# Patient Record
Sex: MALE | Race: WHITE | NOT HISPANIC OR LATINO | Employment: OTHER | URBAN - METROPOLITAN AREA
[De-identification: names, ages, dates, MRNs, and addresses within clinical notes are randomized per-mention and may not be internally consistent; named-entity substitution may affect disease eponyms.]

---

## 2017-01-06 ENCOUNTER — OFFICE VISIT (OUTPATIENT)
Dept: CARDIOLOGY | Facility: CLINIC | Age: 72
End: 2017-01-06
Attending: INTERNAL MEDICINE
Payer: COMMERCIAL

## 2017-01-06 VITALS
BODY MASS INDEX: 25.9 KG/M2 | DIASTOLIC BLOOD PRESSURE: 58 MMHG | HEIGHT: 71 IN | HEART RATE: 68 BPM | WEIGHT: 185 LBS | SYSTOLIC BLOOD PRESSURE: 118 MMHG

## 2017-01-06 DIAGNOSIS — I10 ESSENTIAL HYPERTENSION WITH GOAL BLOOD PRESSURE LESS THAN 140/90: Primary | ICD-10-CM

## 2017-01-06 DIAGNOSIS — E78.5 HYPERLIPIDEMIA WITH TARGET LDL LESS THAN 100: ICD-10-CM

## 2017-01-06 DIAGNOSIS — R07.89 ATYPICAL CHEST PAIN: ICD-10-CM

## 2017-01-06 DIAGNOSIS — I77.810 AORTIC ROOT DILATATION (H): ICD-10-CM

## 2017-01-06 DIAGNOSIS — I25.10 CORONARY ARTERY DISEASE INVOLVING NATIVE CORONARY ARTERY OF NATIVE HEART WITHOUT ANGINA PECTORIS: ICD-10-CM

## 2017-01-06 PROCEDURE — 99214 OFFICE O/P EST MOD 30 MIN: CPT | Performed by: INTERNAL MEDICINE

## 2017-01-06 RX ORDER — NITROGLYCERIN 0.4 MG/1
TABLET SUBLINGUAL
Qty: 25 TABLET | Refills: 3 | Status: SHIPPED | OUTPATIENT
Start: 2017-01-06 | End: 2020-08-07

## 2017-01-06 RX ORDER — ROSUVASTATIN CALCIUM 40 MG/1
40 TABLET, COATED ORAL DAILY
Qty: 90 TABLET | Refills: 3 | Status: SHIPPED | OUTPATIENT
Start: 2017-01-06 | End: 2017-05-30

## 2017-01-06 NOTE — MR AVS SNAPSHOT
After Visit Summary   1/6/2017    Javier Tamez    MRN: [id number]           Patient Information     Date Of Birth          1945        Visit Information        Provider Department      1/6/2017 3:15 PM Miguelito Sim MD [facility] AT [place]        Today's Diagnoses     Essential hypertension with goal blood pressure less than 140/90    -  1     CAD (coronary artery disease)         Aortic stenosis         Coronary artery disease involving native coronary artery of native heart without angina pectoris            Follow-ups after your visit        Your next 10 appointments already scheduled     Jan 17, 2017  9:30 AM   NM SH CV MPI MULT RST ST 1 DAY with SCINM1   [facility] CV Nuclear Medicine (Cardiovascular Imaging at [facility])    [address]  [address]  [place] [address] [phone]           For a ONE day exam: Allow 3-4 hours for test. For a TWO day exam: Allow 2 hours PER day for test.  You may need to stop some medicines before the test. Follow your doctor s orders. - If you take a beta blocker: Follow your doctor s specific instructions on taking it prior to and on the day of your exam. - If you take Aggrenox or dipyridamole (Persantine, Permole), stop taking it 48 hours before your test. - If you take Viagra, Cialis or Levitra, stop taking it 48 hours before your test. - If you take theophylline or aminophylline, stop taking it 12 hours before your test.  For patients with diabetes: - If you take insulin, call your diabetes care team. Ask if you should take a 1/2 dose the morning of your test. - If you take diabetes medicine by mouth, don t take it on the morning of your test. Bring it with you to take after the test. (If you have questions, call your diabetes care team.)  Do not take nitrates on the day of your test. Do not wear your Nitro-Patch.  Stop all caffeine 12 hours before the test. This includes  "coffee, tea, soda pop, chocolate and certain medicines (such as Anacin, Excedrin and NoDoz). Also avoid decaf coffee and tea, as these contain small amounts of caffeine.  No alcohol, smoking or other tobacco for 12 hours before the test.  Stop eating 3 hours before the test. You may drink water.  Please wear a loose two-piece outfit. If you will have an exercise test, bring rubber-soled walking shoes.  When you arrive, please tell us if you: - Have diabetes - Are breastfeeding - May be pregnant - Have a pacemaker of ICD (implantable defibrillator).  Please call your Imaging Department at your exam site with any questions.              Future tests that were ordered for you today     Open Future Orders        Priority Expected Expires Ordered    NM Exercise stress test (nuc card) Routine 1/13/2017 1/6/2018 1/6/2017            Who to contact     If you have questions or need follow up information about today's clinic visit or your schedule please contact [facility] PHYS[facility] HE[facility] AT [place] directly at [phone].  Normal or non-critical lab and imaging results will be communicated to you by Resort Gemshart, letter or phone within 4 business days after the clinic has received the results. If you do not hear from us within 7 days, please contact the clinic through Gamifyt or phone. If you have a critical or abnormal lab result, we will notify you by phone as soon as possible.  Submit refill requests through Iterasi or call your pharmacy and they will forward the refill request to us. Please allow 3 business days for your refill to be completed.          Additional Information About Your Visit        Resort Gemsharspotflux Information     Iterasi lets you send messages to your doctor, view your test results, renew your prescriptions, schedule appointments and more. To sign up, go to [email or web address] . Click on \"Log in\" on the left side of the screen, which will take you to the Welcome page. Then click on \"Sign up " "Now\" on the right side of the page.     You will be asked to enter the access code listed below, as well as some personal information. Please follow the directions to create your username and password.     Your access code is: [email or web address]  Expires: 2017  9:30 AM     Your access code will  in 90 days. If you need help or a new code, please call your [facility] clinic or [phone].        Care EveryWhere ID     This is your Care EveryWhere ID. This could be used by other organizations to access your [facility] medical records  [id number]        Your Vitals Were     Pulse Height BMI (Body Mass Index)             68 1.803 m (5' 11\") 25.81 kg/m2          Blood Pressure from Last 3 Encounters:   17 118/58   16 102/56   16 96/56    Weight from Last 3 Encounters:   17 83.915 kg (185 lb)   16 82.101 kg (181 lb)   16 81.194 kg (179 lb)              We Performed the Following     Follow-Up with Cardiologist          Today's Medication Changes          These changes are accurate as of: 17  4:35 PM.  If you have any questions, ask your nurse or doctor.               Start taking these medicines.        Dose/Directions    nitroglycerin 0.4 MG sublingual tablet   Commonly known as:  NITROSTAT   Used for:  Coronary artery disease involving native coronary artery of native heart without angina pectoris   Started by:  Miguelito Sim MD        For chest pain place 1 tablet under the tongue every 5 minutes for 3 doses. If symptoms persist 5 minutes after 1st dose call 911.   Quantity:  25 tablet   Refills:  3       rosuvastatin 40 MG tablet   Commonly known as:  CRESTOR   Used for:  Coronary artery disease involving native coronary artery of native heart without angina pectoris   Started by:  Miguelito Sim MD        Dose:  40 mg   Take 1 tablet (40 mg) by mouth daily   Quantity:  90 tablet   Refills:  3         Stop taking these medicines if you haven't already. Please " contact your care team if you have questions.     simvastatin 40 MG tablet   Commonly known as:  ZOCOR   Stopped by:  Miguelito Sim MD                Where to get your medicines      These medications were sent to [id number] - [facility] [address]  [address] [facility] [address]     Phone:  [phone]    - nitroglycerin 0.4 MG sublingual tablet  - rosuvastatin 40 MG tablet             Primary Care Provider Office Phone # Fax #    Guillermo Kong [phone]       [facility] [address]  [facility] [address]        Thank you!     Thank you for choosing [facility] AT [place]  for your care. Our goal is always to provide you with excellent care. Hearing back from our patients is one way we can continue to improve our services. Please take a few minutes to complete the written survey that you may receive in the mail after your visit with us. Thank you!             Your Updated Medication List - Protect others around you: Learn how to safely use, store and throw away your medicines at www.disposemymeds.org.          This list is accurate as of: 1/6/17  4:35 PM.  Always use your most recent med list.                   Brand Name Dispense Instructions for use    aspirin 81 MG tablet      Take 81 mg by mouth daily       cyanocobalamin 100 MCG Tabs tablet    vitamin  B-12     Take 1,000 mcg by mouth daily.       lisinopril 2.5 MG tablet    PRINIVIL/Zestril    90 tablet    Take 1 tablet (2.5 mg) by mouth daily       nitroglycerin 0.4 MG sublingual tablet    NITROSTAT    25 tablet    For chest pain place 1 tablet under the tongue every 5 minutes for 3 doses. If symptoms persist 5 minutes after 1st dose call 911.       rosuvastatin 40 MG tablet    CRESTOR    90 tablet    Take 1 tablet (40 mg) by mouth daily       tadalafil 5 MG tablet    CIALIS    15 tablet    Take 1 tablet (5 mg) by mouth daily Never use  with nitroglycerin, terazosin or doxazosin.       VITAMIN C PO      Take 1,000 mg by mouth daily.       VITAMIN D (CHOLECALCIFEROL) PO      Take 1,000 Units by mouth daily

## 2017-01-06 NOTE — Clinical Note
2017    Guillermo Kong MD  [facility]   [address]  [facility] [address]    RE: Javier Jaegeras       Dear Colleague,    I had the pleasure of seeing Javier Tamez in the [facility].    Mr. Tamez is a very nice 72-year-old gentleman with past medical history significant for hypercholesterolemia, a very strong family history of coronary artery disease with his father dying of myocardial infarction at age 62, however, his first heart attack was at age 39.  His grandfather  at age 35 of myocardial infarction and he now informs me that his brother at 68 just had a myocardial infarction for which he emergently received a stent in [place].      Mr. Tamez had an atypical chest pain which led to a stress echocardiogram in  that appeared to be completely normal.  Because of his high risk, we decided to go beyond that and we did a calcium score which unfortunately came back with a score of 4316, putting him in the top percentile for cardiac risk.  His echocardiogram also raised the question of an ascending aortic aneurysm at 3.9.  However, the CT scan showed that it was actually 3.6 x 3.7.  He also had multiple calcified granulomas.      We embarked on a very aggressive risk factor modification and medical management.      Mr. Tamez returns to clinic stating he thinks he is doing quite well.  He works out 3-4 times a week.  He does a combination of aerobic activity and resistance activity and states he has no exertional chest, arm, neck, jaw or shoulder discomfort with this activity.  He does, however, have a left arm pain that he describes it as kind of pain in his arm as an aching sensation that occurs at rest with no relationship to physical activity.  He states he is not sure if he hurt his elbow with his weight lifting.  He has actually backed off on his weights to see if this might improve.  Again, it is not all related to his physical activity,  but he is worried about it, especially with his brother's recent myocardial infarction.  He notes no side effects or problems with his current medical regimen.  He states his lisinopril was decreased from 5 mg to 2.5 mg by his primary care doctor and asks if this is okay.  He also asks about his calcium score, coronary artery disease, his prognosis and stress testing.     Outpatient Encounter Prescriptions as of 1/6/2017   Medication Sig Dispense Refill     rosuvastatin (CRESTOR) 40 MG tablet Take 1 tablet (40 mg) by mouth daily 90 tablet 3     nitroglycerin (NITROSTAT) 0.4 MG sublingual tablet For chest pain place 1 tablet under the tongue every 5 minutes for 3 doses. If symptoms persist 5 minutes after 1st dose call 911. 25 tablet 3     lisinopril (PRINIVIL,ZESTRIL) 2.5 MG tablet Take 1 tablet (2.5 mg) by mouth daily 90 tablet 3     tadalafil (CIALIS) 5 MG tablet Take 1 tablet (5 mg) by mouth daily Never use with nitroglycerin, terazosin or doxazosin. 15 tablet 11     VITAMIN D, CHOLECALCIFEROL, PO Take 1,000 Units by mouth daily       Ascorbic Acid (VITAMIN C PO) Take 1,000 mg by mouth daily.       aspirin 81 MG tablet Take 81 mg by mouth daily        cyanocobalamin (VITAMIN  B-12) 100 MCG TABS Take 1,000 mcg by mouth daily.       [DISCONTINUED] simvastatin (ZOCOR) 40 MG tablet Take 1 tablet (40 mg) by mouth At Bedtime 90 tablet 3     No facility-administered encounter medications on file as of 1/6/2017.      ASSESSMENT AND PLAN:  At this time, Bal arm pain is quite atypical; however, given the fact that he is a very high risk individual, I have suggested we do a stress test, and I will do a stress nuclear scan at this time to evaluate this further.  If it is negative, then we will just continue with aggressive risk factor modification.  If it is positive, then obviously we will go the Cath Lab for further evaluation and treatment.  We talked about the possibilities of medical management versus stenting  versus coronary artery bypass grafting.  He has many questions, but I think I answered most of them to his satisfaction.      We also talked about the differences between high calcium score and a negative stress test, how these things can be possible.  We talked about flow through the arteries and the fact that the calcium may be in the wall or outside the wall, and again I think I have explained this to his satisfaction.        Blood pressure is very well controlled at 118/58  clearly within the SPRINT range.  I thought that lisinopril at 2.5 appears to be doing an adequate job and he does not need to be on more.      Weight is 185 pounds, which gives him a body mass index of 25.9 with clothes on and I have told him this is a good range.      We looked at his fasting lipid profile.  It is good at 128 with an HDL of 57, LDL 62, triglycerides of 43.  I suggested we try to get even more aggressive with this and I will switch him from simvastatin to rosuvastatin 40 mg and we try to drive his LDL down further.  We will recheck a fasting lipid profile in 1 month.        Electrolytes within normal limits with a creatinine of 0.81, BUN of 12 and a potassium of 4.6 on his lisinopril.      He states he is planning on having right hip surgery this summer.  The stress nuclear scan will also serve as a preoperative evaluation.      He asked about the aneurysm and again I have explained that the CT scan measuring at 3.6 x 3.7 is within normal limits, granted at the upper limits of normal and that the echocardiogram is probably more of a tangential cut at 3.9 and he probably does not have an ascending aortic aneurysm.  I have told him we can look at this again down the road, probably would use an echocardiogram as opposed to repeating a CT scan, but at this time the treatment is just good treatment of his blood pressure, which he is already  on.      I congratulated him on his exercise regimen and encouraged him to continue.       We talked about Mediterranean style diet high in fresh fruits and vegetables, high fiber, whole grain, low in red meat and low in dairy products.  Further evaluation and treatment will depend on his nuclear scan and a followup of his fasting lipid profile.     Again, thank you for allowing me to participate in the care of your patient.      Sincerely,    Miguelito Sim MD     [facility]

## 2017-01-06 NOTE — PROGRESS NOTES
HPI and Plan:   See dictation    Orders Placed This Encounter   Procedures     NM Exercise stress test (nuc card)     Basic metabolic panel     Lipid Profile     Basic metabolic panel     Lipid Profile     Follow-Up with Cardiac Advanced Practice Provider     Follow-Up with Cardiologist       Orders Placed This Encounter   Medications     rosuvastatin (CRESTOR) 40 MG tablet     Sig: Take 1 tablet (40 mg) by mouth daily     Dispense:  90 tablet     Refill:  3     nitroglycerin (NITROSTAT) 0.4 MG sublingual tablet     Sig: For chest pain place 1 tablet under the tongue every 5 minutes for 3 doses. If symptoms persist 5 minutes after 1st dose call 911.     Dispense:  25 tablet     Refill:  3       Medications Discontinued During This Encounter   Medication Reason     simvastatin (ZOCOR) 40 MG tablet          Encounter Diagnoses   Name Primary?     Essential hypertension with goal blood pressure less than 140/90 Yes     Coronary artery disease involving native coronary artery of native heart without angina pectoris      Aortic root dilatation (H)      Hyperlipidemia with target LDL less than 100      Atypical chest pain        CURRENT MEDICATIONS:  Current Outpatient Prescriptions   Medication Sig Dispense Refill     rosuvastatin (CRESTOR) 40 MG tablet Take 1 tablet (40 mg) by mouth daily 90 tablet 3     nitroglycerin (NITROSTAT) 0.4 MG sublingual tablet For chest pain place 1 tablet under the tongue every 5 minutes for 3 doses. If symptoms persist 5 minutes after 1st dose call 911. 25 tablet 3     lisinopril (PRINIVIL,ZESTRIL) 2.5 MG tablet Take 1 tablet (2.5 mg) by mouth daily 90 tablet 3     tadalafil (CIALIS) 5 MG tablet Take 1 tablet (5 mg) by mouth daily Never use with nitroglycerin, terazosin or doxazosin. 15 tablet 11     VITAMIN D, CHOLECALCIFEROL, PO Take 1,000 Units by mouth daily       Ascorbic Acid (VITAMIN C PO) Take 1,000 mg by mouth daily.       aspirin 81 MG tablet Take 81 mg by mouth daily         cyanocobalamin (VITAMIN  B-12) 100 MCG TABS Take 1,000 mcg by mouth daily.         ALLERGIES   No Known Allergies    PAST MEDICAL HISTORY:  Past Medical History   Diagnosis Date     Aortic root dilatation (H)      Hyperlipidemia LDL goal <70      Family history of heart disease      Coronary artery disease      Other chronic pain      Arthritis      Aortic stenosis      Essential hypertension with goal blood pressure less than 140/90 2016       PAST SURGICAL HISTORY:  Past Surgical History   Procedure Laterality Date     Vasectomy       Colonoscopy       Adenomatous polyp removed     C nonspecific procedure       kidney stone       FAMILY HISTORY:  Family History   Problem Relation Age of Onset     C.A.D. Father       of MI at age 63, 1ST MI age 39     DIABETES Father      CEREBROVASCULAR DISEASE Mother      Stroke age 69     Breast Cancer Mother       age 75       SOCIAL HISTORY:  Social History     Social History     Marital Status:      Spouse Name: N/A     Number of Children: 4     Years of Education: N/A     Occupational History     [employer] sales Self          Social History Main Topics     Smoking status: Never Smoker      Smokeless tobacco: Never Used     Alcohol Use: Yes      Comment: 3-4 glasses of wine weekly     Drug Use: No     Sexual Activity: Yes     Birth Control/ Protection: Surgical     Other Topics Concern     Parent/Sibling W/ Cabg, Mi Or Angioplasty Before 65f 55m? Yes     father      Caffeine Concern Yes     2 cups coffee daily, occ soda      Sleep Concern No     Special Diet No     Exercise Yes     3-4 times weekly      Seat Belt Yes     Social History Narrative    Rides bicycle or goes to [facility] regularly (3x/week).            Review of Systems:  Skin:  Negative       Eyes:  Positive for glasses    ENT:  Negative      Respiratory:  Negative       Cardiovascular:  Negative      Gastroenterology: Positive for   IBS  Genitourinary:  Positive for  "erectile dysfunction;nocturia 1-2 times  Musculoskeletal:  Positive for joint pain;joint stiffness;arthritis pain in left arm \"very infrequent\" - does not last long and is not sharp   Neurologic:  Negative      Psychiatric:  Negative      Heme/Lymph/Imm:  Negative      Endocrine:  Negative        Physical Exam:  Vitals: /58 mmHg  Pulse 68  Ht 1.803 m (5' 11\")  Wt 83.915 kg (185 lb)  BMI 25.81 kg/m2    Constitutional:  cooperative, alert and oriented, well developed, well nourished, in no acute distress        Skin:  warm and dry to the touch, no apparent skin lesions or masses noted        Head:  normocephalic, no masses or lesions        Eyes:  pupils equal and round, conjunctivae and lids unremarkable, sclera white, no xanthalasma, EOMS intact, no nystagmus        ENT:  no pallor or cyanosis, dentition good        Neck:  carotid pulses are full and equal bilaterally;no carotid bruit        Chest:  normal breath sounds, clear to auscultation, normal A-P diameter, normal symmetry, normal respiratory excursion, no use of accessory muscles          Cardiac: regular rhythm;normal S1 and S2;no S3 or S4;no murmurs, gallops or rubs detected occasional premature beats                Abdomen:           Vascular: pulses full and equal                                        Extremities and Back:  no edema;no spinal abnormalities noted;normal muscle strength and tone              Neurological:  affect appropriate, oriented to time, person and place;no gross motor deficits              CC  Miguelito Sim MD  [facility]  [address]  [place] [address]                "

## 2017-01-07 NOTE — PROGRESS NOTES
HISTORY OF PRESENT ILLNESS:  Mr. Tamez is a very nice 72-year-old gentleman with past medical history significant for hypercholesterolemia, a very strong family history of coronary artery disease with his father dying of myocardial infarction at age 62, however, his first heart attack was at age 39.  His grandfather  at age 35 of myocardial infarction and he now informs me that his brother at 68 just had a myocardial infarction for which he emergently received a stent in [place].      Mr. Tamez had an atypical chest pain which led to a stress echocardiogram in  that appeared to be completely normal.  Because of his high risk, we decided to go beyond that and we did a calcium score which unfortunately came back with a score of 4316, putting him in the top percentile for cardiac risk.  His echocardiogram also raised the question of an ascending aortic aneurysm at 3.9.  However, the CT scan showed that it was actually 3.6 x 3.7.  He also had multiple calcified granulomas.      We embarked on a very aggressive risk factor modification and medical management.      Mr. Tamez returns to clinic stating he thinks he is doing quite well.  He works out 3-4 times a week.  He does a combination of aerobic activity and resistance activity and states he has no exertional chest, arm, neck, jaw or shoulder discomfort with this activity.  He does, however, have a left arm pain that he describes it as kind of pain in his arm as an aching sensation that occurs at rest with no relationship to physical activity.  He states he is not sure if he hurt his elbow with his weight lifting.  He has actually backed off on his weights to see if this might improve.  Again, it is not all related to his physical activity, but he is worried about it, especially with his brother's recent myocardial infarction.  He notes no side effects or problems with his current medical regimen.  He states his lisinopril was decreased from 5 mg to 2.5 mg by  his primary care doctor and asks if this is okay.  He also asks about his calcium score, coronary artery disease, his prognosis and stress testing.      ASSESSMENT AND PLAN:  At this time, Javier' arm pain is quite atypical; however, given the fact that he is a very high risk individual, I have suggested we do a stress test, and I will do a stress nuclear scan at this time to evaluate this further.  If it is negative, then we will just continue with aggressive risk factor modification.  If it is positive, then obviously we will go the Cath Lab for further evaluation and treatment.  We talked about the possibilities of medical management versus stenting versus coronary artery bypass grafting.  He has many questions, but I think I answered most of them to his satisfaction.      We also talked about the differences between high calcium score and a negative stress test, how these things can be possible.  We talked about flow through the arteries and the fact that the calcium may be in the wall or outside the wall, and again I think I have explained this to his satisfaction.        Blood pressure is very well controlled at 118/58  clearly within the SPRINT range.  I thought that lisinopril at 2.5 appears to be doing an adequate job and he does not need to be on more.      Weight is 185 pounds, which gives him a body mass index of 25.9 with clothes on and I have told him this is a good range.      We looked at his fasting lipid profile.  It is good at 128 with an HDL of 57, LDL 62, triglycerides of 43.  I suggested we try to get even more aggressive with this and I will switch him from simvastatin to rosuvastatin 40 mg and we try to drive his LDL down further.  We will recheck a fasting lipid profile in 1 month.        Electrolytes within normal limits with a creatinine of 0.81, BUN of 12 and a potassium of 4.6 on his lisinopril.      He states he is planning on having right hip surgery this summer.  The stress nuclear  scan will also serve as a preoperative evaluation.      He asked about the aneurysm and again I have explained that the CT scan measuring at 3.6 x 3.7 is within normal limits, granted at the upper limits of normal and that the echocardiogram is probably more of a tangential cut at 3.9 and he probably does not have an ascending aortic aneurysm.  I have told him we can look at this again down the road, probably would use an echocardiogram as opposed to repeating a CT scan, but at this time the treatment is just good treatment of his blood pressure, which he is already  on.      I congratulated him on his exercise regimen and encouraged him to continue.      We talked about Mediterranean style diet high in fresh fruits and vegetables, high fiber, whole grain, low in red meat and low in dairy products.  Further evaluation and treatment will depend on his nuclear scan and a followup of his fasting lipid profile.         FLAQUITO ANNA MD, [facility]             D: 2017 17:48   T: 2017 12:55   MT: LUCY      Name:     ANILA MEDINA   MRN:      [id number]        Account:      [id number]   :      1945           Service Date: 2017      Document: [id number]

## 2017-01-24 ENCOUNTER — HOSPITAL ENCOUNTER (OUTPATIENT)
Dept: CARDIOLOGY | Facility: CLINIC | Age: 72
Discharge: HOME | End: 2017-01-24
Attending: INTERNAL MEDICINE | Admitting: INTERNAL MEDICINE
Payer: MEDICARE

## 2017-01-24 ENCOUNTER — TELEPHONE (OUTPATIENT)
Dept: CARDIOLOGY | Facility: CLINIC | Age: 72
End: 2017-01-24

## 2017-01-24 DIAGNOSIS — I25.10 CORONARY ARTERY DISEASE INVOLVING NATIVE CORONARY ARTERY OF NATIVE HEART WITHOUT ANGINA PECTORIS: ICD-10-CM

## 2017-01-24 PROCEDURE — 34300033 ZZH RX 343: Performed by: INTERNAL MEDICINE

## 2017-01-24 PROCEDURE — 78452 HT MUSCLE IMAGE SPECT MULT: CPT | Mod: 26 | Performed by: INTERNAL MEDICINE

## 2017-01-24 PROCEDURE — 93018 CV STRESS TEST I&R ONLY: CPT | Performed by: INTERNAL MEDICINE

## 2017-01-24 PROCEDURE — A9502 TC99M TETROFOSMIN: HCPCS | Performed by: INTERNAL MEDICINE

## 2017-01-24 PROCEDURE — 93016 CV STRESS TEST SUPVJ ONLY: CPT | Performed by: INTERNAL MEDICINE

## 2017-01-24 PROCEDURE — 78452 HT MUSCLE IMAGE SPECT MULT: CPT

## 2017-01-24 RX ADMIN — TETROFOSMIN 9.86 MCI.: 0.23 INJECTION, POWDER, LYOPHILIZED, FOR SOLUTION INTRAVENOUS at 10:29

## 2017-01-24 RX ADMIN — TETROFOSMIN 3.6 MCI.: 0.23 INJECTION, POWDER, LYOPHILIZED, FOR SOLUTION INTRAVENOUS at 09:22

## 2017-01-24 NOTE — TELEPHONE ENCOUNTER
Nuclear stress test 1-24-17 - ordered at  1-6-17   1.  Myocardial perfusion imaging using single isotope technique  demonstrated normal myocardial perfusion at rest and poststress with  no evidence of significant exercise-induced ischemia or prior  myocardial infarction.   2. Gated images demonstrated normal left ventricular wall motion.  The  left ventricular systolic function is 55% at rest and 60% on the post  stress images.  [place]: Dr. Luis A Sim to review.

## 2017-03-21 ENCOUNTER — TELEPHONE (OUTPATIENT)
Dept: INTERNAL MEDICINE | Facility: CLINIC | Age: 72
End: 2017-03-21

## 2017-03-21 NOTE — TELEPHONE ENCOUNTER
Pt calls, asking if we have colonoscopy report from 2011. He has a follow up colonoscopy coming up, but concerned it may not be covered. He thinks he had an adenomatous polyp removed then and insurance would cover this after 2 years.     No records in EPIC or Care Everywhere regarding this.   Pt will check with [facility] to see if they have this information.

## 2017-03-23 ENCOUNTER — TRANSFERRED RECORDS (OUTPATIENT)
Dept: HEALTH INFORMATION MANAGEMENT | Facility: CLINIC | Age: 72
End: 2017-03-23

## 2017-04-04 ENCOUNTER — TELEPHONE (OUTPATIENT)
Dept: CARDIOLOGY | Facility: CLINIC | Age: 72
End: 2017-04-04

## 2017-04-04 DIAGNOSIS — I25.10 CORONARY ARTERY DISEASE INVOLVING NATIVE CORONARY ARTERY OF NATIVE HEART WITHOUT ANGINA PECTORIS: ICD-10-CM

## 2017-04-04 LAB
ALT SERPL W P-5'-P-CCNC: 37 U/L (ref 0–70)
CHOLEST SERPL-MCNC: 131 MG/DL
HDLC SERPL-MCNC: 72 MG/DL
LDLC SERPL CALC-MCNC: 51 MG/DL
NONHDLC SERPL-MCNC: 59 MG/DL
TRIGL SERPL-MCNC: 38 MG/DL

## 2017-04-04 PROCEDURE — 84460 ALANINE AMINO (ALT) (SGPT): CPT | Performed by: INTERNAL MEDICINE

## 2017-04-04 PROCEDURE — 80061 LIPID PANEL: CPT | Performed by: INTERNAL MEDICINE

## 2017-04-04 PROCEDURE — 36415 COLL VENOUS BLD VENIPUNCTURE: CPT | Performed by: INTERNAL MEDICINE

## 2017-04-04 NOTE — LETTER
April 10, 2017       TO: Javier Tamez   [address] [place] [address]       Dear Mr. Tamez,    The results of your recent labs were reviewed by Dr. Sim. Labs look much better. Continue with medication, diet and exercise.    Results for orders placed or performed in visit on 04/04/17   Lipid Profile   Result Value Ref Range    Cholesterol 131 <200 mg/dL    Triglycerides 38 <150 mg/dL    HDL Cholesterol 72 >39 mg/dL    LDL Cholesterol Calculated 51 <100 mg/dL    Non HDL Cholesterol 59 <130 mg/dL   ALT   Result Value Ref Range    ALT 37 0 - 70 U/L         Dr. Sim's Nurse Team 2.     [facility]

## 2017-04-04 NOTE — TELEPHONE ENCOUNTER
2 month FLP recheck after changing to rosuvastatin 40 mg daily at OV 1-6-17  Lab Results   Component Value Date    CHOL 131 04/04/2017     Lab Results   Component Value Date    HDL 72 04/04/2017     Lab Results   Component Value Date    LDL 51 04/04/2017     Lab Results   Component Value Date    TRIG 38 04/04/2017   ALT      37   Will message Dr. Sim

## 2017-04-25 DIAGNOSIS — N52.9 ERECTILE DYSFUNCTION, UNSPECIFIED ERECTILE DYSFUNCTION TYPE: ICD-10-CM

## 2017-04-25 RX ORDER — TADALAFIL 5 MG/1
5 TABLET ORAL DAILY
Qty: 15 TABLET | Refills: 5 | Status: ON HOLD | OUTPATIENT
Start: 2017-04-25 | End: 2017-10-24

## 2017-04-25 NOTE — TELEPHONE ENCOUNTER
Pt calls for refill of Cialis.     Cialis 5mg      Last Written Prescription Date: 3/31/16  Last Fill Quantity: 15,  # refills: 11   Last Office Visit with [facility], P or [facility] prescribing provider: 11/30/16                                             Prescription approved per [facility] Refill Protocol.

## 2017-05-02 ENCOUNTER — TRANSFERRED RECORDS (OUTPATIENT)
Dept: HEALTH INFORMATION MANAGEMENT | Facility: CLINIC | Age: 72
End: 2017-05-02

## 2017-05-22 ENCOUNTER — TELEPHONE (OUTPATIENT)
Dept: INTERNAL MEDICINE | Facility: CLINIC | Age: 72
End: 2017-05-22

## 2017-05-22 NOTE — TELEPHONE ENCOUNTER
[facility] request for 90 day supply conversion Request for simvastatin., requesting to Please send to pharmacy. Simvastatin is not on patients active medication list. (crestor is on medication list).  Is patient to be taking Simvastatin?  Provider please review and advise.

## 2017-05-24 NOTE — TELEPHONE ENCOUNTER
Dr Sim recommended changing from simvastatin to Crestor at patient's cardiology office visit on 1/6/2017.   Please check with patient to see whether he ever made this change, and if not, what the reason was to remain on simvastatin.

## 2017-05-26 ENCOUNTER — TELEPHONE (OUTPATIENT)
Dept: CARDIOLOGY | Facility: CLINIC | Age: 72
End: 2017-05-26

## 2017-05-26 DIAGNOSIS — I25.10 CORONARY ARTERY DISEASE INVOLVING NATIVE CORONARY ARTERY OF NATIVE HEART WITHOUT ANGINA PECTORIS: ICD-10-CM

## 2017-05-26 DIAGNOSIS — E78.00 PURE HYPERCHOLESTEROLEMIA: Primary | ICD-10-CM

## 2017-05-26 NOTE — TELEPHONE ENCOUNTER
Pt calls back. States that he is filling prescription for Crestor - this is being ordered by Dr. Sim's office and their office will follow up with the pharmacy to make sure this is what is ordered for him.

## 2017-05-26 NOTE — TELEPHONE ENCOUNTER
Numbers are better but would be even better on rosuvastatin. Is he doing anything different...exercising...lost weight..different diet?

## 2017-05-26 NOTE — TELEPHONE ENCOUNTER
Spoke with patient who reports that he did not change to Rosuvastatin as recommended at last OV in Jan and has remained on Simvastatin 40 mg daily. FLP's on 4-4-17 were:   Chol HDL LDL sherwin Chol/HDL TG   04/04/17 0807 131 72 51 -- 38   11/30/16 0945 128 57 62 -- 43   10/15/15 1218 143 65 70 2.2 42   Patient wondering if he should remain on the Simvastatin as currently taking or change to Rosuvastatin?.   Will message Dr. Sim.

## 2017-05-26 NOTE — TELEPHONE ENCOUNTER
Patient called to get clarification of his statin medication.  Reports he has been taking simvastatin since appt with Dr Sim back on January 6th.  He recalls that the medication was supposed to be changed and was inquiring why.      Per note:  We looked at his fasting lipid profile.  It is good at 128 with an HDL of 57, LDL 62, triglycerides of 43.  I suggested we try to get even more aggressive with this and I will switch him from simvastatin to rosuvastatin 40 mg and we try to drive his LDL down further.  We will recheck a fasting lipid profile in 1 month.     Prescription was sent to his pharmacy on 1/6/17 for rosuvastatin 40mg.  As indicated above patient has approximately 10 days left of his simvastatin that he is going to complete and he will then start new medication.  [facility] pharmacy called to clarify order sent back on 1/6/17, they indicated that medication was put on hold and simvastatin continued to be filled.  Reviewed order per Dr Sim from 1/6/17 and indicated this was current and patient is requesting that this be filled.  Message will be sent to team 2 so Dr Sim is informed that patient will be starting recommended statin early June to see if he would like to have another lipid panel done after new therapy started as lipid panel done 4/4/17 did not reflect numbers with new medication recommended.

## 2017-05-30 RX ORDER — ROSUVASTATIN CALCIUM 40 MG/1
40 TABLET, COATED ORAL DAILY
Qty: 90 TABLET | Refills: 2 | Status: SHIPPED | OUTPATIENT
Start: 2017-05-30 | End: 2018-01-12

## 2017-05-30 NOTE — TELEPHONE ENCOUNTER
Spoke with patient and Dr. Sim's message reply reviewed. Patient agrees to finish his current tablets of Simvastatin and then change to Rosuvastatin 40 mg daily with repeat lipids. Patient states he is not doing anything different in terms of diet, exercise or weight loss. Prescription to be e scribed into [facility].   Patient also states he is thinking about having hip surgery and may need to see a cardiologist for clearance. Patient will call back when/if hip surgery is scheduled.

## 2017-07-21 ENCOUNTER — TELEPHONE (OUTPATIENT)
Dept: CARDIOLOGY | Facility: CLINIC | Age: 72
End: 2017-07-21

## 2017-07-21 NOTE — TELEPHONE ENCOUNTER
Patient called, left message that he was having hip surgery 10-24-17 and wondering if medications should be added or changed and is he should be seen by Dr. Sim before?  Attempted to return message Cell # was incorrect and home phone just rang.

## 2017-07-24 ENCOUNTER — TELEPHONE (OUTPATIENT)
Dept: CARDIOLOGY | Facility: CLINIC | Age: 72
End: 2017-07-24

## 2017-07-24 NOTE — TELEPHONE ENCOUNTER
Patient called - left message requesting return call regarding hip surgery scheduled for OCt.  Message left to please call back.

## 2017-07-24 NOTE — TELEPHONE ENCOUNTER
Spoke with patient and transferred to scheduling for cardiac clearance pre surgery appointment with cardiologist.

## 2017-07-25 ENCOUNTER — TELEPHONE (OUTPATIENT)
Dept: CARDIOLOGY | Facility: CLINIC | Age: 72
End: 2017-07-25

## 2017-07-25 DIAGNOSIS — E78.00 PURE HYPERCHOLESTEROLEMIA: ICD-10-CM

## 2017-07-25 LAB
ALT SERPL W P-5'-P-CCNC: 50 U/L (ref 0–70)
CHOLEST SERPL-MCNC: 117 MG/DL
HDLC SERPL-MCNC: 65 MG/DL
LDLC SERPL CALC-MCNC: 44 MG/DL
NONHDLC SERPL-MCNC: 52 MG/DL
TRIGL SERPL-MCNC: 41 MG/DL

## 2017-07-25 PROCEDURE — 84460 ALANINE AMINO (ALT) (SGPT): CPT | Performed by: INTERNAL MEDICINE

## 2017-07-25 PROCEDURE — 36415 COLL VENOUS BLD VENIPUNCTURE: CPT | Performed by: INTERNAL MEDICINE

## 2017-07-25 PROCEDURE — 80061 LIPID PANEL: CPT | Performed by: INTERNAL MEDICINE

## 2017-07-25 NOTE — TELEPHONE ENCOUNTER
Labs to be reviewed by Dr. Sim  Next scheduled OV 9-28-17 with Dr. Sim  Result letter mailed and reminded of Sept OV.

## 2017-07-25 NOTE — LETTER
July 25, 2017       TO: Javier Tamez   [address] [place] [address]       Dear Mr. Tamez,    The results of your recent cholesterol labs look good. Continue with medication, diet and exercise.   Office visit 9-28-17 with Dr. Sim.     Results for orders placed or performed in visit on 07/25/17   Lipid Profile   Result Value Ref Range    Cholesterol 117 <200 mg/dL    Triglycerides 41 <150 mg/dL    HDL Cholesterol 65 >39 mg/dL    LDL Cholesterol Calculated 44 <100 mg/dL    Non HDL Cholesterol 52 <130 mg/dL   ALT   Result Value Ref Range    ALT 50 0 - 70 U/L         Sincerely,    [facility]

## 2017-07-26 ENCOUNTER — TELEPHONE (OUTPATIENT)
Dept: CARDIOLOGY | Facility: CLINIC | Age: 72
End: 2017-07-26

## 2017-07-26 NOTE — TELEPHONE ENCOUNTER
The literature shows that starting a beta blocker just before surgery actually increase his risk. Do not start beta blocker.

## 2017-07-26 NOTE — TELEPHONE ENCOUNTER
Patient called asking is he will need a BB started prior to upcoming Hip surgery 10-24-17 @ [facility]?  Patient has a cardiac clearance OV with Dr. Sim 9-28-17 and a general pre op clearance appointment with his PMD 9-29-17.

## 2017-08-30 ENCOUNTER — TELEPHONE (OUTPATIENT)
Dept: CARDIOLOGY | Facility: CLINIC | Age: 72
End: 2017-08-30

## 2017-08-30 NOTE — TELEPHONE ENCOUNTER
Patient has an upcoming OV w Dr. Sim on 9- and a physical with his PMD on 9- is having hip surgery in October and was wondering if both visits were necessary.  Reviewed with patient the importance of Cardiac clearance and H&P with the PMD is required for surgery.

## 2017-09-12 ENCOUNTER — PRE VISIT (OUTPATIENT)
Dept: CARDIOLOGY | Facility: CLINIC | Age: 72
End: 2017-09-12

## 2017-09-28 ENCOUNTER — OFFICE VISIT (OUTPATIENT)
Dept: CARDIOLOGY | Facility: CLINIC | Age: 72
End: 2017-09-28
Payer: COMMERCIAL

## 2017-09-28 VITALS
BODY MASS INDEX: 25.34 KG/M2 | HEART RATE: 84 BPM | HEIGHT: 71 IN | WEIGHT: 181 LBS | SYSTOLIC BLOOD PRESSURE: 100 MMHG | DIASTOLIC BLOOD PRESSURE: 58 MMHG

## 2017-09-28 DIAGNOSIS — R07.89 ATYPICAL CHEST PAIN: ICD-10-CM

## 2017-09-28 DIAGNOSIS — E78.00 HYPERCHOLESTEROLEMIA: ICD-10-CM

## 2017-09-28 DIAGNOSIS — I77.810 AORTIC ROOT DILATATION (H): ICD-10-CM

## 2017-09-28 DIAGNOSIS — I25.10 CORONARY ARTERY DISEASE INVOLVING NATIVE CORONARY ARTERY OF NATIVE HEART WITHOUT ANGINA PECTORIS: Primary | ICD-10-CM

## 2017-09-28 PROCEDURE — 99214 OFFICE O/P EST MOD 30 MIN: CPT | Mod: 25 | Performed by: INTERNAL MEDICINE

## 2017-09-28 PROCEDURE — 93000 ELECTROCARDIOGRAM COMPLETE: CPT | Performed by: INTERNAL MEDICINE

## 2017-09-28 NOTE — PROGRESS NOTES
HPI and Plan:   See dictation    Orders Placed This Encounter   Procedures     EKG 12-lead complete w/read - Clinics (performed today)       No orders of the defined types were placed in this encounter.      There are no discontinued medications.      Encounter Diagnoses   Name Primary?     Coronary artery disease involving native coronary artery of native heart without angina pectoris Yes     Aortic root dilatation (H)      Hypercholesterolemia      Atypical chest pain        CURRENT MEDICATIONS:  Current Outpatient Prescriptions   Medication Sig Dispense Refill     rosuvastatin (CRESTOR) 40 MG tablet Take 1 tablet (40 mg) by mouth daily 90 tablet 2     tadalafil (CIALIS) 5 MG tablet Take 1 tablet (5 mg) by mouth daily Never use with nitroglycerin, terazosin or doxazosin. 15 tablet 5     nitroglycerin (NITROSTAT) 0.4 MG sublingual tablet For chest pain place 1 tablet under the tongue every 5 minutes for 3 doses. If symptoms persist 5 minutes after 1st dose call 911. 25 tablet 3     lisinopril (PRINIVIL,ZESTRIL) 2.5 MG tablet Take 1 tablet (2.5 mg) by mouth daily 90 tablet 3     VITAMIN D, CHOLECALCIFEROL, PO Take 1,000 Units by mouth daily       Ascorbic Acid (VITAMIN C PO) Take 1,000 mg by mouth daily.       aspirin 81 MG tablet Take 81 mg by mouth daily        cyanocobalamin (VITAMIN  B-12) 100 MCG TABS Take 1,000 mcg by mouth daily.         ALLERGIES   No Known Allergies    PAST MEDICAL HISTORY:  Past Medical History:   Diagnosis Date     Aortic root dilatation (H)      Aortic stenosis      Arthritis      Coronary artery disease      Essential hypertension with goal blood pressure less than 140/90 11/26/2016     Family history of heart disease      Hyperlipidemia LDL goal <70      Other chronic pain        PAST SURGICAL HISTORY:  Past Surgical History:   Procedure Laterality Date     C NONSPECIFIC PROCEDURE  1990's    kidney stone     COLONOSCOPY  2011    Adenomatous polyp removed     VASECTOMY         FAMILY  "HISTORY:  Family History   Problem Relation Age of Onset     C.A.D. Father       of MI at age 63, 1ST MI age 39     DIABETES Father      CEREBROVASCULAR DISEASE Mother      Stroke age 69     Breast Cancer Mother       age 75       SOCIAL HISTORY:  Social History     Social History     Marital status:      Spouse name: N/A     Number of children: 4     Years of education: N/A     Occupational History     [employer] sales Self          Social History Main Topics     Smoking status: Never Smoker     Smokeless tobacco: Never Used     Alcohol use Yes      Comment: 3-4 glasses of wine weekly     Drug use: No     Sexual activity: Yes     Birth control/ protection: Surgical     Other Topics Concern     Parent/Sibling W/ Cabg, Mi Or Angioplasty Before 65f 55m? Yes     father      Caffeine Concern Yes     2 cups coffee daily, occ soda      Sleep Concern No     Special Diet No     Exercise Yes     3-4 times weekly      Seat Belt Yes     Social History Narrative    Rides bicycle or goes to [place] regularly (3x/week).            Review of Systems:  Skin:  Negative       Eyes:  Positive for glasses    ENT:  Negative      Respiratory:  Negative       Cardiovascular:  Negative      Gastroenterology: Positive for   IBS  Genitourinary:  Positive for erectile dysfunction;nocturia 1-2 times  Musculoskeletal:  Positive for joint pain;joint stiffness;arthritis right hip pain, surgery scheduled in Oct, 2017  Neurologic:  Positive for (TIA)      Psychiatric:  Negative      Heme/Lymph/Imm:  Negative      Endocrine:  Negative        Physical Exam:  Vitals: /58  Pulse 84  Ht 1.803 m (5' 11\")  Wt 82.1 kg (181 lb)  BMI 25.24 kg/m2    Constitutional:  cooperative, alert and oriented, well developed, well nourished, in no acute distress        Skin:  warm and dry to the touch, no apparent skin lesions or masses noted        Head:  normocephalic, no masses or lesions        Eyes:  pupils equal and round, " conjunctivae and lids unremarkable, sclera white, no xanthalasma, EOMS intact, no nystagmus        ENT:  no pallor or cyanosis, dentition good        Neck:  carotid pulses are full and equal bilaterally;no carotid bruit        Chest:  normal breath sounds, clear to auscultation, normal A-P diameter, normal symmetry, normal respiratory excursion, no use of accessory muscles          Cardiac: regular rhythm;normal S1 and S2;no S3 or S4;no murmurs, gallops or rubs detected                  Abdomen:           Vascular: pulses full and equal                                        Extremities and Back:  no edema;no spinal abnormalities noted;normal muscle strength and tone              Neurological:  affect appropriate, oriented to time, person and place;no gross motor deficits              CC  No referring provider defined for this encounter.

## 2017-09-28 NOTE — MR AVS SNAPSHOT
After Visit Summary   9/28/2017    Javier Tamez    MRN: [id number]           Patient Information     Date Of Birth          1945        Visit Information        Provider Department      9/28/2017 8:15 AM Miguelito Sim MD [facility]        Today's Diagnoses     Coronary artery disease involving native coronary artery of native heart without angina pectoris    -  1    Aortic root dilatation (H)        Hypercholesterolemia        Atypical chest pain           Follow-ups after your visit        Your next 10 appointments already scheduled     Sep 29, 2017  2:00 PM CDT   SHORT with Guillermo Kong MD   [facility] ([facility])    [address]  [facility] [id number] [phone]            Oct 24, 2017   Procedure with Meño Avalos MD   [facility] (--)    [address]  [facility] [id number] [phone]              Who to contact     If you have questions or need follow up information about today's clinic visit or your schedule please contact [facility] directly at [phone].  Normal or non-critical lab and imaging results will be communicated to you by MyChart, letter or phone within 4 business days after the clinic has received the results. If you do not hear from us within 7 days, please contact the clinic through MyChart or phone. If you have a critical or abnormal lab result, we will notify you by phone as soon as possible.  Submit refill requests through [facility] or call your pharmacy and they will forward the refill request to us. Please allow 3 business days for your refill to be completed.          Additional Information About Your Visit        MyChart Information     [facility] lets you send messages to your doctor, view your test results, renew your prescriptions, schedule appointments and more. To sign up, go  "to [email or web address] . Click on \"Log in\" on the left side of the screen, which will take you to the Welcome page. Then click on \"Sign up Now\" on the right side of the page.     You will be asked to enter the access code listed below, as well as some personal information. Please follow the directions to create your username and password.     Your access code is: [email or web address]  Expires: 2017  9:01 AM     Your access code will  in 90 days. If you need help or a new code, please call your [place] clinic or [phone].        Care EveryWhere ID     This is your Care EveryWhere ID. This could be used by other organizations to access your [place] medical records  [id number]        Your Vitals Were     Pulse Height BMI (Body Mass Index)             84 1.803 m (5' 11\") 25.24 kg/m2          Blood Pressure from Last 3 Encounters:   17 100/58   17 118/58   16 102/56    Weight from Last 3 Encounters:   17 82.1 kg (181 lb)   17 83.9 kg (185 lb)   16 82.1 kg (181 lb)              We Performed the Following     EKG 12-lead complete w/read - Clinics (performed today)        Primary Care Provider Office Phone # Fax #    Guillermo Kong [phone]       [address] [place] [address]        Equal Access to Services     [facility]: Hadii aad ku hadasho Soomaali, waaxda luqadaha, qaybta kaalmada adeegyada, waxay seble starkey . So [facility] [phone].    ATENCIÓN: Si habla español, tiene a crews disposición servicios gratuitos de asistencia lingüística. Llame al [phone].    We comply with applicable federal civil rights laws and [place] laws. We do not discriminate on the basis of race, color, national origin, age, disability sex, sexual orientation or gender identity.            Thank you!     Thank you for choosing [facility] [place]  for your care. Our goal is always to provide you " with excellent care. Hearing back from our patients is one way we can continue to improve our services. Please take a few minutes to complete the written survey that you may receive in the mail after your visit with us. Thank you!             Your Updated Medication List - Protect others around you: Learn how to safely use, store and throw away your medicines at www.disposemymeds.org.          This list is accurate as of: 9/28/17  9:01 AM.  Always use your most recent med list.                   Brand Name Dispense Instructions for use Diagnosis    aspirin 81 MG tablet      Take 81 mg by mouth daily        cyanocobalamin 100 MCG Tabs tablet    vitamin  B-12     Take 1,000 mcg by mouth daily.        lisinopril 2.5 MG tablet    PRINIVIL/Zestril    90 tablet    Take 1 tablet (2.5 mg) by mouth daily    Essential hypertension with goal blood pressure less than 140/90, Aortic root dilatation (H)       nitroGLYcerin 0.4 MG sublingual tablet    NITROSTAT    25 tablet    For chest pain place 1 tablet under the tongue every 5 minutes for 3 doses. If symptoms persist 5 minutes after 1st dose call 911.    Coronary artery disease involving native coronary artery of native heart without angina pectoris       rosuvastatin 40 MG tablet    CRESTOR    90 tablet    Take 1 tablet (40 mg) by mouth daily    Coronary artery disease involving native coronary artery of native heart without angina pectoris       tadalafil 5 MG tablet    CIALIS    15 tablet    Take 1 tablet (5 mg) by mouth daily Never use with nitroglycerin, terazosin or doxazosin.    Erectile dysfunction, unspecified erectile dysfunction type       VITAMIN C PO      Take 1,000 mg by mouth daily.        VITAMIN D (CHOLECALCIFEROL) PO      Take 1,000 Units by mouth daily

## 2017-09-28 NOTE — LETTER
2017    Guillermo Kong MD  [address]  [facility] [address]    RE: Javier Tamez       Dear Colleague,    I had the pleasure of seeing Javier Tamez in the [facility].    Mr. Tamez is a very nice 72-year-old gentleman with past medical history significant for hypercholesterolemia, a very strong family history of coronary artery disease with his father dying of myocardial infarction at age 62, however, his first heart attack was age 40.  His grandfather  at age 35 and a brother at 68 had a myocardial infarction for which he received a stent in [place].      Prior workup includes a stress echocardiogram in  that appeared to be completely normal.  It did raise the question of an ascending aortic aneurysm.  To evaluate further, we did a calcium score which unfortunately came back at 4316, putting him in the top 1% for cardiac risk.  CT scan showed that his ascending aorta measured 3.6 x 3.7.  He had multiple calcified granulomas and we embarked on a very aggressive risk factor modification and medical management.      When I saw Mr. Tamez back this past January, he was having an atypical chest discomfort.  We performed a stress nuclear scan for which he was able to exercise 9 minutes of standard Silvano protocol.  He had no symptoms, no EKG changes.  Perfusion appeared to be normal.      Mr. Tamez is retiring in the next week and has decided to get his hip fixed.  He was just delaying this until he was retired.  He states the hip has also gotten much worse in the last 6 months or so.  It markedly interferes with his ability to exercise.  He is not running at all and in training his replacement this has taken up a great deal of time and he just has not even been walking or riding his bike very much.  He states he has had no chest, arm, neck, jaw or shoulder discomfort.  No dyspnea on exertion, orthopnea or PND.  No palpitations, lightheadedness, dizziness,  syncope or near-syncope.      He is concerned about his aorta.     Outpatient Encounter Prescriptions as of 9/28/2017   Medication Sig Dispense Refill     rosuvastatin (CRESTOR) 40 MG tablet Take 1 tablet (40 mg) by mouth daily 90 tablet 2     tadalafil (CIALIS) 5 MG tablet Take 1 tablet (5 mg) by mouth daily Never use with nitroglycerin, terazosin or doxazosin. 15 tablet 5     nitroglycerin (NITROSTAT) 0.4 MG sublingual tablet For chest pain place 1 tablet under the tongue every 5 minutes for 3 doses. If symptoms persist 5 minutes after 1st dose call 911. 25 tablet 3     lisinopril (PRINIVIL,ZESTRIL) 2.5 MG tablet Take 1 tablet (2.5 mg) by mouth daily 90 tablet 3     VITAMIN D, CHOLECALCIFEROL, PO Take 1,000 Units by mouth daily       Ascorbic Acid (VITAMIN C PO) Take 1,000 mg by mouth daily.       aspirin 81 MG tablet Take 81 mg by mouth daily        cyanocobalamin (VITAMIN  B-12) 100 MCG TABS Take 1,000 mcg by mouth daily.       No facility-administered encounter medications on file as of 9/28/2017.       ASSESSMENT AND PLAN:  Mr. Tamez appears to be doing quite well from a cardiac standpoint without clinical evidence of ischemia, heart failure or significant arrhythmia.  His stress nuclear scan in January of this year looks quite normal at a high workload and I do not think we need to do any further evaluation.  EKG today is normal.  He can proceed with his hip surgery.      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      Blood pressure is very well controlled at 100/58 with a pulse of 84.  He is not on a beta blocker, but with his low blood pressure which at times he recorded in the mid 90s I would not initiate beta blockade.      Weight is 181 pounds, giving him a body mass index of 25.      Fasting lipid profile is outstanding.  Total cholesterol is 117, HDL is 65, LDL is 44, triglycerides of 41.  We will continue his medical regimen as is.      I  have encouraged him to go ahead with his hip surgery in an effort to get him back to a regular exercise regimen, which he needs for his cardiovascular well-being.      We talked about his aneurysm.  I told him the echo overestimated the amount that was dilated.  His aorta is borderline dilated by CT scan from 02/2015.  I will repeat this in a year or two, but I have told him it was not dilated on the more accurate study and that his blood pressure is very well controlled.      Thank you for allowing me to participate in his care.     Sincerely,    Miguelito Sim MD     [facility]

## 2017-09-28 NOTE — PROGRESS NOTES
HISTORY OF PRESENT ILLNESS:  Mr. Tamez is a very nice 72-year-old gentleman with past medical history significant for hypercholesterolemia, a very strong family history of coronary artery disease with his father dying of myocardial infarction at age 62, however, his first heart attack was age 40.  His grandfather  at age 35 and a brother at 68 had a myocardial infarction for which he received a stent in [place].      Prior workup includes a stress echocardiogram in  that appeared to be completely normal.  It did raise the question of an ascending aortic aneurysm.  To evaluate further, we did a calcium score which unfortunately came back at 4316, putting him in the top 1% for cardiac risk.  CT scan showed that his ascending aorta measured 3.6 x 3.7.  He had multiple calcified granulomas and we embarked on a very aggressive risk factor modification and medical management.      When I saw Mr. Tamez back this past January, he was having an atypical chest discomfort.  We performed a stress nuclear scan for which he was able to exercise 9 minutes of standard Silvano protocol.  He had no symptoms, no EKG changes.  Perfusion appeared to be normal.      Mr. Tamez is retiring in the next week and has decided to get his hip fixed.  He was just delaying this until he was retired.  He states the hip has also gotten much worse in the last 6 months or so.  It markedly interferes with his ability to exercise.  He is not running at all and in training his replacement this has taken up a great deal of time and he just has not even been walking or riding his bike very much.  He states he has had no chest, arm, neck, jaw or shoulder discomfort.  No dyspnea on exertion, orthopnea or PND.  No palpitations, lightheadedness, dizziness, syncope or near-syncope.      He is concerned about his aorta.      ASSESSMENT AND PLAN:  Mr. Tamez appears to be doing quite well from a cardiac standpoint without clinical evidence of ischemia,  heart failure or significant arrhythmia.  His stress nuclear scan in January of this year looks quite normal at a high workload and I do not think we need to do any further evaluation.  EKG today is normal.  He can proceed with his hip surgery.      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      Blood pressure is very well controlled at 100/58 with a pulse of 84.  He is not on a beta blocker, but with his low blood pressure which at times he recorded in the mid 90s I would not initiate beta blockade.      Weight is 181 pounds, giving him a body mass index of 25.      Fasting lipid profile is outstanding.  Total cholesterol is 117, HDL is 65, LDL is 44, triglycerides of 41.  We will continue his medical regimen as is.      I have encouraged him to go ahead with his hip surgery in an effort to get him back to a regular exercise regimen, which he needs for his cardiovascular well-being.      We talked about his aneurysm.  I told him the echo overestimated the amount that was dilated.  His aorta is borderline dilated by CT scan from 2015.  I will repeat this in a year or two, but I have told him it was not dilated on the more accurate study and that his blood pressure is very well controlled.      Thank you for allowing me to participate in his care.         FLAQUITO ANNA MD, [facility]             D: 2017 09:01   T: 2017 11:14   MT: [id number]      Name:     ANILA MEDINA   MRN:      [id number]        Account:      [id number]   :      1945           Service Date: 2017      Document: [id number]

## 2017-09-29 ENCOUNTER — OFFICE VISIT (OUTPATIENT)
Dept: INTERNAL MEDICINE | Facility: CLINIC | Age: 72
End: 2017-09-29
Payer: COMMERCIAL

## 2017-09-29 VITALS
OXYGEN SATURATION: 93 % | HEART RATE: 124 BPM | HEIGHT: 71 IN | DIASTOLIC BLOOD PRESSURE: 50 MMHG | TEMPERATURE: 98.4 F | SYSTOLIC BLOOD PRESSURE: 92 MMHG | WEIGHT: 181.7 LBS | BODY MASS INDEX: 25.44 KG/M2

## 2017-09-29 DIAGNOSIS — M16.11 PRIMARY OSTEOARTHRITIS OF RIGHT HIP: ICD-10-CM

## 2017-09-29 DIAGNOSIS — I77.810 AORTIC ROOT DILATATION (H): ICD-10-CM

## 2017-09-29 DIAGNOSIS — I25.10 CORONARY ARTERY DISEASE INVOLVING NATIVE CORONARY ARTERY OF NATIVE HEART WITHOUT ANGINA PECTORIS: ICD-10-CM

## 2017-09-29 DIAGNOSIS — Z01.818 PREOP GENERAL PHYSICAL EXAM: Primary | ICD-10-CM

## 2017-09-29 LAB
ABO + RH BLD: NORMAL
ABO + RH BLD: NORMAL
ANION GAP SERPL CALCULATED.3IONS-SCNC: 7 MMOL/L (ref 3–14)
BLD GP AB SCN SERPL QL: NORMAL
BLOOD BANK CMNT PATIENT-IMP: NORMAL
BUN SERPL-MCNC: 13 MG/DL (ref 7–30)
CALCIUM SERPL-MCNC: 9.2 MG/DL (ref 8.5–10.1)
CHLORIDE SERPL-SCNC: 104 MMOL/L (ref 94–109)
CO2 SERPL-SCNC: 29 MMOL/L (ref 20–32)
CREAT SERPL-MCNC: 0.97 MG/DL (ref 0.66–1.25)
ERYTHROCYTE [DISTWIDTH] IN BLOOD BY AUTOMATED COUNT: 12.8 % (ref 10–15)
GFR SERPL CREATININE-BSD FRML MDRD: 76 ML/MIN/1.7M2
GLUCOSE SERPL-MCNC: 96 MG/DL (ref 70–99)
HCT VFR BLD AUTO: 47.4 % (ref 40–53)
HGB BLD-MCNC: 16.4 G/DL (ref 13.3–17.7)
MCH RBC QN AUTO: 32.4 PG (ref 26.5–33)
MCHC RBC AUTO-ENTMCNC: 34.6 G/DL (ref 31.5–36.5)
MCV RBC AUTO: 94 FL (ref 78–100)
PLATELET # BLD AUTO: 209 10E9/L (ref 150–450)
POTASSIUM SERPL-SCNC: 4.3 MMOL/L (ref 3.4–5.3)
RBC # BLD AUTO: 5.06 10E12/L (ref 4.4–5.9)
SODIUM SERPL-SCNC: 140 MMOL/L (ref 133–144)
SPECIMEN EXP DATE BLD: NORMAL
WBC # BLD AUTO: 7.8 10E9/L (ref 4–11)

## 2017-09-29 PROCEDURE — 99214 OFFICE O/P EST MOD 30 MIN: CPT | Performed by: INTERNAL MEDICINE

## 2017-09-29 PROCEDURE — 85027 COMPLETE CBC AUTOMATED: CPT | Performed by: INTERNAL MEDICINE

## 2017-09-29 PROCEDURE — 80048 BASIC METABOLIC PNL TOTAL CA: CPT | Performed by: INTERNAL MEDICINE

## 2017-09-29 PROCEDURE — 36415 COLL VENOUS BLD VENIPUNCTURE: CPT | Performed by: INTERNAL MEDICINE

## 2017-09-29 PROCEDURE — 86901 BLOOD TYPING SEROLOGIC RH(D): CPT | Performed by: INTERNAL MEDICINE

## 2017-09-29 PROCEDURE — 86850 RBC ANTIBODY SCREEN: CPT | Performed by: INTERNAL MEDICINE

## 2017-09-29 PROCEDURE — 86900 BLOOD TYPING SEROLOGIC ABO: CPT | Performed by: INTERNAL MEDICINE

## 2017-09-29 NOTE — PROGRESS NOTES
[place] [address]  [facility] [address]  [phone]  Dept: [phone]    PRE-OP EVALUATION:  Today's date: 2017    Javier Tamez (: 1945) presents for pre-operative evaluation assessment as requested by Dr. Galvez.  He requires evaluation and anesthesia risk assessment prior to undergoing surgery/procedure for treatment of Arthroplasty Hip, Anterior .  Proposed procedure: surgery    Date of Surgery/ Procedure: 10/24/2017  Time of Surgery/ Procedure: 7:30am  Hospital/Surgical Facility:[facility]  Fax number for surgical facility:   Primary Physician: Guillermo Kong  Type of Anesthesia Anticipated: General    Patient has a Health Care Directive or Living Will:  NO    Preop Questions 2017   1.  Do you have a history of heart attack, stroke, stent, bypass or surgery on an artery in the head, neck, heart or legs? YES - Remote history of TIA   2.  Do you ever have any pain or discomfort in your chest? YES - See below   3.  Do you have a history of  Heart Failure? No   4.   Are you troubled by shortness of breath when:  walking on a level surface, or up a slight hill, or at night? No   5.  Do you currently have a cold, bronchitis or other respiratory infection? No   6.  Do you have a cough, shortness of breath, or wheezing? No   7.  Do you sometimes get pains in the calves of your legs when you walk? No   8. Do you or anyone in your family have previous history of blood clots? No   9.  Do you or does anyone in your family have a serious bleeding problem such as prolonged bleeding following surgeries or cuts? No   10. Have you ever had problems with anemia or been told to take iron pills? No   11. Have you had any abnormal blood loss such as black, tarry or bloody stools? No   12. Have you ever had a blood transfusion? No   13. Have you or any of your relatives ever had problems with anesthesia? No   14. Do you have sleep apnea, excessive snoring or  "daytime drowsiness? No   15. Do you have any prosthetic heart valves? No   16. Do you have prosthetic joints? No     HPI:                                                      Brief HPI related to upcoming procedure: anterior right hip arthroplasty    Surgery is on 10/24/2017 by Dr. Hernandez at [facility]. Patient reports occasional left hip discomfort.     CAD  TIA symptoms occurred 25 years ago. Patient has not experienced any similar symptoms since. He saw Dr. Sim 9/28/2017. EKG from 9/28/2017 and nuclear stress test from 1/24/2017 were normal.     Patient never had any stents or bypasses placed. He experiences sporadic \"twinges\" of fleeting sensations in his chest, arm or jaw. Symptoms last less than a second. He states that symptoms are noticeable, but not disruptive or worrying. Episodes do not make him sweaty or short of breath. Symptoms do not come on with exertion.     See problem list for active medical problems.  Problems all longstanding and stable, except as noted/documented.  See ROS for pertinent symptoms related to these conditions.                                                                                                  .    MEDICAL HISTORY:                                                    Patient Active Problem List    Diagnosis Date Noted     Aortic stenosis      Priority: Medium     Coronary artery disease involving native coronary artery without angina pectoris 10/15/2015     Priority: Medium     Atypical chest pain 01/28/2015     Priority: Medium     Aortic root dilatation (H) 01/03/2014     Priority: Medium     Hypercholesterolemia 01/03/2014     Priority: Medium     Diagnosis updated by automated process. Provider to review and confirm.       Adenomatous polyp 01/03/2014     Priority: Medium     IBS (irritable bowel syndrome) 05/22/2013     Priority: Medium      Past Medical History:   Diagnosis Date     Aortic root dilatation (H)      Aortic stenosis      Arthritis      Coronary " artery disease      Essential hypertension with goal blood pressure less than 140/90 11/26/2016     Family history of heart disease      Hyperlipidemia LDL goal <70      Other chronic pain      Past Surgical History:   Procedure Laterality Date     C NONSPECIFIC PROCEDURE  1990's    kidney stone     COLONOSCOPY  2011    Adenomatous polyp removed     VASECTOMY       Current Outpatient Prescriptions   Medication Sig Dispense Refill     rosuvastatin (CRESTOR) 40 MG tablet Take 1 tablet (40 mg) by mouth daily 90 tablet 2     tadalafil (CIALIS) 5 MG tablet Take 1 tablet (5 mg) by mouth daily Never use with nitroglycerin, terazosin or doxazosin. 15 tablet 5     nitroglycerin (NITROSTAT) 0.4 MG sublingual tablet For chest pain place 1 tablet under the tongue every 5 minutes for 3 doses. If symptoms persist 5 minutes after 1st dose call 911. 25 tablet 3     lisinopril (PRINIVIL,ZESTRIL) 2.5 MG tablet Take 1 tablet (2.5 mg) by mouth daily 90 tablet 3     VITAMIN D, CHOLECALCIFEROL, PO Take 1,000 Units by mouth daily       Ascorbic Acid (VITAMIN C PO) Take 1,000 mg by mouth daily.       aspirin 81 MG tablet Take 81 mg by mouth daily        cyanocobalamin (VITAMIN  B-12) 100 MCG TABS Take 1,000 mcg by mouth daily.       OTC products: None, except as noted above    No Known Allergies   Latex Allergy: NO    Social History   Substance Use Topics     Smoking status: Never Smoker     Smokeless tobacco: Never Used     Alcohol use Yes      Comment: 3-4 glasses of wine weekly     History   Drug Use No     REVIEW OF SYSTEMS:                                                    C: NEGATIVE for fever, chills, change in weight  I: NEGATIVE for worrisome rashes, moles or lesions  E: NEGATIVE for vision changes or irritation  E/M: NEGATIVE for ear, mouth and throat problems  R: NEGATIVE for significant cough or SOB  B: NEGATIVE for masses, tenderness or discharge  CV: NEGATIVE for chest pain, palpitations or peripheral edema  GI: POSITIVE for  "occasional constipation NEGATIVE for nausea, abdominal pain, heartburn, or change in bowel habits  : NEGATIVE for frequency, dysuria, or hematuria  M: NEGATIVE for significant arthralgias or myalgia  N: NEGATIVE for weakness, dizziness or paresthesias  E: NEGATIVE for temperature intolerance, skin/hair changes  H: NEGATIVE for bleeding problems  P: NEGATIVE for changes in mood or affect    This document serves as a record of the services and decisions personally performed and made by Guillermo Kong MD. It was created on his behalf by Qi Palomino, a trained medical scribe. The creation of this document is based on the provider's statements to the medical scribe.  Qi Palomino September 29, 2017 2:57 PM     EXAM:                                                    BP 92/50 (BP Location: Right arm, Patient Position: Sitting, Cuff Size: Adult Regular)  Pulse 124  Temp 98.4  F (36.9  C) (Oral)  Ht 1.803 m (5' 11\")  Wt 82.4 kg (181 lb 11.2 oz)  SpO2 93%  BMI 25.34 kg/m2      GENERAL APPEARANCE: healthy, alert and no distress     EYES: EOMI,  PERRL     HENT: ear canals and TM's normal and nose and mouth without ulcers or lesions     NECK: no adenopathy, no asymmetry, masses, or scars and thyroid normal to palpation     RESP: lungs clear to auscultation - no rales, rhonchi or wheezes     CV: regular rates and rhythm, normal S1 S2, no S3 or S4 and no murmur, click or rub     ABDOMEN:  soft, nontender, no HSM or masses and bowel sounds normal     MS: extremities normal- no gross deformities noted, no evidence of inflammation in joints, FROM in all extremities.     SKIN: no suspicious lesions or rashes     NEURO: Normal strength and tone, sensory exam grossly normal, mentation intact and speech normal     PSYCH: mentation appears normal. and affect normal/bright     LYMPHATICS: No axillary, cervical, or supraclavicular nodes    DIAGNOSTICS:                                                      Labs Drawn and in " Process:   Unresulted Labs Ordered in the Past 30 Days of this Admission     Date and Time Order Name Status Description    9/29/2017 1453 CBC WITH PLATELETS In process     9/29/2017 1453 ABO/RH TYPE AND SCREEN In process     9/29/2017 1453 BASIC METABOLIC PANEL In process           Recent Labs   Lab Test  11/30/16   0945  03/31/16   1744   HGB  15.9  16.1   PLT  212  211   NA  144  142   POTASSIUM  4.6  4.2   CR  0.81  0.80      IMPRESSION:                                                    Reason for surgery/procedure: anterior right hip arthroplasty  Diagnosis/reason for consult: Clearance for surgery    The proposed surgical procedure is considered INTERMEDIATE risk.    REVISED CARDIAC RISK INDEX  The patient has the following serious cardiovascular risks for perioperative complications such as (MI, PE, VFib and 3  AV Block):  No serious cardiac risks  INTERPRETATION: 0 risks: Class I (very low risk - 0.4% complication rate)    The patient has the following additional risks for perioperative complications:  No identified additional risks      ICD-10-CM    1. Preop general physical exam Z01.818 Basic metabolic panel     ABO/Rh type and screen     CBC with platelets   2. Primary osteoarthritis of right hip M16.11    3. Coronary artery disease involving native coronary artery of native heart without angina pectoris I25.10    4. Aortic root dilatation (H) I77.810        RECOMMENDATIONS:                                                      --Consult hospital rounder / IM to assist post-op medical management    APPROVAL GIVEN to proceed with proposed procedure, without further diagnostic evaluation     Okay to go ahead with surgery as planned.   Stop by the lab ([address]) for preop labs.   Okay to take lisinopril the night before surgery (with your Crestor) instead of the morning of surgery.   The information in this document, created by the medical scribe for me, accurately reflects the services I personally  performed and the decisions made by me. I have reviewed and approved this document for accuracy prior to leaving the patient care area.  September 29, 2017 2:42 PM    Signed Electronically by: Guillermo Kong MD,     Copy of this evaluation report is provided to requesting physician.    [facility] Preop Guidelines

## 2017-09-29 NOTE — NURSING NOTE
"Chief Complaint   Patient presents with     Pre-Op Exam     Hip replacement       Initial BP 92/50 (BP Location: Right arm, Patient Position: Sitting, Cuff Size: Adult Regular)  Pulse 124  Temp 98.4  F (36.9  C) (Oral)  Ht 5' 11\" (1.803 m)  Wt 181 lb 11.2 oz (82.4 kg)  SpO2 93%  BMI 25.34 kg/m2 Estimated body mass index is 25.34 kg/(m^2) as calculated from the following:    Height as of this encounter: 5' 11\" (1.803 m).    Weight as of this encounter: 181 lb 11.2 oz (82.4 kg).  Medication Reconciliation: complete   Shabbir Meeks MA    "

## 2017-09-29 NOTE — PATIENT INSTRUCTIONS
Before Your Surgery      Call your surgeon if there is any change in your health. This includes signs of a cold or flu (such as a sore throat, runny nose, cough, rash or fever).    Do not smoke, drink alcohol or take over the counter medicine (unless your surgeon or primary care doctor tells you to) for the 24 hours before and after surgery.    If you take prescribed drugs: Follow your doctor s orders about which medicines to take and which to stop until after surgery.    Eating and drinking prior to surgery: follow the instructions from your surgeon    Take a shower or bath the night before surgery. Use the soap your surgeon gave you to gently clean your skin. If you do not have soap from your surgeon, use your regular soap. Do not shave or scrub the surgery site.  Wear clean pajamas and have clean sheets on your bed.         Okay to go ahead with surgery as planned.   Stop by the lab ([address]) for preop labs.   Okay to take lisinopril the night before surgery (with your Crestor) instead of the morning of surgery.

## 2017-09-29 NOTE — MR AVS SNAPSHOT
After Visit Summary   9/29/2017    Javier Tamez    MRN: [id number]           Patient Information     Date Of Birth          1945        Visit Information        Provider Department      9/29/2017 2:00 PM [facility]        Today's Diagnoses     Preop general physical exam    -  1    Primary osteoarthritis of right hip        Coronary artery disease involving native coronary artery of native heart without angina pectoris        Aortic root dilatation (H)          Care Instructions      Before Your Surgery      Call your surgeon if there is any change in your health. This includes signs of a cold or flu (such as a sore throat, runny nose, cough, rash or fever).    Do not smoke, drink alcohol or take over the counter medicine (unless your surgeon or primary care doctor tells you to) for the 24 hours before and after surgery.    If you take prescribed drugs: Follow your doctor s orders about which medicines to take and which to stop until after surgery.    Eating and drinking prior to surgery: follow the instructions from your surgeon    Take a shower or bath the night before surgery. Use the soap your surgeon gave you to gently clean your skin. If you do not have soap from your surgeon, use your regular soap. Do not shave or scrub the surgery site.  Wear clean pajamas and have clean sheets on your bed.         Okay to go ahead with surgery as planned.   Stop by the lab ([address]) for preop labs.   Okay to take lisinopril the night before surgery (with your Crestor) instead of the morning of surgery.           Follow-ups after your visit        Your next 10 appointments already scheduled     Oct 24, 2017   Procedure with Meño Avalos MD   [facility] PeriOP Services (--)    [address]  [place] [address] [phone]              Who to contact     If you have questions or need follow up information about today's clinic visit or  "your schedule please contact [facility] directly at [phone].  Normal or non-critical lab and imaging results will be communicated to you by MyChart, letter or phone within 4 business days after the clinic has received the results. If you do not hear from us within 7 days, please contact the clinic through [employer] or phone. If you have a critical or abnormal lab result, we will notify you by phone as soon as possible.  Submit refill requests through [employer] or call your pharmacy and they will forward the refill request to us. Please allow 3 business days for your refill to be completed.          Additional Information About Your Visit        [employer] Information     Threadflip lets you send messages to your doctor, view your test results, renew your prescriptions, schedule appointments and more. To sign up, go to [email or web address] . Click on \"Log in\" on the left side of the screen, which will take you to the Welcome page. Then click on \"Sign up Now\" on the right side of the page.     You will be asked to enter the access code listed below, as well as some personal information. Please follow the directions to create your username and password.     Your access code is: [email or web address]  Expires: 2017  9:01 AM     Your access code will  in 90 days. If you need help or a new code, please call your [place] clinic or [phone].        Care EveryWhere ID     This is your Care EveryWhere ID. This could be used by other organizations to access your [place] medical records  [id number]        Your Vitals Were     Pulse Temperature Height Pulse Oximetry BMI (Body Mass Index)       124 98.4  F (36.9  C) (Oral) 5' 11\" (1.803 m) 93% 25.34 kg/m2        Blood Pressure from Last 3 Encounters:   17 92/50   17 100/58   17 118/58    Weight from Last 3 Encounters:   17 181 lb 11.2 oz (82.4 kg)   17 181 lb (82.1 kg)   17 185 lb (83.9 kg)              We Performed " the Following     ABO/Rh type and screen     Basic metabolic panel     CBC with platelets        Primary Care Provider Office Phone # Fax #    Guillermo Kong [phone]       [address] [facility] [address]  [facility] [address]        Equal Access to Services     NICKNICOLETTE ISAEL : Hadii aad ku hadmoono Soomaali, waaxda luqadaha, qaybta kaalmada adeegyada, waxay idiin hayaan adebeltran quiñonez laAlexischet . So [facility] [phone].    ATENCIÓN: Si habla español, tiene a crews disposición servicios gratuitos de asistencia lingüística. Llame al [phone].    We comply with applicable federal civil rights laws and [place] laws. We do not discriminate on the basis of race, color, national origin, age, disability, sex, sexual orientation, or gender identity.            Thank you!     Thank you for choosing [facility]  for your care. Our goal is always to provide you with excellent care. Hearing back from our patients is one way we can continue to improve our services. Please take a few minutes to complete the written survey that you may receive in the mail after your visit with us. Thank you!             Your Updated Medication List - Protect others around you: Learn how to safely use, store and throw away your medicines at www.disposemymeds.org.          This list is accurate as of: 9/29/17  3:01 PM.  Always use your most recent med list.                   Brand Name Dispense Instructions for use Diagnosis    aspirin 81 MG tablet      Take 81 mg by mouth daily        cyanocobalamin 100 MCG Tabs tablet    vitamin  B-12     Take 1,000 mcg by mouth daily.        lisinopril 2.5 MG tablet    PRINIVIL/Zestril    90 tablet    Take 1 tablet (2.5 mg) by mouth daily    Essential hypertension with goal blood pressure less than 140/90, Aortic root dilatation (H)       nitroGLYcerin 0.4 MG sublingual tablet    NITROSTAT    25 tablet    For chest pain place 1 tablet under the tongue every 5 minutes for 3 doses. If  symptoms persist 5 minutes after 1st dose call 911.    Coronary artery disease involving native coronary artery of native heart without angina pectoris       rosuvastatin 40 MG tablet    CRESTOR    90 tablet    Take 1 tablet (40 mg) by mouth daily    Coronary artery disease involving native coronary artery of native heart without angina pectoris       tadalafil 5 MG tablet    CIALIS    15 tablet    Take 1 tablet (5 mg) by mouth daily Never use with nitroglycerin, terazosin or doxazosin.    Erectile dysfunction, unspecified erectile dysfunction type       VITAMIN C PO      Take 1,000 mg by mouth daily.        VITAMIN D (CHOLECALCIFEROL) PO      Take 1,000 Units by mouth daily

## 2017-10-02 ENCOUNTER — TRANSFERRED RECORDS (OUTPATIENT)
Dept: HEALTH INFORMATION MANAGEMENT | Facility: CLINIC | Age: 72
End: 2017-10-02

## 2017-10-11 ENCOUNTER — TRANSFERRED RECORDS (OUTPATIENT)
Dept: HEALTH INFORMATION MANAGEMENT | Facility: CLINIC | Age: 72
End: 2017-10-11

## 2017-10-20 NOTE — H&P (VIEW-ONLY)
[place] [address]  [facility] [address]  [phone]  Dept: [phone]    PRE-OP EVALUATION:  Today's date: 2017    Javier Tamez (: 1945) presents for pre-operative evaluation assessment as requested by Dr. Galvez.  He requires evaluation and anesthesia risk assessment prior to undergoing surgery/procedure for treatment of Arthroplasty Hip, Anterior .  Proposed procedure: surgery    Date of Surgery/ Procedure: 10/24/2017  Time of Surgery/ Procedure: 7:30am  Hospital/Surgical Facility:[facility]  Fax number for surgical facility:   Primary Physician: Guillermo Kong  Type of Anesthesia Anticipated: General    Patient has a Health Care Directive or Living Will:  NO    Preop Questions 2017   1.  Do you have a history of heart attack, stroke, stent, bypass or surgery on an artery in the head, neck, heart or legs? YES - Remote history of TIA   2.  Do you ever have any pain or discomfort in your chest? YES - See below   3.  Do you have a history of  Heart Failure? No   4.   Are you troubled by shortness of breath when:  walking on a level surface, or up a slight hill, or at night? No   5.  Do you currently have a cold, bronchitis or other respiratory infection? No   6.  Do you have a cough, shortness of breath, or wheezing? No   7.  Do you sometimes get pains in the calves of your legs when you walk? No   8. Do you or anyone in your family have previous history of blood clots? No   9.  Do you or does anyone in your family have a serious bleeding problem such as prolonged bleeding following surgeries or cuts? No   10. Have you ever had problems with anemia or been told to take iron pills? No   11. Have you had any abnormal blood loss such as black, tarry or bloody stools? No   12. Have you ever had a blood transfusion? No   13. Have you or any of your relatives ever had problems with anesthesia? No   14. Do you have sleep apnea, excessive snoring or  "daytime drowsiness? No   15. Do you have any prosthetic heart valves? No   16. Do you have prosthetic joints? No     HPI:                                                      Brief HPI related to upcoming procedure: anterior right hip arthroplasty    Surgery is on 10/24/2017 by Dr. Hernandez at [facility]. Patient reports occasional left hip discomfort.     CAD  TIA symptoms occurred 25 years ago. Patient has not experienced any similar symptoms since. He saw Dr. Sim 9/28/2017. EKG from 9/28/2017 and nuclear stress test from 1/24/2017 were normal.     Patient never had any stents or bypasses placed. He experiences sporadic \"twinges\" of fleeting sensations in his chest, arm or jaw. Symptoms last less than a second. He states that symptoms are noticeable, but not disruptive or worrying. Episodes do not make him sweaty or short of breath. Symptoms do not come on with exertion.     See problem list for active medical problems.  Problems all longstanding and stable, except as noted/documented.  See ROS for pertinent symptoms related to these conditions.                                                                                                  .    MEDICAL HISTORY:                                                    Patient Active Problem List    Diagnosis Date Noted     Aortic stenosis      Priority: Medium     Coronary artery disease involving native coronary artery without angina pectoris 10/15/2015     Priority: Medium     Atypical chest pain 01/28/2015     Priority: Medium     Aortic root dilatation (H) 01/03/2014     Priority: Medium     Hypercholesterolemia 01/03/2014     Priority: Medium     Diagnosis updated by automated process. Provider to review and confirm.       Adenomatous polyp 01/03/2014     Priority: Medium     IBS (irritable bowel syndrome) 05/22/2013     Priority: Medium      Past Medical History:   Diagnosis Date     Aortic root dilatation (H)      Aortic stenosis      Arthritis      Coronary " artery disease      Essential hypertension with goal blood pressure less than 140/90 11/26/2016     Family history of heart disease      Hyperlipidemia LDL goal <70      Other chronic pain      Past Surgical History:   Procedure Laterality Date     C NONSPECIFIC PROCEDURE  1990's    kidney stone     COLONOSCOPY  2011    Adenomatous polyp removed     VASECTOMY       Current Outpatient Prescriptions   Medication Sig Dispense Refill     rosuvastatin (CRESTOR) 40 MG tablet Take 1 tablet (40 mg) by mouth daily 90 tablet 2     tadalafil (CIALIS) 5 MG tablet Take 1 tablet (5 mg) by mouth daily Never use with nitroglycerin, terazosin or doxazosin. 15 tablet 5     nitroglycerin (NITROSTAT) 0.4 MG sublingual tablet For chest pain place 1 tablet under the tongue every 5 minutes for 3 doses. If symptoms persist 5 minutes after 1st dose call 911. 25 tablet 3     lisinopril (PRINIVIL,ZESTRIL) 2.5 MG tablet Take 1 tablet (2.5 mg) by mouth daily 90 tablet 3     VITAMIN D, CHOLECALCIFEROL, PO Take 1,000 Units by mouth daily       Ascorbic Acid (VITAMIN C PO) Take 1,000 mg by mouth daily.       aspirin 81 MG tablet Take 81 mg by mouth daily        cyanocobalamin (VITAMIN  B-12) 100 MCG TABS Take 1,000 mcg by mouth daily.       OTC products: None, except as noted above    No Known Allergies   Latex Allergy: NO    Social History   Substance Use Topics     Smoking status: Never Smoker     Smokeless tobacco: Never Used     Alcohol use Yes      Comment: 3-4 glasses of wine weekly     History   Drug Use No     REVIEW OF SYSTEMS:                                                    C: NEGATIVE for fever, chills, change in weight  I: NEGATIVE for worrisome rashes, moles or lesions  E: NEGATIVE for vision changes or irritation  E/M: NEGATIVE for ear, mouth and throat problems  R: NEGATIVE for significant cough or SOB  B: NEGATIVE for masses, tenderness or discharge  CV: NEGATIVE for chest pain, palpitations or peripheral edema  GI: POSITIVE for  "occasional constipation NEGATIVE for nausea, abdominal pain, heartburn, or change in bowel habits  : NEGATIVE for frequency, dysuria, or hematuria  M: NEGATIVE for significant arthralgias or myalgia  N: NEGATIVE for weakness, dizziness or paresthesias  E: NEGATIVE for temperature intolerance, skin/hair changes  H: NEGATIVE for bleeding problems  P: NEGATIVE for changes in mood or affect    This document serves as a record of the services and decisions personally performed and made by Guillermo Kong MD. It was created on his behalf by Qi Palomino, a trained medical scribe. The creation of this document is based on the provider's statements to the medical scribe.  Qi Palomino September 29, 2017 2:57 PM     EXAM:                                                    BP 92/50 (BP Location: Right arm, Patient Position: Sitting, Cuff Size: Adult Regular)  Pulse 124  Temp 98.4  F (36.9  C) (Oral)  Ht 1.803 m (5' 11\")  Wt 82.4 kg (181 lb 11.2 oz)  SpO2 93%  BMI 25.34 kg/m2      GENERAL APPEARANCE: healthy, alert and no distress     EYES: EOMI,  PERRL     HENT: ear canals and TM's normal and nose and mouth without ulcers or lesions     NECK: no adenopathy, no asymmetry, masses, or scars and thyroid normal to palpation     RESP: lungs clear to auscultation - no rales, rhonchi or wheezes     CV: regular rates and rhythm, normal S1 S2, no S3 or S4 and no murmur, click or rub     ABDOMEN:  soft, nontender, no HSM or masses and bowel sounds normal     MS: extremities normal- no gross deformities noted, no evidence of inflammation in joints, FROM in all extremities.     SKIN: no suspicious lesions or rashes     NEURO: Normal strength and tone, sensory exam grossly normal, mentation intact and speech normal     PSYCH: mentation appears normal. and affect normal/bright     LYMPHATICS: No axillary, cervical, or supraclavicular nodes    DIAGNOSTICS:                                                      Labs Drawn and in " Process:   Unresulted Labs Ordered in the Past 30 Days of this Admission     Date and Time Order Name Status Description    9/29/2017 1453 CBC WITH PLATELETS In process     9/29/2017 1453 ABO/RH TYPE AND SCREEN In process     9/29/2017 1453 BASIC METABOLIC PANEL In process           Recent Labs   Lab Test  11/30/16   0945  03/31/16   1744   HGB  15.9  16.1   PLT  212  211   NA  144  142   POTASSIUM  4.6  4.2   CR  0.81  0.80      IMPRESSION:                                                    Reason for surgery/procedure: anterior right hip arthroplasty  Diagnosis/reason for consult: Clearance for surgery    The proposed surgical procedure is considered INTERMEDIATE risk.    REVISED CARDIAC RISK INDEX  The patient has the following serious cardiovascular risks for perioperative complications such as (MI, PE, VFib and 3  AV Block):  No serious cardiac risks  INTERPRETATION: 0 risks: Class I (very low risk - 0.4% complication rate)    The patient has the following additional risks for perioperative complications:  No identified additional risks      ICD-10-CM    1. Preop general physical exam Z01.818 Basic metabolic panel     ABO/Rh type and screen     CBC with platelets   2. Primary osteoarthritis of right hip M16.11    3. Coronary artery disease involving native coronary artery of native heart without angina pectoris I25.10    4. Aortic root dilatation (H) I77.810        RECOMMENDATIONS:                                                      --Consult hospital rounder / IM to assist post-op medical management    APPROVAL GIVEN to proceed with proposed procedure, without further diagnostic evaluation     Okay to go ahead with surgery as planned.   Stop by the lab ([address]) for preop labs.   Okay to take lisinopril the night before surgery (with your Crestor) instead of the morning of surgery.   The information in this document, created by the medical scribe for me, accurately reflects the services I personally  performed and the decisions made by me. I have reviewed and approved this document for accuracy prior to leaving the patient care area.  September 29, 2017 2:42 PM    Signed Electronically by: Guillermo Kong MD,     Copy of this evaluation report is provided to requesting physician.    [facility] Preop Guidelines

## 2017-10-24 ENCOUNTER — HOSPITAL ENCOUNTER (INPATIENT)
Facility: CLINIC | Age: 72
LOS: 1 days | Discharge: HOME | DRG: 470 | End: 2017-10-25
Attending: ORTHOPAEDIC SURGERY | Admitting: ORTHOPAEDIC SURGERY
Payer: MEDICARE

## 2017-10-24 ENCOUNTER — APPOINTMENT (OUTPATIENT)
Dept: PHYSICAL THERAPY | Facility: CLINIC | Age: 72
DRG: 470 | End: 2017-10-24
Attending: ORTHOPAEDIC SURGERY
Payer: MEDICARE

## 2017-10-24 ENCOUNTER — ANESTHESIA (OUTPATIENT)
Dept: SURGERY | Facility: CLINIC | Age: 72
DRG: 470 | End: 2017-10-24
Payer: MEDICARE

## 2017-10-24 ENCOUNTER — APPOINTMENT (OUTPATIENT)
Dept: GENERAL RADIOLOGY | Facility: CLINIC | Age: 72
DRG: 470 | End: 2017-10-24
Attending: ORTHOPAEDIC SURGERY
Payer: MEDICARE

## 2017-10-24 ENCOUNTER — ANESTHESIA EVENT (OUTPATIENT)
Dept: SURGERY | Facility: CLINIC | Age: 72
DRG: 470 | End: 2017-10-24
Payer: MEDICARE

## 2017-10-24 DIAGNOSIS — Z96.641 STATUS POST RIGHT HIP REPLACEMENT: Primary | ICD-10-CM

## 2017-10-24 LAB
ABO + RH BLD: NORMAL
ABO + RH BLD: NORMAL
BLD GP AB SCN SERPL QL: NORMAL
BLOOD BANK CMNT PATIENT-IMP: NORMAL
CREAT SERPL-MCNC: 0.73 MG/DL (ref 0.66–1.25)
GFR SERPL CREATININE-BSD FRML MDRD: >90 ML/MIN/1.7M2
HGB BLD-MCNC: 15.6 G/DL (ref 13.3–17.7)
PLATELET # BLD AUTO: 200 10E9/L (ref 150–450)
POTASSIUM SERPL-SCNC: 3.9 MMOL/L (ref 3.4–5.3)
SPECIMEN EXP DATE BLD: NORMAL

## 2017-10-24 PROCEDURE — 25000125 ZZHC RX 250: Performed by: NURSE ANESTHETIST, CERTIFIED REGISTERED

## 2017-10-24 PROCEDURE — 97161 PT EVAL LOW COMPLEX 20 MIN: CPT | Mod: GP | Performed by: PHYSICAL THERAPIST

## 2017-10-24 PROCEDURE — C1776 JOINT DEVICE (IMPLANTABLE): HCPCS | Performed by: ORTHOPAEDIC SURGERY

## 2017-10-24 PROCEDURE — 97116 GAIT TRAINING THERAPY: CPT | Mod: GP | Performed by: PHYSICAL THERAPIST

## 2017-10-24 PROCEDURE — 97530 THERAPEUTIC ACTIVITIES: CPT | Mod: GP | Performed by: PHYSICAL THERAPIST

## 2017-10-24 PROCEDURE — 86850 RBC ANTIBODY SCREEN: CPT | Performed by: PHYSICIAN ASSISTANT

## 2017-10-24 PROCEDURE — A9270 NON-COVERED ITEM OR SERVICE: HCPCS | Mod: GY | Performed by: ORTHOPAEDIC SURGERY

## 2017-10-24 PROCEDURE — 12000007 ZZH R&B INTERMEDIATE

## 2017-10-24 PROCEDURE — 40000170 ZZH STATISTIC PRE-PROCEDURE ASSESSMENT II: Performed by: ORTHOPAEDIC SURGERY

## 2017-10-24 PROCEDURE — 25000566 ZZH SEVOFLURANE, EA 15 MIN: Performed by: ORTHOPAEDIC SURGERY

## 2017-10-24 PROCEDURE — 86900 BLOOD TYPING SEROLOGIC ABO: CPT | Performed by: PHYSICIAN ASSISTANT

## 2017-10-24 PROCEDURE — 25000128 H RX IP 250 OP 636: Performed by: ANESTHESIOLOGY

## 2017-10-24 PROCEDURE — 0SR903A REPLACEMENT OF RIGHT HIP JOINT WITH CERAMIC SYNTHETIC SUBSTITUTE, UNCEMENTED, OPEN APPROACH: ICD-10-PCS | Performed by: ORTHOPAEDIC SURGERY

## 2017-10-24 PROCEDURE — 25000128 H RX IP 250 OP 636: Performed by: ORTHOPAEDIC SURGERY

## 2017-10-24 PROCEDURE — 25000125 ZZHC RX 250: Performed by: ORTHOPAEDIC SURGERY

## 2017-10-24 PROCEDURE — 27210794 ZZH OR GENERAL SUPPLY STERILE: Performed by: ORTHOPAEDIC SURGERY

## 2017-10-24 PROCEDURE — 25800025 ZZH RX 258: Performed by: ORTHOPAEDIC SURGERY

## 2017-10-24 PROCEDURE — 97110 THERAPEUTIC EXERCISES: CPT | Mod: GP | Performed by: PHYSICAL THERAPIST

## 2017-10-24 PROCEDURE — 37000008 ZZH ANESTHESIA TECHNICAL FEE, 1ST 30 MIN: Performed by: ORTHOPAEDIC SURGERY

## 2017-10-24 PROCEDURE — 25000128 H RX IP 250 OP 636: Performed by: PHYSICIAN ASSISTANT

## 2017-10-24 PROCEDURE — A9270 NON-COVERED ITEM OR SERVICE: HCPCS | Mod: GY | Performed by: PHYSICIAN ASSISTANT

## 2017-10-24 PROCEDURE — 40000277 XR SURGERY CARM FLUORO LESS THAN 5 MIN W STILLS

## 2017-10-24 PROCEDURE — 25000125 ZZHC RX 250: Performed by: ANESTHESIOLOGY

## 2017-10-24 PROCEDURE — 99207 ZZC CONSULT E&M CHANGED TO SUBSEQUENT LEVEL: CPT | Performed by: NURSE PRACTITIONER

## 2017-10-24 PROCEDURE — 99232 SBSQ HOSP IP/OBS MODERATE 35: CPT | Performed by: NURSE PRACTITIONER

## 2017-10-24 PROCEDURE — 71000012 ZZH RECOVERY PHASE 1 LEVEL 1 FIRST HR: Performed by: ORTHOPAEDIC SURGERY

## 2017-10-24 PROCEDURE — 40000193 ZZH STATISTIC PT WARD VISIT: Performed by: PHYSICAL THERAPIST

## 2017-10-24 PROCEDURE — 27210995 ZZH RX 272: Performed by: ORTHOPAEDIC SURGERY

## 2017-10-24 PROCEDURE — 37000009 ZZH ANESTHESIA TECHNICAL FEE, EACH ADDTL 15 MIN: Performed by: ORTHOPAEDIC SURGERY

## 2017-10-24 PROCEDURE — 86901 BLOOD TYPING SEROLOGIC RH(D): CPT | Performed by: PHYSICIAN ASSISTANT

## 2017-10-24 PROCEDURE — 25000125 ZZHC RX 250: Performed by: PHYSICIAN ASSISTANT

## 2017-10-24 PROCEDURE — 36000065 ZZH SURGERY LEVEL 4 W FLUORO 1ST 30 MIN: Performed by: ORTHOPAEDIC SURGERY

## 2017-10-24 PROCEDURE — 85049 AUTOMATED PLATELET COUNT: CPT | Performed by: PHYSICIAN ASSISTANT

## 2017-10-24 PROCEDURE — 25000132 ZZH RX MED GY IP 250 OP 250 PS 637: Mod: GY | Performed by: ORTHOPAEDIC SURGERY

## 2017-10-24 PROCEDURE — 25000128 H RX IP 250 OP 636: Performed by: NURSE ANESTHETIST, CERTIFIED REGISTERED

## 2017-10-24 PROCEDURE — 36000063 ZZH SURGERY LEVEL 4 EA 15 ADDTL MIN: Performed by: ORTHOPAEDIC SURGERY

## 2017-10-24 PROCEDURE — 40000986 XR PELVIS AD HIP PORTABLE RIGHT 1 VIEW

## 2017-10-24 PROCEDURE — 36415 COLL VENOUS BLD VENIPUNCTURE: CPT | Performed by: PHYSICIAN ASSISTANT

## 2017-10-24 PROCEDURE — C1713 ANCHOR/SCREW BN/BN,TIS/BN: HCPCS | Performed by: ORTHOPAEDIC SURGERY

## 2017-10-24 PROCEDURE — 25000132 ZZH RX MED GY IP 250 OP 250 PS 637: Mod: GY | Performed by: PHYSICIAN ASSISTANT

## 2017-10-24 PROCEDURE — 84132 ASSAY OF SERUM POTASSIUM: CPT | Performed by: PHYSICIAN ASSISTANT

## 2017-10-24 PROCEDURE — 82565 ASSAY OF CREATININE: CPT | Performed by: PHYSICIAN ASSISTANT

## 2017-10-24 PROCEDURE — 85018 HEMOGLOBIN: CPT | Performed by: PHYSICIAN ASSISTANT

## 2017-10-24 DEVICE — IMP SCR BONE CAN ACE 6.5X20MM 1217-20-500: Type: IMPLANTABLE DEVICE | Site: HIP | Status: FUNCTIONAL

## 2017-10-24 DEVICE — IMPLANTABLE DEVICE: Type: IMPLANTABLE DEVICE | Site: HIP | Status: FUNCTIONAL

## 2017-10-24 DEVICE — IMP SCR BONE CAN ACE 6.5X35MM 1217-35-500: Type: IMPLANTABLE DEVICE | Site: HIP | Status: FUNCTIONAL

## 2017-10-24 DEVICE — IMP HEAD FEMORAL DEPUY CERAMIC 36MM +5MM 136536320: Type: IMPLANTABLE DEVICE | Site: HIP | Status: FUNCTIONAL

## 2017-10-24 DEVICE — IMP APEX HOLE ELIMINATOR HIP DEPUY DURALOC 1246-03-000: Type: IMPLANTABLE DEVICE | Site: HIP | Status: FUNCTIONAL

## 2017-10-24 DEVICE — IMP SCR DEPUY PINNACLE CANC 6.5X15MM 1217-15-500: Type: IMPLANTABLE DEVICE | Site: HIP | Status: FUNCTIONAL

## 2017-10-24 RX ORDER — LIDOCAINE HYDROCHLORIDE 20 MG/ML
INJECTION, SOLUTION INFILTRATION; PERINEURAL PRN
Status: DISCONTINUED | OUTPATIENT
Start: 2017-10-24 | End: 2017-10-24

## 2017-10-24 RX ORDER — ONDANSETRON 2 MG/ML
INJECTION INTRAMUSCULAR; INTRAVENOUS PRN
Status: DISCONTINUED | OUTPATIENT
Start: 2017-10-24 | End: 2017-10-24

## 2017-10-24 RX ORDER — FENTANYL CITRATE 50 UG/ML
25-50 INJECTION, SOLUTION INTRAMUSCULAR; INTRAVENOUS
Status: DISCONTINUED | OUTPATIENT
Start: 2017-10-24 | End: 2017-10-24 | Stop reason: HOSPADM

## 2017-10-24 RX ORDER — HYDROMORPHONE HYDROCHLORIDE 1 MG/ML
.3-.5 INJECTION, SOLUTION INTRAMUSCULAR; INTRAVENOUS; SUBCUTANEOUS
Status: DISCONTINUED | OUTPATIENT
Start: 2017-10-24 | End: 2017-10-25 | Stop reason: HOSPADM

## 2017-10-24 RX ORDER — MUPIROCIN 20 MG/G
OINTMENT TOPICAL 2 TIMES DAILY
Status: ON HOLD | COMMUNITY
Start: 2017-10-17 | End: 2017-10-25

## 2017-10-24 RX ORDER — ACETAMINOPHEN 325 MG/1
975 TABLET ORAL EVERY 8 HOURS
Status: DISCONTINUED | OUTPATIENT
Start: 2017-10-24 | End: 2017-10-25 | Stop reason: HOSPADM

## 2017-10-24 RX ORDER — ACETAMINOPHEN 500 MG
1000 TABLET ORAL ONCE
Status: COMPLETED | OUTPATIENT
Start: 2017-10-24 | End: 2017-10-24

## 2017-10-24 RX ORDER — SODIUM CHLORIDE, SODIUM LACTATE, POTASSIUM CHLORIDE, CALCIUM CHLORIDE 600; 310; 30; 20 MG/100ML; MG/100ML; MG/100ML; MG/100ML
INJECTION, SOLUTION INTRAVENOUS CONTINUOUS
Status: DISCONTINUED | OUTPATIENT
Start: 2017-10-24 | End: 2017-10-24 | Stop reason: HOSPADM

## 2017-10-24 RX ORDER — FENTANYL CITRATE 50 UG/ML
INJECTION, SOLUTION INTRAMUSCULAR; INTRAVENOUS PRN
Status: DISCONTINUED | OUTPATIENT
Start: 2017-10-24 | End: 2017-10-24

## 2017-10-24 RX ORDER — HYDRALAZINE HYDROCHLORIDE 20 MG/ML
10 INJECTION INTRAMUSCULAR; INTRAVENOUS EVERY 4 HOURS PRN
Status: DISCONTINUED | OUTPATIENT
Start: 2017-10-24 | End: 2017-10-25 | Stop reason: HOSPADM

## 2017-10-24 RX ORDER — OXYCODONE HYDROCHLORIDE 5 MG/1
5-10 TABLET ORAL
Status: DISCONTINUED | OUTPATIENT
Start: 2017-10-24 | End: 2017-10-25 | Stop reason: HOSPADM

## 2017-10-24 RX ORDER — LIDOCAINE 40 MG/G
CREAM TOPICAL
Status: DISCONTINUED | OUTPATIENT
Start: 2017-10-24 | End: 2017-10-25 | Stop reason: HOSPADM

## 2017-10-24 RX ORDER — NALOXONE HYDROCHLORIDE 0.4 MG/ML
.1-.4 INJECTION, SOLUTION INTRAMUSCULAR; INTRAVENOUS; SUBCUTANEOUS
Status: DISCONTINUED | OUTPATIENT
Start: 2017-10-24 | End: 2017-10-25 | Stop reason: HOSPADM

## 2017-10-24 RX ORDER — KETOROLAC TROMETHAMINE 15 MG/ML
15 INJECTION, SOLUTION INTRAMUSCULAR; INTRAVENOUS EVERY 6 HOURS
Status: COMPLETED | OUTPATIENT
Start: 2017-10-24 | End: 2017-10-25

## 2017-10-24 RX ORDER — HYDROMORPHONE HYDROCHLORIDE 1 MG/ML
.3-.5 INJECTION, SOLUTION INTRAMUSCULAR; INTRAVENOUS; SUBCUTANEOUS EVERY 5 MIN PRN
Status: DISCONTINUED | OUTPATIENT
Start: 2017-10-24 | End: 2017-10-24 | Stop reason: HOSPADM

## 2017-10-24 RX ORDER — TEMAZEPAM 7.5 MG/1
7.5 CAPSULE ORAL
Status: DISCONTINUED | OUTPATIENT
Start: 2017-10-25 | End: 2017-10-25 | Stop reason: HOSPADM

## 2017-10-24 RX ORDER — ONDANSETRON 4 MG/1
4 TABLET, ORALLY DISINTEGRATING ORAL EVERY 30 MIN PRN
Status: DISCONTINUED | OUTPATIENT
Start: 2017-10-24 | End: 2017-10-24 | Stop reason: HOSPADM

## 2017-10-24 RX ORDER — AMOXICILLIN 250 MG
1-2 CAPSULE ORAL 2 TIMES DAILY
Status: DISCONTINUED | OUTPATIENT
Start: 2017-10-24 | End: 2017-10-25 | Stop reason: HOSPADM

## 2017-10-24 RX ORDER — KETOROLAC TROMETHAMINE 30 MG/ML
INJECTION, SOLUTION INTRAMUSCULAR; INTRAVENOUS PRN
Status: DISCONTINUED | OUTPATIENT
Start: 2017-10-24 | End: 2017-10-24 | Stop reason: HOSPADM

## 2017-10-24 RX ORDER — DEXAMETHASONE SODIUM PHOSPHATE 4 MG/ML
INJECTION, SOLUTION INTRA-ARTICULAR; INTRALESIONAL; INTRAMUSCULAR; INTRAVENOUS; SOFT TISSUE PRN
Status: DISCONTINUED | OUTPATIENT
Start: 2017-10-24 | End: 2017-10-24

## 2017-10-24 RX ORDER — CEFAZOLIN SODIUM 2 G/100ML
2 INJECTION, SOLUTION INTRAVENOUS EVERY 8 HOURS
Status: COMPLETED | OUTPATIENT
Start: 2017-10-24 | End: 2017-10-25

## 2017-10-24 RX ORDER — EPHEDRINE SULFATE 50 MG/ML
INJECTION, SOLUTION INTRAMUSCULAR; INTRAVENOUS; SUBCUTANEOUS PRN
Status: DISCONTINUED | OUTPATIENT
Start: 2017-10-24 | End: 2017-10-24

## 2017-10-24 RX ORDER — ACETAMINOPHEN 325 MG/1
650 TABLET ORAL EVERY 4 HOURS PRN
Status: DISCONTINUED | OUTPATIENT
Start: 2017-10-27 | End: 2017-10-25 | Stop reason: HOSPADM

## 2017-10-24 RX ORDER — ONDANSETRON 2 MG/ML
4 INJECTION INTRAMUSCULAR; INTRAVENOUS EVERY 6 HOURS PRN
Status: DISCONTINUED | OUTPATIENT
Start: 2017-10-24 | End: 2017-10-25 | Stop reason: HOSPADM

## 2017-10-24 RX ORDER — DEXTROSE MONOHYDRATE, SODIUM CHLORIDE, AND POTASSIUM CHLORIDE 50; 1.49; 4.5 G/1000ML; G/1000ML; G/1000ML
INJECTION, SOLUTION INTRAVENOUS CONTINUOUS
Status: DISCONTINUED | OUTPATIENT
Start: 2017-10-24 | End: 2017-10-25 | Stop reason: HOSPADM

## 2017-10-24 RX ORDER — CEFAZOLIN SODIUM 2 G/100ML
2 INJECTION, SOLUTION INTRAVENOUS
Status: COMPLETED | OUTPATIENT
Start: 2017-10-24 | End: 2017-10-24

## 2017-10-24 RX ORDER — NEOSTIGMINE METHYLSULFATE 1 MG/ML
VIAL (ML) INJECTION PRN
Status: DISCONTINUED | OUTPATIENT
Start: 2017-10-24 | End: 2017-10-24

## 2017-10-24 RX ORDER — ONDANSETRON 4 MG/1
4 TABLET, ORALLY DISINTEGRATING ORAL EVERY 6 HOURS PRN
Status: DISCONTINUED | OUTPATIENT
Start: 2017-10-24 | End: 2017-10-25 | Stop reason: HOSPADM

## 2017-10-24 RX ORDER — CYCLOBENZAPRINE HCL 5 MG
5 TABLET ORAL 3 TIMES DAILY PRN
Status: DISCONTINUED | OUTPATIENT
Start: 2017-10-24 | End: 2017-10-25 | Stop reason: HOSPADM

## 2017-10-24 RX ORDER — BUPIVACAINE HYDROCHLORIDE AND EPINEPHRINE 5; 5 MG/ML; UG/ML
INJECTION, SOLUTION EPIDURAL; INTRACAUDAL; PERINEURAL PRN
Status: DISCONTINUED | OUTPATIENT
Start: 2017-10-24 | End: 2017-10-24 | Stop reason: HOSPADM

## 2017-10-24 RX ORDER — CEFAZOLIN SODIUM 1 G/3ML
1 INJECTION, POWDER, FOR SOLUTION INTRAMUSCULAR; INTRAVENOUS SEE ADMIN INSTRUCTIONS
Status: DISCONTINUED | OUTPATIENT
Start: 2017-10-24 | End: 2017-10-24 | Stop reason: HOSPADM

## 2017-10-24 RX ORDER — HYDROXYZINE HYDROCHLORIDE 10 MG/1
10 TABLET, FILM COATED ORAL EVERY 6 HOURS PRN
Status: DISCONTINUED | OUTPATIENT
Start: 2017-10-24 | End: 2017-10-25 | Stop reason: HOSPADM

## 2017-10-24 RX ORDER — PROPOFOL 10 MG/ML
INJECTION, EMULSION INTRAVENOUS PRN
Status: DISCONTINUED | OUTPATIENT
Start: 2017-10-24 | End: 2017-10-24

## 2017-10-24 RX ORDER — GLYCOPYRROLATE 0.2 MG/ML
INJECTION, SOLUTION INTRAMUSCULAR; INTRAVENOUS PRN
Status: DISCONTINUED | OUTPATIENT
Start: 2017-10-24 | End: 2017-10-24

## 2017-10-24 RX ORDER — PROCHLORPERAZINE MALEATE 5 MG
5 TABLET ORAL EVERY 6 HOURS PRN
Status: DISCONTINUED | OUTPATIENT
Start: 2017-10-24 | End: 2017-10-25 | Stop reason: HOSPADM

## 2017-10-24 RX ORDER — ONDANSETRON 2 MG/ML
4 INJECTION INTRAMUSCULAR; INTRAVENOUS EVERY 30 MIN PRN
Status: DISCONTINUED | OUTPATIENT
Start: 2017-10-24 | End: 2017-10-24 | Stop reason: HOSPADM

## 2017-10-24 RX ADMIN — ROCURONIUM BROMIDE 10 MG: 10 INJECTION INTRAVENOUS at 08:48

## 2017-10-24 RX ADMIN — PHENYLEPHRINE HYDROCHLORIDE 100 MCG: 10 INJECTION INTRAVENOUS at 09:30

## 2017-10-24 RX ADMIN — CEFAZOLIN SODIUM 1 G: 2 INJECTION, SOLUTION INTRAVENOUS at 09:40

## 2017-10-24 RX ADMIN — NEOSTIGMINE METHYLSULFATE 4 MG: 1 INJECTION INTRAMUSCULAR; INTRAVENOUS; SUBCUTANEOUS at 10:29

## 2017-10-24 RX ADMIN — PHENYLEPHRINE HYDROCHLORIDE 100 MCG: 10 INJECTION INTRAVENOUS at 09:07

## 2017-10-24 RX ADMIN — ROCURONIUM BROMIDE 20 MG: 10 INJECTION INTRAVENOUS at 09:25

## 2017-10-24 RX ADMIN — ONDANSETRON 4 MG: 2 INJECTION INTRAMUSCULAR; INTRAVENOUS at 10:04

## 2017-10-24 RX ADMIN — GLYCOPYRROLATE 0.6 MG: 0.2 INJECTION, SOLUTION INTRAMUSCULAR; INTRAVENOUS at 10:29

## 2017-10-24 RX ADMIN — ROCURONIUM BROMIDE 20 MG: 10 INJECTION INTRAVENOUS at 08:19

## 2017-10-24 RX ADMIN — CEFAZOLIN SODIUM 2 G: 2 INJECTION, SOLUTION INTRAVENOUS at 17:34

## 2017-10-24 RX ADMIN — SODIUM CHLORIDE, POTASSIUM CHLORIDE, SODIUM LACTATE AND CALCIUM CHLORIDE: 600; 310; 30; 20 INJECTION, SOLUTION INTRAVENOUS at 06:56

## 2017-10-24 RX ADMIN — ROCURONIUM BROMIDE 50 MG: 10 INJECTION INTRAVENOUS at 07:35

## 2017-10-24 RX ADMIN — MIDAZOLAM HYDROCHLORIDE 2 MG: 1 INJECTION, SOLUTION INTRAMUSCULAR; INTRAVENOUS at 07:30

## 2017-10-24 RX ADMIN — PHENYLEPHRINE HYDROCHLORIDE 100 MCG: 10 INJECTION INTRAVENOUS at 07:45

## 2017-10-24 RX ADMIN — LIDOCAINE HYDROCHLORIDE 1 ML: 10 INJECTION, SOLUTION EPIDURAL; INFILTRATION; INTRACAUDAL; PERINEURAL at 07:00

## 2017-10-24 RX ADMIN — SENNOSIDES AND DOCUSATE SODIUM 2 TABLET: 8.6; 5 TABLET ORAL at 21:47

## 2017-10-24 RX ADMIN — ACETAMINOPHEN 1000 MG: 500 TABLET, FILM COATED ORAL at 07:00

## 2017-10-24 RX ADMIN — PROPOFOL 150 MG: 10 INJECTION, EMULSION INTRAVENOUS at 07:35

## 2017-10-24 RX ADMIN — PHENYLEPHRINE HYDROCHLORIDE 100 MCG: 10 INJECTION INTRAVENOUS at 09:55

## 2017-10-24 RX ADMIN — PHENYLEPHRINE HYDROCHLORIDE 100 MCG: 10 INJECTION INTRAVENOUS at 08:18

## 2017-10-24 RX ADMIN — DEXAMETHASONE SODIUM PHOSPHATE 4 MG: 4 INJECTION, SOLUTION INTRA-ARTICULAR; INTRALESIONAL; INTRAMUSCULAR; INTRAVENOUS; SOFT TISSUE at 07:40

## 2017-10-24 RX ADMIN — HYDROMORPHONE HYDROCHLORIDE 0.5 MG: 1 INJECTION, SOLUTION INTRAMUSCULAR; INTRAVENOUS; SUBCUTANEOUS at 11:27

## 2017-10-24 RX ADMIN — CEFAZOLIN SODIUM 2 G: 2 INJECTION, SOLUTION INTRAVENOUS at 07:40

## 2017-10-24 RX ADMIN — FENTANYL CITRATE 50 MCG: 50 INJECTION, SOLUTION INTRAMUSCULAR; INTRAVENOUS at 08:30

## 2017-10-24 RX ADMIN — PHENYLEPHRINE HYDROCHLORIDE 100 MCG: 10 INJECTION INTRAVENOUS at 09:37

## 2017-10-24 RX ADMIN — KETOROLAC TROMETHAMINE 15 MG: 15 INJECTION, SOLUTION INTRAMUSCULAR; INTRAVENOUS at 15:04

## 2017-10-24 RX ADMIN — TRANEXAMIC ACID 1 G: 100 INJECTION, SOLUTION INTRAVENOUS at 10:04

## 2017-10-24 RX ADMIN — KETOROLAC TROMETHAMINE 15 MG: 15 INJECTION, SOLUTION INTRAMUSCULAR; INTRAVENOUS at 21:47

## 2017-10-24 RX ADMIN — HYDROMORPHONE HYDROCHLORIDE 0.5 MG: 1 INJECTION, SOLUTION INTRAMUSCULAR; INTRAVENOUS; SUBCUTANEOUS at 08:40

## 2017-10-24 RX ADMIN — FENTANYL CITRATE 50 MCG: 50 INJECTION, SOLUTION INTRAMUSCULAR; INTRAVENOUS at 08:48

## 2017-10-24 RX ADMIN — SODIUM CHLORIDE, POTASSIUM CHLORIDE, SODIUM LACTATE AND CALCIUM CHLORIDE: 600; 310; 30; 20 INJECTION, SOLUTION INTRAVENOUS at 10:02

## 2017-10-24 RX ADMIN — FENTANYL CITRATE 50 MCG: 50 INJECTION, SOLUTION INTRAMUSCULAR; INTRAVENOUS at 10:37

## 2017-10-24 RX ADMIN — SODIUM CHLORIDE, POTASSIUM CHLORIDE, SODIUM LACTATE AND CALCIUM CHLORIDE: 600; 310; 30; 20 INJECTION, SOLUTION INTRAVENOUS at 08:45

## 2017-10-24 RX ADMIN — Medication 5 MG: at 09:55

## 2017-10-24 RX ADMIN — FENTANYL CITRATE 100 MCG: 50 INJECTION, SOLUTION INTRAMUSCULAR; INTRAVENOUS at 07:30

## 2017-10-24 RX ADMIN — POTASSIUM CHLORIDE, DEXTROSE MONOHYDRATE AND SODIUM CHLORIDE: 150; 5; 450 INJECTION, SOLUTION INTRAVENOUS at 13:30

## 2017-10-24 RX ADMIN — FENTANYL CITRATE 50 MCG: 50 INJECTION, SOLUTION INTRAMUSCULAR; INTRAVENOUS at 08:26

## 2017-10-24 RX ADMIN — PHENYLEPHRINE HYDROCHLORIDE 100 MCG: 10 INJECTION INTRAVENOUS at 08:53

## 2017-10-24 RX ADMIN — TRANEXAMIC ACID 1 G: 100 INJECTION, SOLUTION INTRAVENOUS at 07:50

## 2017-10-24 RX ADMIN — ACETAMINOPHEN 975 MG: 325 TABLET, FILM COATED ORAL at 17:30

## 2017-10-24 RX ADMIN — Medication 5 MG: at 09:58

## 2017-10-24 RX ADMIN — LIDOCAINE HYDROCHLORIDE 40 MG: 20 INJECTION, SOLUTION INFILTRATION; PERINEURAL at 07:35

## 2017-10-24 RX ADMIN — HYDROMORPHONE HYDROCHLORIDE 0.5 MG: 1 INJECTION, SOLUTION INTRAMUSCULAR; INTRAVENOUS; SUBCUTANEOUS at 11:00

## 2017-10-24 RX ADMIN — FENTANYL CITRATE 50 MCG: 50 INJECTION, SOLUTION INTRAMUSCULAR; INTRAVENOUS at 10:44

## 2017-10-24 RX ADMIN — PHENYLEPHRINE HYDROCHLORIDE 100 MCG: 10 INJECTION INTRAVENOUS at 08:14

## 2017-10-24 ASSESSMENT — ENCOUNTER SYMPTOMS: DYSRHYTHMIAS: 0

## 2017-10-24 NOTE — IP AVS SNAPSHOT
[address] [facility]    [address] [place] [address]    Phone:  [phone]                                       After Visit Summary   10/24/2017    Javier Tamez    MRN: [id number]           After Visit Summary Signature Page     I have received my discharge instructions, and my questions have been answered. I have discussed any challenges I see with this plan with the nurse or doctor.    ..........................................................................................................................................  Patient/Patient Representative Signature      ..........................................................................................................................................  Patient Representative Print Name and Relationship to Patient    ..................................................               ................................................  Date                                            Time    ..........................................................................................................................................  Reviewed by Signature/Title    ...................................................              ..............................................  Date                                                            Time

## 2017-10-24 NOTE — ANESTHESIA CARE TRANSFER NOTE
Patient: Javier Tamez    Procedure(s):  RIGHT TOTAL HIP ARTHROPLASTY DIRECT ANTERIOR APPROACH - Wound Class: I-Clean    Diagnosis: RIGHT HIP DJD  Diagnosis Additional Information: No value filed.    Anesthesia Type:   General, ETT     Note:  Airway :Face Mask  Patient transferred to:PACU  Comments: Patient awake, vss, report givenHandoff Report: Identified the Reponsible Provider, Reviewed the pertinent medical history, Discussed the surgical course, Reviewed Intra-OP anesthesia mangement and issues during anesthesia, Set expectations for post-procedure period, Allowed opportunity for questions and acknowledgement of understanding and Identifed the Patient      Vitals: (Last set prior to Anesthesia Care Transfer)    CRNA VITALS  10/24/2017 1006 - 10/24/2017 1045      10/24/2017             NIBP: (!)  135/95    NIBP Mean: 122                Electronically Signed By: JAS Steel CRNA  October 24, 2017  10:45 AM

## 2017-10-24 NOTE — ANESTHESIA POSTPROCEDURE EVALUATION
Patient: Javier Tamez    Procedure(s):  RIGHT TOTAL HIP ARTHROPLASTY DIRECT ANTERIOR APPROACH - Wound Class: I-Clean    Diagnosis:RIGHT HIP DJD  Diagnosis Additional Information: No value filed.    Anesthesia Type:  General, ETT    Note:  Anesthesia Post Evaluation    Patient location during evaluation: PACU  Patient participation: Able to fully participate in evaluation  Level of consciousness: awake  Pain management: adequate  Airway patency: patent  Cardiovascular status: acceptable  Respiratory status: acceptable  Hydration status: acceptable  PONV: none     Anesthetic complications: None          Last vitals:  Vitals:    10/24/17 1115 10/24/17 1130 10/24/17 1145   BP: 114/72 104/64 90/69   Pulse:      Resp: 10 10 12   Temp:      SpO2: 99% 99% 100%         Electronically Signed By: José Staples MD  October 24, 2017  12:11 PM

## 2017-10-24 NOTE — ANESTHESIA PREPROCEDURE EVALUATION
Anesthesia Evaluation     . Pt has had prior anesthetic.            ROS/MED HX    ENT/Pulmonary:  - neg pulmonary ROS     Neurologic:     (+)TIA     Cardiovascular: Comment: 1/24/17 10:33 AM [id number] [facility] Nuclear Medicine   [employer] Interactive Report and InfoRx   View the interactive report  PACS Images   Show images for NM Exercise stress test (nuc card)  Study Result   GATED MYOCARDIAL PERFUSION SCINTIGRAPHY EXERCISE- ONE DAY STUDY      1/24/2017 10:33 AM  ANILA MEDINA  72 years  Male  1945.     Indication/Clinical History: Coronary artery calcification, arm  discomfort     Impression  1.  Myocardial perfusion imaging using single isotope technique  demonstrated normal myocardial perfusion at rest and poststress with  no evidence of significant exercise-induced ischemia or prior  myocardial infarction.   2. Gated images demonstrated normal left ventricular wall motion.  The  left ventricular systolic function is 55% at rest and 60% on the post  stress images.  3. There is no previous study for comparison.     Procedure  The patient performed treadmill exercise using a Silvano protocol,  completing 9 minutes and 0 seconds with an estimated workload of 10.1  METS.  The test was terminated due to patient fatigue. The heart rate  was 83 beats per minute at baseline and increased to 136 beats at peak  exercise, which was 91% of the maximum predicted heart rate. The rest  blood pressure was 110/68 mm/Hg and peak blood pressure is 158/72mm/Hg  with rate pressure product of 21,488. The patient experienced no chest  discomfort during the test. The patient was not on a beta blocker.     Myocardial perfusion imaging was performed at rest, approximately 45  minutes after the injection intravenously of 3.6of Tc-99m Myoview. At  peak exercise, the patient was injected intravenously with 9.86 mCi of   Tc-99m Myoview and exercise continued for approximately 1 minute.  Gated post-stress tomographic  imaging was performed approximately 30  minutes after stress     EKG Findings  The resting EKG demonstrated normal sinus rhythm with nonspecific ST-T  wave abnormalities . The stress EKG demonstrated no significant ST-T  wave changes from baseline.     Tomographic Findings  Overall, the study quality is good . On the stress images, perfusion  is normal. On the rest images, perfusion is normal . Gated images  demonstrated normal left ventricular wall motion. The left ventricular  ejection fraction was calculated to be 55% at rest and 60% on the post  stress images. TID was absent.        (+) Dyslipidemia, hypertension--CAD, --. : . . . :. .      (-) CHF, arrhythmias and stent   METS/Exercise Tolerance:  >4 METS   Hematologic:         Musculoskeletal:   (+) arthritis, , , -       GI/Hepatic:        (-) GERD and hiatal hernia   Renal/Genitourinary:      (-) renal disease   Endo:      (-) Type II DM   Psychiatric:         Infectious Disease:         Malignancy:         Other:                     Physical Exam  Normal systems: dental    Airway   Mallampati: II  TM distance: >3 FB  Neck ROM: full    Dental     Cardiovascular   Rhythm and rate: regular and normal      Pulmonary    breath sounds clear to auscultation                    Anesthesia Plan      History & Physical Review  History and physical reviewed and following examination; no interval change.    ASA Status:  3 .    NPO Status:  > 8 hours    Plan for General and ETT with Intravenous and Propofol induction. Maintenance will be Balanced.    PONV prophylaxis:  Ondansetron (or other 5HT-3) and Dexamethasone or Solumedrol       Postoperative Care  Postoperative pain management:  IV analgesics and Oral pain medications.      Consents  Anesthetic plan, risks, benefits and alternatives discussed with:  Patient.  Use of blood products discussed: Yes.   Use of blood products discussed with Patient.  Consented to blood products.  .                          .

## 2017-10-24 NOTE — PLAN OF CARE
Problem: Patient Care Overview  Goal: Plan of Care/Patient Progress Review  PT eval completed and treatment initiated,  Pt admitted for ZEYNEP, ant, no precautions, WBAT.  Pt's previous level of function was I at home with spouse with no AD, 1 step to enter and 12 ro lower level.  Bed and bath on main level with walk in shower and shower chair.  Pt has FWW to bring in to be adjusted.    Discharge Planner PT   Patient plan for discharge: home with A of spouse POD #1  Current status: min A for bed mob and transfers with FWW, SBA for amb with ', zachary LE ex well  Barriers to return to prior living situation: none  Recommendations for discharge: home with A to cont HEP  Rationale for recommendations: to maximize functional  Mob and surgical outcome.       Entered by: REANNA RIVERO 10/24/2017 4:03 PM

## 2017-10-24 NOTE — CONSULTS
[facility]    Hospitalist Consultation    Date of Admission:  10/24/2017    Assessment & Plan   Javier Tamez is a 72 year old male who was admitted on 10/24/2017 status post right total hip arthroplasty with direct anterior approach by Dr. Avalos. Surgery was well tolerated under general anesthesia with endotracheal intubation. - ml. I was asked to see the patient for post-operative medical management. He pain is adequately controlled at this time and he is recovering in his hospital room.     #1 Status post right total hip arthroplasty, direct anterior approach, 10/24/2017  Today Mr. Tamez underwent the above surgery, which was well tolerated. His pain is adequately controlled.   - full surgical plan of care per Dr. Avalos, including fluid and pain management, DVT prophylaxis   - renal function is normal, reasonable to continue Toradol and monitor BMP tomorrow     #2 Coronary artery disease, patient reports high coronary calcium score  #3 Aortic stenosis, unknown severity  Mr. Tamez states he has never had a myocardial infarction, only a high coronary calcium score. On optimization without concern.  - will be on ASA per Dr. Avalos  - will hold lisinopril for now, will assess renal function tomorrow and consider resuming  - will hold rosuvastatin in-hospital  - PRN hydralazine for SBP > 180    DVT Prophylaxis: Enoxaparin (Lovenox) SQ per Dr. Avalos  Code Status: Full Code    Disposition: Expected discharge in 1-2 days once meets discharge criteria per Dr. Avalos.    The above assessment and plan has been discussed with Dr. Jimenez, who is in agreement with the above assessment and plan.     JAS Valdes, CNP  Hospitalist Service, House Officer  [facility]     Text Page  Pager: [phone]    Reason for Consult   Reason for consult: I was asked by Dr. Avalos to evaluate this patient for post-operative medical management.    Primary Care Physician   Dr. Li  KEVEN Kong MD    History is obtained from the patient and his wife.    History of Present Illness   Javier Tamez is a 72 year old male who was admitted on 10/24/2017 status post right total hip arthroplasty with direct anterior approach by Dr. Avalos. Surgery was well tolerated under general anesthesia with endotracheal intubation. - ml. I was asked to see the patient for post-operative medical management. He pain is adequately controlled at this time and he is recovering in his hospital room.     Past Medical History    I personally reviewed history with patient:  - TIA in 1992  - CAD, based on elevated coronary calcium score  - aortic stenosis, unknown severity  - aortic root dilation  - renal stone    Past Surgical History   I personally reviewed history with patient:  - vasectomy    Prior to Admission Medications   Prior to Admission Medications   Prescriptions Last Dose Informant Patient Reported? Taking?   ASPIRIN EC PO Over 1 week ago at am Self Yes Yes   Sig: Take 81 mg by mouth every morning   Ascorbic Acid (VITAMIN C PO) Over 1 week ago at am Self Yes Yes   Sig: Take 1,000 mg by mouth every morning    Cyanocobalamin (VITAMIN B 12 PO) Over 1 week ago at am Self Yes Yes   Sig: Take 1,000 mcg by mouth every morning   IBUPROFEN PO Over 1 week ago at prn Self Yes Yes   Sig: Take 400 mg by mouth 2 times daily as needed for moderate pain (2 x 200mg)   Probiotic Product (PROBIOTIC PO) Over 1 week ago at am Self Yes Yes   Sig: Take 1 capsule by mouth every morning   Tadalafil (CIALIS PO) Over 1 week ago at prn Self Yes Yes   Sig: Take 5 mg by mouth daily as needed for erectile dysfunction   VITAMIN D, CHOLECALCIFEROL, PO Over 1 week ago at am Self Yes Yes   Sig: Take 1,000 Units by mouth every morning    lisinopril (PRINIVIL,ZESTRIL) 2.5 MG tablet 10/23/2017 at am Self No Yes   Sig: Take 1 tablet (2.5 mg) by mouth daily   mupirocin (BACTROBAN) 2 % ointment 10/24/2017 at am Self Yes No   Sig: Spray into  "both nostrils 2 times daily   nitroglycerin (NITROSTAT) 0.4 MG sublingual tablet Never used. at prn Self No Yes   Sig: For chest pain place 1 tablet under the tongue every 5 minutes for 3 doses. If symptoms persist 5 minutes after 1st dose call 911.   rosuvastatin (CRESTOR) 40 MG tablet 10/23/2017 at hs Self No Yes   Sig: Take 1 tablet (40 mg) by mouth daily      Facility-Administered Medications: None     Allergies   No Known Allergies    Social History   I personally reviewed history with patient:  - lives locally with his wife, who is present  - recently retired from sales  - lifelong non-smoker  - drinks 3-4 alcohol beverages/week  - denies other drugs    Family History   I personally reviewed history with patient:  - Mom: breast cancer, stroke  - Father: diabetes, coronary artery disease    Review of Systems   The 10 point Review of Systems is negative other than noted in the HPI or here.     Physical Exam   Nursing Notes Reviewed.  /64 (BP Location: Right arm)  Pulse 70  Temp 97.6  F (36.4  C) (Oral)  Resp 16  Ht 1.803 m (5' 11\")  Wt 82.1 kg (181 lb)  SpO2 94%  BMI 25.24 kg/m2     General: Appears stated age, no acute distress.  Skin:  Warm, dry. No rashes or lesions on exposed skin.  HEENT:  Normocephalic, atraumatic.  Chest:  Bilateral anterior and posterior lung fields clear to auscultation. No increased work of breathing. Does require supplemental oxygen.  Cardiovascular:  Regular rate and rhythm, without rub, or gallop. Murmur not easily appreciated. Bilateral upper and lower distal pulses palpable.   Abdomen:  Soft, non-tender, non-distended. Bowel sounds present.   Musculoskeletal:  Moves all four extremities.   Neurological:  Alert and oriented x 4. Cranial nerves II-XII grossly intact.   Psychiatric:  Affect and mood congruent.      Data   -Data reviewed today: All pertinent laboratory and imaging results from this encounter were reviewed. I personally reviewed no images or EKG's " today.    Recent Labs  Lab 10/24/17  0645   HGB 15.6      POTASSIUM 3.9   CR 0.73       Imaging:  No results found for this or any previous visit (from the past 24 hour(s)).

## 2017-10-24 NOTE — IP AVS SNAPSHOT
MRN:[id number]                      After Visit Summary   10/24/2017    Javier Tamez    MRN: [id number]           Thank you!     Thank you for choosing [facility] for your care. Our goal is always to provide you with excellent care. Hearing back from our patients is one way we can continue to improve our services. Please take a few minutes to complete the written survey that you may receive in the mail after you visit with us. Thank you!        Patient Information     Date Of Birth          1945        Designated Caregiver       Most Recent Value    Caregiver    Will someone help with your care after discharge? yes    Name of designated caregiver Jennifer    Phone number of caregiver     Caregiver address [address] [facility]      About your hospital stay     You were admitted on:  October 24, 2017 You last received care in the:  [place] [address] [facility]    You were discharged on:  October 25, 2017        Reason for your hospital stay       Minimally invasive total hip replacement                  Who to Call     For medical emergencies, please call 911.  For non-urgent questions about your medical care, please call your primary care provider or clinic, [phone]  For questions related to your surgery, please call your surgery clinic        Attending Provider     Provider Specialty    Meño Avalos MD Orthopedics       Primary Care Provider Office Phone # Fax #    Guillermo Kong [phone]       When to contact your care team       EMERGENCY CARE PLAN     1. I have questions or concerns during clinic hours:   - I will call the clinic directly at [phone] and and speak with Dr. Leonor Jackson's care coordinator.     2. I have questions or concerns outside clinic hours:   - I will call the clinic directly at [phone] and speak with the doctor on-call.     3. I need to schedule an appointment:   - I will call the clinic directly at  [phone] for [place] appointments or [phone] for [place] appointments.     4. I am concerned about symptoms that I am having and think I need same day treatment:   - During clinic hours, I will call the clinic first at [phone] and speak with Renetta.   - She will either make an appointment with Dr. Galvez or she will direct you to come in to our walk-in urgent care clinic.   - The urgent care is open 7 days a week from 8:00am - 8:00pm at all of our locations.     - After clinic hours, I will call the clinic first at [phone] and speak with the on-call doctor.   - You should NOT go to the emergency room or a urgent care with out being directed to do so by the clinic.                  After Care Instructions     Activity       Your activity upon discharge: activity as tolerated.  You should work on home exercises provided by the physical therapist at the hospital 2-3 times a day.     Physical Therapy: Once you leave the hospital you will not need outpatient physical therapy for your hip.  Walking is the best exercise after a hip replacement.  Do as much walking as you feel comfortable doing  Remember, you have no hip restrictions or precautions, however you should avoid any twisting or torquing of the hip (no hokie pokie).  You should also avoid any kind of strengthening exercises of the hip and leg for the first 8 weeks after surgery.     Assistive Ambulatory Devices:  Use your walker/crutches until you feel comfortable advancing to a cane.  You should use the cane in the hand opposite your surgery.  You can then advance from the cane as you feel comfortable.     Elevate: Swelling in the leg is normal after a hip replacement.  By keeping your leg elevated with a pillow under your calf, not under the knee, will help with the swelling.  The best position is to have the knee above the level of your heart and your ankle above the level of your knee.  Wearing the compression stockings (Barak hose) can help  reduce swelling.  You should wear these until you are seen at Dr. Galvez's office post operatively.  You can remove these twice a day for laundering and hygiene.  The swelling in the leg will be present for at least 4-6 weeks.       Ice: Ice the hip 4-5 times a day for 20-30 minutes at a time.  Icing will help reduce swelling and pain.     Pain control: Take the pain medication and/or anti-inflammatory medication as prescribed.  Don't let your pain become severe.            Diet       Follow this diet upon discharge: Regular            Discharge Instructions       Upon leaving the hospital you will be discharged with a few different medications:     1. Aspirin 325mg - you will be taking one tablet twice a day for 5 weeks following surgery for a blood thinner to help prevent blood clots.   2. Oxycodone 5mg - this is a pain medication.  You can take one or two tablets every four to six hours.  You will need this medication the longest to sleep at night and for physical therapy.  You can wean yourself from this medication as you are able by either increasing the time between tablets or going down to one tablet if you are taking two at a time.    **REMINDER: If you need a refill of your pain medication prior to your first post-operative appointment please contact our office and allow 24 to 48 hours for refills to be processed. Any refills needed before the weekend will need to be submitted on Thursday.  Also, all narcotic pain medication cannot be called in to the pharmacy and will need to be picked up at one of our clinics ([place] or [facility]), this is a Federal Law.  You or a family member will need to allow for time to come to our office to pick these up.  Please let us know who will be picking up the prescription as that person will need to show a photo ID to get the prescription.**    3. Senokot - this is a stool softener.  You can take one or two pills twice a day as needed for constipation.  You should take  this medication as long as you are taking narcotic pain medication and as long as you do not have diarrhea.  As you are more active and taking less pain medication you will be able to stop using this.     Medication you should already have at home and to start the day after you get home from the hospital:   1. Celebrex 200mg - this is an anti-inflammatory medication you will be taking one tablet daily with your morning meal. This medication will help with the pain and swelling in your hip and leg.  You should not take another anti-inflammatory medication (i.e. Ibuprofen, advil, aleve, etc) while taking celebrex.  You can take tylenol with Celebrex.  You should have already gotten a prescription for this medication and filled it prior to surgery.  You can start this medication the day after you get home from the hospital.     Other medications not prescribed:   1. Tylenol - you can take Tylenol in addition to the pain medication.  Do not exceed 3,000mg in a day.   2. Ibuprofen - we would like you to avoid taking ibuprofen while you are taking the aspirin.   3. Iron - we typically do not prescribe an iron supplement pill after surgery however, if you want to take one we recommend 324 or 325mg daily.  These pills will make you more constipated.     Please contact Dr. Galvez's office with any questions.  You can either call Dr. Leonor Jackson's , at [phone] or email Dr. Leonor Carreno's physician assistant, at [email or web address].            Wound care and dressings       You have a gauze and tape dressing over the incision that you should change daily for one week.  Once you are one week out from surgery you do not need to keep a dressing over the incision.  There is a thin mesh film adhered to your skin over the incision which should stay in place until your follow-up appointment with Dr. Galvez.  If this comes off you should call Dr. Galvez's office for further instruction.  You can get your  "incision wet in the shower but you should not submerge it.                  Follow-up Appointments     Follow-up and recommended labs and tests       Your follow-up appointment is schedule for 3:00pm on  at the [place] office with Dr. Galvez.  Please call [phone] if you need to reschedule this appointment.     If you have any questions prior to your follow-up appointment please call Dr. Leonor Jackson's , at [phone].  You may also email Ev with any questions at [email or web address]                  Pending Results     No orders found for last 3 day(s).            Statement of Approval     Ordered          10/25/17 1030  I have reviewed and agree with all the recommendations and orders detailed in this document.  EFFECTIVE NOW     Approved and electronically signed by:  Ev Chicas PA-C             Admission Information     Date & Time Provider Department Dept. Phone    10/24/2017 Meño Avalos MD [place] [address] [facility] [phone]      Your Vitals Were     Blood Pressure Pulse Temperature Respirations Height Weight    113/63 (BP Location: Left arm) 80 98.4  F (36.9  C) (Oral) 16 1.803 m (5' 11\") 82.1 kg (181 lb)    Pulse Oximetry BMI (Body Mass Index)                95% 25.24 kg/m2          MyChart Information     [email or web address] lets you send messages to your doctor, view your test results, renew your prescriptions, schedule appointments and more. To sign up, go to [email or web address] . Click on \"Log in\" on the left side of the screen, which will take you to the Welcome page. Then click on \"Sign up Now\" on the right side of the page.     You will be asked to enter the access code listed below, as well as some personal information. Please follow the directions to create your username and password.     Your access code is: [email or web address]  Expires: 2017  9:01 AM     Your access code will  in 90 days. If you need help or a new code, " please call your [facility] clinic or [phone].        Care EveryWhere ID     This is your Care EveryWhere ID. This could be used by other organizations to access your [facility] medical records  [id number]        Equal Access to Services     JASON KIRKLAND : Hadii aad ku hadpaula Gonzalez, waaxda luqadaha, qaybta kaalmada adeconyda, oliver quiñonez lalito garcia. So [facility] [phone].    ATENCIÓN: Si habla español, tiene a crews disposición servicios gratuitos de asistencia lingüística. Llame al [phone].    We comply with applicable federal civil rights laws and [place] laws. We do not discriminate on the basis of race, color, national origin, age, disability, sex, sexual orientation, or gender identity.               Review of your medicines      START taking        Dose / Directions    oxyCODONE 5 MG IR tablet   Commonly known as:  ROXICODONE        Dose:  5-10 mg   Take 1-2 tablets (5-10 mg) by mouth every 4 hours as needed for moderate to severe pain   Quantity:  50 tablet   Refills:  0       senna-docusate 8.6-50 MG per tablet   Commonly known as:  SENOKOT-S;PERICOLACE        Dose:  1-2 tablet   Take 1-2 tablets by mouth 2 times daily as needed for constipation   Quantity:  60 tablet   Refills:  0         CONTINUE these medicines which may have CHANGED, or have new prescriptions. If we are uncertain of the size of tablets/capsules you have at home, strength may be listed as something that might have changed.        Dose / Directions    aspirin  MG EC tablet   This may have changed:    - medication strength  - how much to take  - when to take this        Dose:  325 mg   Take 1 tablet (325 mg) by mouth 2 times daily   Quantity:  60 tablet   Refills:  0         CONTINUE these medicines which have NOT CHANGED        Dose / Directions    CIALIS PO        Dose:  5 mg   Take 5 mg by mouth daily as needed for erectile dysfunction   Refills:  0       lisinopril 2.5 MG tablet   Commonly known as:   PRINIVIL/Zestril   Used for:  Essential hypertension with goal blood pressure less than 140/90, Aortic root dilatation (H)   Notes to Patient:  Resume as prescribed        Dose:  2.5 mg   Take 1 tablet (2.5 mg) by mouth daily   Quantity:  90 tablet   Refills:  3       nitroGLYcerin 0.4 MG sublingual tablet   Commonly known as:  NITROSTAT   Used for:  Coronary artery disease involving native coronary artery of native heart without angina pectoris        For chest pain place 1 tablet under the tongue every 5 minutes for 3 doses. If symptoms persist 5 minutes after 1st dose call 911.   Quantity:  25 tablet   Refills:  3       PROBIOTIC PO   Notes to Patient:  Resume as prescribed        Dose:  1 capsule   Take 1 capsule by mouth every morning   Refills:  0       rosuvastatin 40 MG tablet   Commonly known as:  CRESTOR   Used for:  Coronary artery disease involving native coronary artery of native heart without angina pectoris   Notes to Patient:  Resume as prescribed        Dose:  40 mg   Take 1 tablet (40 mg) by mouth daily   Quantity:  90 tablet   Refills:  2       VITAMIN B 12 PO   Notes to Patient:  Resume as prescribed        Dose:  1000 mcg   Take 1,000 mcg by mouth every morning   Refills:  0       VITAMIN C PO   Notes to Patient:  Resume as prescribed        Dose:  1000 mg   Take 1,000 mg by mouth every morning   Refills:  0       VITAMIN D (CHOLECALCIFEROL) PO   Notes to Patient:  Resume as prescribed        Dose:  1000 Units   Take 1,000 Units by mouth every morning   Refills:  0         STOP taking     IBUPROFEN PO           mupirocin 2 % ointment   Commonly known as:  BACTROBAN                Where to get your medicines      These medications were sent to [facility] [place], [place] - [address]  [address] [facility] [address]     Phone:  [phone]     aspirin  MG EC tablet    senna-docusate 8.6-50 MG per tablet         Some of these will need a paper prescription and  others can be bought over the counter. Ask your nurse if you have questions.     Bring a paper prescription for each of these medications     oxyCODONE 5 MG IR tablet                Protect others around you: Learn how to safely use, store and throw away your medicines at www.disposemymeds.org.             Medication List: This is a list of all your medications and when to take them. Check marks below indicate your daily home schedule. Keep this list as a reference.      Medications           Morning Afternoon Evening Bedtime As Needed    aspirin  MG EC tablet   Take 1 tablet (325 mg) by mouth 2 times daily   Next Dose Due:  Start 10/26/17                                      CIALIS PO   Take 5 mg by mouth daily as needed for erectile dysfunction                                   lisinopril 2.5 MG tablet   Commonly known as:  PRINIVIL/Zestril   Take 1 tablet (2.5 mg) by mouth daily   Notes to Patient:  Resume as prescribed                                nitroGLYcerin 0.4 MG sublingual tablet   Commonly known as:  NITROSTAT   For chest pain place 1 tablet under the tongue every 5 minutes for 3 doses. If symptoms persist 5 minutes after 1st dose call 911.                                   oxyCODONE 5 MG IR tablet   Commonly known as:  ROXICODONE   Take 1-2 tablets (5-10 mg) by mouth every 4 hours as needed for moderate to severe pain                                   PROBIOTIC PO   Take 1 capsule by mouth every morning   Notes to Patient:  Resume as prescribed                                rosuvastatin 40 MG tablet   Commonly known as:  CRESTOR   Take 1 tablet (40 mg) by mouth daily   Notes to Patient:  Resume as prescribed                                senna-docusate 8.6-50 MG per tablet   Commonly known as:  SENOKOT-S;PERICOLACE   Take 1-2 tablets by mouth 2 times daily as needed for constipation   Last time this was given:  2 tablets on 10/25/2017  8:25 AM                                   VITAMIN B 12 PO    Take 1,000 mcg by mouth every morning   Notes to Patient:  Resume as prescribed                                VITAMIN C PO   Take 1,000 mg by mouth every morning   Notes to Patient:  Resume as prescribed                                VITAMIN D (CHOLECALCIFEROL) PO   Take 1,000 Units by mouth every morning   Notes to Patient:  Resume as prescribed

## 2017-10-24 NOTE — PLAN OF CARE
Problem: Hip Replacement, Total (Adult)  Goal: Signs and Symptoms of Listed Potential Problems Will be Absent or Manageable (Hip Replacement, Total)  Signs and symptoms of listed potential problems will be absent or manageable by discharge/transition of care (reference Hip Replacement, Total (Adult) CPG).   Outcome: Improving  Pt dangled at bedside and ambulated in halls with PT.  CMS good to ft.  Hip drsg dry and intact.  Chapa patent.  Rates pain #3 and has not taken any PRN meds yet. Ice to hip.

## 2017-10-24 NOTE — PROGRESS NOTES
10/24/17 1500   Quick Adds   Type of Visit Initial PT Evaluation   Living Environment   Lives With spouse   Living Arrangements house   Home Accessibility stairs to enter home;stairs within home   Number of Stairs to Enter Home 1   Number of Stairs Within Home 12  (optional)   Stair Railings at Home inside, present on right side   Transportation Available car;family or friend will provide   Self-Care   Equipment Currently Used at Home shower chair;walker, rolling  (has but not using)   Functional Level Prior   Ambulation 0-->independent   Transferring 0-->independent   Toileting 0-->independent   Bathing 0-->independent   Dressing 0-->independent   Eating 0-->independent   Communication 0-->understands/communicates without difficulty   Swallowing 0-->swallows foods/liquids without difficulty   Cognition 0 - no cognition issues reported   Fall history within last six months no   Which of the above functional risks had a recent onset or change? none   General Information   Onset of Illness/Injury or Date of Surgery - Date 10/24/17   Referring Physician Meño Smith   Patient/Family Goals Statement pt plans on dc to home with sopuse   Pertinent History of Current Problem (include personal factors and/or comorbidities that impact the POC) s/p ZEYNEP, mini   Precautions/Limitations no known precautions/limitations   Weight-Bearing Status - LUE full weight-bearing   Weight-Bearing Status - RUE full weight-bearing   Weight-Bearing Status - LLE full weight-bearing   Weight-Bearing Status - RLE weight-bearing as tolerated   Cognitive Status Examination   Orientation orientation to person, place and time   Level of Consciousness alert   Follows Commands and Answers Questions 100% of the time   Personal Safety and Judgment intact   Memory intact   Pain Assessment   Patient Currently in Pain Yes, see Vital Sign flowsheet  (3/10)   Integumentary/Edema   Integumentary/Edema Comments R hip per surgery   Range of Motion (ROM)   ROM  "Comment R LE limited s/p ZEYNEP   Strength   Strength Comments R LE limited s/p ZEYNEP due to pain, able to SAQ I   Bed Mobility   Bed Mobility Comments bed mob with min A and A for set up   Transfer Skills   Transfer Comments Sit to stand iwth FWW with cues for safety   Gait   Gait Comments amb with FWW with CGA 20'   Balance   Balance Comments impaired standing dynamic balance requires B UE support   Coordination   Coordination no deficits were identified   Muscle Tone   Muscle Tone no deficits were identified   General Therapy Interventions   Planned Therapy Interventions bed mobility training;gait training;ROM;strengthening;stretching;transfer training   Clinical Impression   Criteria for Skilled Therapeutic Intervention yes, treatment indicated   PT Diagnosis difficulty walking   Influenced by the following impairments post op pain decreased ROM, strength, balance and act zachary   Functional limitations due to impairments impaired functional mob I and safety   Clinical Presentation Stable/Uncomplicated   Clinical Presentation Rationale clinical judgement   Clinical Decision Making (Complexity) Low complexity   Therapy Frequency` 2 times/day   Predicted Duration of Therapy Intervention (days/wks) 2 days   Anticipated Discharge Disposition Home with Assist   Risk & Benefits of therapy have been explained Yes   Patient, Family & other staff in agreement with plan of care Yes   [facility] TM \"6 Clicks\"   2016, Trustees of [facility], under license to [employer].  All rights reserved.   6 Clicks Short Forms Basic Mobility Inpatient Short Form   [facility]  \"6 Clicks\" V.2 Basic Mobility Inpatient Short Form   1. Turning from your back to your side while in a flat bed without using bedrails? 3 - A Little   2. Moving from lying on your back to sitting on the side of a flat bed without using bedrails? 3 - A Little   3. Moving to and from a bed to a chair (including a wheelchair)? 3 - A Little "   4. Standing up from a chair using your arms (e.g., wheelchair, or bedside chair)? 3 - A Little   5. To walk in hospital room? 4 - None   6. Climbing 3-5 steps with a railing? 3 - A Little   Basic Mobility Raw Score (Score out of 24.Lower scores equate to lower levels of function) 19   Total Evaluation Time   Total Evaluation Time (Minutes) 10

## 2017-10-24 NOTE — BRIEF OP NOTE
[facility] Brief Operative Note    Pre-operative diagnosis: RIGHT HIP DJD   Post-operative diagnosis same   Procedure: Procedure(s):  RIGHT TOTAL HIP ARTHROPLASTY DIRECT ANTERIOR APPROACH - Wound Class: I-Clean   Surgeon(s): Surgeon(s) and Role:     * Meño Avalos MD - Primary     * Ev Chicas PA-C - Assisting   Estimated blood loss: 300 mL    Specimens: * No specimens in log *   Findings: Advanced OA     Plan: DC home POD1 w/wife assist.  DVT prophylaxis with lovenox in hospital, ASA 325mg BID x1 month.

## 2017-10-24 NOTE — PROGRESS NOTES
Admission medication history interview status for the 10/24/2017  admission is complete. See EPIC admission navigator for prior to admission medications     Medication history source reliability:Good    Medication history interview source(s):Patient    Medication history resources (including written lists, pill bottles, clinic record):Patient phoned in a med list prior to day of procedure.    [facility]    Additional medication history information not noted on PTA med list :None    Time spent in this activity: 30 minutes    Prior to Admission medications    Medication Sig Last Dose Taking? Auth Provider   ASPIRIN EC PO Take 81 mg by mouth every morning Over 1 week ago at am Yes Reported, Patient   Cyanocobalamin (VITAMIN B 12 PO) Take 1,000 mcg by mouth every morning Over 1 week ago at am Yes Reported, Patient   Tadalafil (CIALIS PO) Take 5 mg by mouth daily as needed for erectile dysfunction Over 1 week ago at prn Yes Reported, Patient   Probiotic Product (PROBIOTIC PO) Take 1 capsule by mouth every morning Over 1 week ago at am Yes Reported, Patient   IBUPROFEN PO Take 400 mg by mouth 2 times daily as needed for moderate pain (2 x 200mg) Over 1 week ago at prn Yes Reported, Patient   rosuvastatin (CRESTOR) 40 MG tablet Take 1 tablet (40 mg) by mouth daily 10/23/2017 at hs Yes Miguelito Sim MD   nitroglycerin (NITROSTAT) 0.4 MG sublingual tablet For chest pain place 1 tablet under the tongue every 5 minutes for 3 doses. If symptoms persist 5 minutes after 1st dose call 911. Never used. at prn Yes Miguelito Sim MD   lisinopril (PRINIVIL,ZESTRIL) 2.5 MG tablet Take 1 tablet (2.5 mg) by mouth daily 10/23/2017 at am Yes Guillermo Kong MD   VITAMIN D, CHOLECALCIFEROL, PO Take 1,000 Units by mouth every morning  Over 1 week ago at am Yes Reported, Patient   Ascorbic Acid (VITAMIN C PO) Take 1,000 mg by mouth every morning  Over 1 week ago at am Yes  Reported, Patient   mupirocin (BACTROBAN) 2 % ointment Spray into both nostrils 2 times daily 10/24/2017 at am  Reported, Patient

## 2017-10-25 ENCOUNTER — APPOINTMENT (OUTPATIENT)
Dept: PHYSICAL THERAPY | Facility: CLINIC | Age: 72
DRG: 470 | End: 2017-10-25
Attending: ORTHOPAEDIC SURGERY
Payer: MEDICARE

## 2017-10-25 ENCOUNTER — APPOINTMENT (OUTPATIENT)
Dept: OCCUPATIONAL THERAPY | Facility: CLINIC | Age: 72
DRG: 470 | End: 2017-10-25
Attending: ORTHOPAEDIC SURGERY
Payer: MEDICARE

## 2017-10-25 VITALS
OXYGEN SATURATION: 95 % | HEIGHT: 71 IN | SYSTOLIC BLOOD PRESSURE: 113 MMHG | DIASTOLIC BLOOD PRESSURE: 63 MMHG | RESPIRATION RATE: 16 BRPM | HEART RATE: 80 BPM | TEMPERATURE: 98.4 F | BODY MASS INDEX: 25.34 KG/M2 | WEIGHT: 181 LBS

## 2017-10-25 LAB
GLUCOSE SERPL-MCNC: 125 MG/DL (ref 70–99)
HGB BLD-MCNC: 12.1 G/DL (ref 13.3–17.7)

## 2017-10-25 PROCEDURE — 85018 HEMOGLOBIN: CPT | Performed by: ORTHOPAEDIC SURGERY

## 2017-10-25 PROCEDURE — 82947 ASSAY GLUCOSE BLOOD QUANT: CPT | Performed by: ORTHOPAEDIC SURGERY

## 2017-10-25 PROCEDURE — 97530 THERAPEUTIC ACTIVITIES: CPT | Mod: GP

## 2017-10-25 PROCEDURE — 97116 GAIT TRAINING THERAPY: CPT | Mod: GP

## 2017-10-25 PROCEDURE — 36415 COLL VENOUS BLD VENIPUNCTURE: CPT | Performed by: ORTHOPAEDIC SURGERY

## 2017-10-25 PROCEDURE — 97535 SELF CARE MNGMENT TRAINING: CPT | Mod: GO | Performed by: OCCUPATIONAL THERAPIST

## 2017-10-25 PROCEDURE — 25000128 H RX IP 250 OP 636: Performed by: ORTHOPAEDIC SURGERY

## 2017-10-25 PROCEDURE — 25000132 ZZH RX MED GY IP 250 OP 250 PS 637: Mod: GY | Performed by: ORTHOPAEDIC SURGERY

## 2017-10-25 PROCEDURE — 40000133 ZZH STATISTIC OT WARD VISIT: Performed by: OCCUPATIONAL THERAPIST

## 2017-10-25 PROCEDURE — A9270 NON-COVERED ITEM OR SERVICE: HCPCS | Mod: GY | Performed by: ORTHOPAEDIC SURGERY

## 2017-10-25 PROCEDURE — 97165 OT EVAL LOW COMPLEX 30 MIN: CPT | Mod: GO | Performed by: OCCUPATIONAL THERAPIST

## 2017-10-25 PROCEDURE — 40000193 ZZH STATISTIC PT WARD VISIT

## 2017-10-25 PROCEDURE — 97110 THERAPEUTIC EXERCISES: CPT | Mod: GP

## 2017-10-25 PROCEDURE — 25800025 ZZH RX 258: Performed by: ORTHOPAEDIC SURGERY

## 2017-10-25 RX ORDER — AMOXICILLIN 250 MG
1-2 CAPSULE ORAL 2 TIMES DAILY PRN
Qty: 60 TABLET | Refills: 0 | Status: SHIPPED | OUTPATIENT
Start: 2017-10-25 | End: 2018-01-12

## 2017-10-25 RX ORDER — OXYCODONE HYDROCHLORIDE 5 MG/1
5-10 TABLET ORAL EVERY 4 HOURS PRN
Qty: 50 TABLET | Refills: 0 | Status: SHIPPED | OUTPATIENT
Start: 2017-10-25 | End: 2018-01-12

## 2017-10-25 RX ADMIN — SENNOSIDES AND DOCUSATE SODIUM 2 TABLET: 8.6; 5 TABLET ORAL at 08:25

## 2017-10-25 RX ADMIN — ENOXAPARIN SODIUM 40 MG: 40 INJECTION SUBCUTANEOUS at 08:25

## 2017-10-25 RX ADMIN — ACETAMINOPHEN 975 MG: 325 TABLET, FILM COATED ORAL at 10:07

## 2017-10-25 RX ADMIN — ACETAMINOPHEN 975 MG: 325 TABLET, FILM COATED ORAL at 01:20

## 2017-10-25 RX ADMIN — POTASSIUM CHLORIDE, DEXTROSE MONOHYDRATE AND SODIUM CHLORIDE: 150; 5; 450 INJECTION, SOLUTION INTRAVENOUS at 00:28

## 2017-10-25 RX ADMIN — KETOROLAC TROMETHAMINE 15 MG: 15 INJECTION, SOLUTION INTRAMUSCULAR; INTRAVENOUS at 03:36

## 2017-10-25 RX ADMIN — KETOROLAC TROMETHAMINE 15 MG: 15 INJECTION, SOLUTION INTRAMUSCULAR; INTRAVENOUS at 10:07

## 2017-10-25 RX ADMIN — CEFAZOLIN SODIUM 2 G: 2 INJECTION, SOLUTION INTRAVENOUS at 01:20

## 2017-10-25 ASSESSMENT — ACTIVITIES OF DAILY LIVING (ADL): PREVIOUS_RESPONSIBILITIES: MEAL PREP;HOUSEKEEPING;LAUNDRY;SHOPPING;YARDWORK;MEDICATION MANAGEMENT;FINANCES;DRIVING

## 2017-10-25 NOTE — PLAN OF CARE
Problem: Patient Care Overview  Goal: Plan of Care/Patient Progress Review  Discharge Planner PT   Patient plan for discharge: Home with wife  Current status: Pt demonstrates ability to perform bed mobility independently, good sitting balance at EOB. Pt performs sit to/from stand and stand pivot transfer modified independent with FWW. Pt able to ambulate 200' with FWW modified independent. Pt able to ascend/descend 12 steps with handrail SBA and cues for sequencing. Pt independent with basic ZEYNEP supine exercise program.  Barriers to return to prior living situation: None anticipated  Recommendations for discharge: Home with wife, continue mobilization and HEP as instructed.  Rationale for recommendations: Pt independent with bed mobility, transfers, and ambulation. Requires only SBA for stair management, which wife is able to provide.       Entered by: Evy Flores 10/25/2017 9:56 AM     Physical Therapy Discharge Summary     Reason for therapy discharge:    Discharged to home.     Progress towards therapy goal(s). See goals on Care Plan in [facility] electronic health record for goal details.  Goals met     Therapy recommendation(s):    Continue home exercise program.

## 2017-10-25 NOTE — PLAN OF CARE
Problem: Patient Care Overview  Goal: Plan of Care/Patient Progress Review  OT: Orders received, eval completed, and treatment initiated. Pt admitted fora R ZEYNEP with anterior approach with no precautions.  Pt's previous level of function was I at home with spouse with no AD, 1 step to enter and 12 to lower level.  Bed and bath on main level with walk in shower and shower chair.  Pt has 2WW, reacher, and high toilets.    Discharge Planner OT      Patient plan for discharge: home with A of spouse   Current status: Pt completed bed mobility with IND, transferred with VC for hand placement, but with MOD IND physically, pt demonstrates functional standing tolerance and balance. Pt completed LE dressing with SBA, trialed AE, but demonstrates good mobility and will have assist from spouse for LE dressing as needed. Pt participated in ed for fall prevention, car transfers, and home safety.   Barriers to return to prior living situation: none  Recommendations for discharge: home with assist from spouse as needed  Rationale for recommendations: Pt demonstrates ability to follow discharge recommendations and has support at home.        Entered by: Geeta Gayle 10/25/2017 10:49 AM           Occupational Therapy Discharge Summary    Reason for therapy discharge:    Discharged to home.    Progress towards therapy goal(s). See goals on Care Plan in [facility] electronic health record for goal details.  Goals met    Therapy recommendation(s):    No further therapy is recommended.

## 2017-10-25 NOTE — PROGRESS NOTES
"Orthopedic Surgery  10/25/2017  POD #1    S: Patient voices no complaints today.     O: Blood pressure 105/59, pulse 87, temperature 98.8  F (37.1  C), temperature source Oral, resp. rate 16, height 1.803 m (5' 11\"), weight 82.1 kg (181 lb), SpO2 93 %.  Lab Results   Component Value Date    HGB 12.1 10/25/2017     Neurovascularly intact.  Calves are negative bilaterally, both soft and nontender.  The dressing is C/D/I.  The wound looks good with minimal erythema of the surrounding skin.    A: Mr. Tamez is doing well status post right total hip arthroplasty.    P:   1. Dressing change prior to d/c.  2. Mobilize and continue physical therapy.   3. Discharge to home today.    Ev Chicas PA-C  [phone]  "

## 2017-10-25 NOTE — PLAN OF CARE
Problem: Patient Care Overview  Goal: Plan of Care/Patient Progress Review  Outcome: Improving  A/O x4. Up Ax1 walker to amb in ingram. CMS intact. Drsg C/D/I. VSS on RA. Denies pain. Chapa in place and patent. Progressing per POC. Will cont to [facility]. Plan to DC 10/25.

## 2017-10-25 NOTE — PLAN OF CARE
Problem: Patient Care Overview  Goal: Plan of Care/Patient Progress Review  Outcome: Adequate for Discharge Date Met:  10/25/17  Minimal pain, managed with Tylenol and Toradol, voiding adequately, up with SBA and walker, VSS on RA, CMS intact, drsg changed, discharge teaching done with patient and wife, discharged home.

## 2017-10-25 NOTE — PLAN OF CARE
Problem: Hip Replacement, Total (Adult)  Goal: Signs and Symptoms of Listed Potential Problems Will be Absent or Manageable (Hip Replacement, Total)  Signs and symptoms of listed potential problems will be absent or manageable by discharge/transition of care (reference Hip Replacement, Total (Adult) CPG).   Outcome: Improving  A&O x4, VSS on RA, CMS intact, Drng C/D/I, Activity w/assist of 1, stephens patent and intact, Pain controlled w/ scheduled toradol and tylenol, Diet reg, IV-SL, denies nausea and appetite's been good per pt. Continue to monitor.

## 2017-10-25 NOTE — PROGRESS NOTES
10/25/17 1000   Quick Adds   Type of Visit Initial Occupational Therapy Evaluation   Living Environment   Lives With spouse   Living Arrangements house   Home Accessibility stairs to enter home;stairs within home   Number of Stairs to Enter Home 1   Number of Stairs Within Home 12  (optional)   Stair Railings at Home inside, present on right side   Living Environment Comment Pt lives with his wife who is able to assist as needed, they have a walk in shower, with shower chair, high toilets, and reacher.    Self-Care   Usual Activity Tolerance excellent   Current Activity Tolerance good   Functional Level Prior   Ambulation 0-->independent   Transferring 0-->independent   Toileting 0-->independent   Bathing 0-->independent   Dressing 0-->independent   Eating 0-->independent   Communication 0-->understands/communicates without difficulty   Swallowing 0-->swallows foods/liquids without difficulty   Cognition 0 - no cognition issues reported   Fall history within last six months no   Which of the above functional risks had a recent onset or change? none   Prior Functional Level Comment Per pt report he was IND in all ADLs and IADLs   General Information   Onset of Illness/Injury or Date of Surgery - Date 10/24/17   Referring Physician Katrin   Patient/Family Goals Statement None specifically stated   Additional Occupational Profile Info/Pertinent History of Current Problem Pt is a 71 yo male who underwent R ZEYNEP with direct anterior approach   Precautions/Limitations fall precautions;no known precautions/limitations   Weight-Bearing Status - RLE weight-bearing as tolerated   Cognitive Status Examination   Orientation orientation to person, place and time   Cognitive Comment No cognitive issues noted   Visual Perception   Visual Perception Wears glasses   Sensory Examination   Sensory Quick Adds No deficits were identified   Pain Assessment   Patient Currently in Pain Yes, see Vital Sign flowsheet   Integumentary/Edema    Integumentary/Edema no deficits were identifed   Range of Motion (ROM)   ROM Quick Adds No deficits were identified   ROM Comment BUE   Strength   Manual Muscle Testing Quick Adds No deficits were identified   Strength Comments BUE   Coordination   Upper Extremity Coordination No deficits were identified   Mobility   Bed Mobility Comments IND   Transfer Skills   Transfer Comments SBA   Transfer Skill: Bed to Chair/Chair to Bed   Level of Coffee: Bed to Chair stand-by assist   Physical Assist/Nonphysical Assist: Bed to Chair verbal cues   Weight-Bearing Restrictions weight-bearing as tolerated   Assistive Device - Transfer Skill Bed to Chair Chair to Bed Rehab Eval rolling walker   Transfer Skill: Sit to Stand   Level of Coffee: Sit/Stand stand-by assist   Physical Assist/Nonphysical Assist: Sit/Stand verbal cues   Transfer Skill: Sit to Stand weight-bearing as tolerated   Assistive Device for Transfer: Sit/Stand rolling walker   Toilet Transfer   Toilet Transfer Comments SBA   Tub/Shower Transfer   Tub/Shower Transfer Comments pt has walk in shower   Balance   Balance Comments reduced dynamic balance   Bathing   Level of Coffee stand-by assist   Upper Body Dressing   Level of Coffee: Dress Upper Body independent   Lower Body Dressing   Level of Coffee: Dress Lower Body minimum assist (75% patients effort)   Toileting   Level of Coffee: Toilet stand-by assist   Grooming   Level of Coffee: Grooming independent   Eating/Self Feeding   Level of Coffee: Eating independent   Instrumental Activities of Daily Living (IADL)   Previous Responsibilities meal prep;housekeeping;laundry;shopping;yardwork;medication management;finances;driving   Activities of Daily Living Analysis   Impairments Contributing to Impaired Activities of Daily Living balance impaired;strength decreased   General Therapy Interventions   Planned Therapy Interventions ADL retraining   Clinical Impression  "  Criteria for Skilled Therapeutic Interventions Met yes, treatment indicated   OT Diagnosis reduced safety and IND in ADLs   Influenced by the following impairments balance impaired;strength decreased   Assessment of Occupational Performance 1-3 Performance Deficits   Identified Performance Deficits dressing, bathing, toileting   Clinical Decision Making (Complexity) Low complexity   Therapy Frequency other (see comments)  (1 time)   Predicted Duration of Therapy Intervention (days/wks) 1 time   Anticipated Discharge Disposition Home with Assist   Risks and Benefits of Treatment have been explained. Yes   Patient, Family & other staff in agreement with plan of care Yes   [facility] TM \"6 Clicks\"   2016, Trustees of [facility], under license to [employer].  All rights reserved.   6 Clicks Short Forms Basic Mobility Inpatient Short Form   [facility]  \"6 Clicks\" Daily Activity Inpatient Short Form   1. Putting on and taking off regular lower body clothing? 3 - A Little   2. Bathing (including washing, rinsing, drying)? 3 - A Little   3. Toileting, which includes using toilet, bedpan or urinal? 3 - A Little   4. Putting on and taking off regular upper body clothing? 4 - None   5. Taking care of personal grooming such as brushing teeth? 4 - None   6. Eating meals? 4 - None   Daily Activity Raw Score (Score out of 24.Lower scores equate to lower levels of function) 21   Total Evaluation Time   Total Evaluation Time (Minutes) 5     "

## 2017-10-26 ENCOUNTER — TELEPHONE (OUTPATIENT)
Dept: INTERNAL MEDICINE | Facility: CLINIC | Age: 72
End: 2017-10-26

## 2017-10-26 NOTE — TELEPHONE ENCOUNTER
IP F/U    Date: 10/25/17  Diagnosis: Status post hip replacement  Is patient active in care coordination? No  Was patient in TCU? No

## 2017-10-30 NOTE — DISCHARGE SUMMARY
"Discharge Summary    Javier Tamez MRN# [id number]   YOB: 1945 Age: 72 year old     Date of Admission: 10/24/2017    Date of Discharge: 10/25/2017    Reason for Admission: Javier Tamez is an 72 year old male who was admitted to the hospital following surgery.    Preoperative Diagnosis: RIGHT HIP DJD    Postoperative Diagnosis: RIGHT HIP DJD    Procedure Completed:  Right total hip arthroplasty    Hospital Course:  Mr. Tamez was admitted and underwent the above procedure. The patient tolerated the procedure well. There were no complications. Following surgery he was admitted to the floor.  During his stay he did not require any blood transfusions. His pain was controlled with oral pain medication.  During his stay he progressed well in physical therapy and all the therapy goals were met.     Discharge Physical Exam:  /63 (BP Location: Left arm)  Pulse 80  Temp 98.4  F (36.9  C) (Oral)  Resp 16  Ht 1.803 m (5' 11\")  Wt 82.1 kg (181 lb)  SpO2 95%  BMI 25.24 kg/m2  Neurovascularly intact, distal pulses present bilaterally.  Calves are negative bilaterally, both soft and nontender.    Assessment: Mr. Tamez is stable and doing well status post right total hip arthroplasty.    Plan: We will discharge him home on analgesics and deep venous thrombosis prophylaxis.  He will follow-up with me approximately 2 weeks from surgery.  He may call [phone] to schedule an appointment.    Meds:   Javier Tamez   Home Medication Instructions KARLA:[id number]    Printed on:10/30/17 0553   Medication Information                      Ascorbic Acid (VITAMIN C PO)  Take 1,000 mg by mouth every morning              aspirin  MG EC tablet  Take 1 tablet (325 mg) by mouth 2 times daily             Cyanocobalamin (VITAMIN B 12 PO)  Take 1,000 mcg by mouth every morning             lisinopril (PRINIVIL,ZESTRIL) 2.5 MG tablet  Take 1 tablet (2.5 mg) by mouth daily             nitroglycerin " (NITROSTAT) 0.4 MG sublingual tablet  For chest pain place 1 tablet under the tongue every 5 minutes for 3 doses. If symptoms persist 5 minutes after 1st dose call 911.             oxyCODONE (ROXICODONE) 5 MG IR tablet  Take 1-2 tablets (5-10 mg) by mouth every 4 hours as needed for moderate to severe pain             Probiotic Product (PROBIOTIC PO)  Take 1 capsule by mouth every morning             rosuvastatin (CRESTOR) 40 MG tablet  Take 1 tablet (40 mg) by mouth daily             senna-docusate (SENOKOT-S;PERICOLACE) 8.6-50 MG per tablet  Take 1-2 tablets by mouth 2 times daily as needed for constipation             Tadalafil (CIALIS PO)  Take 5 mg by mouth daily as needed for erectile dysfunction             VITAMIN D, CHOLECALCIFEROL, PO  Take 1,000 Units by mouth every morning

## 2017-11-05 NOTE — OP NOTE
DATE OF SERVICE:  10/24/2017    SURGEON  RONNIE FREDERICK M.D.    ASSISTANT  Ev Chicas PA-C    PREOPERATIVE DIAGNOSIS   Right hip osteoarthritis, failed to respond to conservative management.    POSTOPERATIVE DIAGNOSIS   Right hip osteoarthritis, failed to respond to conservative management.    TITLE OF PROCEDURE   Right total hip arthroplasty, Depuy uncemented components, direct anterior approach.    PROCEDURE  The patient was brought to the operating room and after satisfactory anesthesia was placed on the [place] table. The right  lower extremity was then prepped and draped in the usual sterile fashion. Image intensification was utilized during the case for component positioning as well as leg length assessment. An incision was made just lateral to the ASIS and coursing toward the proximal femur. Dissection was carried down to the TFL. The fascia overlying the TFL was incised and dissection was carried down to the interval between TFL and rectus femoris. This was identified. A lateral cobra retractor was placed followed by a medial cobra around the femoral neck. The leash vessels, anterior circumflex, was identified and was coagulated. The underlying fascia was released. Dissection was carried down to the hip capsule. Capsule was identified and a capsulotomy was performed in a T-type fashion first along the intertrochanteric line and then secondarily up along the femoral neck and head, finally completed by releasing along the saddle of the trochanter. The capsular edges were tagged for later repair. The hip was then externally rotated and medial capsular release was performed such that the lesser trochanter could be palpated. Following this, the femoral neck was osteotomized as per the preoperative plan. The femoral head was removed with a corkscrew without difficulty. The acetabulum was exposed and was reamed sequentially up to 64 mm. This was reamed under both direct visualization as well as the aid of image  intensification. A 64 mm Ipava cup was impacted into place in approximately 40 to 45 degrees of abduction, and 15 degrees of anteversion. Three screws were placed and this gave excellent fixation. The 36 mm neutral liner was impacted into place.     Attention was then directed to the femur. The hook was placed underneath the proximal aspect of the femur between the trochanteric ridge and gluteus enmanuel. The hip was then brought into external rotation, adduction, and extension. The femur was then carefully elevated using the appropriate releases off the inside of the greater trochanter. Once the femur was elevated, a starter broach was placed followed by sequential broaches. The broaches were performed up to size 7, which gave excellent torsinal as well as axial stability. Trial reduction was performed with a +5mm high offset head. The hip was reduced and with hip reduction the combined anteversion looked excellent. The hip was brought into full extension and external rotation. There was no evidence of instability. As well, x-rays were printed and compared with the opposite side and found to have good length and restoration of offset.  It was felt that an additional stem size could not be placed.  The hip was then dislocated and then the proximal femur was then brought back up into the proximal aspect of the wound. The real size 7 trilock stem, high offset was impacted into place. Again this gave excellent torsion as well as axial stability. The real +5mm ceramic biolox head was impacted into place and the hip was reduced again. The image intensification confirmed excellent position of the components.   A 3 minute dilute betadine soak was performed.  The wound was thoroughly irrigated. The capsule was closed with interrupted 0 Vicryl suture and tissues infiltrated with toradol/marcaine mixture. The tensor fascia was closed with a running 0 V-lock suture. The subcutaneous layer was closed with interrupted 2-0  Vicryl, 2-0 V-lock, and 3-0 subcuticular monocryl was placed followed by Dermabond Prineo. Sterile dressing was applied. The patient left the operating room in satisfactory condition. Patient received 1 gm of tranexamic acid pre-op and at closure.

## 2017-11-06 ENCOUNTER — TRANSFERRED RECORDS (OUTPATIENT)
Dept: HEALTH INFORMATION MANAGEMENT | Facility: CLINIC | Age: 72
End: 2017-11-06

## 2017-12-08 ENCOUNTER — TRANSFERRED RECORDS (OUTPATIENT)
Dept: HEALTH INFORMATION MANAGEMENT | Facility: CLINIC | Age: 72
End: 2017-12-08

## 2017-12-22 DIAGNOSIS — I77.810 AORTIC ROOT DILATATION (H): ICD-10-CM

## 2017-12-22 DIAGNOSIS — I10 ESSENTIAL HYPERTENSION WITH GOAL BLOOD PRESSURE LESS THAN 140/90: ICD-10-CM

## 2017-12-26 RX ORDER — LISINOPRIL 2.5 MG/1
TABLET ORAL
Qty: 90 TABLET | Refills: 0 | Status: SHIPPED | OUTPATIENT
Start: 2017-12-26 | End: 2018-01-12

## 2017-12-28 DIAGNOSIS — I10 ESSENTIAL HYPERTENSION WITH GOAL BLOOD PRESSURE LESS THAN 140/90: ICD-10-CM

## 2017-12-28 DIAGNOSIS — I77.810 AORTIC ROOT DILATATION (H): ICD-10-CM

## 2018-01-04 RX ORDER — LISINOPRIL 2.5 MG/1
TABLET ORAL
Qty: 90 TABLET | Refills: 2 | Status: SHIPPED | OUTPATIENT
Start: 2018-01-04 | End: 2018-11-24

## 2018-01-04 NOTE — TELEPHONE ENCOUNTER
Requested Prescriptions   Pending Prescriptions Disp Refills     lisinopril (PRINIVIL/ZESTRIL) 2.5 MG tablet [Pharmacy Med Name: LISINOPRIL 2.5MG    TAB] 90 tablet 3     Sig: TAKE ONE TABLET BY MOUTH DAILY    ACE Inhibitors (Including Combos) Protocol Passed    12/28/2017  8:29 AM       Passed - Blood pressure under 140/90    BP Readings from Last 3 Encounters:   10/25/17 113/63   09/29/17 92/50   09/28/17 100/58                Passed - Recent or future visit with authorizing provider's specialty    Patient had office visit in the last year or has a visit in the next 30 days with authorizing provider.  See chart review.              Passed - Patient is age 18 or older       Passed - Normal serum creatinine on file in past 12 months    Recent Labs   Lab Test  10/24/17   0645   CR  0.73            Passed - Normal serum potassium on file in past 12 months    Recent Labs   Lab Test  10/24/17   0645   POTASSIUM  3.9             Prescription approved per [facility] Refill Protocol.  Aviva Enciso RN

## 2018-01-12 ENCOUNTER — OFFICE VISIT (OUTPATIENT)
Dept: CARDIOLOGY | Facility: CLINIC | Age: 73
End: 2018-01-12
Attending: INTERNAL MEDICINE
Payer: COMMERCIAL

## 2018-01-12 VITALS
HEART RATE: 80 BPM | HEIGHT: 71 IN | SYSTOLIC BLOOD PRESSURE: 108 MMHG | DIASTOLIC BLOOD PRESSURE: 62 MMHG | WEIGHT: 188.5 LBS | BODY MASS INDEX: 26.39 KG/M2

## 2018-01-12 DIAGNOSIS — I25.10 CORONARY ARTERY DISEASE INVOLVING NATIVE CORONARY ARTERY OF NATIVE HEART WITHOUT ANGINA PECTORIS: Primary | ICD-10-CM

## 2018-01-12 DIAGNOSIS — I25.10 CORONARY ARTERY DISEASE INVOLVING NATIVE CORONARY ARTERY OF NATIVE HEART WITHOUT ANGINA PECTORIS: ICD-10-CM

## 2018-01-12 DIAGNOSIS — I77.89 ASCENDING AORTA ENLARGEMENT (H): ICD-10-CM

## 2018-01-12 LAB
ANION GAP SERPL CALCULATED.3IONS-SCNC: 3 MMOL/L (ref 3–14)
BUN SERPL-MCNC: 10 MG/DL (ref 7–30)
CALCIUM SERPL-MCNC: 9 MG/DL (ref 8.5–10.1)
CHLORIDE SERPL-SCNC: 105 MMOL/L (ref 94–109)
CHOLEST SERPL-MCNC: 119 MG/DL
CO2 SERPL-SCNC: 31 MMOL/L (ref 20–32)
CREAT SERPL-MCNC: 0.77 MG/DL (ref 0.66–1.25)
GFR SERPL CREATININE-BSD FRML MDRD: >90 ML/MIN/1.7M2
GLUCOSE SERPL-MCNC: 93 MG/DL (ref 70–99)
HDLC SERPL-MCNC: 63 MG/DL
LDLC SERPL CALC-MCNC: 48 MG/DL
NONHDLC SERPL-MCNC: 56 MG/DL
POTASSIUM SERPL-SCNC: 3.9 MMOL/L (ref 3.4–5.3)
SODIUM SERPL-SCNC: 139 MMOL/L (ref 133–144)
TRIGL SERPL-MCNC: 41 MG/DL

## 2018-01-12 PROCEDURE — 80061 LIPID PANEL: CPT | Performed by: INTERNAL MEDICINE

## 2018-01-12 PROCEDURE — 93000 ELECTROCARDIOGRAM COMPLETE: CPT | Performed by: NURSE PRACTITIONER

## 2018-01-12 PROCEDURE — 99214 OFFICE O/P EST MOD 30 MIN: CPT | Performed by: NURSE PRACTITIONER

## 2018-01-12 PROCEDURE — 36415 COLL VENOUS BLD VENIPUNCTURE: CPT | Performed by: INTERNAL MEDICINE

## 2018-01-12 PROCEDURE — 80048 BASIC METABOLIC PNL TOTAL CA: CPT | Performed by: INTERNAL MEDICINE

## 2018-01-12 RX ORDER — SIMVASTATIN 40 MG
40 TABLET ORAL AT BEDTIME
COMMUNITY
End: 2018-04-13 | Stop reason: ALTCHOICE

## 2018-01-12 NOTE — LETTER
1/12/2018    Guillermo Kong MD, MD  [address] [facility] [address]  [facility] [address]    RE: Javier Tamez       Dear Colleague,    I had the pleasure of seeing Javier Tamez in the [facility].    History of Present Illness:    Javier Tamez is a 73 year old male followed here by Dr. Sim.  Mr. Jaeger returns today to review his lipid panel.  He has a history of dyslipidemia.  He has a strong family history of coronary artery disease with a father who passed away at the age of 62 but was diagnosed at the age of 39.  His grandfather passed away at age 35 and her brother had a heart attack at 68 years of age.    Previous stress echocardiogram in 2015 showed no ischemia.  He has had a coronary CT scan revealing a calcium score of 4316 putting him in 99th percentile for age and gender matched control groups.  This scan also suggested that his ascending aorta measures 3.6 x 3.7 cm.  Previous echocardiogram has measured this closer to 3.9-4.0.    He underwent a right anterior hip replacement recently by Dr. Galvez.  This went well.  He still has some residual discomfort but overall is doing well.  A nuclear stress test was performed prior to that showing no inducible ischemia.  He has preserved ejection fraction.    Echocardiography has also shown aortic sclerosis without stenosis.  I do not appreciate a systolic murmur.    His lipid panel reveals a total cholesterol 119 HDL 63 LDL 48 with normal triglycerides.  He remains on simvastatin 20 mg per day.    Initially his heart rate was 106 bpm.  He had a mild anemia documented after his hip surgery.  He has no palpitations chest pain or shortness of breath.    I rechecked the pulse rate and obtained 80 bpm.  An EKG confirms normal sinus rhythm with heart rate in the 80s.    He has concerns about peripheral arterial disease.  He is interested in [facility] screening.  Have highly encouraged him to pursue this to check carotids and even  abdominal aorta.  We will do a follow-up CT scan here to assess his ascending aorta and adjusted his EKG.    Exam is otherwise unremarkable      Impression/Plan:     1.  Coronary artery disease by CT coronary calcium score  -The 99th percentile for age and gender match control group  -No angina/negative nuclear stress test  -Continue aggressive risk factor modification including ace inhibitors statins and aspirin    2.  Dyslipidemia  -Treated to goal  -Continue simvastatin  -Recheck lipids in January at his annual visit    3.  Dilated proximal ascending aorta measuring 3.6 x 3.7 cm on CT scan  -Appears slightly larger by echo  -CT scan is ordered in January 2019    4.  Aortic sclerosis without stenosis    5.  Mildly elevated heart rate on presentation-not confirmed on EKG.    It has been a pleasure meeting Mr. Jaeger is today.  This patient had a number of questions.  Greater than 50% of this 30 minute visit today was spent in counseling    We will plan to see him back in 1 year or certainly sooner if issues arise        Sadie Leavitt, MSN, APRN-BC, CNP  Cardiology    Orders Placed This Encounter   Procedures     CT Chest w/o & w Contrast     EKG 12-lead complete w/read - Clinics     Orders Placed This Encounter   Medications     aspirin 81 MG tablet     Sig: Take 81 mg by mouth daily     simvastatin (ZOCOR) 40 MG tablet     Sig: Take 40 mg by mouth At Bedtime     Medications Discontinued During This Encounter   Medication Reason     lisinopril (PRINIVIL/ZESTRIL) 2.5 MG tablet Medication Reconciliation Clean Up     oxyCODONE (ROXICODONE) 5 MG IR tablet Not filled/taken by Patient     rosuvastatin (CRESTOR) 40 MG tablet Cost/Formulary change     senna-docusate (SENOKOT-S;PERICOLACE) 8.6-50 MG per tablet Medication Reconciliation Clean Up         Encounter Diagnoses   Name Primary?     Coronary artery disease involving native coronary artery of native heart without angina pectoris      Ascending aorta enlargement  (H) Yes       CURRENT MEDICATIONS:  Current Outpatient Prescriptions   Medication Sig Dispense Refill     aspirin 81 MG tablet Take 81 mg by mouth daily       simvastatin (ZOCOR) 40 MG tablet Take 40 mg by mouth At Bedtime       lisinopril (PRINIVIL/ZESTRIL) 2.5 MG tablet TAKE ONE TABLET BY MOUTH DAILY 90 tablet 2     Cyanocobalamin (VITAMIN B 12 PO) Take 1,000 mcg by mouth every morning       Tadalafil (CIALIS PO) Take 5 mg by mouth daily as needed for erectile dysfunction       Probiotic Product (PROBIOTIC PO) Take 1 capsule by mouth every morning       nitroglycerin (NITROSTAT) 0.4 MG sublingual tablet For chest pain place 1 tablet under the tongue every 5 minutes for 3 doses. If symptoms persist 5 minutes after 1st dose call 911. 25 tablet 3     VITAMIN D, CHOLECALCIFEROL, PO Take 1,000 Units by mouth every morning        Ascorbic Acid (VITAMIN C PO) Take 1,000 mg by mouth every morning        [DISCONTINUED] lisinopril (PRINIVIL/ZESTRIL) 2.5 MG tablet TAKE ONE TABLET BY MOUTH DAILY 90 tablet 0       ALLERGIES   No Known Allergies    PAST MEDICAL HISTORY:  Past Medical History:   Diagnosis Date     Aortic root dilatation (H)      Aortic stenosis      Arthritis      Atypical chest pain 1/28/2015     Cerebral artery occlusion with cerebral infarction (H)     TIA  1992     Essential hypertension with goal blood pressure less than 140/90 11/26/2016    takes lisinopril for preventitive     Family history of heart disease      Hypercholesterolemia 1/3/2014     Diagnosis updated by automated process. Provider to review and confirm.     Hyperlipidemia LDL goal <70      IBS (irritable bowel syndrome)      Other chronic pain        PAST SURGICAL HISTORY:  Past Surgical History:   Procedure Laterality Date     ARTHROPLASTY HIP ANTERIOR Right 10/24/2017    Procedure: ARTHROPLASTY HIP ANTERIOR;  RIGHT TOTAL HIP ARTHROPLASTY DIRECT ANTERIOR APPROACH;  Surgeon: Meño Avalos MD;  Location:  OR     C NONSPECIFIC PROCEDURE   "    kidney stone     COLONOSCOPY  2011    Adenomatous polyp removed     VASECTOMY         FAMILY HISTORY:  Family History   Problem Relation Age of Onset     C.A.D. Father       of MI at age 63, 1ST MI age 39     DIABETES Father      CEREBROVASCULAR DISEASE Mother      Stroke age 69     Breast Cancer Mother       age 75       SOCIAL HISTORY:  Social History     Social History     Marital status:      Spouse name: N/A     Number of children: 4     Years of education: N/A     Occupational History     [employer] sales Self          Social History Main Topics     Smoking status: Never Smoker     Smokeless tobacco: Never Used     Alcohol use Yes      Comment: 3-4 glasses of wine weekly     Drug use: No     Sexual activity: Yes     Birth control/ protection: Surgical     Other Topics Concern     Parent/Sibling W/ Cabg, Mi Or Angioplasty Before 65f 55m? Yes     father      Caffeine Concern Yes     2 cups coffee daily, occ soda      Sleep Concern No     Special Diet No     Exercise Yes     3-4 times weekly      Seat Belt Yes     Social History Narrative    Rides bicycle or [place] to [facility] regularly (3x/week).            Review of Systems:  Skin:  Negative     Eyes:  Positive for glasses  ENT:  Negative    Respiratory:  Negative    Cardiovascular:  Negative    Gastroenterology: Negative    Genitourinary:  Negative    Musculoskeletal:  Positive for joint pain  Neurologic:  Negative    Psychiatric:  Negative    Heme/Lymph/Imm:  Negative    Endocrine:  Negative      Physical Exam:  Vitals: /62  Pulse 106  Ht 1.803 m (5' 11\")  Wt 85.5 kg (188 lb 8 oz)  BMI 26.29 kg/m2    Constitutional:  cooperative, alert and oriented, well developed, well nourished, in no acute distress        Skin:  warm and dry to the touch, no apparent skin lesions or masses noted        Head:  normocephalic, no masses or lesions        Eyes:  pupils equal and round, conjunctivae and lids unremarkable, sclera " white, no xanthalasma, EOMS intact, no nystagmus        ENT:  no pallor or cyanosis, dentition good        Neck:  carotid pulses are full and equal bilaterally;no carotid bruit        Chest:  normal breath sounds, clear to auscultation, normal A-P diameter, normal symmetry, normal respiratory excursion, no use of accessory muscles        Cardiac: regular rhythm;normal S1 and S2;no S3 or S4;no murmurs, gallops or rubs detected occasional premature beats                Abdomen:  abdomen soft;BS normoactive;no masses;no HSM;non-tender;no bruits        Vascular: pulses full and equal                                      Extremities and Back:  no edema;no spinal abnormalities noted;normal muscle strength and tone        Neurological:             Recent Lab Results:  LIPID RESULTS:  Lab Results   Component Value Date    CHOL 119 01/12/2018    HDL 63 01/12/2018    LDL 48 01/12/2018    TRIG 41 01/12/2018    CHOLHDLRATIO 2.2 10/15/2015       LIVER ENZYME RESULTS:  Lab Results   Component Value Date    AST 23 11/30/2016    ALT 50 07/25/2017       CBC RESULTS:  Lab Results   Component Value Date    WBC 7.8 09/29/2017    RBC 5.06 09/29/2017    HGB 12.1 (L) 10/25/2017    HCT 47.4 09/29/2017    MCV 94 09/29/2017    MCH 32.4 09/29/2017    MCHC 34.6 09/29/2017    RDW 12.8 09/29/2017     10/24/2017       BMP RESULTS:  Lab Results   Component Value Date     01/12/2018    POTASSIUM 3.9 01/12/2018    CHLORIDE 105 01/12/2018    CO2 31 01/12/2018    ANIONGAP 3 01/12/2018    GLC 93 01/12/2018    BUN 10 01/12/2018    CR 0.77 01/12/2018    GFRESTIMATED >90 01/12/2018    GFRESTBLACK >90 01/12/2018    ALEXANDRA 9.0 01/12/2018        A1C RESULTS:  No results found for: A1C    INR RESULTS:  No results found for: INR    Thank you for allowing me to participate in the care of your patient.    Sincerely,     JAS [facility]

## 2018-01-12 NOTE — PROGRESS NOTES
History of Present Illness:    Javier Tamez is a 73 year old male followed here by Dr. Sim.  Mr. Jaeger returns today to review his lipid panel.  He has a history of dyslipidemia.  He has a strong family history of coronary artery disease with a father who passed away at the age of 62 but was diagnosed at the age of 39.  His grandfather passed away at age 35 and her brother had a heart attack at 68 years of age.    Previous stress echocardiogram in 2015 showed no ischemia.  He has had a coronary CT scan revealing a calcium score of 4316 putting him in 99th percentile for age and gender matched control groups.  This scan also suggested that his ascending aorta measures 3.6 x 3.7 cm.  Previous echocardiogram has measured this closer to 3.9-4.0.    He underwent a right anterior hip replacement recently by Dr. Galvez.  This went well.  He still has some residual discomfort but overall is doing well.  A nuclear stress test was performed prior to that showing no inducible ischemia.  He has preserved ejection fraction.    Echocardiography has also shown aortic sclerosis without stenosis.  I do not appreciate a systolic murmur.    His lipid panel reveals a total cholesterol 119 HDL 63 LDL 48 with normal triglycerides.  He remains on simvastatin 20 mg per day.    Initially his heart rate was 106 bpm.  He had a mild anemia documented after his hip surgery.  He has no palpitations chest pain or shortness of breath.    I rechecked the pulse rate and obtained 80 bpm.  An EKG confirms normal sinus rhythm with heart rate in the 80s.    He has concerns about peripheral arterial disease.  He is interested in [facility] screening.  Have highly encouraged him to pursue this to check carotids and even abdominal aorta.  We will do a follow-up CT scan here to assess his ascending aorta and adjusted his EKG.    Exam is otherwise unremarkable      Impression/Plan:     1.  Coronary artery disease by CT coronary calcium score  -The  99th percentile for age and gender match control group  -No angina/negative nuclear stress test  -Continue aggressive risk factor modification including ace inhibitors statins and aspirin    2.  Dyslipidemia  -Treated to goal  -Continue simvastatin  -Recheck lipids in January at his annual visit    3.  Dilated proximal ascending aorta measuring 3.6 x 3.7 cm on CT scan  -Appears slightly larger by echo  -CT scan is ordered in January 2019    4.  Aortic sclerosis without stenosis    5.  Mildly elevated heart rate on presentation-not confirmed on EKG.    It has been a pleasure meeting Mr. Jaeger is today.  This patient had a number of questions.  Greater than 50% of this 30 minute visit today was spent in counseling    We will plan to see him back in 1 year or certainly sooner if issues arise        Sadie Leavitt, MSN, APRN-BC, CNP  Cardiology    Orders Placed This Encounter   Procedures     CT Chest w/o & w Contrast     EKG 12-lead complete w/read - Clinics     Orders Placed This Encounter   Medications     aspirin 81 MG tablet     Sig: Take 81 mg by mouth daily     simvastatin (ZOCOR) 40 MG tablet     Sig: Take 40 mg by mouth At Bedtime     Medications Discontinued During This Encounter   Medication Reason     lisinopril (PRINIVIL/ZESTRIL) 2.5 MG tablet Medication Reconciliation Clean Up     oxyCODONE (ROXICODONE) 5 MG IR tablet Not filled/taken by Patient     rosuvastatin (CRESTOR) 40 MG tablet Cost/Formulary change     senna-docusate (SENOKOT-S;PERICOLACE) 8.6-50 MG per tablet Medication Reconciliation Clean Up         Encounter Diagnoses   Name Primary?     Coronary artery disease involving native coronary artery of native heart without angina pectoris      Ascending aorta enlargement (H) Yes       CURRENT MEDICATIONS:  Current Outpatient Prescriptions   Medication Sig Dispense Refill     aspirin 81 MG tablet Take 81 mg by mouth daily       simvastatin (ZOCOR) 40 MG tablet Take 40 mg by mouth At Bedtime        lisinopril (PRINIVIL/ZESTRIL) 2.5 MG tablet TAKE ONE TABLET BY MOUTH DAILY 90 tablet 2     Cyanocobalamin (VITAMIN B 12 PO) Take 1,000 mcg by mouth every morning       Tadalafil (CIALIS PO) Take 5 mg by mouth daily as needed for erectile dysfunction       Probiotic Product (PROBIOTIC PO) Take 1 capsule by mouth every morning       nitroglycerin (NITROSTAT) 0.4 MG sublingual tablet For chest pain place 1 tablet under the tongue every 5 minutes for 3 doses. If symptoms persist 5 minutes after 1st dose call 911. 25 tablet 3     VITAMIN D, CHOLECALCIFEROL, PO Take 1,000 Units by mouth every morning        Ascorbic Acid (VITAMIN C PO) Take 1,000 mg by mouth every morning        [DISCONTINUED] lisinopril (PRINIVIL/ZESTRIL) 2.5 MG tablet TAKE ONE TABLET BY MOUTH DAILY 90 tablet 0       ALLERGIES   No Known Allergies    PAST MEDICAL HISTORY:  Past Medical History:   Diagnosis Date     Aortic root dilatation (H)      Aortic stenosis      Arthritis      Atypical chest pain 2015     Cerebral artery occlusion with cerebral infarction (H)     TIA       Essential hypertension with goal blood pressure less than 140/90 2016    takes lisinopril for preventitive     Family history of heart disease      Hypercholesterolemia 1/3/2014     Diagnosis updated by automated process. Provider to review and confirm.     Hyperlipidemia LDL goal <70      IBS (irritable bowel syndrome)      Other chronic pain        PAST SURGICAL HISTORY:  Past Surgical History:   Procedure Laterality Date     ARTHROPLASTY HIP ANTERIOR Right 10/24/2017    Procedure: ARTHROPLASTY HIP ANTERIOR;  RIGHT TOTAL HIP ARTHROPLASTY DIRECT ANTERIOR APPROACH;  Surgeon: Meño Avalos MD;  Location: [facility] OR     C NONSPECIFIC PROCEDURE      kidney stone     COLONOSCOPY      Adenomatous polyp removed     VASECTOMY         FAMILY HISTORY:  Family History   Problem Relation Age of Onset     C.A.D. Father       of MI at age 63, 1ST MI age 39      "DIABETES Father      CEREBROVASCULAR DISEASE Mother      Stroke age 69     Breast Cancer Mother       age 75       SOCIAL HISTORY:  Social History     Social History     Marital status:      Spouse name: N/A     Number of children: 4     Years of education: N/A     Occupational History     [employer] Self          Social History Main Topics     Smoking status: Never Smoker     Smokeless tobacco: Never Used     Alcohol use Yes      Comment: 3-4 glasses of wine weekly     Drug use: No     Sexual activity: Yes     Birth control/ protection: Surgical     Other Topics Concern     Parent/Sibling W/ Cabg, Mi Or Angioplasty Before 65f 55m? Yes     father      Caffeine Concern Yes     2 cups coffee daily, occ soda      Sleep Concern No     Special Diet No     Exercise Yes     3-4 times weekly      Seat Belt Yes     Social History Narrative    Rides bicycle or [place] to [place] regularly (3x/week).            Review of Systems:  Skin:  Negative     Eyes:  Positive for glasses  ENT:  Negative    Respiratory:  Negative    Cardiovascular:  Negative    Gastroenterology: Negative    Genitourinary:  Negative    Musculoskeletal:  Positive for joint pain  Neurologic:  Negative    Psychiatric:  Negative    Heme/Lymph/Imm:  Negative    Endocrine:  Negative      Physical Exam:  Vitals: /62  Pulse 106  Ht 1.803 m (5' 11\")  Wt 85.5 kg (188 lb 8 oz)  BMI 26.29 kg/m2    Constitutional:  cooperative, alert and oriented, well developed, well nourished, in no acute distress        Skin:  warm and dry to the touch, no apparent skin lesions or masses noted        Head:  normocephalic, no masses or lesions        Eyes:  pupils equal and round, conjunctivae and lids unremarkable, sclera white, no xanthalasma, EOMS intact, no nystagmus        ENT:  no pallor or cyanosis, dentition good        Neck:  carotid pulses are full and equal bilaterally;no carotid bruit        Chest:  normal breath sounds, clear to " auscultation, normal A-P diameter, normal symmetry, normal respiratory excursion, no use of accessory muscles        Cardiac: regular rhythm;normal S1 and S2;no S3 or S4;no murmurs, gallops or rubs detected occasional premature beats                Abdomen:  abdomen soft;BS normoactive;no masses;no HSM;non-tender;no bruits        Vascular: pulses full and equal                                      Extremities and Back:  no edema;no spinal abnormalities noted;normal muscle strength and tone        Neurological:             Recent Lab Results:  LIPID RESULTS:  Lab Results   Component Value Date    CHOL 119 01/12/2018    HDL 63 01/12/2018    LDL 48 01/12/2018    TRIG 41 01/12/2018    CHOLHDLRATIO 2.2 10/15/2015       LIVER ENZYME RESULTS:  Lab Results   Component Value Date    AST 23 11/30/2016    ALT 50 07/25/2017       CBC RESULTS:  Lab Results   Component Value Date    WBC 7.8 09/29/2017    RBC 5.06 09/29/2017    HGB 12.1 (L) 10/25/2017    HCT 47.4 09/29/2017    MCV 94 09/29/2017    MCH 32.4 09/29/2017    MCHC 34.6 09/29/2017    RDW 12.8 09/29/2017     10/24/2017       BMP RESULTS:  Lab Results   Component Value Date     01/12/2018    POTASSIUM 3.9 01/12/2018    CHLORIDE 105 01/12/2018    CO2 31 01/12/2018    ANIONGAP 3 01/12/2018    GLC 93 01/12/2018    BUN 10 01/12/2018    CR 0.77 01/12/2018    GFRESTIMATED >90 01/12/2018    GFRESTBLACK >90 01/12/2018    ALEXANDRA 9.0 01/12/2018        A1C RESULTS:  No results found for: A1C    INR RESULTS:  No results found for: INR        CC  Miguelito Sim MD  [id number]  [place] [id number]

## 2018-01-12 NOTE — MR AVS SNAPSHOT
"              After Visit Summary   1/12/2018    Javier Tamez    MRN: [id number]           Patient Information     Date Of Birth          1945        Visit Information        Provider Department      1/12/2018 8:50 AM Sadie Leavitt APRN CNP [facility]        Today's Diagnoses     Ascending aorta enlargement (H)    -  1    Coronary artery disease involving native coronary artery of native heart without angina pectoris          Care Instructions    See Dr Sim back in one year with CT and labs or sooner if you need left hip done          Follow-ups after your visit        Future tests that were ordered for you today     Open Future Orders        Priority Expected Expires Ordered    CT Chest w/o & w Contrast Routine 1/10/2019 1/11/2019 1/12/2018            Who to contact     If you have questions or need follow up information about today's clinic visit or your schedule please contact [facility] directly at [phone].  Normal or non-critical lab and imaging results will be communicated to you by Atoomahart, letter or phone within 4 business days after the clinic has received the results. If you do not hear from us within 7 days, please contact the clinic through MongoSluicet or phone. If you have a critical or abnormal lab result, we will notify you by phone as soon as possible.  Submit refill requests through Netseer or call your pharmacy and they will forward the refill request to us. Please allow 3 business days for your refill to be completed.          Additional Information About Your Visit        Atoomahart Information     Netseer lets you send messages to your doctor, view your test results, renew your prescriptions, schedule appointments and more. To sign up, go to [email or web address] . Click on \"Log in\" on the left side of the screen, which will take you to the Welcome page. Then click on \"Sign up Now\" " "on the right side of the page.     You will be asked to enter the access code listed below, as well as some personal information. Please follow the directions to create your username and password.     Your access code is: [email or web address]  Expires: 2018  9:57 AM     Your access code will  in 90 days. If you need help or a new code, please call your [place] clinic or [phone].        Care EveryWhere ID     This is your Care EveryWhere ID. This could be used by other organizations to access your [place] medical records  [id number]        Your Vitals Were     Pulse Height BMI (Body Mass Index)             106 1.803 m (5' 11\") 26.29 kg/m2          Blood Pressure from Last 3 Encounters:   18 108/62   10/25/17 113/63   17 92/50    Weight from Last 3 Encounters:   18 85.5 kg (188 lb 8 oz)   10/24/17 82.1 kg (181 lb)   17 82.4 kg (181 lb 11.2 oz)              We Performed the Following     EKG 12-lead complete w/read - Clinics     Follow-Up with Cardiac Advanced Practice Provider          Today's Medication Changes          These changes are accurate as of: 18  9:57 AM.  If you have any questions, ask your nurse or doctor.               Stop taking these medicines if you haven't already. Please contact your care team if you have questions.     rosuvastatin 40 MG tablet   Commonly known as:  CRESTOR   Stopped by:  Sadie Leavitt, JAS CNP                    Primary Care Provider Office Phone # Fax #    Guillermo Kong [phone]       [address] [facility] [address]  [facility] [address]        Equal Access to Services     [facility] CenterBRISEYDA : Hadii arsen Gonzalez, waaxda luqadaha, qaybta kaalmada biayada, oliver garcia. So [facility] [phone].    ATENCIÓN: Si habla español, tiene a crews disposición servicios gratuitos de asistencia lingüística. Llame al [phone].    We comply with applicable federal civil rights laws and [place] " laws. We do not discriminate on the basis of race, color, national origin, age, disability, sex, sexual orientation, or gender identity.            Thank you!     Thank you for choosing [facility]  for your care. Our goal is always to provide you with excellent care. Hearing back from our patients is one way we can continue to improve our services. Please take a few minutes to complete the written survey that you may receive in the mail after your visit with us. Thank you!             Your Updated Medication List - Protect others around you: Learn how to safely use, store and throw away your medicines at www.disposemymeds.org.          This list is accurate as of: 1/12/18  9:57 AM.  Always use your most recent med list.                   Brand Name Dispense Instructions for use Diagnosis    aspirin 81 MG tablet      Take 81 mg by mouth daily        CIALIS PO      Take 5 mg by mouth daily as needed for erectile dysfunction        lisinopril 2.5 MG tablet    PRINIVIL/Zestril    90 tablet    TAKE ONE TABLET BY MOUTH DAILY    Essential hypertension with goal blood pressure less than 140/90, Aortic root dilatation (H)       nitroGLYcerin 0.4 MG sublingual tablet    NITROSTAT    25 tablet    For chest pain place 1 tablet under the tongue every 5 minutes for 3 doses. If symptoms persist 5 minutes after 1st dose call 911.    Coronary artery disease involving native coronary artery of native heart without angina pectoris       PROBIOTIC PO      Take 1 capsule by mouth every morning        simvastatin 40 MG tablet    ZOCOR     Take 40 mg by mouth At Bedtime        VITAMIN B 12 PO      Take 1,000 mcg by mouth every morning        VITAMIN C PO      Take 1,000 mg by mouth every morning        VITAMIN D (CHOLECALCIFEROL) PO      Take 1,000 Units by mouth every morning

## 2018-01-24 ENCOUNTER — TRANSFERRED RECORDS (OUTPATIENT)
Dept: HEALTH INFORMATION MANAGEMENT | Facility: CLINIC | Age: 73
End: 2018-01-24

## 2018-02-26 DIAGNOSIS — N52.8 OTHER MALE ERECTILE DYSFUNCTION: Primary | ICD-10-CM

## 2018-02-26 DIAGNOSIS — N52.9 ERECTILE DYSFUNCTION, UNSPECIFIED ERECTILE DYSFUNCTION TYPE: ICD-10-CM

## 2018-02-26 NOTE — TELEPHONE ENCOUNTER
"Requested Prescriptions   Pending Prescriptions Disp Refills     tadalafil (CIALIS) 5 MG tablet       Sig: Take 1 tablet (5 mg) by mouth daily as needed    Erectile Dysfuction Protocol Passed    2/26/2018  9:29 AM       Passed - Absence of nitrates on medication list       Passed - Absence of Alpha Blockers on Med list       Passed - Recent or future visit with authorizing provider    Patient had office visit in the last year or has a visit in the next 30 days with authorizing provider.  See \"Patient Info\" tab in inbasket, or \"Choose Columns\" in Meds & Orders section of the refill encounter.            Passed - Patient is age 18 or older        Routing refill request to provider for review/approval because:  Drug not active on patient's medication list        "

## 2018-02-27 RX ORDER — TADALAFIL 5 MG/1
5 TABLET ORAL DAILY PRN
Qty: 15 TABLET | Refills: 5 | Status: SHIPPED | OUTPATIENT
Start: 2018-02-27 | End: 2019-03-15

## 2018-02-27 NOTE — TELEPHONE ENCOUNTER
Patient called to check on status of refill.  Wants a call once PCP has addressed this message.  SAVANNA Tovar R.N.

## 2018-03-30 ENCOUNTER — TRANSFERRED RECORDS (OUTPATIENT)
Dept: HEALTH INFORMATION MANAGEMENT | Facility: CLINIC | Age: 73
End: 2018-03-30

## 2018-04-10 DIAGNOSIS — E78.2 MIXED HYPERLIPIDEMIA: Primary | ICD-10-CM

## 2018-04-13 RX ORDER — ROSUVASTATIN CALCIUM 40 MG/1
40 TABLET, COATED ORAL DAILY
Qty: 90 TABLET | Refills: 3 | Status: SHIPPED | OUTPATIENT
Start: 2018-04-13 | End: 2018-10-04

## 2018-04-13 NOTE — TELEPHONE ENCOUNTER
Call from patient, he changed to rosuvastatin last year but keeps forgetting the correct name so had not realized that simvastatin was still on his med list. Patient needs a new script for crestor/rosuvastatin 40mg sent today. Rx escripted, med list updated.

## 2018-07-16 ENCOUNTER — TELEPHONE (OUTPATIENT)
Dept: CARDIOLOGY | Facility: CLINIC | Age: 73
End: 2018-07-16

## 2018-07-16 NOTE — TELEPHONE ENCOUNTER
Call from Patient that he is scheduled for total hip replacement on 10-9-18 at [facility] as an in patient done by [facility].  *Patient asks if he needs a stress test before, last nuclear was done 1-24-17, and per OV note visit with Sadie  1-19-18 CT Chest to be done 1/2019 but to be moved up sooner if patient is having surgery fall 2018.   *Patient will schedule Chest CT but wonders about Stress test?  * Also patient will need cardiac clearance and would likw to be added to Dr. Sim's Sept schedule, no appointment available at this time. Patient wonders if he should f/u with another cardiologist for cardiac Clearance?   Will message Dr. Sim.

## 2018-07-19 NOTE — TELEPHONE ENCOUNTER
CT is not necessary but can be done at anytime to follow up on Ascending aortic dilitation. If he is asymptomatic he doesn't need a repeat stress test. He should be seen early in September by YUNG.

## 2018-07-19 NOTE — TELEPHONE ENCOUNTER
Attempted to contact patient to review Dr. Sim's recommendations. Patient did not answer. Left detailed message with recommendations to continue on Crestor. Instructed patient to call back with any further questions.

## 2018-07-19 NOTE — TELEPHONE ENCOUNTER
Spoke with patient about Dr. Sim's recommendations. Also reviewed with patient that Dr. Sim is not available for cardiac clearance in September, but the patient may see any of the cardiologist for cardiac clearance. Patient verbalized understanding and agreed with plan of care. Patient is also questioning which statin he should be on. Patient states that his AVS from his visit with Sadie in January says to continue on simvastatin and Crestor was discontinued from his medication list. Patient states that he has been taking Crestor 40 mg, not simvastatin. It appears, per chart review, patient was confused about which statin he was on during a medication rec at an OV and said he was on simvastatin instead of Crestor, which may be why Sadie suggested the patient to continue on simvastatin. Patient is confused about which statin they would like him to be on. Patient requesting that Dr. Sim weigh in on which statin he should continue to take. Will route to Dr. Sim for review. Patient connected with scheduling to set up cardiac clearance OV in September.

## 2018-09-05 ENCOUNTER — TRANSFERRED RECORDS (OUTPATIENT)
Dept: HEALTH INFORMATION MANAGEMENT | Facility: CLINIC | Age: 73
End: 2018-09-05

## 2018-09-18 ENCOUNTER — OFFICE VISIT (OUTPATIENT)
Dept: INTERNAL MEDICINE | Facility: CLINIC | Age: 73
End: 2018-09-18
Payer: COMMERCIAL

## 2018-09-18 VITALS
RESPIRATION RATE: 16 BRPM | BODY MASS INDEX: 25.76 KG/M2 | TEMPERATURE: 98.3 F | SYSTOLIC BLOOD PRESSURE: 110 MMHG | DIASTOLIC BLOOD PRESSURE: 60 MMHG | HEART RATE: 86 BPM | HEIGHT: 71 IN | WEIGHT: 184 LBS | OXYGEN SATURATION: 95 %

## 2018-09-18 DIAGNOSIS — E78.5 HYPERLIPIDEMIA LDL GOAL <100: ICD-10-CM

## 2018-09-18 DIAGNOSIS — I25.10 CORONARY ARTERY DISEASE INVOLVING NATIVE CORONARY ARTERY OF NATIVE HEART WITHOUT ANGINA PECTORIS: ICD-10-CM

## 2018-09-18 DIAGNOSIS — Z01.818 PREOP GENERAL PHYSICAL EXAM: Primary | ICD-10-CM

## 2018-09-18 DIAGNOSIS — Z96.641 STATUS POST RIGHT HIP REPLACEMENT: ICD-10-CM

## 2018-09-18 DIAGNOSIS — I77.810 AORTIC ROOT DILATATION (H): ICD-10-CM

## 2018-09-18 DIAGNOSIS — R35.0 URINARY FREQUENCY: ICD-10-CM

## 2018-09-18 LAB
ALBUMIN UR-MCNC: NEGATIVE MG/DL
APPEARANCE UR: CLEAR
BILIRUB UR QL STRIP: NEGATIVE
COLOR UR AUTO: YELLOW
ERYTHROCYTE [DISTWIDTH] IN BLOOD BY AUTOMATED COUNT: 13.5 % (ref 10–15)
GLUCOSE UR STRIP-MCNC: NEGATIVE MG/DL
HCT VFR BLD AUTO: 48.4 % (ref 40–53)
HGB BLD-MCNC: 16.3 G/DL (ref 13.3–17.7)
HGB UR QL STRIP: NEGATIVE
KETONES UR STRIP-MCNC: NEGATIVE MG/DL
LEUKOCYTE ESTERASE UR QL STRIP: NEGATIVE
MCH RBC QN AUTO: 32.3 PG (ref 26.5–33)
MCHC RBC AUTO-ENTMCNC: 33.7 G/DL (ref 31.5–36.5)
MCV RBC AUTO: 96 FL (ref 78–100)
MRSA DNA SPEC QL NAA+PROBE: POSITIVE
NITRATE UR QL: NEGATIVE
PH UR STRIP: 6.5 PH (ref 5–7)
PLATELET # BLD AUTO: 215 10E9/L (ref 150–450)
RBC # BLD AUTO: 5.05 10E12/L (ref 4.4–5.9)
SOURCE: NORMAL
SP GR UR STRIP: 1.02 (ref 1–1.03)
SPECIMEN SOURCE: ABNORMAL
UROBILINOGEN UR STRIP-ACNC: 0.2 EU/DL (ref 0.2–1)
WBC # BLD AUTO: 6 10E9/L (ref 4–11)

## 2018-09-18 PROCEDURE — 99215 OFFICE O/P EST HI 40 MIN: CPT | Performed by: INTERNAL MEDICINE

## 2018-09-18 PROCEDURE — 93000 ELECTROCARDIOGRAM COMPLETE: CPT | Performed by: INTERNAL MEDICINE

## 2018-09-18 PROCEDURE — 81003 URINALYSIS AUTO W/O SCOPE: CPT | Performed by: INTERNAL MEDICINE

## 2018-09-18 PROCEDURE — 80061 LIPID PANEL: CPT | Performed by: INTERNAL MEDICINE

## 2018-09-18 PROCEDURE — 36415 COLL VENOUS BLD VENIPUNCTURE: CPT | Performed by: INTERNAL MEDICINE

## 2018-09-18 PROCEDURE — 80053 COMPREHEN METABOLIC PANEL: CPT | Performed by: INTERNAL MEDICINE

## 2018-09-18 PROCEDURE — 85027 COMPLETE CBC AUTOMATED: CPT | Performed by: INTERNAL MEDICINE

## 2018-09-18 PROCEDURE — 87641 MR-STAPH DNA AMP PROBE: CPT | Performed by: INTERNAL MEDICINE

## 2018-09-18 PROCEDURE — 87640 STAPH A DNA AMP PROBE: CPT | Mod: 59 | Performed by: INTERNAL MEDICINE

## 2018-09-18 NOTE — PROGRESS NOTES
[place] [address]  [facility] [address]  [phone]  Dept: [phone]    PRE-OP EVALUATION:  Today's date: 2018    Javier Tamez (: 1945) presents for pre-operative evaluation assessment as requested by Dr. Galvez.  He requires evaluation and anesthesia risk assessment prior to undergoing surgery/procedure for treatment of left hip replacement .    Fax number for surgical facility: [phone]  Primary Physician: Guillermo Kong  Type of Anesthesia Anticipated: to be determined    Patient has a Health Care Directive or Living Will:  YES     Preop Questions 2018   Who is doing your surgery? Wojciech Galvez   What are you having done? Total  left hip replacement   Date of Surgery/Procedure: 1   Facility or Hospital where procedure/surgery will be performed: [facility]   1.  Do you have a history of Heart attack, stroke, stent, coronary bypass surgery, or other heart surgery? YES  - remote history of TIA (eg weakness)   2.  Do you ever have any pain or discomfort in your chest? No   3.  Do you have a history of  Heart Failure? No   4.   Are you troubled by shortness of breath when:  walking on a level surface, or up a slight hill, or at night? No   5.  Do you currently have a cold, bronchitis or other respiratory infection? No   6.  Do you have a cough, shortness of breath, or wheezing? No   7.  Do you sometimes get pains in the calves of your legs when you walk? No   8. Do you or anyone in your family have previous history of blood clots? No   9.  Do you or does anyone in your family have a serious bleeding problem such as prolonged bleeding following surgeries or cuts? No   10. Have you ever had problems with anemia or been told to take iron pills? No   11. Have you had any abnormal blood loss such as black, tarry or bloody stools? No   12. Have you ever had a blood transfusion? No   13. Have you or any of your relatives ever had problems with  anesthesia? No   14. Do you have sleep apnea, excessive snoring or daytime drowsiness? No   15. Do you have any prosthetic heart valves? No   16. Do you have prosthetic joints? YES - right hip, 10/2017         HPI:     HPI related to upcoming procedure: Patient is undergoing left hip arthroplasty on 10/9/18 with Dr Galvez.     No recent illness or chest discomfort, or TIA symptoms.       See problem list for active medical problems.  Problems all longstanding and stable, except as noted/documented.  See ROS for pertinent symptoms related to these conditions.                                                                                                                                                          .    MEDICAL HISTORY:     Patient Active Problem List    Diagnosis Date Noted     Status post right hip replacement 10/24/2017     Priority: Medium     Aortic stenosis      Priority: Medium     Coronary artery disease involving native coronary artery without angina pectoris 10/15/2015     Priority: Medium     Atypical chest pain 01/28/2015     Priority: Medium     Aortic root dilatation (H) 01/03/2014     Priority: Medium     Hypercholesterolemia 01/03/2014     Priority: Medium     Diagnosis updated by automated process. Provider to review and confirm.       Adenomatous polyp 01/03/2014     Priority: Medium     IBS (irritable bowel syndrome) 05/22/2013     Priority: Medium      Past Medical History:   Diagnosis Date     Aortic root dilatation (H)      Aortic stenosis      Arthritis      Atypical chest pain 1/28/2015     Cerebral artery occlusion with cerebral infarction (H)     TIA  1992     Essential hypertension with goal blood pressure less than 140/90 11/26/2016    takes lisinopril for preventitive     Family history of heart disease      Hypercholesterolemia 1/3/2014     Diagnosis updated by automated process. Provider to review and confirm.     Hyperlipidemia LDL goal <70      IBS (irritable bowel  syndrome)      Other chronic pain      Past Surgical History:   Procedure Laterality Date     ARTHROPLASTY HIP ANTERIOR Right 10/24/2017    Procedure: ARTHROPLASTY HIP ANTERIOR;  RIGHT TOTAL HIP ARTHROPLASTY DIRECT ANTERIOR APPROACH;  Surgeon: Meño Avalos MD;  Location: [facility] OR     C NONSPECIFIC PROCEDURE  1990's    kidney stone     COLONOSCOPY  2011    Adenomatous polyp removed     VASECTOMY       Current Outpatient Prescriptions   Medication Sig Dispense Refill     Ascorbic Acid (VITAMIN C PO) Take 1,000 mg by mouth every morning        aspirin 81 MG tablet Take 81 mg by mouth daily       Cyanocobalamin (VITAMIN B 12 PO) Take 1,000 mcg by mouth every morning       lisinopril (PRINIVIL/ZESTRIL) 2.5 MG tablet TAKE ONE TABLET BY MOUTH DAILY 90 tablet 2     nitroglycerin (NITROSTAT) 0.4 MG sublingual tablet For chest pain place 1 tablet under the tongue every 5 minutes for 3 doses. If symptoms persist 5 minutes after 1st dose call 911. 25 tablet 3     Probiotic Product (PROBIOTIC PO) Take 1 capsule by mouth every morning       rosuvastatin (CRESTOR) 40 MG tablet Take 1 tablet (40 mg) by mouth daily 90 tablet 3     tadalafil (CIALIS) 5 MG tablet Take 1 tablet (5 mg) by mouth daily as needed 15 tablet 5     VITAMIN D, CHOLECALCIFEROL, PO Take 1,000 Units by mouth every morning        OTC products: None, except as noted above and Aspirin    No Known Allergies   Latex Allergy: NO    Social History   Substance Use Topics     Smoking status: Never Smoker     Smokeless tobacco: Never Used     Alcohol use Yes      Comment: 3-4 glasses of wine weekly     History   Drug Use No       REVIEW OF SYSTEMS:   CONSTITUTIONAL: NEGATIVE for fever, chills, change in weight  INTEGUMENTARY/SKIN: NEGATIVE for worrisome rashes, moles or lesions  EYES: NEGATIVE for vision changes or irritation  ENT/MOUTH: NEGATIVE for ear, mouth and throat problems  RESP: NEGATIVE for significant cough or SOB  BREAST: NEGATIVE for masses, tenderness or  "discharge  CV: NEGATIVE for chest pain, palpitations or peripheral edema  GI: NEGATIVE for nausea, abdominal pain, heartburn, or change in bowel habits  : NEGATIVE for frequency, dysuria, or hematuria  MUSCULOSKELETAL: NEGATIVE for significant arthralgias or myalgia  NEURO: NEGATIVE for weakness, dizziness or paresthesias  ENDOCRINE: NEGATIVE for temperature intolerance, skin/hair changes  HEME: NEGATIVE for bleeding problems  PSYCHIATRIC: NEGATIVE for changes in mood or affect    This document serves as a record of the services and decisions personally performed and made by Guillermo Kong MD. It was created on his behalf by Yolie Collins, a trained medical scribe. The creation of this document is based on the provider's statements to the medical scribe.  Yolie Collins September 18, 2018 9:28 AM       EXAM:   /60 (BP Location: Left arm, Patient Position: Sitting, Cuff Size: Adult Large)  Pulse 86  Temp 98.3  F (36.8  C) (Oral)  Resp 16  Ht 5' 11\" (1.803 m)  Wt 184 lb (83.5 kg)  SpO2 95%  BMI 25.66 kg/m2    GENERAL APPEARANCE: healthy, alert and no distress     EYES: EOMI,  PERRL     HENT: ear canals and TM's normal and nose and mouth without ulcers or lesions     NECK: no adenopathy, no asymmetry, masses, or scars and thyroid normal to palpation     RESP: lungs clear to auscultation - no rales, rhonchi or wheezes     CV: regular rates and rhythm, normal S1 S2, no S3 or S4 and no murmur, click or rub     ABDOMEN:  soft, nontender, no HSM or masses and bowel sounds normal     MS: extremities normal- no gross deformities noted, no evidence of inflammation in joints, FROM in all extremities.     SKIN: no suspicious lesions or rashes     NEURO: Normal strength and tone, sensory exam grossly normal, mentation intact and speech normal     PSYCH: mentation appears normal. and affect normal/bright     LYMPHATICS: No cervical adenopathy    DIAGNOSTICS:     EKG: appears normal, NSR, normal axis, normal " intervals, no acute ST/T changes c/w ischemia, no LVH by voltage criteria, unchanged from previous tracings  Labs Resulted Today:   Results for orders placed or performed in visit on 01/12/18   Basic metabolic panel   Result Value Ref Range    Sodium 139 133 - 144 mmol/L    Potassium 3.9 3.4 - 5.3 mmol/L    Chloride 105 94 - 109 mmol/L    Carbon Dioxide 31 20 - 32 mmol/L    Anion Gap 3 3 - 14 mmol/L    Glucose 93 70 - 99 mg/dL    Urea Nitrogen 10 7 - 30 mg/dL    Creatinine 0.77 0.66 - 1.25 mg/dL    GFR Estimate >90 >60 mL/min/1.7m2    GFR Estimate If Black >90 >60 mL/min/1.7m2    Calcium 9.0 8.5 - 10.1 mg/dL   Lipid Profile   Result Value Ref Range    Cholesterol 119 <200 mg/dL    Triglycerides 41 <150 mg/dL    HDL Cholesterol 63 >39 mg/dL    LDL Cholesterol Calculated 48 <100 mg/dL    Non HDL Cholesterol 56 <130 mg/dL       Recent Labs   Lab Test  01/12/18   0738  10/25/17   0619  10/24/17   0645  09/29/17   1500   HGB   --   12.1*  15.6  16.4   PLT   --    --   200  209   NA  139   --    --   140   POTASSIUM  3.9   --   3.9  4.3   CR  0.77   --   0.73  0.97        IMPRESSION:   Reason for surgery/procedure: Left hip pain  Diagnosis/reason for consult: Perioperative risk assessment     The proposed surgical procedure is considered INTERMEDIATE risk.    REVISED CARDIAC RISK INDEX  The patient has the following serious cardiovascular risks for perioperative complications such as (MI, PE, VFib and 3  AV Block):  No serious cardiac risks  INTERPRETATION: 0 risks: Class I (very low risk - 0.4% complication rate)    The patient has the following additional risks for perioperative complications:  No identified additional risks      ICD-10-CM    1. Pre-op exam Z01.818 EKG 12-lead complete w/read - Clinics     Methicillin resistant staph aureus cult   2. Preop general physical exam Z01.818      (Z01.818) Preop general physical exam  (primary encounter diagnosis)  Comment: stable.   Plan: EKG 12-lead complete w/read -  Clinics, CBC with        platelets, Comprehensive metabolic panel (BMP +        Alb, Alk Phos, ALT, AST, Total. Bili, TP), UA         reflex to Microscopic, Methicillin Resistant         Staph Aureus PCR, CANCELED: Methicillin         resistant staph aureus cult, CANCELED: Basic         metabolic panel          (I25.10) Coronary artery disease involving native coronary artery of native heart without angina pectoris  Comment: No recent angina or CHF symptoms.   Plan: Lipid panel reflex to direct LDL Non-fasting,         CANCELED: Lipid panel reflex to direct LDL         Fasting          (I77.810) Aortic root dilatation (H)  Comment: stable.   Plan: Continue current meds and f/u with cardiology as they advise.     (E78.5) Hyperlipidemia LDL goal <100  Comment: Update lipids. Continue current meds.   Plan: Comprehensive metabolic panel (BMP + Alb, Alk         Phos, ALT, AST, Total. Bili, TP), Lipid panel         reflex to direct LDL Non-fasting, CANCELED:         Lipid panel reflex to direct LDL Fasting          (Z96.641) Status post right hip replacement    (R35.0) Urinary frequency  Comment: Check UA.   Plan: UA reflex to Microscopic            RECOMMENDATIONS:     --Consult hospital rounder / IM to assist post-op medical management    --Patient is to take all scheduled medications on the day of surgery EXCEPT for modifications listed below.    Hold off on taking any aspirin, ibuprofen or Aleve for about 10 days prior to surgery.   Take your lisinopril the morning of surgery with sips of water.     APPROVAL GIVEN to proceed with proposed procedure, without further diagnostic evaluation     The information in this document, created by the medical scribe for me, accurately reflects the services I personally performed and the decisions made by me. I have reviewed and approved this document for accuracy prior to leaving the patient care area.  September 18, 2018 9:36 AM    Signed Electronically by: Guillermo Kong MD,      Copy of this evaluation report is provided to requesting physician.    [facility] Preop Guidelines    Revised Cardiac Risk Index

## 2018-09-18 NOTE — MR AVS SNAPSHOT
After Visit Summary   9/18/2018    Javier Tamez    MRN: [id number]           Patient Information     Date Of Birth          1945        Visit Information        Provider Department      9/18/2018 9:00 AM Guillermo Kong MD [facility]        Today's Diagnoses     Preop general physical exam    -  1    Coronary artery disease involving native coronary artery of native heart without angina pectoris        Aortic root dilatation (H)        Hyperlipidemia LDL goal <100        Status post right hip replacement        Urinary frequency          Care Instructions      Before Your Surgery      Call your surgeon if there is any change in your health. This includes signs of a cold or flu (such as a sore throat, runny nose, cough, rash or fever).    Do not smoke, drink alcohol or take over the counter medicine (unless your surgeon or primary care doctor tells you to) for the 24 hours before and after surgery.    If you take prescribed drugs: Follow your doctor s orders about which medicines to take and which to stop until after surgery.    Eating and drinking prior to surgery: follow the instructions from your surgeon    Take a shower or bath the night before surgery. Use the soap your surgeon gave you to gently clean your skin. If you do not have soap from your surgeon, use your regular soap. Do not shave or scrub the surgery site.  Wear clean pajamas and have clean sheets on your bed.         Hold off on taking any aspirin, ibuprofen or Aleve for about 10 days prior to surgery.   Take your lisinopril the morning of surgery with sips of water.     Labs down in [address] today. I'll get back to you soon with results.     See me after surgery if Dr Galvez recommends, or otherwise see me in a year.           Follow-ups after your visit        Your next 10 appointments already scheduled     Sep 19, 2018  8:15 AM CDT   New Visit with Star White MD   [facility]  "[facility]    [address] [place] [address]  [facility] [address] [phone]            Oct 09, 2018   Procedure with Meño Avalos MD   [facility] (--)    [address]  [place] [address] [phone]              Who to contact     If you have questions or need follow up information about today's clinic visit or your schedule please contact [facility] directly at [phone].  Normal or non-critical lab and imaging results will be communicated to you by MyChart, letter or phone within 4 business days after the clinic has received the results. If you do not hear from us within 7 days, please contact the clinic through MyChart or phone. If you have a critical or abnormal lab result, we will notify you by phone as soon as possible.  Submit refill requests through [employer] or call your pharmacy and they will forward the refill request to us. Please allow 3 business days for your refill to be completed.          Additional Information About Your Visit        Care EveryWhere ID     This is your Care EveryWhere ID. This could be used by other organizations to access your [place] medical records  [id number]        Your Vitals Were     Pulse Temperature Respirations Height Pulse Oximetry BMI (Body Mass Index)    86 98.3  F (36.8  C) (Oral) 16 5' 11\" (1.803 m) 95% 25.66 kg/m2       Blood Pressure from Last 3 Encounters:   09/18/18 110/60   01/12/18 108/62   10/25/17 113/63    Weight from Last 3 Encounters:   09/18/18 184 lb (83.5 kg)   01/12/18 188 lb 8 oz (85.5 kg)   10/24/17 181 lb (82.1 kg)              We Performed the Following     CBC with platelets     Comprehensive metabolic panel (BMP + Alb, Alk Phos, ALT, AST, Total. Bili, TP)     EKG 12-lead complete w/read - Clinics     Lipid panel reflex to direct LDL Fasting     Methicillin resistant staph aureus cult     UA reflex to Microscopic        Primary Care " Provider Office Phone # Fax #    Guillermo Kong [phone]       [address] [facility] [address]  [facility] [address]        Equal Access to Services     NICKNICOLETTE ISAEL : Hadii arsen ku hadmoono Sokranthiali, waaxda luqadaha, qaybta kaalmada adeegyada, oliver gonzalezrandall jose. So [facility] [phone].    ATENCIÓN: Si habla español, tiene a crews disposición servicios gratuitos de asistencia lingüística. Llame al [phone].    We comply with applicable federal civil rights laws and [place] laws. We do not discriminate on the basis of race, color, national origin, age, disability, sex, sexual orientation, or gender identity.            Thank you!     Thank you for choosing [facility]  for your care. Our goal is always to provide you with excellent care. Hearing back from our patients is one way we can continue to improve our services. Please take a few minutes to complete the written survey that you may receive in the mail after your visit with us. Thank you!             Your Updated Medication List - Protect others around you: Learn how to safely use, store and throw away your medicines at www.disposemymeds.org.          This list is accurate as of 9/18/18  9:41 AM.  Always use your most recent med list.                   Brand Name Dispense Instructions for use Diagnosis    aspirin 81 MG tablet      Take 81 mg by mouth daily        lisinopril 2.5 MG tablet    PRINIVIL/Zestril    90 tablet    TAKE ONE TABLET BY MOUTH DAILY    Essential hypertension with goal blood pressure less than 140/90, Aortic root dilatation (H)       nitroGLYcerin 0.4 MG sublingual tablet    NITROSTAT    25 tablet    For chest pain place 1 tablet under the tongue every 5 minutes for 3 doses. If symptoms persist 5 minutes after 1st dose call 911.    Coronary artery disease involving native coronary artery of native heart without angina pectoris       PROBIOTIC PO      Take 1 capsule by mouth every morning         rosuvastatin 40 MG tablet    CRESTOR    90 tablet    Take 1 tablet (40 mg) by mouth daily    Mixed hyperlipidemia       tadalafil 5 MG tablet    CIALIS    15 tablet    Take 1 tablet (5 mg) by mouth daily as needed    Other male erectile dysfunction, Erectile dysfunction, unspecified erectile dysfunction type       VITAMIN B 12 PO      Take 1,000 mcg by mouth every morning        VITAMIN C PO      Take 1,000 mg by mouth every morning        VITAMIN D (CHOLECALCIFEROL) PO      Take 1,000 Units by mouth every morning

## 2018-09-18 NOTE — PATIENT INSTRUCTIONS
Before Your Surgery      Call your surgeon if there is any change in your health. This includes signs of a cold or flu (such as a sore throat, runny nose, cough, rash or fever).    Do not smoke, drink alcohol or take over the counter medicine (unless your surgeon or primary care doctor tells you to) for the 24 hours before and after surgery.    If you take prescribed drugs: Follow your doctor s orders about which medicines to take and which to stop until after surgery.    Eating and drinking prior to surgery: follow the instructions from your surgeon    Take a shower or bath the night before surgery. Use the soap your surgeon gave you to gently clean your skin. If you do not have soap from your surgeon, use your regular soap. Do not shave or scrub the surgery site.  Wear clean pajamas and have clean sheets on your bed.         Hold off on taking any aspirin, ibuprofen or Aleve for about 10 days prior to surgery.   Take your lisinopril the morning of surgery with sips of water.     Labs down in [address] today. I'll get back to you soon with results.     See me after surgery if Dr Galvez recommends, or otherwise see me in a year.

## 2018-09-19 ENCOUNTER — OFFICE VISIT (OUTPATIENT)
Dept: CARDIOLOGY | Facility: CLINIC | Age: 73
End: 2018-09-19
Payer: COMMERCIAL

## 2018-09-19 VITALS
HEART RATE: 80 BPM | DIASTOLIC BLOOD PRESSURE: 64 MMHG | HEIGHT: 71 IN | WEIGHT: 185 LBS | BODY MASS INDEX: 25.9 KG/M2 | SYSTOLIC BLOOD PRESSURE: 110 MMHG

## 2018-09-19 DIAGNOSIS — I10 ESSENTIAL HYPERTENSION WITH GOAL BLOOD PRESSURE LESS THAN 140/90: ICD-10-CM

## 2018-09-19 DIAGNOSIS — I25.10 CORONARY ARTERY DISEASE INVOLVING NATIVE CORONARY ARTERY OF NATIVE HEART WITHOUT ANGINA PECTORIS: ICD-10-CM

## 2018-09-19 DIAGNOSIS — E78.2 MIXED HYPERLIPIDEMIA: Primary | ICD-10-CM

## 2018-09-19 DIAGNOSIS — E78.00 HYPERCHOLESTEROLEMIA: ICD-10-CM

## 2018-09-19 DIAGNOSIS — I77.89 ASCENDING AORTA ENLARGEMENT (H): ICD-10-CM

## 2018-09-19 LAB
ALBUMIN SERPL-MCNC: 3.9 G/DL (ref 3.4–5)
ALP SERPL-CCNC: 66 U/L (ref 40–150)
ALT SERPL W P-5'-P-CCNC: 54 U/L (ref 0–70)
ANION GAP SERPL CALCULATED.3IONS-SCNC: 8 MMOL/L (ref 3–14)
AST SERPL W P-5'-P-CCNC: 33 U/L (ref 0–45)
BILIRUB SERPL-MCNC: 0.8 MG/DL (ref 0.2–1.3)
BUN SERPL-MCNC: 12 MG/DL (ref 7–30)
CALCIUM SERPL-MCNC: 9 MG/DL (ref 8.5–10.1)
CHLORIDE SERPL-SCNC: 104 MMOL/L (ref 94–109)
CHOLEST SERPL-MCNC: 91 MG/DL
CO2 SERPL-SCNC: 28 MMOL/L (ref 20–32)
CREAT SERPL-MCNC: 0.8 MG/DL (ref 0.66–1.25)
GFR SERPL CREATININE-BSD FRML MDRD: >90 ML/MIN/1.7M2
GLUCOSE SERPL-MCNC: 84 MG/DL (ref 70–99)
HDLC SERPL-MCNC: 59 MG/DL
LDLC SERPL CALC-MCNC: 24 MG/DL
NONHDLC SERPL-MCNC: 32 MG/DL
POTASSIUM SERPL-SCNC: 4.3 MMOL/L (ref 3.4–5.3)
PROT SERPL-MCNC: 7.6 G/DL (ref 6.8–8.8)
SODIUM SERPL-SCNC: 140 MMOL/L (ref 133–144)
TRIGL SERPL-MCNC: 42 MG/DL

## 2018-09-19 PROCEDURE — 99214 OFFICE O/P EST MOD 30 MIN: CPT | Performed by: INTERNAL MEDICINE

## 2018-09-19 NOTE — PROGRESS NOTES
Service Date: 09/19/2018      REFERRING PHYSICIAN:  Dr. Guillermo Kong.      HISTORY OF PRESENT ILLNESS:  It is my pleasure to see your patient, Javier Tamez, in followup visit for clearance for orthopedic surgery.  Mr. Tamez is a 73-year-old gentleman who is normally followed by my partner, Dr. Miguelito Sim.  This is a patient with known coronary artery disease.  On calcium scoring, he has a very high calcium score of over 4000.  He did have stress testing in 01/2017 which showed no evidence of ischemia.  This patient does have a history of a cerebral infarction in the past.  He has aortic root dilatation also.  Last assessment of his aortic root was in 2014.  At that particular time, the sinuses of Valsalva measured 3.9 cm in maximum diameter and the ascending aorta measured 3.6 cm.  Last lipid profile was in January of this year showing an LDL of 48, HDL of 63 and triglycerides of 41 with a total cholesterol of 119 which is excellent.      The patient has no chest pains or chest pressure.  He has no symptoms of congestive heart failure.  He does not have aortic stenosis based upon the last echocardiogram in 2014 and on physical examination and he has no history of malignant dysrhythmias.  Based upon the absence of these 4 clinical issues, his risk of an intraoperative cardiac event would be low.  His blood pressure is well controlled at 110/64.      IMPRESSION:   1.  Coronary artery disease.  The patient is asymptomatic with respect to coronary artery disease with no symptoms suggestive of angina pectoris.   2.  Mildly dilated aortic root and ascending aorta in 2014 on echocardiography.   3.  Normotensive.   4.  Excellent lipid profile.        Based upon the fact that the patient does not have symptoms of unstable angina pectoris, decompensated congestive heart failure, severely obstructive valvular lesions, nor malignant dysrhythmias, his risk of intraoperative cardiac event would be low.      PLAN:  We  will obtain a Lexiscan study to determine if there is any evidence of ischemia prior to surgery and given the fact that he has a very high calcium score.  We will have this performed as soon as possible so that there is enough time prior to his orthopedic surgery which I believe is on 10/09.  We will have the results of this test sent to the patient and to his physicians.  I have arranged for the patient to follow up in a year's time with Dr. Sim and at that stage we will recheck his lipids, but we will also recheck his echocardiogram.  At some stage, it might be useful to have an MRI or CT of his entire thoracic aorta which will give more definitive measurements.  It has been my pleasure to be involved in the care of this very nice patient.      cc:   Guillermo Kong MD   [facility] [address]    [address]   [place] [address]      Meño Avalos MD   [facility]    [address]    [address]   [place] [address]         HARSH HOWARD MD, [facility]             D: 2018   T: 2018   MT: [id number]      Name:     ANILA MEDINA   MRN:      [id number]        Account:      [id number]   :      1945           Service Date: 2018      Document: [id number]

## 2018-09-19 NOTE — LETTER
9/19/2018      Guillermo Kong MD, MD  [address] [facility] [address]  [facility] [address]      RE: Javier Tamez       Dear Colleague,    I had the pleasure of seeing Javier Tamez in the [facility].    Service Date: 09/19/2018      REFERRING PHYSICIAN:  Dr. Guillermo Kong.      HISTORY OF PRESENT ILLNESS:  It is my pleasure to see your patient, Javier Tamez, in followup visit for clearance for orthopedic surgery.  Mr. Tamez is a 73-year-old gentleman who is normally followed by my partner, Dr. Miguelito Sim.  This is a patient with known coronary artery disease.  On calcium scoring, he has a very high calcium score of over 4000.  He did have stress testing in 01/2017 which showed no evidence of ischemia.  This patient does have a history of a cerebral infarction in the past.  He has aortic root dilatation also.  Last assessment of his aortic root was in 2014.  At that particular time, the sinuses of Valsalva measured 3.9 cm in maximum diameter and the ascending aorta measured 3.6 cm.  Last lipid profile was in January of this year showing an LDL of 48, HDL of 63 and triglycerides of 41 with a total cholesterol of 119 which is excellent.      The patient has no chest pains or chest pressure.  He has no symptoms of congestive heart failure.  He does not have aortic stenosis based upon the last echocardiogram in 2014 and on physical examination and he has no history of malignant dysrhythmias.  Based upon the absence of these 4 clinical issues, his risk of an intraoperative cardiac event would be low.  His blood pressure is well controlled at 110/64.      IMPRESSION:   1.  Coronary artery disease.  The patient is asymptomatic with respect to coronary artery disease with no symptoms suggestive of angina pectoris.   2.  Mildly dilated aortic root and ascending aorta in 2014 on echocardiography.   3.  Normotensive.   4.  Excellent lipid profile.        Based upon the fact that the  patient does not have symptoms of unstable angina pectoris, decompensated congestive heart failure, severely obstructive valvular lesions, nor malignant dysrhythmias, his risk of intraoperative cardiac event would be low.      PLAN:  We will obtain a Lexiscan study to determine if there is any evidence of ischemia prior to surgery and given the fact that he has a very high calcium score.  We will have this performed as soon as possible so that there is enough time prior to his orthopedic surgery which I believe is on 10/09.  We will have the results of this test sent to the patient and to his physicians.  I have arranged for the patient to follow up in a year's time with Dr. Sim and at that stage we will recheck his lipids, but we will also recheck his echocardiogram.  At some stage, it might be useful to have an MRI or CT of his entire thoracic aorta which will give more definitive measurements.  It has been my pleasure to be involved in the care of this very nice patient.      cc:   Guillermo Kong MD   [facility] [address]    [address]   [place] [address]      Meño Avalos MD   [facility]    [address]    [address]   [place] [address]         HARSH HOWARD MD, [facility]             D: 2018   T: 2018   MT: JIMBO      Name:     ANILA MEDINA   MRN:      [id number]        Account:      [id number]   :      1945           Service Date: 2018      Document: [id number]         Outpatient Encounter Prescriptions as of 2018   Medication Sig Dispense Refill     aspirin 81 MG tablet Take 81 mg by mouth daily       Cyanocobalamin (VITAMIN B 12 PO) Take 1,000 mcg by mouth every morning       lisinopril (PRINIVIL/ZESTRIL) 2.5 MG tablet TAKE ONE TABLET BY MOUTH DAILY 90 tablet 2     nitroglycerin (NITROSTAT) 0.4 MG sublingual tablet For chest pain place 1 tablet under the tongue every 5 minutes for 3 doses. If symptoms  persist 5 minutes after 1st dose call 911. 25 tablet 3     Probiotic Product (PROBIOTIC PO) Take 1 capsule by mouth every morning       rosuvastatin (CRESTOR) 40 MG tablet Take 1 tablet (40 mg) by mouth daily 90 tablet 3     tadalafil (CIALIS) 5 MG tablet Take 1 tablet (5 mg) by mouth daily as needed 15 tablet 5     VITAMIN D, CHOLECALCIFEROL, PO Take 1,000 Units by mouth every morning        Ascorbic Acid (VITAMIN C PO) Take 1,000 mg by mouth every morning        No facility-administered encounter medications on file as of 9/19/2018.        Again, thank you for allowing me to participate in the care of your patient.      Sincerely,    Star White MD, MD     [facility]

## 2018-09-19 NOTE — MR AVS SNAPSHOT
After Visit Summary   9/19/2018    Javier Tamez    MRN: [id number]           Patient Information     Date Of Birth          1945        Visit Information        Provider Department      9/19/2018 8:15 AM Star White MD [facility]        Today's Diagnoses     Mixed hyperlipidemia    -  1    Coronary artery disease involving native coronary artery of native heart without angina pectoris        Ascending aorta enlargement (H)        Hypercholesterolemia        Essential hypertension with goal blood pressure less than 140/90           Follow-ups after your visit        Additional Services     Follow-Up with Cardiologist                 Your next 10 appointments already scheduled     Sep 21, 2018  8:00 AM CDT   NM MPI WITH LEXISCAN with RHNM1   [facility] ([facility])    [address] [facility] [id number] [phone]           How do I prepare for my exam? (Food and drink instructions) Day 1 & Day 2: Stop all caffeine 12 hours before the test. This includes coffee, tea, soda pop, chocolate and certain medicines (such as Anacin, Excedrin and NoDoz). Also avoid decaf coffee and tea, as these contain small amounts of caffeine. Stop eating 4 hours before the test. You may drink water.  How do I prepare for my exam? (Other instructions) You may need to stop some medicines before the test. Follow your doctor s orders. Day 1 & Day 2: *If you take a beta blocker: Do not take your beta-blocker on the day before your test, unless specifically told to by your doctor. And do not take it on the day of your test. Bring it with you to take after the test.  *If you take Aggrenox or dipyridamole (Persantine, Permole), stop taking it 48 hours before your test. *If you take Viagra, Cialis or Levitra, stop taking it 48 hours before your test. *If you take theophylline or aminophylline, stop taking it 12 hours  before your test.  For patients with diabetes: *If you take insulin, call your diabetes care team. Ask if you should take a 1/2 dose the morning of your test. *If you take diabetes medicine by mouth, don t take it on the morning of your test. Bring it with you to take after the test. (If you have questions, call your diabetes care team.)  *Do not take nitrates on the day of your test. Do not wear your Nitro-Patch. *No alcohol, smoking or other tobacco for 12 hours before the test.  What should I wear: Please wear a loose two-piece outfit. If you will have an exercise test, bring rubber-soled walking shoes.  How long does the exam take: *This test can take 1-2 days.* ONE day exam: Allow 3-4 hours for test. IF TWO day exam: Allow 30-60 minutes PER day for test.  What should I bring: Please bring a list of your medicines (including vitamins, minerals and over-the-counter drugs). Leave your valuables at home.  Do I need a :  No  is needed.  What do I need to tell my doctor? When you arrive, please tell us if you: * Have diabetes * Are breastfeeding * May be pregnant * Have a pacemaker of ICD (implantable defibrillator).  What should I do after the exam: No restrictions, You may resume normal activities.  What is this test: Your doctor has ordered a nuclear stress test to check how well blood is flowing through your heart. You will either exercise or take a medicine that mimics exercise; we will watch your heart.  Who should I call with questions: If you have any questions, please call the Imaging Department where you will have your exam. Directions, parking instructions, and other information is available on our website, [email or web address].            Sep 21, 2018  9:30 AM CDT   Ekg Stress Nm Lexiscan with RESRM3   [facility] ([facility])    [address] [facility] [address]   [phone]            Oct 09, 2018   Procedure with Meño Avalos MD  "  [facility] (--)    [address]  [place] [address] [phone]              Future tests that were ordered for you today     Open Future Orders        Priority Expected Expires Ordered    NM Lexiscan stress test (nuc card) Routine 9/21/2018 9/19/2019 9/19/2018    Lipid Profile Routine 9/19/2019 9/19/2019 9/19/2018    ALT Routine 9/19/2019 9/19/2019 9/19/2018    Basic metabolic panel Routine 9/19/2019 9/20/2019 9/19/2018    Follow-Up with Cardiologist Routine 9/19/2019 9/20/2019 9/19/2018    Echocardiogram Routine 9/19/2019 9/20/2019 9/19/2018            Who to contact     If you have questions or need follow up information about today's clinic visit or your schedule please contact [facility] directly at [phone].  Normal or non-critical lab and imaging results will be communicated to you by MyChart, letter or phone within 4 business days after the clinic has received the results. If you do not hear from us within 7 days, please contact the clinic through MyChart or phone. If you have a critical or abnormal lab result, we will notify you by phone as soon as possible.  Submit refill requests through [employer] or call your pharmacy and they will forward the refill request to us. Please allow 3 business days for your refill to be completed.          Additional Information About Your Visit        Care EveryWhere ID     This is your Care EveryWhere ID. This could be used by other organizations to access your [place] medical records  [id number]        Your Vitals Were     Pulse Height BMI (Body Mass Index)             80 1.803 m (5' 11\") 25.8 kg/m2          Blood Pressure from Last 3 Encounters:   09/19/18 110/64   09/18/18 110/60   01/12/18 108/62    Weight from Last 3 Encounters:   09/19/18 83.9 kg (185 lb)   09/18/18 83.5 kg (184 lb)   01/12/18 85.5 kg (188 lb 8 oz)               Primary Care Provider Office Phone # Fax #    " Guillermo Kong [phone]       [address] [facility] [address]  [facility] [address]        Equal Access to Services     JASON KIRKLAND : Hadсергей aresn ferreira charanjit Soneena, waprabhuda luqmichealha, qaamitata kaalmada neha, oliver gonzalezrandall jose. So [facility] [phone].    ATENCIÓN: Si habla español, tiene a crews disposición servicios gratuitos de asistencia lingüística. Llame al [phone].    We comply with applicable federal civil rights laws and [place] laws. We do not discriminate on the basis of race, color, national origin, age, disability, sex, sexual orientation, or gender identity.            Thank you!     Thank you for choosing [facility]  for your care. Our goal is always to provide you with excellent care. Hearing back from our patients is one way we can continue to improve our services. Please take a few minutes to complete the written survey that you may receive in the mail after your visit with us. Thank you!             Your Updated Medication List - Protect others around you: Learn how to safely use, store and throw away your medicines at www.disposemymeds.org.          This list is accurate as of 9/19/18  8:34 AM.  Always use your most recent med list.                   Brand Name Dispense Instructions for use Diagnosis    aspirin 81 MG tablet      Take 81 mg by mouth daily        lisinopril 2.5 MG tablet    PRINIVIL/Zestril    90 tablet    TAKE ONE TABLET BY MOUTH DAILY    Essential hypertension with goal blood pressure less than 140/90, Aortic root dilatation (H)       nitroGLYcerin 0.4 MG sublingual tablet    NITROSTAT    25 tablet    For chest pain place 1 tablet under the tongue every 5 minutes for 3 doses. If symptoms persist 5 minutes after 1st dose call 911.    Coronary artery disease involving native coronary artery of native heart without angina pectoris       PROBIOTIC PO      Take 1 capsule by mouth every morning         rosuvastatin 40 MG tablet    CRESTOR    90 tablet    Take 1 tablet (40 mg) by mouth daily    Mixed hyperlipidemia       tadalafil 5 MG tablet    CIALIS    15 tablet    Take 1 tablet (5 mg) by mouth daily as needed    Other male erectile dysfunction, Erectile dysfunction, unspecified erectile dysfunction type       VITAMIN B 12 PO      Take 1,000 mcg by mouth every morning        VITAMIN C PO      Take 1,000 mg by mouth every morning        VITAMIN D (CHOLECALCIFEROL) PO      Take 1,000 Units by mouth every morning

## 2018-09-19 NOTE — PROGRESS NOTES
HPI and Plan:   See dictation    Orders Placed This Encounter   Procedures     NM Lexiscan stress test (nuc card)     Lipid Profile     ALT     Basic metabolic panel     Follow-Up with Cardiologist     Echocardiogram       No orders of the defined types were placed in this encounter.      There are no discontinued medications.      Encounter Diagnoses   Name Primary?     Mixed hyperlipidemia Yes     Coronary artery disease involving native coronary artery of native heart without angina pectoris      Ascending aorta enlargement (H)      Hypercholesterolemia      Essential hypertension with goal blood pressure less than 140/90        CURRENT MEDICATIONS:  Current Outpatient Prescriptions   Medication Sig Dispense Refill     aspirin 81 MG tablet Take 81 mg by mouth daily       Cyanocobalamin (VITAMIN B 12 PO) Take 1,000 mcg by mouth every morning       lisinopril (PRINIVIL/ZESTRIL) 2.5 MG tablet TAKE ONE TABLET BY MOUTH DAILY 90 tablet 2     nitroglycerin (NITROSTAT) 0.4 MG sublingual tablet For chest pain place 1 tablet under the tongue every 5 minutes for 3 doses. If symptoms persist 5 minutes after 1st dose call 911. 25 tablet 3     Probiotic Product (PROBIOTIC PO) Take 1 capsule by mouth every morning       rosuvastatin (CRESTOR) 40 MG tablet Take 1 tablet (40 mg) by mouth daily 90 tablet 3     tadalafil (CIALIS) 5 MG tablet Take 1 tablet (5 mg) by mouth daily as needed 15 tablet 5     VITAMIN D, CHOLECALCIFEROL, PO Take 1,000 Units by mouth every morning        Ascorbic Acid (VITAMIN C PO) Take 1,000 mg by mouth every morning          ALLERGIES   No Known Allergies    PAST MEDICAL HISTORY:  Past Medical History:   Diagnosis Date     Aortic root dilatation (H)      Aortic stenosis      Arthritis      Atypical chest pain 1/28/2015     Cerebral artery occlusion with cerebral infarction (H)     TIA  1992     Essential hypertension with goal blood pressure less than 140/90 11/26/2016    takes lisinopril for  preventitive     Family history of heart disease      Hypercholesterolemia 1/3/2014     Diagnosis updated by automated process. Provider to review and confirm.     Hyperlipidemia LDL goal <70      IBS (irritable bowel syndrome)      Other chronic pain        PAST SURGICAL HISTORY:  Past Surgical History:   Procedure Laterality Date     ARTHROPLASTY HIP ANTERIOR Right 10/24/2017    Procedure: ARTHROPLASTY HIP ANTERIOR;  RIGHT TOTAL HIP ARTHROPLASTY DIRECT ANTERIOR APPROACH;  Surgeon: Meño Avalos MD;  Location: [facility] OR     C NONSPECIFIC PROCEDURE      kidney stone     COLONOSCOPY      Adenomatous polyp removed     VASECTOMY         FAMILY HISTORY:  Family History   Problem Relation Age of Onset     C.A.D. 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 Yes     father      Caffeine Concern Yes     2 cups coffee daily, occ soda      Sleep Concern No     Special Diet No     Exercise Yes     3-4 times weekly      Seat Belt Yes     Social History Narrative    Rides bicycle or goes to Y regularly (3x/week).            Review of Systems:  Skin:  Negative for       Eyes:  Negative for      ENT:  Negative for      Respiratory:  Negative for       Cardiovascular:  Negative for      Gastroenterology: Negative for      Genitourinary:  not  "assessed      Musculoskeletal:  Positive for joint pain    Neurologic:  Negative for      Psychiatric:  Negative for      Heme/Lymph/Imm:  Negative      Endocrine:  Negative        Physical Exam:  Vitals: /64  Pulse 80  Ht 1.803 m (5' 11\")  Wt 83.9 kg (185 lb)  BMI 25.8 kg/m2    Constitutional:  cooperative, alert and oriented, well developed, well nourished, in no acute distress        Skin:  warm and dry to the touch, no apparent skin lesions or masses noted          Head:  normocephalic, no masses or lesions        Eyes:  pupils equal and round, conjunctivae and lids unremarkable, sclera white, no xanthalasma, EOMS intact, no nystagmus        Lymph:      ENT:  no pallor or cyanosis, dentition good        Neck:  carotid pulses are full and equal bilaterally;no carotid bruit        Respiratory:  normal breath sounds, clear to auscultation, normal A-P diameter, normal symmetry, normal respiratory excursion, no use of accessory muscles         Cardiac: regular rhythm;normal S1 and S2;no S3 or S4;no murmurs, gallops or rubs detected occasional premature beats              pulses full and equal                                        GI:  abdomen soft;BS normoactive;no masses;no HSM;non-tender;no bruits        Extremities and Muscular Skeletal:  no edema;no spinal abnormalities noted;normal muscle strength and tone              Neurological:  no gross motor deficits;affect appropriate        Psych:  Alert and Oriented x 3        CC  No referring provider defined for this encounter.              "

## 2018-09-19 NOTE — LETTER
9/19/2018    Guillermo Kong MD, MD  [address]  [facility] [address]    RE: Javier Tamez       Dear Colleague,    I had the pleasure of seeing Javier Tamez in the [facility].    HPI and Plan:   See dictation    Orders Placed This Encounter   Procedures     NM Lexiscan stress test (nuc card)     Lipid Profile     ALT     Basic metabolic panel     Follow-Up with Cardiologist     Echocardiogram       No orders of the defined types were placed in this encounter.      There are no discontinued medications.      Encounter Diagnoses   Name Primary?     Mixed hyperlipidemia Yes     Coronary artery disease involving native coronary artery of native heart without angina pectoris      Ascending aorta enlargement (H)      Hypercholesterolemia      Essential hypertension with goal blood pressure less than 140/90        CURRENT MEDICATIONS:  Current Outpatient Prescriptions   Medication Sig Dispense Refill     aspirin 81 MG tablet Take 81 mg by mouth daily       Cyanocobalamin (VITAMIN B 12 PO) Take 1,000 mcg by mouth every morning       lisinopril (PRINIVIL/ZESTRIL) 2.5 MG tablet TAKE ONE TABLET BY MOUTH DAILY 90 tablet 2     nitroglycerin (NITROSTAT) 0.4 MG sublingual tablet For chest pain place 1 tablet under the tongue every 5 minutes for 3 doses. If symptoms persist 5 minutes after 1st dose call 911. 25 tablet 3     Probiotic Product (PROBIOTIC PO) Take 1 capsule by mouth every morning       rosuvastatin (CRESTOR) 40 MG tablet Take 1 tablet (40 mg) by mouth daily 90 tablet 3     tadalafil (CIALIS) 5 MG tablet Take 1 tablet (5 mg) by mouth daily as needed 15 tablet 5     VITAMIN D, CHOLECALCIFEROL, PO Take 1,000 Units by mouth every morning        Ascorbic Acid (VITAMIN C PO) Take 1,000 mg by mouth every morning          ALLERGIES   No Known Allergies    PAST MEDICAL HISTORY:  Past Medical History:   Diagnosis Date     Aortic root dilatation (H)      Aortic stenosis       Arthritis      Atypical chest pain 2015     Cerebral artery occlusion with cerebral infarction (H)     TIA       Essential hypertension with goal blood pressure less than 140/90 2016    takes lisinopril for preventitive     Family history of heart disease      Hypercholesterolemia 1/3/2014     Diagnosis updated by automated process. Provider to review and confirm.     Hyperlipidemia LDL goal <70      IBS (irritable bowel syndrome)      Other chronic pain        PAST SURGICAL HISTORY:  Past Surgical History:   Procedure Laterality Date     ARTHROPLASTY HIP ANTERIOR Right 10/24/2017    Procedure: ARTHROPLASTY HIP ANTERIOR;  RIGHT TOTAL HIP ARTHROPLASTY DIRECT ANTERIOR APPROACH;  Surgeon: Meño Avalos MD;  Location: [facility] OR     C NONSPECIFIC PROCEDURE      kidney stone     COLONOSCOPY      Adenomatous polyp removed     VASECTOMY         FAMILY HISTORY:  Family History   Problem Relation Age of Onset     C.A.D. 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 Yes     father      Caffeine Concern Yes     2 cups coffee daily, occ soda      Sleep Concern No     Special Diet No     Exercise Yes     3-4 times weekly      Seat Belt Yes     Social History Narrative    Rides bicycle or goes to [facility] regularly (3x/week).       "      Review of Systems:  Skin:  Negative for       Eyes:  Negative for      ENT:  Negative for      Respiratory:  Negative for       Cardiovascular:  Negative for      Gastroenterology: Negative for      Genitourinary:  not assessed      Musculoskeletal:  Positive for joint pain    Neurologic:  Negative for      Psychiatric:  Negative for      Heme/Lymph/Imm:  Negative      Endocrine:  Negative        Physical Exam:  Vitals: /64  Pulse 80  Ht 1.803 m (5' 11\")  Wt 83.9 kg (185 lb)  BMI 25.8 kg/m2    Constitutional:  cooperative, alert and oriented, well developed, well nourished, in no acute distress        Skin:  warm and dry to the touch, no apparent skin lesions or masses noted          Head:  normocephalic, no masses or lesions        Eyes:  pupils equal and round, conjunctivae and lids unremarkable, sclera white, no xanthalasma, EOMS intact, no nystagmus        Lymph:      ENT:  no pallor or cyanosis, dentition good        Neck:  carotid pulses are full and equal bilaterally;no carotid bruit        Respiratory:  normal breath sounds, clear to auscultation, normal A-P diameter, normal symmetry, normal respiratory excursion, no use of accessory muscles         Cardiac: regular rhythm;normal S1 and S2;no S3 or S4;no murmurs, gallops or rubs detected occasional premature beats              pulses full and equal                                        GI:  abdomen soft;BS normoactive;no masses;no HSM;non-tender;no bruits        Extremities and Muscular Skeletal:  no edema;no spinal abnormalities noted;normal muscle strength and tone              Neurological:  no gross motor deficits;affect appropriate        Psych:  Alert and Oriented x 3        CC  No referring provider defined for this encounter.                Thank you for allowing me to participate in the care of your patient.      Sincerely,     Star White MD, MD     [facility]    cc:   No referring " provider defined for this encounter.

## 2018-09-21 ENCOUNTER — HOSPITAL ENCOUNTER (OUTPATIENT)
Dept: NUCLEAR MEDICINE | Facility: CLINIC | Age: 73
Setting detail: NUCLEAR MEDICINE
End: 2018-09-21
Attending: INTERNAL MEDICINE
Payer: MEDICARE

## 2018-09-21 ENCOUNTER — HOSPITAL ENCOUNTER (OUTPATIENT)
Dept: CARDIOLOGY | Facility: CLINIC | Age: 73
Discharge: HOME | End: 2018-09-21
Attending: INTERNAL MEDICINE | Admitting: INTERNAL MEDICINE
Payer: MEDICARE

## 2018-09-21 DIAGNOSIS — I25.10 CORONARY ARTERY DISEASE INVOLVING NATIVE CORONARY ARTERY OF NATIVE HEART WITHOUT ANGINA PECTORIS: ICD-10-CM

## 2018-09-21 PROCEDURE — 78452 HT MUSCLE IMAGE SPECT MULT: CPT | Mod: 26 | Performed by: INTERNAL MEDICINE

## 2018-09-21 PROCEDURE — 34300033 ZZH RX 343: Performed by: INTERNAL MEDICINE

## 2018-09-21 PROCEDURE — 25000128 H RX IP 250 OP 636

## 2018-09-21 PROCEDURE — A9502 TC99M TETROFOSMIN: HCPCS | Performed by: INTERNAL MEDICINE

## 2018-09-21 PROCEDURE — 93016 CV STRESS TEST SUPVJ ONLY: CPT | Performed by: INTERNAL MEDICINE

## 2018-09-21 PROCEDURE — 93017 CV STRESS TEST TRACING ONLY: CPT

## 2018-09-21 PROCEDURE — 78452 HT MUSCLE IMAGE SPECT MULT: CPT

## 2018-09-21 PROCEDURE — 93018 CV STRESS TEST I&R ONLY: CPT | Performed by: INTERNAL MEDICINE

## 2018-09-21 RX ORDER — REGADENOSON 0.08 MG/ML
INJECTION, SOLUTION INTRAVENOUS
Status: DISCONTINUED
Start: 2018-09-21 | End: 2018-09-21 | Stop reason: WASHOUT

## 2018-09-21 RX ADMIN — TETROFOSMIN 32 MCI.: 1.38 INJECTION, POWDER, LYOPHILIZED, FOR SOLUTION INTRAVENOUS at 09:50

## 2018-09-21 RX ADMIN — TETROFOSMIN 10.3 MCI.: 1.38 INJECTION, POWDER, LYOPHILIZED, FOR SOLUTION INTRAVENOUS at 07:59

## 2018-09-24 ENCOUNTER — CARE COORDINATION (OUTPATIENT)
Dept: CARDIOLOGY | Facility: CLINIC | Age: 73
End: 2018-09-24

## 2018-09-24 NOTE — PROGRESS NOTES
Reviewed exercise stress test showing   Impression  1. Exercise: Average exercise capacity, target heart rate achieved  2. EKG: Normal, negative for ischemia  3. Symptoms: No chest pain or symptoms with exercise  4. Myocardial perfusion imaging using single isotope technique demonstrated normal perfusion. No ischemia or infarct..   5. Gated images demonstrated normal wall motion.  The left ventricular systolic function is 56% at rest, 65% with stress.  6. Compared to the prior study from January 2017, no changes .    Will message Dr. White to review. Pt scheduled for hip surgery 10/9/18. Will message Dr. White to review. LPenfield RN

## 2018-09-28 NOTE — PROGRESS NOTES
Attempted to call pt with results of stress test & recommendations from Dr. White, left message for pt to call back. LPenfield RN

## 2018-10-01 NOTE — PROGRESS NOTES
Pt called back, informed that the stress test showed normal blood flow through his heart & is cleared for surgery from a cardiovascular standpoint. Will let Dr. Kong know. LPenfield RN

## 2018-10-03 NOTE — PROGRESS NOTES
MRSA positive-MSSA positive. Antisepsis DOS. Vancomycin 1500 mg IV ordered pre-procedure per protocol.

## 2018-10-04 ENCOUNTER — TELEPHONE (OUTPATIENT)
Dept: INTERNAL MEDICINE | Facility: CLINIC | Age: 73
End: 2018-10-04

## 2018-10-04 NOTE — TELEPHONE ENCOUNTER
Patient calling--he did not hear anything back regarding his labs from his pre-op other than the staph was positive.  Advised that all other labs were within normal range.  He would like PCP to review and advise.  Surgery is next Tuesday.  Aviva Enciso RN

## 2018-10-04 NOTE — TELEPHONE ENCOUNTER
Labs all fine. Discussed with patient on phone.     Letter completed, please mail along with lab results.

## 2018-10-08 NOTE — H&P (VIEW-ONLY)
[place] [address]  [facility] [address]  [phone]  Dept: [phone]    PRE-OP EVALUATION:  Today's date: 2018    Javier Tamez (: 1945) presents for pre-operative evaluation assessment as requested by Dr. Galvez.  He requires evaluation and anesthesia risk assessment prior to undergoing surgery/procedure for treatment of left hip replacement .    Fax number for surgical facility: [phone]  Primary Physician: Guillermo Kong  Type of Anesthesia Anticipated: to be determined    Patient has a Health Care Directive or Living Will:  YES     Preop Questions 2018   Who is doing your surgery? Wojciech Galvez   What are you having done? Total  left hip replacement   Date of Surgery/Procedure: 1   Facility or Hospital where procedure/surgery will be performed: [facility]   1.  Do you have a history of Heart attack, stroke, stent, coronary bypass surgery, or other heart surgery? YES  - remote history of TIA (eg weakness)   2.  Do you ever have any pain or discomfort in your chest? No   3.  Do you have a history of  Heart Failure? No   4.   Are you troubled by shortness of breath when:  walking on a level surface, or up a slight hill, or at night? No   5.  Do you currently have a cold, bronchitis or other respiratory infection? No   6.  Do you have a cough, shortness of breath, or wheezing? No   7.  Do you sometimes get pains in the calves of your legs when you walk? No   8. Do you or anyone in your family have previous history of blood clots? No   9.  Do you or does anyone in your family have a serious bleeding problem such as prolonged bleeding following surgeries or cuts? No   10. Have you ever had problems with anemia or been told to take iron pills? No   11. Have you had any abnormal blood loss such as black, tarry or bloody stools? No   12. Have you ever had a blood transfusion? No   13. Have you or any of your relatives ever had problems with  anesthesia? No   14. Do you have sleep apnea, excessive snoring or daytime drowsiness? No   15. Do you have any prosthetic heart valves? No   16. Do you have prosthetic joints? YES - right hip, 10/2017         HPI:     HPI related to upcoming procedure: Patient is undergoing left hip arthroplasty on 10/9/18 with Dr Galvez.     No recent illness or chest discomfort, or TIA symptoms.       See problem list for active medical problems.  Problems all longstanding and stable, except as noted/documented.  See ROS for pertinent symptoms related to these conditions.                                                                                                                                                          .    MEDICAL HISTORY:     Patient Active Problem List    Diagnosis Date Noted     Status post right hip replacement 10/24/2017     Priority: Medium     Aortic stenosis      Priority: Medium     Coronary artery disease involving native coronary artery without angina pectoris 10/15/2015     Priority: Medium     Atypical chest pain 01/28/2015     Priority: Medium     Aortic root dilatation (H) 01/03/2014     Priority: Medium     Hypercholesterolemia 01/03/2014     Priority: Medium     Diagnosis updated by automated process. Provider to review and confirm.       Adenomatous polyp 01/03/2014     Priority: Medium     IBS (irritable bowel syndrome) 05/22/2013     Priority: Medium      Past Medical History:   Diagnosis Date     Aortic root dilatation (H)      Aortic stenosis      Arthritis      Atypical chest pain 1/28/2015     Cerebral artery occlusion with cerebral infarction (H)     TIA  1992     Essential hypertension with goal blood pressure less than 140/90 11/26/2016    takes lisinopril for preventitive     Family history of heart disease      Hypercholesterolemia 1/3/2014     Diagnosis updated by automated process. Provider to review and confirm.     Hyperlipidemia LDL goal <70      IBS (irritable bowel  syndrome)      Other chronic pain      Past Surgical History:   Procedure Laterality Date     ARTHROPLASTY HIP ANTERIOR Right 10/24/2017    Procedure: ARTHROPLASTY HIP ANTERIOR;  RIGHT TOTAL HIP ARTHROPLASTY DIRECT ANTERIOR APPROACH;  Surgeon: Meño Avalos MD;  Location: [facility] OR     C NONSPECIFIC PROCEDURE  1990's    kidney stone     COLONOSCOPY  2011    Adenomatous polyp removed     VASECTOMY       Current Outpatient Prescriptions   Medication Sig Dispense Refill     Ascorbic Acid (VITAMIN C PO) Take 1,000 mg by mouth every morning        aspirin 81 MG tablet Take 81 mg by mouth daily       Cyanocobalamin (VITAMIN B 12 PO) Take 1,000 mcg by mouth every morning       lisinopril (PRINIVIL/ZESTRIL) 2.5 MG tablet TAKE ONE TABLET BY MOUTH DAILY 90 tablet 2     nitroglycerin (NITROSTAT) 0.4 MG sublingual tablet For chest pain place 1 tablet under the tongue every 5 minutes for 3 doses. If symptoms persist 5 minutes after 1st dose call 911. 25 tablet 3     Probiotic Product (PROBIOTIC PO) Take 1 capsule by mouth every morning       rosuvastatin (CRESTOR) 40 MG tablet Take 1 tablet (40 mg) by mouth daily 90 tablet 3     tadalafil (CIALIS) 5 MG tablet Take 1 tablet (5 mg) by mouth daily as needed 15 tablet 5     VITAMIN D, CHOLECALCIFEROL, PO Take 1,000 Units by mouth every morning        OTC products: None, except as noted above and Aspirin    No Known Allergies   Latex Allergy: NO    Social History   Substance Use Topics     Smoking status: Never Smoker     Smokeless tobacco: Never Used     Alcohol use Yes      Comment: 3-4 glasses of wine weekly     History   Drug Use No       REVIEW OF SYSTEMS:   CONSTITUTIONAL: NEGATIVE for fever, chills, change in weight  INTEGUMENTARY/SKIN: NEGATIVE for worrisome rashes, moles or lesions  EYES: NEGATIVE for vision changes or irritation  ENT/MOUTH: NEGATIVE for ear, mouth and throat problems  RESP: NEGATIVE for significant cough or SOB  BREAST: NEGATIVE for masses, tenderness or  "discharge  CV: NEGATIVE for chest pain, palpitations or peripheral edema  GI: NEGATIVE for nausea, abdominal pain, heartburn, or change in bowel habits  : NEGATIVE for frequency, dysuria, or hematuria  MUSCULOSKELETAL: NEGATIVE for significant arthralgias or myalgia  NEURO: NEGATIVE for weakness, dizziness or paresthesias  ENDOCRINE: NEGATIVE for temperature intolerance, skin/hair changes  HEME: NEGATIVE for bleeding problems  PSYCHIATRIC: NEGATIVE for changes in mood or affect    This document serves as a record of the services and decisions personally performed and made by Guillermo Kong MD. It was created on his behalf by Yolie Collins, a trained medical scribe. The creation of this document is based on the provider's statements to the medical scribe.  Yolie Collins September 18, 2018 9:28 AM       EXAM:   /60 (BP Location: Left arm, Patient Position: Sitting, Cuff Size: Adult Large)  Pulse 86  Temp 98.3  F (36.8  C) (Oral)  Resp 16  Ht 5' 11\" (1.803 m)  Wt 184 lb (83.5 kg)  SpO2 95%  BMI 25.66 kg/m2    GENERAL APPEARANCE: healthy, alert and no distress     EYES: EOMI,  PERRL     HENT: ear canals and TM's normal and nose and mouth without ulcers or lesions     NECK: no adenopathy, no asymmetry, masses, or scars and thyroid normal to palpation     RESP: lungs clear to auscultation - no rales, rhonchi or wheezes     CV: regular rates and rhythm, normal S1 S2, no S3 or S4 and no murmur, click or rub     ABDOMEN:  soft, nontender, no HSM or masses and bowel sounds normal     MS: extremities normal- no gross deformities noted, no evidence of inflammation in joints, FROM in all extremities.     SKIN: no suspicious lesions or rashes     NEURO: Normal strength and tone, sensory exam grossly normal, mentation intact and speech normal     PSYCH: mentation appears normal. and affect normal/bright     LYMPHATICS: No cervical adenopathy    DIAGNOSTICS:     EKG: appears normal, NSR, normal axis, normal " intervals, no acute ST/T changes c/w ischemia, no LVH by voltage criteria, unchanged from previous tracings  Labs Resulted Today:   Results for orders placed or performed in visit on 01/12/18   Basic metabolic panel   Result Value Ref Range    Sodium 139 133 - 144 mmol/L    Potassium 3.9 3.4 - 5.3 mmol/L    Chloride 105 94 - 109 mmol/L    Carbon Dioxide 31 20 - 32 mmol/L    Anion Gap 3 3 - 14 mmol/L    Glucose 93 70 - 99 mg/dL    Urea Nitrogen 10 7 - 30 mg/dL    Creatinine 0.77 0.66 - 1.25 mg/dL    GFR Estimate >90 >60 mL/min/1.7m2    GFR Estimate If Black >90 >60 mL/min/1.7m2    Calcium 9.0 8.5 - 10.1 mg/dL   Lipid Profile   Result Value Ref Range    Cholesterol 119 <200 mg/dL    Triglycerides 41 <150 mg/dL    HDL Cholesterol 63 >39 mg/dL    LDL Cholesterol Calculated 48 <100 mg/dL    Non HDL Cholesterol 56 <130 mg/dL       Recent Labs   Lab Test  01/12/18   0738  10/25/17   0619  10/24/17   0645  09/29/17   1500   HGB   --   12.1*  15.6  16.4   PLT   --    --   200  209   NA  139   --    --   140   POTASSIUM  3.9   --   3.9  4.3   CR  0.77   --   0.73  0.97        IMPRESSION:   Reason for surgery/procedure: Left hip pain  Diagnosis/reason for consult: Perioperative risk assessment     The proposed surgical procedure is considered INTERMEDIATE risk.    REVISED CARDIAC RISK INDEX  The patient has the following serious cardiovascular risks for perioperative complications such as (MI, PE, VFib and 3  AV Block):  No serious cardiac risks  INTERPRETATION: 0 risks: Class I (very low risk - 0.4% complication rate)    The patient has the following additional risks for perioperative complications:  No identified additional risks      ICD-10-CM    1. Pre-op exam Z01.818 EKG 12-lead complete w/read - Clinics     Methicillin resistant staph aureus cult   2. Preop general physical exam Z01.818      (Z01.818) Preop general physical exam  (primary encounter diagnosis)  Comment: stable.   Plan: EKG 12-lead complete w/read -  Clinics, CBC with        platelets, Comprehensive metabolic panel (BMP +        Alb, Alk Phos, ALT, AST, Total. Bili, TP), UA         reflex to Microscopic, Methicillin Resistant         Staph Aureus PCR, CANCELED: Methicillin         resistant staph aureus cult, CANCELED: Basic         metabolic panel          (I25.10) Coronary artery disease involving native coronary artery of native heart without angina pectoris  Comment: No recent angina or CHF symptoms.   Plan: Lipid panel reflex to direct LDL Non-fasting,         CANCELED: Lipid panel reflex to direct LDL         Fasting          (I77.810) Aortic root dilatation (H)  Comment: stable.   Plan: Continue current meds and f/u with cardiology as they advise.     (E78.5) Hyperlipidemia LDL goal <100  Comment: Update lipids. Continue current meds.   Plan: Comprehensive metabolic panel (BMP + Alb, Alk         Phos, ALT, AST, Total. Bili, TP), Lipid panel         reflex to direct LDL Non-fasting, CANCELED:         Lipid panel reflex to direct LDL Fasting          (Z96.641) Status post right hip replacement    (R35.0) Urinary frequency  Comment: Check UA.   Plan: UA reflex to Microscopic            RECOMMENDATIONS:     --Consult hospital rounder / IM to assist post-op medical management    --Patient is to take all scheduled medications on the day of surgery EXCEPT for modifications listed below.    Hold off on taking any aspirin, ibuprofen or Aleve for about 10 days prior to surgery.   Take your lisinopril the morning of surgery with sips of water.     APPROVAL GIVEN to proceed with proposed procedure, without further diagnostic evaluation     The information in this document, created by the medical scribe for me, accurately reflects the services I personally performed and the decisions made by me. I have reviewed and approved this document for accuracy prior to leaving the patient care area.  September 18, 2018 9:36 AM    Signed Electronically by: Guillermo Kong MD,      Copy of this evaluation report is provided to requesting physician.    [place] Preop Guidelines    Revised Cardiac Risk Index

## 2018-10-09 ENCOUNTER — ANESTHESIA (OUTPATIENT)
Dept: SURGERY | Facility: CLINIC | Age: 73
DRG: 470 | End: 2018-10-09
Payer: MEDICARE

## 2018-10-09 ENCOUNTER — APPOINTMENT (OUTPATIENT)
Dept: PHYSICAL THERAPY | Facility: CLINIC | Age: 73
DRG: 470 | End: 2018-10-09
Attending: ORTHOPAEDIC SURGERY
Payer: MEDICARE

## 2018-10-09 ENCOUNTER — APPOINTMENT (OUTPATIENT)
Dept: GENERAL RADIOLOGY | Facility: CLINIC | Age: 73
DRG: 470 | End: 2018-10-09
Attending: ORTHOPAEDIC SURGERY
Payer: MEDICARE

## 2018-10-09 ENCOUNTER — ANESTHESIA EVENT (OUTPATIENT)
Dept: SURGERY | Facility: CLINIC | Age: 73
DRG: 470 | End: 2018-10-09
Payer: MEDICARE

## 2018-10-09 ENCOUNTER — HOSPITAL ENCOUNTER (INPATIENT)
Facility: CLINIC | Age: 73
LOS: 1 days | Discharge: HOME | DRG: 470 | End: 2018-10-10
Attending: ORTHOPAEDIC SURGERY | Admitting: ORTHOPAEDIC SURGERY
Payer: MEDICARE

## 2018-10-09 DIAGNOSIS — Z96.642 STATUS POST LEFT HIP REPLACEMENT: Primary | ICD-10-CM

## 2018-10-09 LAB
ABO + RH BLD: NORMAL
ABO + RH BLD: NORMAL
BLD GP AB SCN SERPL QL: NORMAL
BLOOD BANK CMNT PATIENT-IMP: NORMAL
CREAT SERPL-MCNC: 0.76 MG/DL (ref 0.66–1.25)
GFR SERPL CREATININE-BSD FRML MDRD: >90 ML/MIN/1.7M2
HGB BLD-MCNC: 15.5 G/DL (ref 13.3–17.7)
POTASSIUM SERPL-SCNC: 3.9 MMOL/L (ref 3.4–5.3)
SPECIMEN EXP DATE BLD: NORMAL

## 2018-10-09 PROCEDURE — 25000128 H RX IP 250 OP 636

## 2018-10-09 PROCEDURE — 12000007 ZZH R&B INTERMEDIATE

## 2018-10-09 PROCEDURE — 40000193 ZZH STATISTIC PT WARD VISIT

## 2018-10-09 PROCEDURE — 71000013 ZZH RECOVERY PHASE 1 LEVEL 1 EA ADDTL HR: Performed by: ORTHOPAEDIC SURGERY

## 2018-10-09 PROCEDURE — 25000132 ZZH RX MED GY IP 250 OP 250 PS 637: Mod: GY | Performed by: PHYSICIAN ASSISTANT

## 2018-10-09 PROCEDURE — 25000132 ZZH RX MED GY IP 250 OP 250 PS 637: Mod: GY | Performed by: ORTHOPAEDIC SURGERY

## 2018-10-09 PROCEDURE — A9270 NON-COVERED ITEM OR SERVICE: HCPCS | Mod: GY

## 2018-10-09 PROCEDURE — 86900 BLOOD TYPING SEROLOGIC ABO: CPT | Performed by: ANESTHESIOLOGY

## 2018-10-09 PROCEDURE — 85018 HEMOGLOBIN: CPT | Performed by: PHYSICIAN ASSISTANT

## 2018-10-09 PROCEDURE — 25000128 H RX IP 250 OP 636: Performed by: NURSE ANESTHETIST, CERTIFIED REGISTERED

## 2018-10-09 PROCEDURE — 0SRB04A REPLACEMENT OF LEFT HIP JOINT WITH CERAMIC ON POLYETHYLENE SYNTHETIC SUBSTITUTE, UNCEMENTED, OPEN APPROACH: ICD-10-PCS | Performed by: ORTHOPAEDIC SURGERY

## 2018-10-09 PROCEDURE — A9270 NON-COVERED ITEM OR SERVICE: HCPCS | Mod: GY | Performed by: ORTHOPAEDIC SURGERY

## 2018-10-09 PROCEDURE — 84132 ASSAY OF SERUM POTASSIUM: CPT | Performed by: PHYSICIAN ASSISTANT

## 2018-10-09 PROCEDURE — 97530 THERAPEUTIC ACTIVITIES: CPT | Mod: GP

## 2018-10-09 PROCEDURE — C1776 JOINT DEVICE (IMPLANTABLE): HCPCS | Performed by: ORTHOPAEDIC SURGERY

## 2018-10-09 PROCEDURE — 36000063 ZZH SURGERY LEVEL 4 EA 15 ADDTL MIN: Performed by: ORTHOPAEDIC SURGERY

## 2018-10-09 PROCEDURE — 25000566 ZZH SEVOFLURANE, EA 15 MIN: Performed by: ORTHOPAEDIC SURGERY

## 2018-10-09 PROCEDURE — 37000008 ZZH ANESTHESIA TECHNICAL FEE, 1ST 30 MIN: Performed by: ORTHOPAEDIC SURGERY

## 2018-10-09 PROCEDURE — 25000132 ZZH RX MED GY IP 250 OP 250 PS 637: Mod: GY

## 2018-10-09 PROCEDURE — 37000009 ZZH ANESTHESIA TECHNICAL FEE, EACH ADDTL 15 MIN: Performed by: ORTHOPAEDIC SURGERY

## 2018-10-09 PROCEDURE — 25000125 ZZHC RX 250: Performed by: PHYSICIAN ASSISTANT

## 2018-10-09 PROCEDURE — 25000128 H RX IP 250 OP 636: Performed by: ANESTHESIOLOGY

## 2018-10-09 PROCEDURE — 27210794 ZZH OR GENERAL SUPPLY STERILE: Performed by: ORTHOPAEDIC SURGERY

## 2018-10-09 PROCEDURE — 40000169 ZZH STATISTIC PRE-PROCEDURE ASSESSMENT I: Performed by: ORTHOPAEDIC SURGERY

## 2018-10-09 PROCEDURE — 25000125 ZZHC RX 250: Performed by: NURSE ANESTHETIST, CERTIFIED REGISTERED

## 2018-10-09 PROCEDURE — 40000277 XR SURGERY CARM FLUORO LESS THAN 5 MIN W STILLS

## 2018-10-09 PROCEDURE — A9270 NON-COVERED ITEM OR SERVICE: HCPCS | Mod: GY | Performed by: PHYSICIAN ASSISTANT

## 2018-10-09 PROCEDURE — 71000012 ZZH RECOVERY PHASE 1 LEVEL 1 FIRST HR: Performed by: ORTHOPAEDIC SURGERY

## 2018-10-09 PROCEDURE — 97110 THERAPEUTIC EXERCISES: CPT | Mod: GP

## 2018-10-09 PROCEDURE — 97161 PT EVAL LOW COMPLEX 20 MIN: CPT | Mod: GP

## 2018-10-09 PROCEDURE — 25800025 ZZH RX 258: Performed by: ORTHOPAEDIC SURGERY

## 2018-10-09 PROCEDURE — 86901 BLOOD TYPING SEROLOGIC RH(D): CPT | Performed by: ANESTHESIOLOGY

## 2018-10-09 PROCEDURE — 82565 ASSAY OF CREATININE: CPT | Performed by: PHYSICIAN ASSISTANT

## 2018-10-09 PROCEDURE — 36415 COLL VENOUS BLD VENIPUNCTURE: CPT | Performed by: PHYSICIAN ASSISTANT

## 2018-10-09 PROCEDURE — 97116 GAIT TRAINING THERAPY: CPT | Mod: GP

## 2018-10-09 PROCEDURE — 25000125 ZZHC RX 250: Performed by: ORTHOPAEDIC SURGERY

## 2018-10-09 PROCEDURE — C1713 ANCHOR/SCREW BN/BN,TIS/BN: HCPCS | Performed by: ORTHOPAEDIC SURGERY

## 2018-10-09 PROCEDURE — 40000986 XR PELVIS AND HIP PORTABLE LEFT 1 VIEW

## 2018-10-09 PROCEDURE — 25000128 H RX IP 250 OP 636: Performed by: PHYSICIAN ASSISTANT

## 2018-10-09 PROCEDURE — 25000128 H RX IP 250 OP 636: Performed by: ORTHOPAEDIC SURGERY

## 2018-10-09 PROCEDURE — 36000065 ZZH SURGERY LEVEL 4 W FLUORO 1ST 30 MIN: Performed by: ORTHOPAEDIC SURGERY

## 2018-10-09 PROCEDURE — 27211024 ZZHC OR SUPPLY OTHER OPNP: Performed by: ORTHOPAEDIC SURGERY

## 2018-10-09 PROCEDURE — 86850 RBC ANTIBODY SCREEN: CPT | Performed by: ANESTHESIOLOGY

## 2018-10-09 DEVICE — IMP SCR BONE CAN ACE 6.5X35MM 1217-35-500: Type: IMPLANTABLE DEVICE | Site: HIP | Status: FUNCTIONAL

## 2018-10-09 DEVICE — IMP APEX HOLE ELIMINATOR HIP DEPUY DURALOC 1246-03-000: Type: IMPLANTABLE DEVICE | Site: HIP | Status: FUNCTIONAL

## 2018-10-09 DEVICE — IMPLANTABLE DEVICE: Type: IMPLANTABLE DEVICE | Site: HIP | Status: FUNCTIONAL

## 2018-10-09 RX ORDER — PROPOFOL 10 MG/ML
INJECTION, EMULSION INTRAVENOUS CONTINUOUS PRN
Status: DISCONTINUED | OUTPATIENT
Start: 2018-10-09 | End: 2018-10-09

## 2018-10-09 RX ORDER — OXYCODONE HYDROCHLORIDE 5 MG/1
5-10 TABLET ORAL
Status: DISCONTINUED | OUTPATIENT
Start: 2018-10-09 | End: 2018-10-10 | Stop reason: HOSPADM

## 2018-10-09 RX ORDER — CELECOXIB 200 MG/1
400 CAPSULE ORAL ONCE
Status: COMPLETED | OUTPATIENT
Start: 2018-10-09 | End: 2018-10-09

## 2018-10-09 RX ORDER — PROPOFOL 10 MG/ML
INJECTION, EMULSION INTRAVENOUS PRN
Status: DISCONTINUED | OUTPATIENT
Start: 2018-10-09 | End: 2018-10-09

## 2018-10-09 RX ORDER — GLYCOPYRROLATE 0.2 MG/ML
INJECTION, SOLUTION INTRAMUSCULAR; INTRAVENOUS PRN
Status: DISCONTINUED | OUTPATIENT
Start: 2018-10-09 | End: 2018-10-09

## 2018-10-09 RX ORDER — ACETAMINOPHEN 500 MG
1000 TABLET ORAL ONCE
Status: COMPLETED | OUTPATIENT
Start: 2018-10-09 | End: 2018-10-09

## 2018-10-09 RX ORDER — EPHEDRINE SULFATE 50 MG/ML
INJECTION, SOLUTION INTRAMUSCULAR; INTRAVENOUS; SUBCUTANEOUS PRN
Status: DISCONTINUED | OUTPATIENT
Start: 2018-10-09 | End: 2018-10-09

## 2018-10-09 RX ORDER — DEXTROSE MONOHYDRATE, SODIUM CHLORIDE, AND POTASSIUM CHLORIDE 50; 1.49; 4.5 G/1000ML; G/1000ML; G/1000ML
INJECTION, SOLUTION INTRAVENOUS CONTINUOUS
Status: DISCONTINUED | OUTPATIENT
Start: 2018-10-09 | End: 2018-10-10 | Stop reason: HOSPADM

## 2018-10-09 RX ORDER — VANCOMYCIN HCL 900 MCG/MG
POWDER (GRAM) MISCELLANEOUS PRN
Status: DISCONTINUED | OUTPATIENT
Start: 2018-10-09 | End: 2018-10-09 | Stop reason: HOSPADM

## 2018-10-09 RX ORDER — ACETAMINOPHEN 325 MG/1
TABLET ORAL
Status: COMPLETED
Start: 2018-10-09 | End: 2018-10-09

## 2018-10-09 RX ORDER — ONDANSETRON 4 MG/1
4 TABLET, ORALLY DISINTEGRATING ORAL EVERY 6 HOURS PRN
Status: DISCONTINUED | OUTPATIENT
Start: 2018-10-09 | End: 2018-10-10 | Stop reason: HOSPADM

## 2018-10-09 RX ORDER — LIDOCAINE HYDROCHLORIDE 20 MG/ML
INJECTION, SOLUTION INFILTRATION; PERINEURAL PRN
Status: DISCONTINUED | OUTPATIENT
Start: 2018-10-09 | End: 2018-10-09

## 2018-10-09 RX ORDER — NEOSTIGMINE METHYLSULFATE 1 MG/ML
VIAL (ML) INJECTION PRN
Status: DISCONTINUED | OUTPATIENT
Start: 2018-10-09 | End: 2018-10-09

## 2018-10-09 RX ORDER — FENTANYL CITRATE 50 UG/ML
25-50 INJECTION, SOLUTION INTRAMUSCULAR; INTRAVENOUS
Status: DISCONTINUED | OUTPATIENT
Start: 2018-10-09 | End: 2018-10-09 | Stop reason: HOSPADM

## 2018-10-09 RX ORDER — MAGNESIUM HYDROXIDE 1200 MG/15ML
LIQUID ORAL PRN
Status: DISCONTINUED | OUTPATIENT
Start: 2018-10-09 | End: 2018-10-09 | Stop reason: HOSPADM

## 2018-10-09 RX ORDER — SODIUM CHLORIDE, SODIUM LACTATE, POTASSIUM CHLORIDE, CALCIUM CHLORIDE 600; 310; 30; 20 MG/100ML; MG/100ML; MG/100ML; MG/100ML
INJECTION, SOLUTION INTRAVENOUS CONTINUOUS
Status: DISCONTINUED | OUTPATIENT
Start: 2018-10-09 | End: 2018-10-09 | Stop reason: HOSPADM

## 2018-10-09 RX ORDER — ONDANSETRON 2 MG/ML
INJECTION INTRAMUSCULAR; INTRAVENOUS PRN
Status: DISCONTINUED | OUTPATIENT
Start: 2018-10-09 | End: 2018-10-09

## 2018-10-09 RX ORDER — KETOROLAC TROMETHAMINE 15 MG/ML
15 INJECTION, SOLUTION INTRAMUSCULAR; INTRAVENOUS EVERY 6 HOURS
Status: COMPLETED | OUTPATIENT
Start: 2018-10-09 | End: 2018-10-10

## 2018-10-09 RX ORDER — ONDANSETRON 4 MG/1
4 TABLET, ORALLY DISINTEGRATING ORAL EVERY 30 MIN PRN
Status: DISCONTINUED | OUTPATIENT
Start: 2018-10-09 | End: 2018-10-09 | Stop reason: HOSPADM

## 2018-10-09 RX ORDER — ACETAMINOPHEN 325 MG/1
975 TABLET ORAL EVERY 8 HOURS
Status: DISCONTINUED | OUTPATIENT
Start: 2018-10-09 | End: 2018-10-10 | Stop reason: HOSPADM

## 2018-10-09 RX ORDER — NALOXONE HYDROCHLORIDE 0.4 MG/ML
.1-.4 INJECTION, SOLUTION INTRAMUSCULAR; INTRAVENOUS; SUBCUTANEOUS
Status: ACTIVE | OUTPATIENT
Start: 2018-10-09 | End: 2018-10-10

## 2018-10-09 RX ORDER — TEMAZEPAM 7.5 MG/1
7.5 CAPSULE ORAL
Status: DISCONTINUED | OUTPATIENT
Start: 2018-10-10 | End: 2018-10-10 | Stop reason: HOSPADM

## 2018-10-09 RX ORDER — PREGABALIN 150 MG/1
150 CAPSULE ORAL DAILY
Status: COMPLETED | OUTPATIENT
Start: 2018-10-09 | End: 2018-10-09

## 2018-10-09 RX ORDER — BUPIVACAINE HYDROCHLORIDE AND EPINEPHRINE 5; 5 MG/ML; UG/ML
INJECTION, SOLUTION EPIDURAL; INTRACAUDAL; PERINEURAL PRN
Status: DISCONTINUED | OUTPATIENT
Start: 2018-10-09 | End: 2018-10-09 | Stop reason: HOSPADM

## 2018-10-09 RX ORDER — ALBUTEROL SULFATE 0.83 MG/ML
2.5 SOLUTION RESPIRATORY (INHALATION) EVERY 4 HOURS PRN
Status: DISCONTINUED | OUTPATIENT
Start: 2018-10-09 | End: 2018-10-09 | Stop reason: HOSPADM

## 2018-10-09 RX ORDER — LIDOCAINE 40 MG/G
CREAM TOPICAL
Status: DISCONTINUED | OUTPATIENT
Start: 2018-10-09 | End: 2018-10-10 | Stop reason: HOSPADM

## 2018-10-09 RX ORDER — HYDROMORPHONE HYDROCHLORIDE 1 MG/ML
.3-.5 INJECTION, SOLUTION INTRAMUSCULAR; INTRAVENOUS; SUBCUTANEOUS
Status: DISCONTINUED | OUTPATIENT
Start: 2018-10-09 | End: 2018-10-10 | Stop reason: HOSPADM

## 2018-10-09 RX ORDER — LISINOPRIL 2.5 MG/1
2.5 TABLET ORAL DAILY
Status: DISCONTINUED | OUTPATIENT
Start: 2018-10-10 | End: 2018-10-10 | Stop reason: HOSPADM

## 2018-10-09 RX ORDER — CEFAZOLIN SODIUM 1 G/3ML
1 INJECTION, POWDER, FOR SOLUTION INTRAMUSCULAR; INTRAVENOUS SEE ADMIN INSTRUCTIONS
Status: DISCONTINUED | OUTPATIENT
Start: 2018-10-09 | End: 2018-10-09 | Stop reason: HOSPADM

## 2018-10-09 RX ORDER — CEFAZOLIN SODIUM 2 G/100ML
2 INJECTION, SOLUTION INTRAVENOUS
Status: DISCONTINUED | OUTPATIENT
Start: 2018-10-09 | End: 2018-10-09 | Stop reason: HOSPADM

## 2018-10-09 RX ORDER — HYDROXYZINE HYDROCHLORIDE 10 MG/1
10 TABLET, FILM COATED ORAL EVERY 6 HOURS PRN
Status: DISCONTINUED | OUTPATIENT
Start: 2018-10-09 | End: 2018-10-10 | Stop reason: HOSPADM

## 2018-10-09 RX ORDER — HYDROMORPHONE HYDROCHLORIDE 1 MG/ML
.3-.5 INJECTION, SOLUTION INTRAMUSCULAR; INTRAVENOUS; SUBCUTANEOUS EVERY 5 MIN PRN
Status: DISCONTINUED | OUTPATIENT
Start: 2018-10-09 | End: 2018-10-09 | Stop reason: HOSPADM

## 2018-10-09 RX ORDER — NALOXONE HYDROCHLORIDE 0.4 MG/ML
.1-.4 INJECTION, SOLUTION INTRAMUSCULAR; INTRAVENOUS; SUBCUTANEOUS
Status: DISCONTINUED | OUTPATIENT
Start: 2018-10-09 | End: 2018-10-10 | Stop reason: HOSPADM

## 2018-10-09 RX ORDER — AMOXICILLIN 250 MG
2 CAPSULE ORAL 2 TIMES DAILY
Status: DISCONTINUED | OUTPATIENT
Start: 2018-10-09 | End: 2018-10-10 | Stop reason: HOSPADM

## 2018-10-09 RX ORDER — MEPERIDINE HYDROCHLORIDE 25 MG/ML
12.5 INJECTION INTRAMUSCULAR; INTRAVENOUS; SUBCUTANEOUS EVERY 5 MIN PRN
Status: DISCONTINUED | OUTPATIENT
Start: 2018-10-09 | End: 2018-10-09 | Stop reason: HOSPADM

## 2018-10-09 RX ORDER — PROCHLORPERAZINE MALEATE 5 MG
5 TABLET ORAL EVERY 6 HOURS PRN
Status: DISCONTINUED | OUTPATIENT
Start: 2018-10-09 | End: 2018-10-10 | Stop reason: HOSPADM

## 2018-10-09 RX ORDER — ONDANSETRON 2 MG/ML
4 INJECTION INTRAMUSCULAR; INTRAVENOUS EVERY 6 HOURS PRN
Status: DISCONTINUED | OUTPATIENT
Start: 2018-10-09 | End: 2018-10-10 | Stop reason: HOSPADM

## 2018-10-09 RX ORDER — ACETAMINOPHEN 325 MG/1
650 TABLET ORAL EVERY 4 HOURS PRN
Status: DISCONTINUED | OUTPATIENT
Start: 2018-10-12 | End: 2018-10-10 | Stop reason: HOSPADM

## 2018-10-09 RX ORDER — DEXAMETHASONE SODIUM PHOSPHATE 4 MG/ML
INJECTION, SOLUTION INTRA-ARTICULAR; INTRALESIONAL; INTRAMUSCULAR; INTRAVENOUS; SOFT TISSUE PRN
Status: DISCONTINUED | OUTPATIENT
Start: 2018-10-09 | End: 2018-10-09

## 2018-10-09 RX ORDER — FENTANYL CITRATE 50 UG/ML
INJECTION, SOLUTION INTRAMUSCULAR; INTRAVENOUS PRN
Status: DISCONTINUED | OUTPATIENT
Start: 2018-10-09 | End: 2018-10-09

## 2018-10-09 RX ORDER — AMOXICILLIN 250 MG
1 CAPSULE ORAL 2 TIMES DAILY
Status: DISCONTINUED | OUTPATIENT
Start: 2018-10-09 | End: 2018-10-10 | Stop reason: HOSPADM

## 2018-10-09 RX ORDER — ONDANSETRON 2 MG/ML
4 INJECTION INTRAMUSCULAR; INTRAVENOUS EVERY 30 MIN PRN
Status: DISCONTINUED | OUTPATIENT
Start: 2018-10-09 | End: 2018-10-09 | Stop reason: HOSPADM

## 2018-10-09 RX ORDER — CEFAZOLIN SODIUM 1 G/3ML
INJECTION, POWDER, FOR SOLUTION INTRAMUSCULAR; INTRAVENOUS PRN
Status: DISCONTINUED | OUTPATIENT
Start: 2018-10-09 | End: 2018-10-09

## 2018-10-09 RX ORDER — CEFAZOLIN SODIUM 2 G/100ML
2 INJECTION, SOLUTION INTRAVENOUS EVERY 8 HOURS
Status: COMPLETED | OUTPATIENT
Start: 2018-10-09 | End: 2018-10-10

## 2018-10-09 RX ORDER — HYDRALAZINE HYDROCHLORIDE 20 MG/ML
10 INJECTION INTRAMUSCULAR; INTRAVENOUS EVERY 4 HOURS PRN
Status: DISCONTINUED | OUTPATIENT
Start: 2018-10-09 | End: 2018-10-10 | Stop reason: HOSPADM

## 2018-10-09 RX ADMIN — CEFAZOLIN 1 G: 1 INJECTION, POWDER, FOR SOLUTION INTRAMUSCULAR; INTRAVENOUS at 10:11

## 2018-10-09 RX ADMIN — GLYCOPYRROLATE 0.4 MG: 0.2 INJECTION, SOLUTION INTRAMUSCULAR; INTRAVENOUS at 11:01

## 2018-10-09 RX ADMIN — PROPOFOL 25 MCG/KG/MIN: 10 INJECTION, EMULSION INTRAVENOUS at 08:28

## 2018-10-09 RX ADMIN — Medication 0.5 MG: at 12:07

## 2018-10-09 RX ADMIN — Medication 0.5 MG: at 11:40

## 2018-10-09 RX ADMIN — Medication 5 MG: at 09:55

## 2018-10-09 RX ADMIN — SENNOSIDES AND DOCUSATE SODIUM 1 TABLET: 8.6; 5 TABLET ORAL at 21:34

## 2018-10-09 RX ADMIN — SODIUM CHLORIDE, POTASSIUM CHLORIDE, SODIUM LACTATE AND CALCIUM CHLORIDE: 600; 310; 30; 20 INJECTION, SOLUTION INTRAVENOUS at 06:56

## 2018-10-09 RX ADMIN — Medication 0.5 MG: at 11:29

## 2018-10-09 RX ADMIN — FENTANYL CITRATE 100 MCG: 50 INJECTION, SOLUTION INTRAMUSCULAR; INTRAVENOUS at 08:55

## 2018-10-09 RX ADMIN — ACETAMINOPHEN 1000 MG: 500 TABLET, FILM COATED ORAL at 06:54

## 2018-10-09 RX ADMIN — FENTANYL CITRATE 50 MCG: 50 INJECTION, SOLUTION INTRAMUSCULAR; INTRAVENOUS at 08:06

## 2018-10-09 RX ADMIN — ROCURONIUM BROMIDE 20 MG: 10 INJECTION INTRAVENOUS at 09:07

## 2018-10-09 RX ADMIN — DEXAMETHASONE SODIUM PHOSPHATE 4 MG: 4 INJECTION, SOLUTION INTRA-ARTICULAR; INTRALESIONAL; INTRAMUSCULAR; INTRAVENOUS; SOFT TISSUE at 08:32

## 2018-10-09 RX ADMIN — HYDROXYZINE HYDROCHLORIDE 10 MG: 10 TABLET ORAL at 15:31

## 2018-10-09 RX ADMIN — PREGABALIN 150 MG: 150 CAPSULE ORAL at 06:55

## 2018-10-09 RX ADMIN — SODIUM CHLORIDE 1 G: 9 INJECTION, SOLUTION INTRAVENOUS at 08:15

## 2018-10-09 RX ADMIN — CEFAZOLIN SODIUM 2 G: 2 INJECTION, SOLUTION INTRAVENOUS at 17:18

## 2018-10-09 RX ADMIN — PHENYLEPHRINE HYDROCHLORIDE 200 MCG: 10 INJECTION, SOLUTION INTRAMUSCULAR; INTRAVENOUS; SUBCUTANEOUS at 10:27

## 2018-10-09 RX ADMIN — CEFAZOLIN 2 G: 1 INJECTION, POWDER, FOR SOLUTION INTRAMUSCULAR; INTRAVENOUS at 08:10

## 2018-10-09 RX ADMIN — Medication 10 MG: at 08:54

## 2018-10-09 RX ADMIN — FENTANYL CITRATE 50 MCG: 50 INJECTION, SOLUTION INTRAMUSCULAR; INTRAVENOUS at 10:18

## 2018-10-09 RX ADMIN — ONDANSETRON 4 MG: 2 INJECTION INTRAMUSCULAR; INTRAVENOUS at 10:44

## 2018-10-09 RX ADMIN — ROCURONIUM BROMIDE 50 MG: 10 INJECTION INTRAVENOUS at 08:06

## 2018-10-09 RX ADMIN — ASPIRIN 325 MG: 325 TABLET, DELAYED RELEASE ORAL at 17:19

## 2018-10-09 RX ADMIN — PHENYLEPHRINE HYDROCHLORIDE 200 MCG: 10 INJECTION, SOLUTION INTRAMUSCULAR; INTRAVENOUS; SUBCUTANEOUS at 10:42

## 2018-10-09 RX ADMIN — ACETAMINOPHEN 975 MG: 325 TABLET, FILM COATED ORAL at 15:31

## 2018-10-09 RX ADMIN — NEOSTIGMINE METHYLSULFATE 3 MG: 1 INJECTION, SOLUTION INTRAVENOUS at 11:01

## 2018-10-09 RX ADMIN — FENTANYL CITRATE 50 MCG: 50 INJECTION, SOLUTION INTRAMUSCULAR; INTRAVENOUS at 09:22

## 2018-10-09 RX ADMIN — CELECOXIB 400 MG: 200 CAPSULE ORAL at 06:55

## 2018-10-09 RX ADMIN — ROCURONIUM BROMIDE 10 MG: 10 INJECTION INTRAVENOUS at 10:05

## 2018-10-09 RX ADMIN — POTASSIUM CHLORIDE, DEXTROSE MONOHYDRATE AND SODIUM CHLORIDE: 150; 5; 450 INJECTION, SOLUTION INTRAVENOUS at 15:33

## 2018-10-09 RX ADMIN — ROCURONIUM BROMIDE 10 MG: 10 INJECTION INTRAVENOUS at 09:46

## 2018-10-09 RX ADMIN — PHENYLEPHRINE HYDROCHLORIDE 200 MCG: 10 INJECTION, SOLUTION INTRAMUSCULAR; INTRAVENOUS; SUBCUTANEOUS at 09:55

## 2018-10-09 RX ADMIN — SODIUM CHLORIDE 1 G: 9 INJECTION, SOLUTION INTRAVENOUS at 10:47

## 2018-10-09 RX ADMIN — VANCOMYCIN HYDROCHLORIDE 1500 MG: 100 INJECTION, POWDER, LYOPHILIZED, FOR SOLUTION INTRAVENOUS at 07:25

## 2018-10-09 RX ADMIN — PHENYLEPHRINE HYDROCHLORIDE 200 MCG: 10 INJECTION, SOLUTION INTRAMUSCULAR; INTRAVENOUS; SUBCUTANEOUS at 08:14

## 2018-10-09 RX ADMIN — HYDROMORPHONE HYDROCHLORIDE 0.5 MG: 1 INJECTION, SOLUTION INTRAMUSCULAR; INTRAVENOUS; SUBCUTANEOUS at 09:54

## 2018-10-09 RX ADMIN — LIDOCAINE HYDROCHLORIDE 40 MG: 20 INJECTION, SOLUTION INFILTRATION; PERINEURAL at 08:06

## 2018-10-09 RX ADMIN — Medication 10 MG: at 08:39

## 2018-10-09 RX ADMIN — PROPOFOL 200 MG: 10 INJECTION, EMULSION INTRAVENOUS at 08:06

## 2018-10-09 RX ADMIN — KETOROLAC TROMETHAMINE 15 MG: 15 INJECTION, SOLUTION INTRAMUSCULAR; INTRAVENOUS at 17:14

## 2018-10-09 ASSESSMENT — ACTIVITIES OF DAILY LIVING (ADL)
ADLS_ACUITY_SCORE: 10
ADLS_ACUITY_SCORE: 10

## 2018-10-09 ASSESSMENT — ENCOUNTER SYMPTOMS: ORTHOPNEA: 0

## 2018-10-09 NOTE — IP AVS SNAPSHOT
MRN:[id number]                      After Visit Summary   10/9/2018    Javier Tamez    MRN: [id number]           Thank you!     Thank you for choosing [facility] for your care. Our goal is always to provide you with excellent care. Hearing back from our patients is one way we can continue to improve our services. Please take a few minutes to complete the written survey that you may receive in the mail after you visit with us. Thank you!        Patient Information     Date Of Birth          1945        Designated Caregiver       Most Recent Value    Caregiver    Will someone help with your care after discharge? yes    Name of designated caregiver Luiza spouse    Phone number of caregiver [phone]    Caregiver address [address], [place]      About your hospital stay     You were admitted on:  October 9, 2018 You last received care in the:  [place] [facility] [address] [facility]    You were discharged on:  October 10, 2018        Reason for your hospital stay       Minimally invasive total hip replacement                  Who to Call     For medical emergencies, please call 911.  For non-urgent questions about your medical care, please call your primary care provider or clinic, [phone]  For questions related to your surgery, please call your surgery clinic        Attending Provider     Provider Specialty    Meño Avalos MD Orthopedics       Primary Care Provider Office Phone # Fax #    Guillermo Kong [phone]       When to contact your care team       EMERGENCY CARE PLAN     1. I have questions or concerns during clinic hours:   - I will call the clinic directly at [phone] and and speak with Dr. Leonor Jackson's care coordinator.     2. I have questions or concerns outside clinic hours:   - I will call the clinic directly at [phone] and speak with the doctor on-call.     3. I need to schedule an appointment:   - I will call  the clinic directly at [phone] for [facility] appointments or [phone] for [employer] appointments.     4. I am concerned about symptoms that I am having and think I need same day treatment:   - During clinic hours, I will call the clinic first at [phone] and speak with Renetta.   - She will either make an appointment with Dr. Galvez or she will direct you to come in to our walk-in urgent care clinic.   - The urgent care is open 7 days a week from 8:00am - 8:00pm at all of our locations.     - After clinic hours, I will call the clinic first at [phone] and speak with the on-call doctor.   - You should NOT go to the emergency room or a urgent care with out being directed to do so by the clinic.                  After Care Instructions     Activity       Your activity upon discharge: activity as tolerated.  You should work on home exercises provided by the physical therapist at the hospital 2-3 times a day.     Physical Therapy: Once you leave the hospital you will not need outpatient physical therapy for your hip.  Walking is the best exercise after a hip replacement.  Do as much walking as you feel comfortable doing  Remember, you have no hip restrictions or precautions, however you should avoid any twisting or torquing of the hip (no hokie pokie).  You should also avoid any kind of strengthening exercises of the hip and leg for the first 8 weeks after surgery.     Assistive Ambulatory Devices:  Use your walker/crutches until you feel comfortable advancing to a cane.  You should use the cane in the hand opposite your surgery.  You can then advance from the cane as you feel comfortable.     Elevate: Swelling in the leg is normal after a hip replacement.  By keeping your leg elevated with a pillow under your calf, not under the knee, will help with the swelling.  The best position is to have the knee above the level of your heart and your ankle above the level of your knee.  Wearing the compression  stockings (Barak hose) can help reduce swelling.  You should wear these until you are seen at Dr. Galvez's office post operatively.  You can remove these twice a day for laundering and hygiene.  The swelling in the leg will be present for at least 4-6 weeks.       Ice: Ice the hip 4-5 times a day for 20-30 minutes at a time.  Icing will help reduce swelling and pain.     Pain control: Take the pain medication and/or anti-inflammatory medication as prescribed.  Don't let your pain become severe.            Diet       Follow this diet upon discharge: Regular            Discharge Instructions       Upon leaving the hospital you will be discharged with a few different medications:     1. Aspirin 325mg - you will be taking one tablet a day for one month following surgery for a blood thinner to help prevent blood clots.   2. Tylenol 500mg - you will be taking two tablets every six hours for pain.  You can take Tylenol in addition to the pain medication.  You should use Tylenol as a scheduled pain medication as long or longer than you are taking the narcotic pain medication (oxycodone/dilaudid/hydrocodone).  Do not exceed 4,000mg in a day.  3. Oxycodone 5mg - this is a pain medication.  You can take one or two tablets every four to six hours.  You will need this medication the longest to sleep at night and for physical therapy.  You can wean yourself from this medication as you are able by either increasing the time between tablets or going down to one tablet if you are taking two at a time.    **REMINDER: If you need a refill of your pain medication prior to your first post-operative appointment please contact our office and allow 24 to 48 hours for refills to be processed. Any refills needed before the weekend will need to be submitted on Thursday.  Also, all narcotic pain medication cannot be called in to the pharmacy and will need to be picked up at one of our clinics ([place] or [place]), this is a Federal Law.  You  or a family member will need to allow for time to come to our office to pick these up.  Please let us know who will be picking up the prescription as that person will need to show a photo ID to get the prescription.**    4. Senokot - this is a stool softener.  You can take one or two pills twice a day as needed for constipation.  You should take this medication as long as you are taking narcotic pain medication and as long as you do not have diarrhea.  As you are more active and taking less pain medication you will be able to stop using this.     5. Celebrex 200mg - this is an anti-inflammatory medication you will be taking one tablet daily with your morning meal. This medication will help with the pain and swelling in your hip and leg.  You should not take another anti-inflammatory medication (i.e. Ibuprofen, advil, aleve, etc) while taking celebrex.  You can take tylenol with Celebrex.  You should have already gotten a prescription for this medication and filled it prior to surgery.  You can start this medication the day after you get home from the hospital.     Other medications not prescribed:   1. Ibuprofen - we would like you to avoid taking ibuprofen while you are taking the aspirin and Celebrex.   2. Iron - we typically do not prescribe an iron supplement pill after surgery however, if you want to take one we recommend 324 or 325mg daily.  These pills will make you more constipated.     Please contact Dr. Galvez's office with any questions.  You can either call Dr. Leonor Jackson's , at [phone] or email Dr. Leonor Carreno's physician assistant, at [email or web address].            Wound care and dressings       You have a gauze and tape dressing over the incision that you should leave in place for 2 days after leaving the hospital.  You will remove this dressing after 2 days and do not need to replace the dressing.  There is a thin mesh film adhered to your skin over the incision which should  "stay in place until you are instructed to remove this.  If this comes off you should call Dr. Galvez's office for further instruction.  You can get your incision wet in the shower but you should not submerge it.                  Follow-up Appointments     Follow-up and recommended labs and tests       Your follow-up appointment is schedule for 2:20pm on Wednesday 10/31 at the [place] office with Dr. Galvez.  Please call [phone] if you need to reschedule this appointment.     If you have any questions prior to your follow-up appointment please call Dr. Leonor Jackson's , at [phone].  You may also email Ev with any questions at [email or web address]                  Pending Results     No orders found for last 3 day(s).            Statement of Approval     Ordered          10/10/18 0738  I have reviewed and agree with all the recommendations and orders detailed in this document.  EFFECTIVE NOW     Approved and electronically signed by:  Ev Chicas PA-C             Admission Information     Date & Time Provider Department Dept. Phone    10/9/2018 Meño Avalos MD [place] [address] [facility] [phone]      Your Vitals Were     Blood Pressure Pulse Temperature Respirations Height Weight    97/55 (BP Location: Left arm) 60 97.9  F (36.6  C) (Oral) 16 1.803 m (5' 11\") 82.6 kg (182 lb)    Pulse Oximetry BMI (Body Mass Index)                91% 25.38 kg/m2          Care EveryWhere ID     This is your Care EveryWhere ID. This could be used by other organizations to access your [place] medical records  [id number]        Equal Access to Services     [facility] ISAEL AH: Hadii arsen Gonzalez, waaxda lucandyadaha, qaybta kaalmaoliver ramirez. So [facility] [phone].    ATENCIÓN: Si habla español, tiene a crews disposición servicios gratuitos de asistencia lingüística. Llame al [phone].    We comply with applicable federal " civil rights laws and [place] laws. We do not discriminate on the basis of race, color, national origin, age, disability, sex, sexual orientation, or gender identity.               Review of your medicines      START taking        Dose / Directions    aspirin 325 MG EC tablet   Replaces:  aspirin 81 MG tablet        Dose:  325 mg   Take 1 tablet (325 mg) by mouth daily   Quantity:  30 tablet   Refills:  0       celecoxib 200 MG capsule   Commonly known as:  celeBREX        Dose:  200 mg   Take 1 capsule (200 mg) by mouth daily   Quantity:  30 capsule   Refills:  0       oxyCODONE IR 5 MG tablet   Commonly known as:  ROXICODONE        Take 1 tablet every 4-6 hours for pain rating 3-6, Take 2 tablets every 4-6 hours for pain 7-10  **Max 8 tablets per day**   Quantity:  30 tablet   Refills:  0       senna-docusate 8.6-50 MG per tablet   Commonly known as:  SENOKOT-S;PERICOLACE        Dose:  2 tablet   Take 2 tablets by mouth 2 times daily as needed for constipation   Quantity:  60 tablet   Refills:  0         CONTINUE these medicines which may have CHANGED, or have new prescriptions. If we are uncertain of the size of tablets/capsules you have at home, strength may be listed as something that might have changed.        Dose / Directions    nitroGLYcerin 0.4 MG sublingual tablet   Commonly known as:  NITROSTAT   This may have changed:    - how much to take  - how to take this  - when to take this  - reasons to take this  - additional instructions   Used for:  Coronary artery disease involving native coronary artery of native heart without angina pectoris        For chest pain place 1 tablet under the tongue every 5 minutes for 3 doses. If symptoms persist 5 minutes after 1st dose call 911.   Quantity:  25 tablet   Refills:  3       * TYLENOL PO   This may have changed:  Another medication with the same name was added. Make sure you understand how and when to take each.        Dose:  500-1000 mg   Take 500-1,000 mg by  mouth 2 times daily as needed for mild pain or fever   Refills:  0       * acetaminophen 500 MG tablet   Commonly known as:  TYLENOL   This may have changed:  You were already taking a medication with the same name, and this prescription was added. Make sure you understand how and when to take each.        Dose:  1000 mg   Take 2 tablets (1,000 mg) by mouth every 6 hours as needed for pain   Quantity:  100 tablet   Refills:  0       * Notice:  This list has 2 medication(s) that are the same as other medications prescribed for you. Read the directions carefully, and ask your doctor or other care provider to review them with you.      CONTINUE these medicines which have NOT CHANGED        Dose / Directions    CRESTOR PO        Dose:  40 mg   Take 40 mg by mouth every evening   Refills:  0       lisinopril 2.5 MG tablet   Commonly known as:  PRINIVIL/Zestril   Used for:  Essential hypertension with goal blood pressure less than 140/90, Aortic root dilatation (H)        TAKE ONE TABLET BY MOUTH DAILY   Quantity:  90 tablet   Refills:  2       PROBIOTIC PO        Dose:  1 capsule   Take 1 capsule by mouth every morning   Refills:  0       tadalafil 5 MG tablet   Commonly known as:  CIALIS   Used for:  Other male erectile dysfunction, Erectile dysfunction, unspecified erectile dysfunction type        Dose:  5 mg   Take 1 tablet (5 mg) by mouth daily as needed   Quantity:  15 tablet   Refills:  5       VITAMIN B 12 PO        Dose:  1000 mcg   Take 1,000 mcg by mouth every morning   Refills:  0       VITAMIN C PO        Dose:  1000 mg   Take 1,000 mg by mouth every morning   Refills:  0       VITAMIN D (CHOLECALCIFEROL) PO        Dose:  1000 Units   Take 1,000 Units by mouth every morning   Refills:  0         STOP taking     aspirin 81 MG tablet   Replaced by:  aspirin 325 MG EC tablet                Where to get your medicines      These medications were sent to [facility] [place], [place] - [address]   [address], [place] [address]     Phone:  [phone]     acetaminophen 500 MG tablet    aspirin 325 MG EC tablet    celecoxib 200 MG capsule    senna-docusate 8.6-50 MG per tablet         Some of these will need a paper prescription and others can be bought over the counter. Ask your nurse if you have questions.     Bring a paper prescription for each of these medications     oxyCODONE IR 5 MG tablet                Protect others around you: Learn how to safely use, store and throw away your medicines at www.disposemymeds.org.        Information about OPIOIDS     PRESCRIPTION OPIOIDS: WHAT YOU NEED TO KNOW   We gave you an opioid (narcotic) pain medicine. It is important to manage your pain, but opioids are not always the best choice. You should first try all the other options your care team gave you. Take this medicine for as short a time (and as few doses) as possible.    Some activities can increase your pain, such as bandage changes or therapy sessions. It may help to take your pain medicine 30 to 60 minutes before these activities. Reduce your stress by getting enough sleep, working on hobbies you enjoy and practicing relaxation or meditation. Talk to your care team about ways to manage your pain beyond prescription opioids.    These medicines have risks:    DO NOT drive when on new or higher doses of pain medicine. These medicines can affect your alertness and reaction times, and you could be arrested for driving under the influence (DUI). If you need to use opioids long-term, talk to your care team about driving.    DO NOT operate heavy machinery    DO NOT do any other dangerous activities while taking these medicines.    DO NOT drink any alcohol while taking these medicines.     If the opioid prescribed includes acetaminophen, DO NOT take with any other medicines that contain acetaminophen. Read all labels carefully. Look for the word  acetaminophen  or  Tylenol.  Ask your pharmacist if  you have questions or are unsure.    You can get addicted to pain medicines, especially if you have a history of addiction (chemical, alcohol or substance dependence). Talk to your care team about ways to reduce this risk.    All opioids tend to cause constipation. Drink plenty of water and eat foods that have a lot of fiber, such as fruits, vegetables, prune juice, apple juice and high-fiber cereal. Take a laxative (Miralax, milk of magnesia, Colace, Senna) if you don t move your bowels at least every other day. Other side effects include upset stomach, sleepiness, dizziness, throwing up, tolerance (needing more of the medicine to have the same effect), physical dependence and slowed breathing.    Store your pills in a secure place, locked if possible. We will not replace any lost or stolen medicine. If you don t finish your medicine, please throw away (dispose) as directed by your pharmacist. The [place] Pollution Control Agency has more information about safe disposal: https://www.pca.[facility].[place].us/living-green/managing-unwanted-medications             Medication List: This is a list of all your medications and when to take them. Check marks below indicate your daily home schedule. Keep this list as a reference.      Medications           Morning Afternoon Evening Bedtime As Needed    aspirin 325 MG EC tablet   Take 1 tablet (325 mg) by mouth daily   Last time this was given:  325 mg on 10/9/2018  5:19 PM   Next Dose Due:  10/10 evening                                celecoxib 200 MG capsule   Commonly known as:  celeBREX   Take 1 capsule (200 mg) by mouth daily   Last time this was given:  400 mg on 10/9/2018  6:55 AM   Next Dose Due:  10/10 evening                                   CRESTOR PO   Take 40 mg by mouth every evening   Next Dose Due:  10/10 evening                                   lisinopril 2.5 MG tablet   Commonly known as:  PRINIVIL/Zestril   TAKE ONE TABLET BY MOUTH DAILY   Next Dose Due:  10/11  morning                                   nitroGLYcerin 0.4 MG sublingual tablet   Commonly known as:  NITROSTAT   For chest pain place 1 tablet under the tongue every 5 minutes for 3 doses. If symptoms persist 5 minutes after 1st dose call 911.   Next Dose Due:  As needed for chest pain                                   oxyCODONE IR 5 MG tablet   Commonly known as:  ROXICODONE   Take 1 tablet every 4-6 hours for pain rating 3-6, Take 2 tablets every 4-6 hours for pain 7-10  **Max 8 tablets per day**   Last time this was given:  5 mg on 10/10/2018  1:36 PM   Next Dose Due:  As needed for pain, 5:40pm                                   PROBIOTIC PO   Take 1 capsule by mouth every morning   Next Dose Due:  10/11 morning                                   senna-docusate 8.6-50 MG per tablet   Commonly known as:  SENOKOT-S;PERICOLACE   Take 2 tablets by mouth 2 times daily as needed for constipation   Last time this was given:  1 tablet on 10/10/2018  8:42 AM   Next Dose Due:  10/11 evening, as needed for constipation                                   tadalafil 5 MG tablet   Commonly known as:  CIALIS   Take 1 tablet (5 mg) by mouth daily as needed   Next Dose Due:  As needed                                   * TYLENOL PO   Take 500-1,000 mg by mouth 2 times daily as needed for mild pain or fever   Last time this was given:  975 mg on 10/10/2018  8:42 AM   Next Dose Due:  As needed for pain                                   * acetaminophen 500 MG tablet   Commonly known as:  TYLENOL   Take 2 tablets (1,000 mg) by mouth every 6 hours as needed for pain   Last time this was given:  975 mg on 10/10/2018  8:42 AM   Next Dose Due:  Duplicate (tylenol)                                   VITAMIN B 12 PO   Take 1,000 mcg by mouth every morning   Next Dose Due:  10/11 morning                                   VITAMIN C PO   Take 1,000 mg by mouth every morning   Next Dose Due:  10/11 morning                                    VITAMIN D (CHOLECALCIFEROL) PO   Take 1,000 Units by mouth every morning   Next Dose Due:  10/11 morning                                   * Notice:  This list has 2 medication(s) that are the same as other medications prescribed for you. Read the directions carefully, and ask your doctor or other care provider to review them with you.

## 2018-10-09 NOTE — PLAN OF CARE
Problem: Hip Arthroplasty (Total, Partial) (Adult)  Goal: Signs and Symptoms of Listed Potential Problems Will be Absent, Minimized or Managed (Hip Arthroplasty)  Signs and symptoms of listed potential problems will be absent, minimized or managed by discharge/transition of care (reference Hip Arthroplasty (Total, Partial) (Adult) CPG).   Outcome: No Change  Pt dangled and walked in halls with PT.  Hip drsg dry and intact.  Ice to hip.  Rates pain #3 and took Tylenol & Atarax.  Denies nausea.  CMS good bilaterally to ft.

## 2018-10-09 NOTE — BRIEF OP NOTE
[facility] Brief Operative Note    Pre-operative diagnosis: DJD LEFT HIP    Post-operative diagnosis same   Procedure: Procedure(s):  LEFT TOTAL HIP ARTHROPLASTY DIRECT ANTERIOR APPROACH (DEPUY)^ - Wound Class: I-Clean   Surgeon(s): Surgeon(s) and Role:     * Meño Avalos MD - Primary     * Ev Chicas PA-C - Assisting   Estimated blood loss: 500 mL    Specimens: * No specimens in log *   Findings: Advanced OA     Plan: DC home POD1 w/family assist.  DVT prophylaxis w/ASA 325mg QD x1mo.

## 2018-10-09 NOTE — PROGRESS NOTES
" 10/09/18 1600   Quick Adds   Type of Visit Initial PT Evaluation   Living Environment   Lives With spouse   Living Arrangements house   Home Accessibility stairs to enter home   Number of Stairs to Enter Home 2   Number of Stairs Within Home 0   Stair Railings at Home outside, present on right side   Transportation Available car;family or friend will provide   Living Environment Comment main floor living   Self-Care   Dominant Hand right   Usual Activity Tolerance good   Current Activity Tolerance moderate   Functional Level Prior   Ambulation 0-->independent   Transferring 0-->independent   Toileting 0-->independent   Bathing 0-->independent   Dressing 0-->independent   Eating 0-->independent   Communication 0-->understands/communicates without difficulty   Swallowing 0-->swallows foods/liquids without difficulty   Cognition 0 - no cognition issues reported   Fall history within last six months no   Prior Functional Level Comment Pt  has a SEC and FWW available   General Information   Onset of Illness/Injury or Date of Surgery - Date 10/09/18   Referring Physician Meño Avalos MD   Patient/Family Goals Statement \"Go home\"   Pertinent History of Current Problem (include personal factors and/or comorbidities that impact the POC) 73 YOM with L hip DJD presents POD 0 L ZEYNEP direct anterior approach. PMH: R TKA, HLD   Precautions/Limitations fall precautions;oxygen therapy device and L/min   Weight-Bearing Status - LUE full weight-bearing   Weight-Bearing Status - RUE full weight-bearing   Weight-Bearing Status - LLE weight-bearing as tolerated   Weight-Bearing Status - RLE full weight-bearing   General Observations Pleasant and cooperative   General Info Comments Activity: ambulate with assist   Cognitive Status Examination   Orientation orientation to person, place and time   Level of Consciousness alert   Follows Commands and Answers Questions 100% of the time;able to follow multistep instructions   Personal " "Safety and Judgment intact   Memory intact   Cognitive Comment no concerns   Pain Assessment   Patient Currently in Pain Yes, see Vital Sign flowsheet  (3/10 L hip)   Posture    Posture Forward head position   Range of Motion (ROM)   ROM Comment BLEs WFL   Strength   Strength Comments RLE 5/5 throughout. L hip 4-/5, knee 4/5, ankle 5/5   Bed Mobility   Bed Mobility Comments Supine>sit with Josep   Transfer Skills   Transfer Comments Sit>stand from EOB to FWW with Josep   Gait   Gait Comments Gait with FWW x 5' with Josep, 3 point pattern, good tolerance for LLE WB, decreased step length B   Balance   Balance Comments Good sitting, requires UE support for standing   Sensory Examination   Sensory Perception no deficits were identified   Coordination   Coordination no deficits were identified   General Therapy Interventions   Planned Therapy Interventions balance training;bed mobility training;gait training;strengthening;transfer training   Clinical Impression   Criteria for Skilled Therapeutic Intervention yes, treatment indicated   PT Diagnosis Impaired gait   Influenced by the following impairments weakness, pain, fatigue   Functional limitations due to impairments bed mobility, transfers, gait   Clinical Presentation Stable/Uncomplicated   Clinical Presentation Rationale elective ZEYNEP progressing per pathway   Clinical Decision Making (Complexity) Low complexity   Therapy Frequency` 2 times/day   Predicted Duration of Therapy Intervention (days/wks) 3 days   Anticipated Discharge Disposition Home with Assist   Risk & Benefits of therapy have been explained Yes   Patient, Family & other staff in agreement with plan of care Yes   [facility] [employer] TM \"6 Clicks\"   2016, Trustees of [facility], under license to [employer].  All rights reserved.   6 Clicks Short Forms Basic Mobility Inpatient Short Form   [facility] AM-PAC  \"6 Clicks\" V.2 Basic Mobility Inpatient Short Form   1. Turning from your back " to your side while in a flat bed without using bedrails? 3 - A Little   2. Moving from lying on your back to sitting on the side of a flat bed without using bedrails? 3 - A Little   3. Moving to and from a bed to a chair (including a wheelchair)? 3 - A Little   4. Standing up from a chair using your arms (e.g., wheelchair, or bedside chair)? 3 - A Little   5. To walk in hospital room? 3 - A Little   6. Climbing 3-5 steps with a railing? 3 - A Little   Basic Mobility Raw Score (Score out of 24.Lower scores equate to lower levels of function) 18   Total Evaluation Time   Total Evaluation Time (Minutes) 10

## 2018-10-09 NOTE — PROVIDER NOTIFICATION
Hospitalist consult received for post-op medical management. Patient is POD #0 s/p LTHA with anterior approach for DJD. Plan for discharge home POD #1 per Orthopedic note. Per chart review patient has a history of coronary artery disease per CT coronary calcium score in 2015. He was evaluated by Dr. White of Cardiology prior to surgery and underwent stress test 9/19/18 which was negative for ischemia. He is also followed for AAA and aortic root dilatation.     He is on lisinopril 2.5mg daily PTA which will be ordered with hold parameters for the am. Cr ordered for am as he is receiving scheduled Toradol.  Nursing to call if Cr abnormal prior to administration.  Hospitalist will chart check tomorrow.     Can resume statin at discharge as plan is for him to go home tomorrow.     Aspirin per Orthopedics.     Will defer formal consultation. Please call with any questions or concerns and we will be happy to see the patient. Discussed with nursing who has no concerns at this time.     Jayde Flores PA-C

## 2018-10-09 NOTE — PROGRESS NOTES
Admission medication history interview status for the 10/9/2018  admission is complete. See EPIC admission navigator for prior to admission medications     Medication history source reliability:Good    Medication history interview source(s):Patient    Medication history resources (including written lists, pill bottles, clinic record):None    Primary pharmacy.[facility]    Additional medication history information not noted on PTA med list :None    Time spent in this activity: 45 minutes    Prior to Admission medications    Medication Sig Last Dose Taking? Auth Provider   Acetaminophen (TYLENOL PO) Take 500-1,000 mg by mouth 2 times daily as needed for mild pain or fever 10/7/2018 at prn Yes Reported, Patient   Ascorbic Acid (VITAMIN C PO) Take 1,000 mg by mouth every morning  10/8/2018 at am Yes Reported, Patient   aspirin 81 MG tablet Take 81 mg by mouth daily 9/30/2018 Yes Reported, Patient   Cyanocobalamin (VITAMIN B 12 PO) Take 1,000 mcg by mouth every morning 10/8/2018 at am Yes Reported, Patient   lisinopril (PRINIVIL/ZESTRIL) 2.5 MG tablet TAKE ONE TABLET BY MOUTH DAILY 10/9/2018 at 0500 Yes Guillermo Kong MD   nitroglycerin (NITROSTAT) 0.4 MG sublingual tablet For chest pain place 1 tablet under the tongue every 5 minutes for 3 doses. If symptoms persist 5 minutes after 1st dose call 911.  Patient taking differently: Place 0.4 mg under the tongue every 5 minutes as needed For chest pain place 1 tablet under the tongue every 5 minutes for 3 doses. If symptoms persist 5 minutes after 1st dose call 911. more than a month at prn Yes Miguelito Sim MD   Probiotic Product (PROBIOTIC PO) Take 1 capsule by mouth every morning 10/8/2018 at am Yes Reported, Patient   Rosuvastatin Calcium (CRESTOR PO) Take 40 mg by mouth every evening 10/9/2018 at 0500 Yes Reported, Patient   tadalafil (CIALIS) 5 MG tablet Take 1 tablet (5 mg) by mouth daily as needed more than a week at prn Yes Guillermo Kong MD   VITAMIN D,  CHOLECALCIFEROL, PO Take 1,000 Units by mouth every morning  10/8/2018 at am Yes Reported, Patient

## 2018-10-09 NOTE — IP AVS SNAPSHOT
[address] [facility]    [address] [place] [address]    Phone:  [phone]                                       After Visit Summary   10/9/2018    Javier Tamez    MRN: [id number]           After Visit Summary Signature Page     I have received my discharge instructions, and my questions have been answered. I have discussed any challenges I see with this plan with the nurse or doctor.    ..........................................................................................................................................  Patient/Patient Representative Signature      ..........................................................................................................................................  Patient Representative Print Name and Relationship to Patient    ..................................................               ................................................  Date                                   Time    ..........................................................................................................................................  Reviewed by Signature/Title    ...................................................              ..............................................  Date                                               Time          22EPIC Rev 08/18

## 2018-10-09 NOTE — ANESTHESIA PREPROCEDURE EVALUATION
Anesthesia Evaluation     . Pt has had prior anesthetic.            ROS/MED HX    ENT/Pulmonary: Comment: TMJ synd      (-) asthma and sleep apnea   Neurologic:     (+)TIA date: 40 years ago features: left leg weakness,     Cardiovascular:     (+) Dyslipidemia, hypertension--CAD, --. : . . . :. .      (-) CHF, GARCIAS, orthopnea/PND and syncope   METS/Exercise Tolerance:  >4 METS   Hematologic:         Musculoskeletal:   (+) arthritis, , , -       GI/Hepatic: Comment: IBS       (-) GERD   Renal/Genitourinary:         Endo:         Psychiatric:         Infectious Disease:         Malignancy:         Other:                     Physical Exam      Airway   Mallampati: II  TM distance: >3 FB  Neck ROM: full    Dental   (+) caps    Cardiovascular   Rhythm and rate: regular      Pulmonary    breath sounds clear to auscultation                    Anesthesia Plan      History & Physical Review  History and physical reviewed and following examination; no interval change.    ASA Status:  2 .        Plan for General and ETT   PONV prophylaxis:  Ondansetron (or other 5HT-3) and Droperidol or Haldol  Additional equipment: Videolaryngoscope Propofol Infusion       Postoperative Care      Consents  Anesthetic plan, risks, benefits and alternatives discussed with:  Patient..                          .  Procedure: Procedure(s):  ARTHROPLASTY HIP ANTERIOR  Preop diagnosis: DJD LEFT HIP     No Known Allergies  Past Medical History:   Diagnosis Date     Aortic root dilatation (H)      Aortic stenosis      Arthritis      Atypical chest pain 1/28/2015     Cerebral artery occlusion with cerebral infarction (H)     TIA  1992     Essential hypertension with goal blood pressure less than 140/90 11/26/2016    takes lisinopril for preventitive     Family history of heart disease      Hypercholesterolemia 1/3/2014     Diagnosis updated by automated process. Provider to review and confirm.     Hyperlipidemia LDL goal <70      IBS (irritable bowel  syndrome)      Other chronic pain      Past Surgical History:   Procedure Laterality Date     ARTHROPLASTY HIP ANTERIOR Right 10/24/2017    Procedure: ARTHROPLASTY HIP ANTERIOR;  RIGHT TOTAL HIP ARTHROPLASTY DIRECT ANTERIOR APPROACH;  Surgeon: Meño Avalos MD;  Location: [facility] OR     C NONSPECIFIC PROCEDURE  1990's    kidney stone     COLONOSCOPY  2011    Adenomatous polyp removed     VASECTOMY       Social History   Substance Use Topics     Smoking status: Never Smoker     Smokeless tobacco: Never Used     Alcohol use Yes      Comment: 3-4 glasses of wine weekly     Prior to Admission medications    Medication Sig Start Date End Date Taking? Authorizing Provider   Acetaminophen (TYLENOL PO) Take 500-1,000 mg by mouth 2 times daily as needed for mild pain or fever   Yes Reported, Patient   Ascorbic Acid (VITAMIN C PO) Take 1,000 mg by mouth every morning    Yes Reported, Patient   aspirin 81 MG tablet Take 81 mg by mouth daily   Yes Reported, Patient   Cyanocobalamin (VITAMIN B 12 PO) Take 1,000 mcg by mouth every morning   Yes Reported, Patient   lisinopril (PRINIVIL/ZESTRIL) 2.5 MG tablet TAKE ONE TABLET BY MOUTH DAILY 1/4/18  Yes Guillermo Kong MD   nitroglycerin (NITROSTAT) 0.4 MG sublingual tablet For chest pain place 1 tablet under the tongue every 5 minutes for 3 doses. If symptoms persist 5 minutes after 1st dose call 911.  Patient taking differently: Place 0.4 mg under the tongue every 5 minutes as needed For chest pain place 1 tablet under the tongue every 5 minutes for 3 doses. If symptoms persist 5 minutes after 1st dose call 911. 1/6/17  Yes Miguelito Sim MD   Probiotic Product (PROBIOTIC PO) Take 1 capsule by mouth every morning   Yes Reported, Patient   Rosuvastatin Calcium (CRESTOR PO) Take 40 mg by mouth every evening   Yes Reported, Patient   tadalafil (CIALIS) 5 MG tablet Take 1 tablet (5 mg) by mouth daily as needed 2/27/18  Yes Guillermo Kong MD   VITAMIN D, CHOLECALCIFEROL, PO Take  1,000 Units by mouth every morning    Yes Reported, Patient     Current Facility-Administered Medications Ordered in Epic   Medication Dose Route Frequency Last Rate Last Dose     ceFAZolin (ANCEF) 1 g vial to attach to  ml bag for ADULT or 50 ml bag for PEDS  1 g Intravenous See Admin Instructions         ceFAZolin (ANCEF) intermittent infusion 2 g in 100 mL dextrose PRE-MIX  2 g Intravenous Pre-Op/Pre-procedure x 1 dose         lactated ringers infusion   Intravenous Continuous 25 mL/hr at 10/09/18 0656       lidocaine 1 % 1 mL  1 mL Other Q1H PRN         tranexamic acid (CYKLOKAPRON) 1 g in sodium chloride 0.9 % 60 mL bolus  1 g Intravenous Once         tranexamic acid (CYKLOKAPRON) 1 g in sodium chloride 0.9 % 60 mL bolus  1 g Intravenous Once         vancomycin (VANCOCIN) 1,500 mg in sodium chloride 0.9 % 250 mL intermittent infusion  1,500 mg Intravenous Pre-Op/Pre-procedure x 1 dose   1,500 mg at 10/09/18 0708     No current [facility]-ordered outpatient prescriptions on file.       lactated ringers 25 mL/hr at 10/09/18 0656     Wt Readings from Last 1 Encounters:   10/09/18 82.6 kg (182 lb)     Temp Readings from Last 1 Encounters:   10/09/18 36.2  C (97.2  F) (Axillary)     BP Readings from Last 6 Encounters:   10/09/18 108/72   09/19/18 110/64   09/18/18 110/60   01/12/18 108/62   10/25/17 113/63   09/29/17 92/50     Pulse Readings from Last 4 Encounters:   10/09/18 77   09/19/18 80   09/18/18 86   01/12/18 80     Resp Readings from Last 1 Encounters:   10/09/18 16     SpO2 Readings from Last 1 Encounters:   10/09/18 97%     Recent Labs   Lab Test  09/18/18   0943  01/12/18   0738   NA  140  139   POTASSIUM  4.3  3.9   CHLORIDE  104  105   CO2  28  31   ANIONGAP  8  3   GLC  84  93   BUN  12  10   CR  0.80  0.77   ALEXANDRA  9.0  9.0     Recent Labs   Lab Test  09/18/18   0943  07/25/17   0813   11/30/16   0945   AST  33   --    --   23   ALT  54  50   < >  40   ALKPHOS  66   --    --   63   BILITOTAL  0.8   --     --   0.8    < > = values in this interval not displayed.     Recent Labs   Lab Test  10/09/18   0705  09/18/18   0943   10/24/17   0645  09/29/17   1500   WBC   --   6.0   --    --   7.8   HGB  15.5  16.3   < >  15.6  16.4   PLT   --   215   --   200  209    < > = values in this interval not displayed.     Recent Labs   Lab Test  10/09/18   0705  10/24/17   0645   ABO  PENDING  A   RH   --   Pos     No results for input(s): INR, PTT in the last 96515 hours.   No results for input(s): TROPI in the last 77597 hours.  No results for input(s): PH, PCO2, PO2, HCO3 in the last 54086 hours.  No results for input(s): HCG in the last 56079 hours.  Recent Results (from the past 744 hour(s))   NM Exercise stress test    Narrative    GATED MYOCARDIAL PERFUSION SCINTIGRAPHY EXERCISE- ONE DAY STUDY     9/21/2018 10:10 AM  ANILA MEDINA  73 years  Male  1945. BMI  26    Indication/Clinical History: 73-year-old male with no cardiac history  undergoing stress test for preop    Impression       1. Exercise: Average exercise capacity, target heart rate  achieved       2. EKG: Normal, negative for ischemia       3. Symptoms: No chest pain or symptoms with exercise  4. Myocardial perfusion imaging using single isotope technique  demonstrated normal perfusion. No ischemia or infarct..   5. Gated images demonstrated normal wall motion.  The left ventricular  systolic function is 56% at rest, 65% with stress.  6. Compared to the prior study from January 2017, no changes .    Procedure  The patient performed treadmill exercise using a Silvano protocol,  completing 10 minutes and 11 seconds with an estimated workload of 12  METS.  The test was terminated due to fatigue. The heart rate was 78  beats per minute at baseline and increased to 139 beats at peak  exercise, which was 94% of the maximum predicted heart rate. The rest  blood pressure was 122/64 mm/Hg and peak blood pressure is 168/70mm/Hg  with rate pressure product of 23,300.  The patient experienced no  symptoms during the test. The patient was not on a beta blocker.    Myocardial perfusion imaging was performed at rest, approximately 45  minutes after the injection intravenously of 10.3of Tc-99m Myoview. At  peak exercise, the patient was injected intravenously with 32 mCi of   Tc-99m Myoview and exercise continued for approximately 1 minute.  Gated post-stress tomographic imaging was performed approximately 30  minutes after stress    EKG Findings  The resting EKG demonstrated sinus rhythm, no baseline ST segment  abnormalities . The stress EKG demonstrated no changes from baseline,  negative for ischemia.    Tomographic Findings  Overall, the study quality is very good . On the stress images, normal  perfusion. On the rest images, normal perfusion . Gated images  demonstrated normal wall motion. The left ventricular ejection  fraction was calculated to be 56% at rest, 65% with stress. TID was  was not appreciated.    LORELEI MENENDEZ MD       RECENT LABS:   ECG:   ECHO:

## 2018-10-09 NOTE — PLAN OF CARE
Problem: Patient Care Overview  Goal: Plan of Care/Patient Progress Review  PT: PT orders received, initial eval completed and treatment initiated. Patient lives with his wife in a house with 2 steps to enter and main floor living. Previously independent with all ADLs and mobility without a device. He rush have access to a SEC and FWW. Pt presents POD 0 L ZEYNEP with direct anterior approach.    Discharge Planner PT   Patient plan for discharge: home tomorrow  Current status: Instructed pt in WBAT and no precautions. Pt requires Josep for bed mobility, transfers and gait x 60' with a FWW.  Barriers to return to prior living situation: level of assist required, pain  Recommendations for discharge: home with assist for household tasks, ambulation and LE HEP programs  Rationale for recommendations: Pt is mobilizing well POD 0. Anticipate home at hospital disch.       Entered by: Caren Owens 10/09/2018 5:10 PM

## 2018-10-09 NOTE — ANESTHESIA CARE TRANSFER NOTE
Patient: Javier Tamez    Procedure(s):  LEFT TOTAL HIP ARTHROPLASTY DIRECT ANTERIOR APPROACH (DEPUY)^ - Wound Class: I-Clean    Diagnosis: DJD LEFT HIP   Diagnosis Additional Information: No value filed.    Anesthesia Type:   General, ETT     Note:  Airway :Face Mask  Patient transferred to:PACU  Handoff Report: Identifed the Patient, Identified the Reponsible Provider, Reviewed the pertinent medical history, Discussed the surgical course, Reviewed Intra-OP anesthesia mangement and issues during anesthesia, Set expectations for post-procedure period and Allowed opportunity for questions and acknowledgement of understanding      Vitals: (Last set prior to Anesthesia Care Transfer)    CRNA VITALS  10/9/2018 1046 - 10/9/2018 1125      10/9/2018             NIBP: 115/68    Pulse: 90    NIBP Mean: 84    Ht Rate: 90    SpO2: 97 %    Resp Rate (observed): 17                Electronically Signed By: Renetta Ibrahim  October 9, 2018  11:25 AM

## 2018-10-09 NOTE — ANESTHESIA POSTPROCEDURE EVALUATION
Patient: Javier Tamez    Procedure(s):  LEFT TOTAL HIP ARTHROPLASTY DIRECT ANTERIOR APPROACH (DEPUY)^ - Wound Class: I-Clean    Diagnosis:DJD LEFT HIP   Diagnosis Additional Information: No value filed.    Anesthesia Type:  General, ETT    Note:  Anesthesia Post Evaluation    Patient location during evaluation: PACU  Patient participation: Able to fully participate in evaluation  Level of consciousness: sleepy but conscious and responsive to verbal stimuli  Pain management: adequate  Airway patency: patent  Cardiovascular status: acceptable and hemodynamically stable  Respiratory status: acceptable and unassisted  Hydration status: acceptable  PONV: none     Anesthetic complications: None          Last vitals:  Vitals:    10/09/18 1345 10/09/18 1415 10/09/18 1515   BP: 113/72 124/74 106/66   Pulse:      Resp: 16 13 14   Temp:      SpO2: 97% 97% 95%         Electronically Signed By: Arian Tijerina MD  October 9, 2018  3:56 PM

## 2018-10-10 ENCOUNTER — APPOINTMENT (OUTPATIENT)
Dept: OCCUPATIONAL THERAPY | Facility: CLINIC | Age: 73
DRG: 470 | End: 2018-10-10
Attending: ORTHOPAEDIC SURGERY
Payer: MEDICARE

## 2018-10-10 ENCOUNTER — APPOINTMENT (OUTPATIENT)
Dept: PHYSICAL THERAPY | Facility: CLINIC | Age: 73
DRG: 470 | End: 2018-10-10
Attending: ORTHOPAEDIC SURGERY
Payer: MEDICARE

## 2018-10-10 VITALS
TEMPERATURE: 98.1 F | DIASTOLIC BLOOD PRESSURE: 56 MMHG | WEIGHT: 182 LBS | SYSTOLIC BLOOD PRESSURE: 93 MMHG | HEART RATE: 60 BPM | BODY MASS INDEX: 25.48 KG/M2 | RESPIRATION RATE: 16 BRPM | OXYGEN SATURATION: 96 % | HEIGHT: 71 IN

## 2018-10-10 LAB
CREAT SERPL-MCNC: 0.71 MG/DL (ref 0.66–1.25)
GFR SERPL CREATININE-BSD FRML MDRD: >90 ML/MIN/1.7M2
GLUCOSE SERPL-MCNC: 116 MG/DL (ref 70–99)
HGB BLD-MCNC: 12.3 G/DL (ref 13.3–17.7)

## 2018-10-10 PROCEDURE — 97110 THERAPEUTIC EXERCISES: CPT | Mod: GP

## 2018-10-10 PROCEDURE — 25800025 ZZH RX 258: Performed by: ORTHOPAEDIC SURGERY

## 2018-10-10 PROCEDURE — A9270 NON-COVERED ITEM OR SERVICE: HCPCS | Mod: GY | Performed by: ORTHOPAEDIC SURGERY

## 2018-10-10 PROCEDURE — 25000128 H RX IP 250 OP 636: Performed by: ORTHOPAEDIC SURGERY

## 2018-10-10 PROCEDURE — 97165 OT EVAL LOW COMPLEX 30 MIN: CPT | Mod: GO

## 2018-10-10 PROCEDURE — 85018 HEMOGLOBIN: CPT | Performed by: ORTHOPAEDIC SURGERY

## 2018-10-10 PROCEDURE — 25000132 ZZH RX MED GY IP 250 OP 250 PS 637: Mod: GY | Performed by: ORTHOPAEDIC SURGERY

## 2018-10-10 PROCEDURE — 40000133 ZZH STATISTIC OT WARD VISIT

## 2018-10-10 PROCEDURE — 82565 ASSAY OF CREATININE: CPT | Performed by: ORTHOPAEDIC SURGERY

## 2018-10-10 PROCEDURE — 40000193 ZZH STATISTIC PT WARD VISIT

## 2018-10-10 PROCEDURE — 82947 ASSAY GLUCOSE BLOOD QUANT: CPT | Performed by: ORTHOPAEDIC SURGERY

## 2018-10-10 PROCEDURE — 97535 SELF CARE MNGMENT TRAINING: CPT | Mod: GO

## 2018-10-10 PROCEDURE — 97530 THERAPEUTIC ACTIVITIES: CPT | Mod: GP

## 2018-10-10 PROCEDURE — 97116 GAIT TRAINING THERAPY: CPT | Mod: GP

## 2018-10-10 PROCEDURE — 36415 COLL VENOUS BLD VENIPUNCTURE: CPT | Performed by: ORTHOPAEDIC SURGERY

## 2018-10-10 RX ORDER — CELECOXIB 200 MG/1
200 CAPSULE ORAL DAILY
Qty: 30 CAPSULE | Refills: 0 | Status: SHIPPED | OUTPATIENT
Start: 2018-10-10 | End: 2020-12-29

## 2018-10-10 RX ORDER — ACETAMINOPHEN 500 MG
1000 TABLET ORAL EVERY 6 HOURS PRN
Qty: 100 TABLET | Refills: 0 | Status: SHIPPED | OUTPATIENT
Start: 2018-10-10 | End: 2019-10-28

## 2018-10-10 RX ORDER — OXYCODONE HYDROCHLORIDE 5 MG/1
TABLET ORAL
Qty: 30 TABLET | Refills: 0 | Status: SHIPPED | OUTPATIENT
Start: 2018-10-10 | End: 2019-10-28

## 2018-10-10 RX ORDER — AMOXICILLIN 250 MG
2 CAPSULE ORAL 2 TIMES DAILY PRN
Qty: 60 TABLET | Refills: 0 | Status: SHIPPED | OUTPATIENT
Start: 2018-10-10 | End: 2019-10-28

## 2018-10-10 RX ADMIN — OXYCODONE HYDROCHLORIDE 5 MG: 5 TABLET ORAL at 09:47

## 2018-10-10 RX ADMIN — POTASSIUM CHLORIDE, DEXTROSE MONOHYDRATE AND SODIUM CHLORIDE: 150; 5; 450 INJECTION, SOLUTION INTRAVENOUS at 03:26

## 2018-10-10 RX ADMIN — KETOROLAC TROMETHAMINE 15 MG: 15 INJECTION, SOLUTION INTRAMUSCULAR; INTRAVENOUS at 01:07

## 2018-10-10 RX ADMIN — CEFAZOLIN SODIUM 2 G: 2 INJECTION, SOLUTION INTRAVENOUS at 01:07

## 2018-10-10 RX ADMIN — ACETAMINOPHEN 975 MG: 325 TABLET, FILM COATED ORAL at 08:42

## 2018-10-10 RX ADMIN — KETOROLAC TROMETHAMINE 15 MG: 15 INJECTION, SOLUTION INTRAMUSCULAR; INTRAVENOUS at 06:40

## 2018-10-10 RX ADMIN — ACETAMINOPHEN 975 MG: 325 TABLET, FILM COATED ORAL at 01:07

## 2018-10-10 RX ADMIN — KETOROLAC TROMETHAMINE 15 MG: 15 INJECTION, SOLUTION INTRAMUSCULAR; INTRAVENOUS at 13:36

## 2018-10-10 RX ADMIN — OXYCODONE HYDROCHLORIDE 5 MG: 5 TABLET ORAL at 13:36

## 2018-10-10 RX ADMIN — SENNOSIDES AND DOCUSATE SODIUM 1 TABLET: 8.6; 5 TABLET ORAL at 08:42

## 2018-10-10 RX ADMIN — OXYCODONE HYDROCHLORIDE 5 MG: 5 TABLET ORAL at 06:40

## 2018-10-10 ASSESSMENT — ACTIVITIES OF DAILY LIVING (ADL)
ADLS_ACUITY_SCORE: 10
PREVIOUS_RESPONSIBILITIES: HOUSEKEEPING;LAUNDRY;SHOPPING;MEDICATION MANAGEMENT;FINANCES;DRIVING
ADLS_ACUITY_SCORE: 10
ADLS_ACUITY_SCORE: 10

## 2018-10-10 NOTE — PROVIDER NOTIFICATION
Pt stating surgical (L) ft feels stiff and swollen.  Pt has normal plantar movement but dorsiflexion has gotten weak. Unable to pull great toe up.  Pedal pulses +2 and strong.  Pt denies numbness and tingling.  Hip drsg dry and intact with ice on it.  Rates pain #3.  Call placed to Dr. Isaias Ibanez, on call ortho MD.  Dr Ibanez stated he is not on call for [facility].   He suggested having pt sit on edge of bed with knee flexed which we did and no change in status.  Dr. Ronny Anderson called

## 2018-10-10 NOTE — PROVIDER NOTIFICATION
Hospitalist consult received for post-op medical management on 10/9/2018, full consultation was deferred at that time.    Patient is POD #1 s/p LTHA with anterior approach for DJD. Plan for discharge home POD #1 per Orthopedic note. Per chart review patient has a history of coronary artery disease per CT coronary calcium score in 2015. He was evaluated by Dr. White of Cardiology prior to surgery and underwent stress test 9/19/18 which was negative for ischemia. He is also followed for AAA and aortic root dilatation.     He is on lisinopril 2.5mg daily PTA which is ordered with hold parameters. Creatinine this morning 0.71, which is unchanged. Okay to resume statin at discharge. May resume Aspirin when okay with Orthopedics.     Recommend checking creatinine at 5-7 day post-discharge follow-up with PCP.     Please re-consult if there is a need for formal consultation. The Hospitalist Service will sign-off. Please call us with questions or concerns.    JAS Valdes, CNP  Hospitalist Service, House Officer  [facility]     Text Page  Pager: [phone]

## 2018-10-10 NOTE — PLAN OF CARE
Problem: Patient Care Overview  Goal: Plan of Care/Patient Progress Review  OT: Evaluation and treatment initiated. Pt lives in a two level town home, all needs met on one level. Prior pt independent in all I/ADLs. History of ZEYNEP in October 2017. Currently, pt is s/p L ZEYNEP with direct anterior approach.  Discharge Planner OT   Patient plan for discharge: Home   Current status:  Pt went from sit<>stand with SBA. Pt ambulated to the bathroom with SBA/CGA and walker, and transferred to/from the toilet with Mod I.  Pt completed lower body dressing with AE and SBA. Educated on compensatory techniques for I/ADL techniques. Pt verbalized confidence in shower and car transfer and declined education. All IP OT goals met, no further needs identified.     Barriers to return to prior living situation: none anticipated   Recommendations for discharge: Home with spouse and assist for I/ADLs as needed  Rationale for recommendations: Pt will have assist from spouse upon discharge. Pt has met all IP OT established goal areas.     Occupational Therapy Discharge Summary    Reason for therapy discharge:    All goals and outcomes met, no further needs identified.    Progress towards therapy goal(s). See goals on Care Plan in [facility] electronic health record for goal details.  Goals met    Therapy recommendation(s):    Assist as needed for I/ADLs            Entered by: Shannan Avila 10/10/2018 11:02 AM

## 2018-10-10 NOTE — PROGRESS NOTES
Received a call back from Dr. Ronny Valenzuela in regards to patient's change of condition. Pt reports some improvement with the movement of his feet from side to side. Patient maintains normal planter flexion and a weak dorsiflexion. Per Dr. Ronny Valenzuela, will continue to monitor overnight and Dr. Avalos to evaluate in the AM.

## 2018-10-10 NOTE — PLAN OF CARE
Problem: Patient Care Overview  Goal: Individualization & Mutuality  Outcome: Completed Date Met: 10/10/18  Pt to D/C to home with wife.  Pt provided with d/c instructions, including new medications, when medications were last given, and when to take them again.  Pt also informed to f/u with ortho as scheduled.  Pt verbalized understanding of all d/c and f/u instructions.  All questions were answered at this time.  Copy of paperwork sent with pt.  Medication/Scripts sent with pt. Was too early to fill celebrex, but pt reports he has it at home.  Wife to provide transport.  All personal belongings sent with pt.

## 2018-10-10 NOTE — PLAN OF CARE
Problem: Patient Care Overview  Goal: Plan of Care/Patient Progress Review  Discharge Planner PT   Patient plan for discharge: home   Current status: supine to sit SBA with elevated HOB and railings, scooting at EOB SBA, sit to stand with min A and FWW cues for technique. Gait training with FWW min A and w/c follow for safety due to low BP, no LOB, flexed forward gait, Denies reports of dizziness at end of session at rest or when OOB. BP monitored  with transfers and following ambulation. BP supine post ex:94/52, HR=74, BP sitting at WKO=997/50, HR=92, post amb in sitting 82/48 HR=85, pt returned to bed at end of session due to low BP  Left hip pain 2/10 throughout session, 5/5 bilateral ankle DF/PF-pt reports weakness has resolved  Barriers to return to prior living situation: level of assist required, pain, low BP  Recommendations for discharge: home with assist for household tasks, ambulation and LE HEP programs  Rationale for recommendations: Anticipate home at hospital disch.       Entered by: Rena Fishman 10/10/2018 12:25 PM         Physical Therapy Discharge Summary    Reason for therapy discharge:    Discharged to home.    Progress towards therapy goal(s). See goals on Care Plan in [facility] electronic health record for goal details.  Goals partially met.  Barriers to achieving goals:   discharge from facility.    Therapy recommendation(s):    No further therapy is recommended.  Continue home exercise program.

## 2018-10-10 NOTE — PLAN OF CARE
Problem: Patient Care Overview  Goal: Plan of Care/Patient Progress Review  Outcome: Improving  Pt is A&Ox4. VSS on 2 L of oxygen NC, afebrile. Up with assist of 1 using a walker and GB. Left foot dorsiflexion has improved to moderate. Pt able to move L foot from side to side while lying in bed. Dressing C/D/I. Pt rates his pain at 3. Tolerates regular diet. Chapa catheter discontinued and is due to void. Progressing well per POC.

## 2018-10-10 NOTE — PROGRESS NOTES
10/10/18 1000   Quick Adds   Type of Visit Initial Occupational Therapy Evaluation   Living Environment   Lives With spouse   Living Arrangements house  (Two level town home )   Home Accessibility stairs to enter home;bed and bath on same level  (Walk in with a built in seat )   Transportation Available car;family or friend will provide   Living Environment Comment Spouse will be available to assist as needed.    Self-Care   Usual Activity Tolerance good   Current Activity Tolerance moderate   Regular Exercise yes   Activity/Exercise Type walking   Exercise Amount/Frequency daily   Equipment Currently Used at Home raised toilet  (Has a reacher)   Functional Level Prior   Ambulation 0-->independent   Transferring 0-->independent   Toileting 0-->independent   Bathing 0-->independent   Dressing 0-->independent   Fall history within last six months no   General Information   Onset of Illness/Injury or Date of Surgery - Date 10/09/18   Referring Physician Meño Avalos MD   Patient/Family Goals Statement Home   Additional Occupational Profile Info/Pertinent History of Current Problem L ZEYNEP with direct anterior approach. No precautions secondary to surgical approach per MD orders   Precautions/Limitations fall precautions   General Info Comments Previous R ZEYNEP October 2017   Cognitive Status Examination   Orientation orientation to person, place and time   Level of Consciousness alert   Visual Perception   Visual Perception Wears glasses   Sensory Examination   Sensory Quick Adds No deficits were identified   Pain Assessment   Patient Currently in Pain Yes, see Vital Sign flowsheet   Mobility   Bed Mobility Bed mobility skill: Sit to supine;Bed mobility skill: Supine to sit   Bed Mobility Skill: Sit to Supine   Level of [place]: Sit/Supine stand-by assist   Bed Mobility Skill: Supine to Sit   Level of [place]: Supine/Sit stand-by assist   Transfer Skills   Transfer Transfer Safety Analysis  "Bed/Chair;Transfer Skill: Stand to Sit;Transfer Safety Analysis Sit/Stand   Transfer Skill: Bed to Chair/Chair to Bed   Level of [place]: Bed to Chair stand-by assist   Transfer Skill: Sit to Stand   Level of [place]: Sit/Stand stand-by assist   Toilet Transfer   Toilet Transfer Toilet Transfer Skill;Toilet Transfer Safety Analysis   Transfer Skill: Toilet Transfer   Level of [place]: Toilet (Mod I )   Lower Body Dressing   Level of [place]: Dress Lower Body stand-by assist   Physical Assist/Nonphysical Assist: Dress Lower Body verbal cues;supervision   Assistive Device reacher;sock-aid;long-handled shoe horn   Instrumental Activities of Daily Living (IADL)   Previous Responsibilities housekeeping;laundry;shopping;medication management;finances;driving   General Therapy Interventions   Planned Therapy Interventions ADL retraining;IADL retraining;transfer training   Clinical Impression   Criteria for Skilled Therapeutic Interventions Met yes, treatment indicated   OT Diagnosis Decreased endurance for I/ADLs    Influenced by the following impairments Decreased endurance for I/ADLs    Assessment of Occupational Performance 1-3 Performance Deficits   Identified Performance Deficits Decreased endurance for I/ADLs  (dressing, bathing, toileting) N   Clinical Decision Making (Complexity) Low complexity   Predicted Duration of Therapy Intervention (days/wks) evaluation and one treatment    Anticipated Discharge Disposition Home with Assist   Risks and Benefits of Treatment have been explained. Yes   Patient, Family & other staff in agreement with plan of care Yes   [facility] TM \"6 Clicks\"   2016, Trustees of [facility], under license to [employer].  All rights reserved.   6 Clicks Short Forms Daily Activity Inpatient Short Form   [facility] AM-PAC  \"6 Clicks\" Daily Activity Inpatient Short Form   1. Putting on and taking off regular lower body clothing? 3 - A Little   2. " Bathing (including washing, rinsing, drying)? 3 - A Little   3. Toileting, which includes using toilet, bedpan or urinal? 3 - A Little   4. Putting on and taking off regular upper body clothing? 4 - None   5. Taking care of personal grooming such as brushing teeth? 4 - None   6. Eating meals? 4 - None   Daily Activity Raw Score (Score out of 24.Lower scores equate to lower levels of function) 21   Total Evaluation Time   Total Evaluation Time (Minutes) 8

## 2018-10-11 ENCOUNTER — TELEPHONE (OUTPATIENT)
Dept: INTERNAL MEDICINE | Facility: CLINIC | Age: 73
End: 2018-10-11

## 2018-10-11 NOTE — PROGRESS NOTES
"Orthopedic Surgery  10/10/2018  POD #1    S: Patient voices no complaints today.. Events of last night noted.   By report, weakness of left ankle last pm, now resolved.    O: Blood pressure 93/56, pulse 60, temperature 98.1  F (36.7  C), temperature source Oral, resp. rate 16, height 1.803 m (5' 11\"), weight 82.6 kg (182 lb), SpO2 96 %.  Lab Results   Component Value Date    HGB 12.3 10/10/2018     Neurovascularly intact, including sciatic, peroneal, tibial, femoral nerves.  Calves are negative bilaterally, both soft and nontender.  The dressing is C/D/I.      A: Mr. Tamez is doing well status post Procedure(s):  ARTHROPLASTY HIP ANTERIOR.    P: Dressing change prior to d/c, use ABD and tape dressing.  No aquacel or island dressing.  Continue physical therapy. Anticipate discharge to home today.    Meño Galvez  [phone]  "

## 2018-10-11 NOTE — TELEPHONE ENCOUNTER
IP F/U    Date: 10/10  Diagnosis: Djd Left Hip , Status Post Left Hip Replacement  Is patient active in care coordination? No  Was patient in TCU? No

## 2018-10-11 NOTE — DISCHARGE SUMMARY
"Discharge Summary    Javier Tamez MRN# [id number]   YOB: 1945 Age: 73 year old     Date of Admission: 10/9/2018    Date of Discharge: 10/10/2018    Reason for Admission: Javier Tamez is an 73 year old male who was admitted to the hospital following surgery.    Preoperative Diagnosis: DJD LEFT HIP     Postoperative Diagnosis: DJD LEFT HIP     Procedure Completed:  Left total hip arthroplasty    Hospital Course:  Mr. Tamez was admitted and underwent the above procedure. The patient tolerated the procedure well. There were no complications. Following surgery he was admitted to the floor.  During his stay he did not require any blood transfusions. His pain was controlled with oral pain medication.  During his stay he progressed well in physical therapy and all the therapy goals were met.     Discharge Physical Exam:  BP 93/56 (BP Location: Left arm)  Pulse 60  Temp 98.1  F (36.7  C) (Oral)  Resp 16  Ht 1.803 m (5' 11\")  Wt 82.6 kg (182 lb)  SpO2 96%  BMI 25.38 kg/m2  Neurovascularly intact, distal pulses present bilaterally.  Calves are negative bilaterally, both soft and nontender.    Assessment: Mr. Tamez is stable and doing well status post left total hip arthroplasty.    Plan: We will discharge him home on analgesics and deep venous thrombosis prophylaxis.  He will follow-up with me approximately 2 weeks from surgery.  He may call [phone] to schedule an appointment.    Meds:   Javier Tamez   Home Medication Instructions KARLA:[id number]    Printed on:10/11/18 9526   Medication Information                      Acetaminophen (TYLENOL PO)  Take 500-1,000 mg by mouth 2 times daily as needed for mild pain or fever             acetaminophen (TYLENOL) 500 MG tablet  Take 2 tablets (1,000 mg) by mouth every 6 hours as needed for pain             Ascorbic Acid (VITAMIN C PO)  Take 1,000 mg by mouth every morning              aspirin 325 MG EC tablet  Take 1 tablet (325 mg) by mouth " daily             celecoxib (CELEBREX) 200 MG capsule  Take 1 capsule (200 mg) by mouth daily             Cyanocobalamin (VITAMIN B 12 PO)  Take 1,000 mcg by mouth every morning             lisinopril (PRINIVIL/ZESTRIL) 2.5 MG tablet  TAKE ONE TABLET BY MOUTH DAILY             nitroglycerin (NITROSTAT) 0.4 MG sublingual tablet  For chest pain place 1 tablet under the tongue every 5 minutes for 3 doses. If symptoms persist 5 minutes after 1st dose call 911.             oxyCODONE IR (ROXICODONE) 5 MG tablet  Take 1 tablet every 4-6 hours for pain rating 3-6,  Take 2 tablets every 4-6 hours for pain 7-10    **Max 8 tablets per day**             Probiotic Product (PROBIOTIC PO)  Take 1 capsule by mouth every morning             Rosuvastatin Calcium (CRESTOR PO)  Take 40 mg by mouth every evening             senna-docusate (SENOKOT-S;PERICOLACE) 8.6-50 MG per tablet  Take 2 tablets by mouth 2 times daily as needed for constipation             tadalafil (CIALIS) 5 MG tablet  Take 1 tablet (5 mg) by mouth daily as needed             VITAMIN D, CHOLECALCIFEROL, PO  Take 1,000 Units by mouth every morning

## 2018-10-16 NOTE — OP NOTE
DATE OF SERVICE:  10/9/2018    SURGEON  RONNIE FREDERICK M.D.    ASSISTANT  Ev Chicas PA-C    PREOPERATIVE DIAGNOSIS   Left hip osteoarthritis, failed to respond to conservative management.    POSTOPERATIVE DIAGNOSIS   Left hip osteoarthritis, failed to respond to conservative management.    TITLE OF PROCEDURE   Left total hip arthroplasty, Depuy uncemented components, direct anterior approach.    PROCEDURE  The patient was brought to the operating room and after satisfactory anesthesia was placed on the [place] table. The left  lower extremity was then prepped and draped in the usual sterile fashion. Image intensification was utilized during the case for component positioning as well as leg length assessment. An incision was made just lateral to the ASIS and coursing toward the proximal femur. Dissection was carried down to the TFL. The fascia overlying the TFL was incised and dissection was carried down to the interval between TFL and rectus femoris. This was identified. A lateral cobra retractor was placed followed by a medial cobra around the femoral neck. The leash vessels, anterior circumflex, was identified and was coagulated. The underlying fascia was released. Dissection was carried down to the hip capsule. Capsule was identified and a capsulotomy was performed in a T-type fashion first along the intertrochanteric line and then secondarily up along the femoral neck and head, finally completed by releasing along the saddle of the trochanter. The capsular edges were tagged for later repair. The hip was then externally rotated and medial capsular release was performed such that the lesser trochanter could be palpated. Following this, the femoral neck was osteotomized as per the preoperative plan. The femoral head was removed with a corkscrew without difficulty. The acetabulum was exposed and was reamed sequentially up to 64 mm. This was reamed under both direct visualization as well as the aid of image  intensification. A 64 mm [place] cup was impacted into place in approximately 40 to 45 degrees of abduction, and 15 degrees of anteversion. Two screws were placed and this gave excellent fixation. The 36 mm  neutral liner was impacted into place.     Attention was then directed to the femur. The hook was placed underneath the proximal aspect of the femur between the trochanteric ridge and gluteus enmanuel. The hip was then brought into external rotation, adduction, and extension. The femur was then carefully elevated using the appropriate releases off the inside of the greater trochanter. Once the femur was elevated, a starter broach was placed followed by sequential broaches. The broaches were performed up to size 8 Actis, which gave excellent torsinal as well as axial stability. Trial reduction was performed with a high offset +8.5mm head. The hip was reduced and with hip reduction the combined anteversion looked excellent. The hip was brought into full extension and external rotation. There was no evidence of instability. As well, x-rays were printed and compared with the opposite side and found to have good length and restoration of offset.  It was felt that an additional stem size could not be placed.  The hip was then dislocated and then the proximal femur was then brought back up into the proximal aspect of the wound. The real size 8 Actis stem, high offset, was impacted into place. Again this gave excellent torsion as well as axial stability. The real +8.5mm ceramic biolox head was impacted into place and the hip was reduced again. The image intensification confirmed excellent position of the components.   A 3 minute dilute betadine soak was performed.  The wound was thoroughly irrigated. The capsule was closed with interrupted 0 Vicryl suture and tissues infiltrated with toradol/marcaine mixture. The tensor fascia was closed with a running 0 V-lock suture. The subcutaneous layer was closed with interrupted  2-0 Vicryl, 2-0 V-lock, and 3-0 subcuticular monocryl was placed followed by Dermabond Prineo. Sterile dressing was applied. The patient left the operating room in satisfactory condition. Patient received 1 gm of tranexamic acid pre-op and at closure.

## 2018-10-18 NOTE — LETTER
"  [facility]  [address] [place] [address]  [phone]    10/4/2018    Javier Tamez  [address] [place] [address]         Dear Mr. Tamez,    The results of your lab tests are enclosed. Everything looks fine. Unless noted otherwise below, any results that are outside the \"normal\" range are within acceptable limits and are of no concern.    The nasal swab was positive for \"MRSA\" (methicillin-resistant Staph Aureus). I will defer to Dr Galvez on treatment for this.     Your hemoglobin, white blood cell count, and platelet count looked fine.  Hemoglobin measures the amount of red blood cells carrying oxygen to the body's tissues. A low hemoglobin can cause symptoms of fatigue.  WBC Count measures White Blood Cells, the cells that fight infection. The percentages of various types of white blood cells are listed and look fine.  Platelets assist in normal blood clotting. Your platelet count looks normal.    LDL= Bad Cholesterol-- the target is below 130.     HDL= Good Cholesterol-- although this is determined mostly by heredity, exercise and/or medications may sometimes raise this number.    Triglycerides are another type of fat in the blood, and can sometimes be lowered by reducing intake of sweets or excess carbohydrates, alcohol, and by weight reduction if needed.  Sometimes medications are also used.    AST and ALT are liver tests, as are the bilirubin (total and direct), albumin, total protein, and alkaline phosphatase. Yours are all normal.     Urea Nitrogen and Creatinine are kidney tests--yours are normal. GFR stands for Glomerular Filtration Rate, a more precise estimate of kidney function.    Sodium, Potassium, Chloride, Carbon Dioxide, and Calcium are all normal salts in the bloodstream. Yours all look normal. Your glucose (blood sugar) also looks fine. (You can ignore the anion gap result).    Your urinalysis (urine study) results were normal, showing for " noted   example no sugar, blood or infection in the urine.         If you have any further questions or problems, please contact our office.    Sincerely,        Guillermo Kong MD  Attachment: Lab results

## 2018-10-31 ENCOUNTER — TRANSFERRED RECORDS (OUTPATIENT)
Dept: HEALTH INFORMATION MANAGEMENT | Facility: CLINIC | Age: 73
End: 2018-10-31

## 2018-11-24 DIAGNOSIS — I77.810 AORTIC ROOT DILATATION (H): ICD-10-CM

## 2018-11-24 DIAGNOSIS — I10 ESSENTIAL HYPERTENSION WITH GOAL BLOOD PRESSURE LESS THAN 140/90: ICD-10-CM

## 2018-11-26 ENCOUNTER — TELEPHONE (OUTPATIENT)
Dept: CARDIOLOGY | Facility: CLINIC | Age: 73
End: 2018-11-26

## 2018-11-26 NOTE — TELEPHONE ENCOUNTER
Call from patient, he wants to clarify if he should have CT chest or echo in the spring. Patient was seen for pre-op for his hip in September with a nuclear study.  Reviewed with patient that this test does not measure his aorta so image testing will be needed this year.  Will message Dr. Sim to clarify which testing is due.

## 2018-11-26 NOTE — TELEPHONE ENCOUNTER
"Requested Prescriptions   Pending Prescriptions Disp Refills     lisinopril (PRINIVIL/ZESTRIL) 2.5 MG tablet [Pharmacy Med Name: LISINOPRIL 2.5MG    TAB] 90 tablet 2    Last Written Prescription Date:  01/04/2018  Last Fill Quantity: 90,  # refills: 2   Last office visit: 9/18/2018 with prescribing provider:     Future Office Visit:   Sig: TAKE ONE TABLET BY MOUTH ONCE DAILY    ACE Inhibitors (Including Combos) Protocol Passed    11/24/2018 10:56 AM       Passed - Blood pressure under 140/90 in past 12 months    BP Readings from Last 3 Encounters:   10/10/18 93/56   09/19/18 110/64   09/18/18 110/60                Passed - Recent (12 mo) or future (30 days) visit within the authorizing provider's specialty    Patient had office visit in the last 12 months or has a visit in the next 30 days with authorizing provider or within the authorizing provider's specialty.  See \"Patient Info\" tab in inbasket, or \"Choose Columns\" in Meds & Orders section of the refill encounter.             Passed - Patient is age 18 or older       Passed - Normal serum creatinine on file in past 12 months    Recent Labs   Lab Test  10/10/18   0719   CR  0.71            Passed - Normal serum potassium on file in past 12 months    Recent Labs   Lab Test  10/09/18   0705   POTASSIUM  3.9             "

## 2018-11-28 RX ORDER — LISINOPRIL 2.5 MG/1
TABLET ORAL
Qty: 90 TABLET | Refills: 0 | Status: SHIPPED | OUTPATIENT
Start: 2018-11-28 | End: 2018-12-19

## 2018-11-28 NOTE — TELEPHONE ENCOUNTER
Prescription approved per [facility] Refill Protocol.  Noemi Lopez RN

## 2018-11-30 NOTE — TELEPHONE ENCOUNTER
Spoke with Patient, Dr. Sim's reply reviewed. Patient agrees with CT chest to evaluate aorta.   Test already scheduled for 1-10-19.

## 2018-11-30 NOTE — TELEPHONE ENCOUNTER
Lets get the CT scan to evaluate his aorta. If he has a significant murmer we may also get an Echo but I suspect it can wait.

## 2018-12-05 ENCOUNTER — TRANSFERRED RECORDS (OUTPATIENT)
Dept: HEALTH INFORMATION MANAGEMENT | Facility: CLINIC | Age: 73
End: 2018-12-05

## 2018-12-06 DIAGNOSIS — I10 ESSENTIAL HYPERTENSION WITH GOAL BLOOD PRESSURE LESS THAN 140/90: ICD-10-CM

## 2018-12-06 DIAGNOSIS — I77.810 AORTIC ROOT DILATATION (H): ICD-10-CM

## 2018-12-06 NOTE — TELEPHONE ENCOUNTER
"Requested Prescriptions   Pending Prescriptions Disp Refills     lisinopril (PRINIVIL/ZESTRIL) 2.5 MG tablet [Pharmacy Med Name: LISINOPRIL 2.5MG    TAB] 90 tablet 2    Last Written Prescription Date:  11/28/2018  Last Fill Quantity: 90,  # refills: 0   Last office visit: 9/18/2018 with prescribing provider:     Future Office Visit:   Sig: TAKE ONE TABLET BY MOUTH ONCE DAILY    ACE Inhibitors (Including Combos) Protocol Passed    12/6/2018  8:16 AM       Passed - Blood pressure under 140/90 in past 12 months    BP Readings from Last 3 Encounters:   10/10/18 93/56   09/19/18 110/64   09/18/18 110/60                Passed - Recent (12 mo) or future (30 days) visit within the authorizing provider's specialty    Patient had office visit in the last 12 months or has a visit in the next 30 days with authorizing provider or within the authorizing provider's specialty.  See \"Patient Info\" tab in inbasket, or \"Choose Columns\" in Meds & Orders section of the refill encounter.             Passed - Patient is age 18 or older       Passed - Normal serum creatinine on file in past 12 months    Recent Labs   Lab Test  10/10/18   0719   CR  0.71            Passed - Normal serum potassium on file in past 12 months    Recent Labs   Lab Test  10/09/18   0705   POTASSIUM  3.9             "

## 2018-12-11 RX ORDER — LISINOPRIL 2.5 MG/1
TABLET ORAL
Qty: 90 TABLET | Refills: 2 | Status: SHIPPED | OUTPATIENT
Start: 2018-12-11 | End: 2019-12-06

## 2018-12-11 NOTE — TELEPHONE ENCOUNTER
Prescription approved per [facility] Refill Protocol. SAVANNA Tovar R.N.

## 2018-12-14 DIAGNOSIS — I10 ESSENTIAL HYPERTENSION WITH GOAL BLOOD PRESSURE LESS THAN 140/90: ICD-10-CM

## 2018-12-14 DIAGNOSIS — I77.810 AORTIC ROOT DILATATION (H): ICD-10-CM

## 2018-12-14 NOTE — TELEPHONE ENCOUNTER
"Requested Prescriptions   Pending Prescriptions Disp Refills     lisinopril (PRINIVIL/ZESTRIL) 2.5 MG tablet [Pharmacy Med Name: LISINOPRIL 2.5MG    TAB] 90 tablet 2    Last Written Prescription Date:  12/11/2018  Last Fill Quantity: 90,  # refills: 2   Last office visit: 9/18/2018 with prescribing provider:     Future Office Visit:   Sig: TAKE ONE TABLET BY MOUTH ONCE DAILY    ACE Inhibitors (Including Combos) Protocol Passed - 12/14/2018 11:06 AM       Passed - Blood pressure under 140/90 in past 12 months    BP Readings from Last 3 Encounters:   10/10/18 93/56   09/19/18 110/64   09/18/18 110/60                Passed - Recent (12 mo) or future (30 days) visit within the authorizing provider's specialty    Patient had office visit in the last 12 months or has a visit in the next 30 days with authorizing provider or within the authorizing provider's specialty.  See \"Patient Info\" tab in inbasket, or \"Choose Columns\" in Meds & Orders section of the refill encounter.             Passed - Patient is age 18 or older       Passed - Normal serum creatinine on file in past 12 months    Recent Labs   Lab Test 10/10/18  0719   CR 0.71            Passed - Normal serum potassium on file in past 12 months    Recent Labs   Lab Test 10/09/18  0705   POTASSIUM 3.9             "

## 2018-12-19 RX ORDER — LISINOPRIL 2.5 MG/1
TABLET ORAL
Qty: 90 TABLET | Refills: 2 | OUTPATIENT
Start: 2018-12-19

## 2019-01-08 ENCOUNTER — TELEPHONE (OUTPATIENT)
Dept: CARDIOLOGY | Facility: CLINIC | Age: 74
End: 2019-01-08

## 2019-01-10 ENCOUNTER — HOSPITAL ENCOUNTER (OUTPATIENT)
Dept: CARDIOLOGY | Facility: CLINIC | Age: 74
Discharge: HOME | End: 2019-01-10
Attending: NURSE PRACTITIONER | Admitting: NURSE PRACTITIONER
Payer: COMMERCIAL

## 2019-01-10 VITALS — SYSTOLIC BLOOD PRESSURE: 108 MMHG | HEART RATE: 82 BPM | DIASTOLIC BLOOD PRESSURE: 68 MMHG

## 2019-01-10 DIAGNOSIS — I77.89 ASCENDING AORTA ENLARGEMENT (H): ICD-10-CM

## 2019-01-10 LAB
CREAT BLD-MCNC: 0.8 MG/DL (ref 0.66–1.25)
GFR SERPL CREATININE-BSD FRML MDRD: >90 ML/MIN/{1.73_M2}

## 2019-01-10 PROCEDURE — 71275 CT ANGIOGRAPHY CHEST: CPT

## 2019-01-10 PROCEDURE — 82565 ASSAY OF CREATININE: CPT

## 2019-01-10 PROCEDURE — 25000128 H RX IP 250 OP 636: Performed by: NURSE PRACTITIONER

## 2019-01-10 RX ORDER — IOPAMIDOL 755 MG/ML
500 INJECTION, SOLUTION INTRAVASCULAR ONCE
Status: COMPLETED | OUTPATIENT
Start: 2019-01-10 | End: 2019-01-10

## 2019-01-10 RX ADMIN — IOPAMIDOL 100 ML: 755 INJECTION, SOLUTION INTRAVENOUS at 11:26

## 2019-01-10 RX ADMIN — SODIUM CHLORIDE 100 ML: 9 INJECTION, SOLUTION INTRAVENOUS at 11:26

## 2019-01-14 ENCOUNTER — TELEPHONE (OUTPATIENT)
Dept: INTERNAL MEDICINE | Facility: CLINIC | Age: 74
End: 2019-01-14

## 2019-01-14 ENCOUNTER — TELEPHONE (OUTPATIENT)
Dept: CARDIOLOGY | Facility: CLINIC | Age: 74
End: 2019-01-14

## 2019-01-14 DIAGNOSIS — E04.2 MULTIPLE THYROID NODULES: Primary | ICD-10-CM

## 2019-01-14 NOTE — TELEPHONE ENCOUNTER
Call from patient, he would like a copy of his CTa chest. He notes that he was called with the results. He was not aware of any thyroid nodules in a previous exams. He would like his PCP updated with this information.  Mailed copy of results to patient.  Called Dr. Kong's office with request to have CTa chest and incidental finding of thyroid nodules brought to his attention.

## 2019-01-14 NOTE — TELEPHONE ENCOUNTER
"Luiza from [facility] calling about a CTA chest they ordered on patient.  Aorta and lung nodules \"were fine\" but there was an incidental finding of thyroid nodules that they would like primary care provider to follow up on.  SAVANNA Tovar R.N.    "

## 2019-01-15 NOTE — TELEPHONE ENCOUNTER
Left patient a MyChart message. Advised that the nodules are likely benign, but we usually get help from endocrinology specialist in an office consult. Will also order a thyroid ultrasound, as they usually are interested in checking this as well.     Please advise pt.

## 2019-01-15 NOTE — TELEPHONE ENCOUNTER
Patient also calling, would like to discuss nodule on thyroid and future treatment plan. Best call back is [phone].

## 2019-01-16 NOTE — TELEPHONE ENCOUNTER
Contacted patient, he has not read the [place] message from Dr. Kong yet. Informed patient that the nodules are usually benign, but usually have patients see our Endocrinologist and will also order thyroid ultrasound. Referral placed for Endocrinology, scheduled appointment for 3/13/19. Informed patient Radiology will call him this week to schedule appointment for ultrasound.

## 2019-01-18 ENCOUNTER — HOSPITAL ENCOUNTER (OUTPATIENT)
Dept: ULTRASOUND IMAGING | Facility: CLINIC | Age: 74
Discharge: HOME | End: 2019-01-18
Attending: INTERNAL MEDICINE | Admitting: INTERNAL MEDICINE
Payer: COMMERCIAL

## 2019-01-18 DIAGNOSIS — E04.2 MULTIPLE THYROID NODULES: ICD-10-CM

## 2019-01-18 PROCEDURE — 76536 US EXAM OF HEAD AND NECK: CPT

## 2019-05-08 ENCOUNTER — OFFICE VISIT (OUTPATIENT)
Dept: ENDOCRINOLOGY | Facility: CLINIC | Age: 74
End: 2019-05-08
Payer: COMMERCIAL

## 2019-05-08 VITALS
TEMPERATURE: 98.2 F | SYSTOLIC BLOOD PRESSURE: 104 MMHG | HEART RATE: 107 BPM | BODY MASS INDEX: 26.02 KG/M2 | WEIGHT: 185.9 LBS | DIASTOLIC BLOOD PRESSURE: 60 MMHG | HEIGHT: 71 IN | OXYGEN SATURATION: 96 %

## 2019-05-08 DIAGNOSIS — E04.1 THYROID NODULE: Primary | ICD-10-CM

## 2019-05-08 PROCEDURE — 84443 ASSAY THYROID STIM HORMONE: CPT | Performed by: INTERNAL MEDICINE

## 2019-05-08 PROCEDURE — 36415 COLL VENOUS BLD VENIPUNCTURE: CPT | Performed by: INTERNAL MEDICINE

## 2019-05-08 PROCEDURE — 99204 OFFICE O/P NEW MOD 45 MIN: CPT | Performed by: INTERNAL MEDICINE

## 2019-05-08 ASSESSMENT — MIFFLIN-ST. JEOR: SCORE: 1610.37

## 2019-05-08 NOTE — PROGRESS NOTES
Name: Javier Tamez  Seen in consultation with Guillermo Kong for thyroid nodule.   HPI:  Javier Tamez is a 74 year old male who presents for the evaluation of thyroid nodule.    Complex past medical history including history of irritable bowel syndrome, coronary artery disease, aortic root dilatation, dyslipidemia, status post right and left hip replacement.    Patient had CTA chest done in January 2019 for ascending aorta enlargement which showed incidental finding of thyroid nodule.  This was followed by thyroid ultrasound as noted below   Thyroid ultrasound January 2019 showing 2 thyroid nodules.      History of radiation exposure: NO  FH of thyroid problem: son has hypothyroidism. No FH of thyroid cancer.  History of thyroid dysfunction: NO  Palpitations:  No  Changes to hair or skin: No  Diarrhea/Constipation:h/o IBS  Dysphagia or Shortness of breath:No  Tremors:No  Difficulty sleeping:No  Changes in weight: No    PMH/PSH:  Past Medical History:   Diagnosis Date     Aortic root dilatation (H)      Aortic stenosis      Arthritis      Atypical chest pain 1/28/2015     Cerebral artery occlusion with cerebral infarction (H)     TIA  1992     Essential hypertension with goal blood pressure less than 140/90 11/26/2016    takes lisinopril for preventitive     Family history of heart disease      Hypercholesterolemia 1/3/2014     Diagnosis updated by automated process. Provider to review and confirm.     Hyperlipidemia LDL goal <70      IBS (irritable bowel syndrome)      Other chronic pain      Past Surgical History:   Procedure Laterality Date     ARTHROPLASTY HIP ANTERIOR Right 10/24/2017    Procedure: ARTHROPLASTY HIP ANTERIOR;  RIGHT TOTAL HIP ARTHROPLASTY DIRECT ANTERIOR APPROACH;  Surgeon: Meño Avalos MD;  Location:  OR     ARTHROPLASTY HIP ANTERIOR Left 10/9/2018    Procedure: ARTHROPLASTY HIP ANTERIOR;  LEFT TOTAL HIP ARTHROPLASTY DIRECT ANTERIOR APPROACH (DEPUY)^;  Surgeon: Meño Avalos  MD Christos;  Location:  OR     C NONSPECIFIC PROCEDURE      kidney stone     COLONOSCOPY      Adenomatous polyp removed     VASECTOMY       Family Hx:  Family History   Problem Relation Age of Onset     C.A.D. 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     Comment:    Social Needs     Financial resource strain: Not on file     Food insecurity:     Worry: Not on file     Inability: Not on file     Transportation needs:     Medical: Not on file     Non-medical: Not on file   Tobacco Use     Smoking status: Never Smoker     Smokeless tobacco: Never Used   Substance and Sexual Activity     Alcohol use: Yes     Comment: 3-4 glasses of wine weekly     Drug use: No     Sexual activity: Yes     Birth control/protection: Surgical   Lifestyle     Physical activity:     Days per week: Not on file     Minutes per session: Not on file     Stress: Not on file   Relationships     Social connections:     Talks on phone: Not on file     Gets together: Not on file     Attends Bahai service: Not on file     Active member of club or organization: Not on file     Attends meetings of clubs or organizations: Not on file     Relationship status: Not on file     Intimate partner violence:     Fear of current or ex partner: Not on file     Emotionally abused: Not on file     Physically abused: Not on file     Forced sexual activity: Not on file   Other Topics Concern     Parent/sibling w/ CABG, MI or angioplasty before 65F 55M? Yes     Comment: father       Service Not Asked  "    Blood Transfusions Not Asked     Caffeine Concern Yes     Comment: 2 cups coffee daily, occ soda      Occupational Exposure Not Asked     Hobby Hazards Not Asked     Sleep Concern No     Stress Concern Not Asked     Weight Concern Not Asked     Special Diet No     Back Care Not Asked     Exercise Yes     Comment: 3-4 times weekly      Bike Helmet Not Asked     Seat Belt Yes     Self-Exams Not Asked   Social History Narrative    Rides bicycle or [place] [facility] regularly (3x/week).           MEDICATIONS:  has a current medication list which includes the following prescription(s): acetaminophen, acetaminophen, ascorbic acid, aspirin, celecoxib, cyanocobalamin, lisinopril, nitroglycerin, oxycodone, probiotic product, rosuvastatin calcium, senna-docusate, tadalafil, and cholecalciferol.    Review of Systems  10 point ROS neg other than the symptoms noted above in the HPI.    Physical Exam   VS: /60 (BP Location: Left arm, Patient Position: Chair, Cuff Size: Adult Regular)   Pulse 107   Temp 98.2  F (36.8  C) (Oral)   Ht 1.803 m (5' 11\")   Wt 84.3 kg (185 lb 14.4 oz)   SpO2 96%   BMI 25.93 kg/m    GENERAL: AXOX3, NAD, well dressed, answering questions appropriately, appears stated age.  HEENT: OP clear, no LAD, no TM, non-tender, no exopthalmous, no proptosis, EOMI, no lig lag, no retraction  NECK: Thyroid normal in size, non tender, no nodules were palpated.  CV: RRR, no rubs, gallops, no murmurs  LUNGS: CTAB, no wheezes, rales, or ronchi  ABDOMEN: +BS  EXTREMITIES: no edema, +pulses, no rashes, no lesions  NEUROLOGY: CN grossly intact, + DTR upper and lower extremity, no tremors  MSK: grossly intact  SKIN: no rashes, no lesions    LABS:  TFTs:  ENDO THYROID LABS-[facility] Latest Ref Rng & Units 11/30/2016 10/15/2015   TSH 0.40 - 4.00 mU/L 1.08 0.77     ENDO THYROID LABS-[facility] Latest Ref Rng & Units 1/3/2014   TSH 0.40 - 4.00 mU/L 1.16     Thyroid Ultrasound:  ULTRASOUND THYROID   1/18/2019 2:17 PM      HISTORY: " Multiple thyroid nodules.     COMPARISON: None.     FINDINGS: The right and left lobes of the thyroid gland measure 4.9 x  2.0 x 1.2 cm and 5.1 x 2.1 x 2.0 cm respectively. The thyroid isthmus  measures 0.1 cm in thickness. Thyroid echotexture is homogeneous. Two  nodules are present in the thyroid gland as follows:     Nodule 1 - 0.9 x 0.7 x 0.4 cm cystic nodule containing a few internal  echogenicities in the mid right lobe.     Nodule 2 - 2.8 x 2.0 x 1.5 cm cystic nodule containing a few mildly  thickened septations and internal echogenicities in the mid left lobe.     No microcalcifications or increased blood flow associated with the  nodules.                                                                      IMPRESSION: Two cystic thyroid nodules, the largest a 2.8 x 2.0 x 1.5  cm mildly complex cystic nodule in the mid left lobe of the thyroid  gland. No significant solid tissue is associated with the nodules.  All pertinent notes, labs, and images personally reviewed by me.     A/P  Mr.Nicholas MARIO Tamez is a 74 year old here for the evaluation of thyroid nodule:  #1 Thyroid Nodule:  Thyroid nodules are common and are frequently benign. Data suggest that the prevalence of palpable thyroid nodules is 3% to 7% in [place]; the prevalence is as high as 50% based on ultrasonography (US) or autopsy data. All patients with a palpable thyroid nodule, however, should undergo US examination. US-guided FNA (US-FNA) is recommended for nodules ?10 mm; US-FNA is suggested for nodules <10 mm only if clinical information or US features are suspicious.  The frequency of thyroid nodules in general population was discussed. Also discussed possibility of malignancy, potential for thyroid autonomy. Discussed possible compressive symptoms and signs to watch for.  No history of radiation  No family history of thyroid cancer  No compressive symptoms  He is not on thyroid hormone replacement.  Clinically looks  euthyroid  Plan  Get thyroid labs today  Biopsy of dominant nodule on left side measuring 2.8 x 2.0 x 1.5 cm.  It appears complex- mostly cystic nodule with septations.  Discussed possible outcomes of biopsy including possible benign, possible malignancy and possible AUS. If AUS indication for molecular marker testing.  Discussed compressive symptoms to watch for  Follow-up after above      More than 50% of the time spent with Mr. Tamez on counseling / coordinating his care.      Follow-up:  After FNA.  Isabel Smith MD  Endocrinology   [facility]/[place]    Cc: Guillermo Kong    Addendum to above note and clinic visit:    Labs reviewed.    See result note/telephone encounter.

## 2019-05-08 NOTE — PATIENT INSTRUCTIONS
[facility]   Dr Smith, Endocrinology Department      [facility]   [address]  [place] [address]  Appointment [phone]  Fax: [phone]  [place]: Monday and Tuesday         [facility]   [address]  [place] [address]  Appointment [phone]  Fax: [phone]  [place]: Wednesday and Thursday            Labs today  FNAB with radiology  Follow up after that.     [facility] radiology scheduleing   [place]  [phone]   [facility] Radiology scheduling  [place]  [phone]     Please call and schedule the recommended test as discussed in clinic visit. These are the numbers to call.

## 2019-05-08 NOTE — NURSING NOTE
ENDOCRINOLOGY INTAKE FORM    Patient Name:  Javier Tamez  :  1945    Is patient Diabetic?   No  Does patient have non-diabetic or other endocrine issues?  Yes: thyroid nodules, hypercholesterolemia    Vitals: There were no vitals taken for this visit.  BMI= There is no height or weight on file to calculate BMI.      Smoking and Alcohol use:  Social History     Tobacco Use     Smoking status: Never Smoker     Smokeless tobacco: Never Used   Substance Use Topics     Alcohol use: Yes     Comment: 3-4 glasses of wine weekly     Drug use: No       Cielo Valenzuela CMA  [facility]  [place]

## 2019-05-08 NOTE — LETTER
5/8/2019         RE: Javier Tamez  [address] [place] [address]        Dear Colleague,    Thank you for referring your patient, Javier Tamez, to the [facility]. Please see a copy of my visit note below.    Name: Javier Tamez  Seen in consultation with Guillermo Kong for thyroid nodule.   HPI:  Javier Tamez is a 74 year old male who presents for the evaluation of thyroid nodule.    Complex past medical history including history of irritable bowel syndrome, coronary artery disease, aortic root dilatation, dyslipidemia, status post right and left hip replacement.    Patient had CTA chest done in January 2019 for ascending aorta enlargement which showed incidental finding of thyroid nodule.  This was followed by thyroid ultrasound as noted below   Thyroid ultrasound January 2019 showing 2 thyroid nodules.      History of radiation exposure: NO  FH of thyroid problem: son has hypothyroidism. No FH of thyroid cancer.  History of thyroid dysfunction: NO  Palpitations:  No  Changes to hair or skin: No  Diarrhea/Constipation:h/o IBS  Dysphagia or Shortness of breath:No  Tremors:No  Difficulty sleeping:No  Changes in weight: No    PMH/PSH:  Past Medical History:   Diagnosis Date     Aortic root dilatation (H)      Aortic stenosis      Arthritis      Atypical chest pain 1/28/2015     Cerebral artery occlusion with cerebral infarction (H)     TIA  1992     Essential hypertension with goal blood pressure less than 140/90 11/26/2016    takes lisinopril for preventitive     Family history of heart disease      Hypercholesterolemia 1/3/2014     Diagnosis updated by automated process. Provider to review and confirm.     Hyperlipidemia LDL goal <70      IBS (irritable bowel syndrome)      Other chronic pain      Past Surgical History:   Procedure Laterality Date     ARTHROPLASTY HIP ANTERIOR Right 10/24/2017    Procedure: ARTHROPLASTY HIP ANTERIOR;  RIGHT TOTAL HIP  ARTHROPLASTY DIRECT ANTERIOR APPROACH;  Surgeon: Meño Avalos MD;  Location: [facility] OR     ARTHROPLASTY HIP ANTERIOR Left 10/9/2018    Procedure: ARTHROPLASTY HIP ANTERIOR;  LEFT TOTAL HIP ARTHROPLASTY DIRECT ANTERIOR APPROACH (DEPUY)^;  Surgeon: Meño Avalos MD;  Location: [facility] OR     C NONSPECIFIC PROCEDURE      kidney stone     COLONOSCOPY      Adenomatous polyp removed     VASECTOMY       Family Hx:  Family History   Problem Relation Age of Onset     C.A.D. 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     Comment:    Social Needs     Financial resource strain: Not on file     Food insecurity:     Worry: Not on file     Inability: Not on file     Transportation needs:     Medical: Not on file     Non-medical: Not on file   Tobacco Use     Smoking status: Never Smoker     Smokeless tobacco: Never Used   Substance and Sexual Activity     Alcohol use: Yes     Comment: 3-4 glasses of wine weekly     Drug use: No     Sexual activity: Yes     Birth control/protection: Surgical   Lifestyle     Physical activity:     Days per week: Not on file     Minutes per session: Not on file     Stress: Not on file   Relationships     Social connections:     Talks on phone: Not on file     Gets together: Not on file     Attends Muslim service: Not on file     Active member of club or organization: Not on file     Attends meetings of clubs or organizations: Not on file     Relationship status: Not on file     Intimate partner violence:     Fear of current or ex partner: Not  "on file     Emotionally abused: Not on file     Physically abused: Not on file     Forced sexual activity: Not on file   Other Topics Concern     Parent/sibling w/ CABG, MI or angioplasty before 65F 55M? Yes     Comment: father       Service Not Asked     Blood Transfusions Not Asked     Caffeine Concern Yes     Comment: 2 cups coffee daily, occ soda      Occupational Exposure Not Asked     Hobby Hazards Not Asked     Sleep Concern No     Stress Concern Not Asked     Weight Concern Not Asked     Special Diet No     Back Care Not Asked     Exercise Yes     Comment: 3-4 times weekly      Bike Helmet Not Asked     Seat Belt Yes     Self-Exams Not Asked   Social History Narrative    Rides bicycle or goes to [employer] regularly (3x/week).           MEDICATIONS:  has a current medication list which includes the following prescription(s): acetaminophen, acetaminophen, ascorbic acid, aspirin, celecoxib, cyanocobalamin, lisinopril, nitroglycerin, oxycodone, probiotic product, rosuvastatin calcium, senna-docusate, tadalafil, and cholecalciferol.    Review of Systems  10 point ROS neg other than the symptoms noted above in the HPI.    Physical Exam   VS: /60 (BP Location: Left arm, Patient Position: Chair, Cuff Size: Adult Regular)   Pulse 107   Temp 98.2  F (36.8  C) (Oral)   Ht 1.803 m (5' 11\")   Wt 84.3 kg (185 lb 14.4 oz)   SpO2 96%   BMI 25.93 kg/m     GENERAL: AXOX3, NAD, well dressed, answering questions appropriately, appears stated age.  HEENT: OP clear, no LAD, no TM, non-tender, no exopthalmous, no proptosis, EOMI, no lig lag, no retraction  NECK: Thyroid normal in size, non tender, no nodules were palpated.  CV: RRR, no rubs, gallops, no murmurs  LUNGS: CTAB, no wheezes, rales, or ronchi  ABDOMEN: +BS  EXTREMITIES: no edema, +pulses, no rashes, no lesions  NEUROLOGY: CN grossly intact, + DTR upper and lower extremity, no tremors  MSK: grossly intact  SKIN: no rashes, no lesions    LABS:  TFTs:  ENDO " THYROID LABS-[facility] Latest Ref Rng & Units 11/30/2016 10/15/2015   TSH 0.40 - 4.00 mU/L 1.08 0.77     ENDO THYROID LABS-[facility] Latest Ref Rng & Units 1/3/2014   TSH 0.40 - 4.00 mU/L 1.16     Thyroid Ultrasound:  ULTRASOUND THYROID   1/18/2019 2:17 PM      HISTORY: Multiple thyroid nodules.     COMPARISON: None.     FINDINGS: The right and left lobes of the thyroid gland measure 4.9 x  2.0 x 1.2 cm and 5.1 x 2.1 x 2.0 cm respectively. The thyroid isthmus  measures 0.1 cm in thickness. Thyroid echotexture is homogeneous. Two  nodules are present in the thyroid gland as follows:     Nodule 1 - 0.9 x 0.7 x 0.4 cm cystic nodule containing a few internal  echogenicities in the mid right lobe.     Nodule 2 - 2.8 x 2.0 x 1.5 cm cystic nodule containing a few mildly  thickened septations and internal echogenicities in the mid left lobe.     No microcalcifications or increased blood flow associated with the  nodules.                                                                      IMPRESSION: Two cystic thyroid nodules, the largest a 2.8 x 2.0 x 1.5  cm mildly complex cystic nodule in the mid left lobe of the thyroid  gland. No significant solid tissue is associated with the nodules.  All pertinent notes, labs, and images personally reviewed by me.     A/P  Mr.Nicholas MARIO Tamez is a 74 year old here for the evaluation of thyroid nodule:  #1 Thyroid Nodule:  Thyroid nodules are common and are frequently benign. Data suggest that the prevalence of palpable thyroid nodules is 3% to 7% in [place]; the prevalence is as high as 50% based on ultrasonography (US) or autopsy data. All patients with a palpable thyroid nodule, however, should undergo US examination. US-guided FNA (US-FNA) is recommended for nodules ?10 mm; US-FNA is suggested for nodules <10 mm only if clinical information or US features are suspicious.  The frequency of thyroid nodules in general population was discussed. Also discussed possibility of malignancy,  potential for thyroid autonomy. Discussed possible compressive symptoms and signs to watch for.  No history of radiation  No family history of thyroid cancer  No compressive symptoms  He is not on thyroid hormone replacement.  Clinically looks euthyroid  Plan  Get thyroid labs today  Biopsy of dominant nodule on left side measuring 2.8 x 2.0 x 1.5 cm.  It appears complex- mostly cystic nodule with septations.  Discussed possible outcomes of biopsy including possible benign, possible malignancy and possible AUS. If AUS indication for molecular marker testing.  Discussed compressive symptoms to watch for  Follow-up after above      More than 50% of the time spent with Mr. Tamez on counseling / coordinating his care.      Follow-up:  After FNA.  Isabel Smith MD  Endocrinology   [facility]/[place]    Cc: Guillermo Kong    Addendum to above note and clinic visit:    Labs reviewed.    See result note/telephone encounter.            Again, thank you for allowing me to participate in the care of your patient.        Sincerely,        Isabel Smith MD

## 2019-05-09 LAB — TSH SERPL DL<=0.005 MIU/L-ACNC: 0.86 MU/L (ref 0.4–4)

## 2019-05-22 ENCOUNTER — HOSPITAL ENCOUNTER (OUTPATIENT)
Dept: ULTRASOUND IMAGING | Facility: CLINIC | Age: 74
Discharge: HOME | End: 2019-05-22
Attending: INTERNAL MEDICINE | Admitting: INTERNAL MEDICINE
Payer: COMMERCIAL

## 2019-05-22 DIAGNOSIS — E04.1 THYROID NODULE: ICD-10-CM

## 2019-05-22 PROCEDURE — 88173 CYTOPATH EVAL FNA REPORT: CPT | Mod: 26 | Performed by: INTERNAL MEDICINE

## 2019-05-22 PROCEDURE — 88305 TISSUE EXAM BY PATHOLOGIST: CPT | Mod: 26 | Performed by: INTERNAL MEDICINE

## 2019-05-22 PROCEDURE — 88112 CYTOPATH CELL ENHANCE TECH: CPT | Performed by: INTERNAL MEDICINE

## 2019-05-22 PROCEDURE — 88305 TISSUE EXAM BY PATHOLOGIST: CPT | Performed by: INTERNAL MEDICINE

## 2019-05-22 PROCEDURE — 00000155 ZZHCL STATISTIC H-CELL BLOCK W/STAIN: Performed by: INTERNAL MEDICINE

## 2019-05-22 PROCEDURE — 00000102 ZZHCL STATISTIC CYTO WRIGHT STAIN TC: Performed by: INTERNAL MEDICINE

## 2019-05-22 PROCEDURE — 88112 CYTOPATH CELL ENHANCE TECH: CPT | Mod: 26 | Performed by: INTERNAL MEDICINE

## 2019-05-22 PROCEDURE — 88173 CYTOPATH EVAL FNA REPORT: CPT | Performed by: INTERNAL MEDICINE

## 2019-05-22 PROCEDURE — 10005 FNA BX W/US GDN 1ST LES: CPT

## 2019-05-22 NOTE — PROGRESS NOTES
RADIOLOGY PROCEDURE NOTE  Patient name: Javier Tamez  MRN: [id number]  : 1945    Pre-procedure diagnosis: Cystic left thyroid nodule  Post-procedure diagnosis: Same    Procedure Date/Time: May 22, 2019  1:16 PM  Procedure: FNA biopsy of the left nodule  Estimated blood loss: None  Specimen(s) collected with description: 3 passes.  Cystic aspiration placed on slides... One pass in molecular.. One pass of fluid left in the syringe  The patient tolerated the procedure well with no immediate complications.  Significant findings:none    See imaging dictation for procedural details.    Provider name: Star Cuellar  Assistant(s):None

## 2019-05-22 NOTE — PROGRESS NOTES
Pt arrived to Radiology today for an US guided left thyroid biopsy. Procedure was performed by SETH WHALEN. Procedure went without complications and pt tolerated procedure well. Pt was given written and verbal instructions and left in satisfactory condition.  Specimen to lab for testing

## 2019-05-23 DIAGNOSIS — E78.00 HYPERCHOLESTEROLEMIA: Primary | ICD-10-CM

## 2019-05-23 RX ORDER — ROSUVASTATIN CALCIUM 40 MG/1
40 TABLET, COATED ORAL EVERY EVENING
Qty: 90 TABLET | Refills: 1 | Status: SHIPPED | OUTPATIENT
Start: 2019-05-23 | End: 2019-05-28

## 2019-05-24 LAB
COPATH REPORT: NORMAL
COPATH REPORT: NORMAL

## 2019-05-28 ENCOUNTER — TELEPHONE (OUTPATIENT)
Dept: ENDOCRINOLOGY | Facility: CLINIC | Age: 74
End: 2019-05-28

## 2019-05-28 DIAGNOSIS — E04.1 THYROID NODULE: Primary | ICD-10-CM

## 2019-05-28 DIAGNOSIS — E78.00 HYPERCHOLESTEROLEMIA: ICD-10-CM

## 2019-05-28 RX ORDER — ROSUVASTATIN CALCIUM 40 MG/1
40 TABLET, COATED ORAL EVERY EVENING
Qty: 90 TABLET | Refills: 1 | Status: SHIPPED | OUTPATIENT
Start: 2019-05-28 | End: 2019-12-06

## 2019-05-28 NOTE — TELEPHONE ENCOUNTER
Biopsy of dominant nodule on left side measuring 2.8 x 2.0 x 1.5 cm.It appears complex- mostly cystic nodule with septations.      SPECIMEN/STAIN PROCESS:   Thyroid, left thyroid nodule , ultrasound guided fine needle aspiration        Pap-Cyto x 3, Roberts's stain-cyto x 1     ----------------------------------------------------------------     CYTOLOGIC INTERPRETATION:      Thyroid, left thyroid nodule , ultrasound guided fine needle aspiration:     Nondiagnostic specimen.   Virtually acellular specimen     The [place] implied risk of malignancy and recommended clinical   management:   Nondiagnostic or Unsatisfactory has a 5-10% risk of malignancy,   recommended management is repeat FNA with   ultrasound guidance in 4-6 weeks.     ---------------    Ana Luisa Herrera.  Discussed results.  Will recommend repeat thyroid US in 6 months to assess for interval change in size and characteristics of thyroid nodules.  Follow up after that in clinic.    The patient indicates understanding of these issues and agrees with the plan.

## 2019-09-20 ENCOUNTER — TELEPHONE (OUTPATIENT)
Dept: INTERNAL MEDICINE | Facility: CLINIC | Age: 74
End: 2019-09-20

## 2019-09-20 NOTE — TELEPHONE ENCOUNTER
Reason for call:  Patient reporting a symptom    Symptom or request: cough, congestion    Duration (how long have symptoms been present): 1 week    Have you been treated for this before? unknown    Additional comments: patient would like advise about the above symptoms and if he needs to be seen    Phone Number patient can be reached at: [phone]    Best Time:  any    Can we leave a detailed message on this number:  YES    Call taken on 9/20/2019 at 3:19 PM by Lin Dennis

## 2019-10-01 ENCOUNTER — HEALTH MAINTENANCE LETTER (OUTPATIENT)
Age: 74
End: 2019-10-01

## 2019-10-11 ENCOUNTER — TELEPHONE (OUTPATIENT)
Dept: CARDIOLOGY | Facility: CLINIC | Age: 74
End: 2019-10-11

## 2019-10-11 ENCOUNTER — HOSPITAL ENCOUNTER (OUTPATIENT)
Dept: CARDIOLOGY | Facility: CLINIC | Age: 74
Discharge: HOME | End: 2019-10-11
Attending: INTERNAL MEDICINE | Admitting: INTERNAL MEDICINE
Payer: COMMERCIAL

## 2019-10-11 DIAGNOSIS — I25.10 CORONARY ARTERY DISEASE INVOLVING NATIVE CORONARY ARTERY WITHOUT ANGINA PECTORIS: Primary | ICD-10-CM

## 2019-10-11 DIAGNOSIS — I25.10 CORONARY ARTERY DISEASE INVOLVING NATIVE CORONARY ARTERY OF NATIVE HEART WITHOUT ANGINA PECTORIS: ICD-10-CM

## 2019-10-11 DIAGNOSIS — I77.89 ASCENDING AORTA ENLARGEMENT (H): ICD-10-CM

## 2019-10-11 DIAGNOSIS — I10 ESSENTIAL HYPERTENSION WITH GOAL BLOOD PRESSURE LESS THAN 140/90: ICD-10-CM

## 2019-10-11 LAB
ALT SERPL W P-5'-P-CCNC: 64 U/L (ref 0–70)
ANION GAP SERPL CALCULATED.3IONS-SCNC: 3 MMOL/L (ref 3–14)
BUN SERPL-MCNC: 14 MG/DL (ref 7–30)
CALCIUM SERPL-MCNC: 8.8 MG/DL (ref 8.5–10.1)
CHLORIDE SERPL-SCNC: 106 MMOL/L (ref 94–109)
CHOLEST SERPL-MCNC: 118 MG/DL
CO2 SERPL-SCNC: 30 MMOL/L (ref 20–32)
CREAT SERPL-MCNC: 0.73 MG/DL (ref 0.66–1.25)
GFR SERPL CREATININE-BSD FRML MDRD: >90 ML/MIN/{1.73_M2}
GLUCOSE SERPL-MCNC: 88 MG/DL (ref 70–99)
HDLC SERPL-MCNC: 52 MG/DL
LDLC SERPL CALC-MCNC: 58 MG/DL
NONHDLC SERPL-MCNC: 66 MG/DL
POTASSIUM SERPL-SCNC: 4 MMOL/L (ref 3.4–5.3)
SODIUM SERPL-SCNC: 139 MMOL/L (ref 133–144)
TRIGL SERPL-MCNC: 39 MG/DL

## 2019-10-11 PROCEDURE — 84460 ALANINE AMINO (ALT) (SGPT): CPT | Performed by: INTERNAL MEDICINE

## 2019-10-11 PROCEDURE — 93306 TTE W/DOPPLER COMPLETE: CPT

## 2019-10-11 PROCEDURE — 93306 TTE W/DOPPLER COMPLETE: CPT | Mod: 26 | Performed by: INTERNAL MEDICINE

## 2019-10-11 PROCEDURE — 80061 LIPID PANEL: CPT | Performed by: INTERNAL MEDICINE

## 2019-10-11 PROCEDURE — 36415 COLL VENOUS BLD VENIPUNCTURE: CPT | Performed by: INTERNAL MEDICINE

## 2019-10-11 PROCEDURE — 80048 BASIC METABOLIC PNL TOTAL CA: CPT | Performed by: INTERNAL MEDICINE

## 2019-10-11 NOTE — TELEPHONE ENCOUNTER
Echo 10/11/19 noted, ordered for annual screening. Patient to see Dr. Sim on 12/31/19.    Echo: Left ventricular systolic function is low normal. The visual ejection fraction is estimated at 50-55%. There is moderate trileaflet aortic sclerosis. No aortic stenosis is present. There is trace aortic regurgitation. Mild aortic root dilatation. The ascending aorta is Borderline dilated.(4.2 cm vs 3.9cm in 2014). No significnat change compared to prior study from 2014. The study was  technically difficult.    Ascending aorta on CT 1/2019 = 3.6cm  Ascending aorta on echo 2014 = 3.6cm    Will message Dr. Sim to review

## 2019-10-14 NOTE — TELEPHONE ENCOUNTER
Called patient to discuss echo report as stable. Patient states he scheduled his visit with Dr. Sim on 12/31/19 because last month he had 2 episodes of chest discomfort. He states both times he was at rest and felt a dull discomfort in his chest that last a couple of minutes. He has had no further episodes since he called for the visit.  Patient has a history of CAD due to elevated calcium score. He had a nuclear study 9/2018 for cardiac clearance prior to hip surgery, nuclear study showed no ischemia.  Patient states his routine exercise is bike riding and hasn't been out much due to the weather. He plans to start using his treadmill again. He wonders if he needs any more testing for this chest discomfort before he sees Dr. Sim.    Will message Dr. Sim to review

## 2019-10-14 NOTE — TELEPHONE ENCOUNTER
Order placed for stress echo on meds per Dr. Sim. Attempted to contact patient to set up stress test, left message for patient to call back.    1520 spoke with patient, he would like to do the stress echo and is able to walk on a treadmill. Connected patient with scheduling.

## 2019-10-22 ENCOUNTER — HOSPITAL ENCOUNTER (OUTPATIENT)
Dept: CARDIOLOGY | Facility: CLINIC | Age: 74
Discharge: HOME | End: 2019-10-22
Attending: INTERNAL MEDICINE | Admitting: INTERNAL MEDICINE
Payer: COMMERCIAL

## 2019-10-22 DIAGNOSIS — I25.10 CORONARY ARTERY DISEASE INVOLVING NATIVE CORONARY ARTERY WITHOUT ANGINA PECTORIS: ICD-10-CM

## 2019-10-22 PROCEDURE — 93350 STRESS TTE ONLY: CPT | Mod: 26 | Performed by: INTERNAL MEDICINE

## 2019-10-22 PROCEDURE — 93325 DOPPLER ECHO COLOR FLOW MAPG: CPT | Mod: 26 | Performed by: INTERNAL MEDICINE

## 2019-10-22 PROCEDURE — 93016 CV STRESS TEST SUPVJ ONLY: CPT | Performed by: INTERNAL MEDICINE

## 2019-10-22 PROCEDURE — 93018 CV STRESS TEST I&R ONLY: CPT | Performed by: INTERNAL MEDICINE

## 2019-10-22 PROCEDURE — 25500064 ZZH RX 255 OP 636: Performed by: INTERNAL MEDICINE

## 2019-10-22 PROCEDURE — 93321 DOPPLER ECHO F-UP/LMTD STD: CPT | Mod: 26 | Performed by: INTERNAL MEDICINE

## 2019-10-22 PROCEDURE — 40000264 ECHO STRESS ECHOCARDIOGRAM

## 2019-10-22 RX ADMIN — HUMAN ALBUMIN MICROSPHERES AND PERFLUTREN 3 ML: 10; .22 INJECTION, SOLUTION INTRAVENOUS at 09:45

## 2019-10-23 ENCOUNTER — TELEPHONE (OUTPATIENT)
Dept: CARDIOLOGY | Facility: CLINIC | Age: 74
End: 2019-10-23

## 2019-10-23 NOTE — TELEPHONE ENCOUNTER
Stress echo ordered by Dr Sim 10/11/19 for patient reports of chest pain, results below. Pt has f/up OV w/ Dr Sim scheduled 12/31/19. Routed to Dr Sim as an FYI.     Interpretation Summary  The patient exercised 10:23.  The patient exhibited no chest pain during exercise.  The EKG portion of this stress test was negative for inducible ischemia (see  echo results below).  Normal resting wall motion and no stress-induced wall motion abnormality.  This was a normal stress echocardiogram with no evidence of stress-induced  ischemia.  The [facility] treadmill score was low risk ( >5 Pittman score).  [place]: Dr Medina

## 2019-10-24 NOTE — TELEPHONE ENCOUNTER
Called patient to review negative stress test and confirm f/up on 12/31/19 w/ Dr Sim. Pt verbalized understanding, agreeable to plan.

## 2019-10-26 ASSESSMENT — ENCOUNTER SYMPTOMS
FEVER: 0
CONSTIPATION: 1
EYE PAIN: 0
COUGH: 0
NERVOUS/ANXIOUS: 0
DIZZINESS: 0
ABDOMINAL PAIN: 0
PARESTHESIAS: 0
MYALGIAS: 0
HEARTBURN: 0
PALPITATIONS: 0
WEAKNESS: 0
NAUSEA: 0
ARTHRALGIAS: 0
SHORTNESS OF BREATH: 0
SORE THROAT: 0
HEMATURIA: 0
HEMATOCHEZIA: 0
FREQUENCY: 0
DYSURIA: 0
HEADACHES: 0
DIARRHEA: 0
JOINT SWELLING: 0
CHILLS: 0

## 2019-10-26 ASSESSMENT — ACTIVITIES OF DAILY LIVING (ADL): CURRENT_FUNCTION: NO ASSISTANCE NEEDED

## 2019-10-28 ENCOUNTER — TELEPHONE (OUTPATIENT)
Dept: INTERNAL MEDICINE | Facility: CLINIC | Age: 74
End: 2019-10-28

## 2019-10-28 ENCOUNTER — OFFICE VISIT (OUTPATIENT)
Dept: INTERNAL MEDICINE | Facility: CLINIC | Age: 74
End: 2019-10-28
Payer: COMMERCIAL

## 2019-10-28 VITALS
WEIGHT: 188 LBS | HEART RATE: 120 BPM | OXYGEN SATURATION: 95 % | HEIGHT: 71 IN | RESPIRATION RATE: 16 BRPM | DIASTOLIC BLOOD PRESSURE: 62 MMHG | SYSTOLIC BLOOD PRESSURE: 90 MMHG | TEMPERATURE: 98.3 F | BODY MASS INDEX: 26.32 KG/M2

## 2019-10-28 DIAGNOSIS — Z00.00 HEALTHCARE MAINTENANCE: Primary | ICD-10-CM

## 2019-10-28 DIAGNOSIS — Z96.642 STATUS POST LEFT HIP REPLACEMENT: ICD-10-CM

## 2019-10-28 PROCEDURE — 99397 PER PM REEVAL EST PAT 65+ YR: CPT | Performed by: NURSE PRACTITIONER

## 2019-10-28 PROCEDURE — 99207 C PAF COMPLETED  NO CHARGE: CPT | Mod: 25 | Performed by: NURSE PRACTITIONER

## 2019-10-28 PROCEDURE — 36415 COLL VENOUS BLD VENIPUNCTURE: CPT | Performed by: NURSE PRACTITIONER

## 2019-10-28 PROCEDURE — 86803 HEPATITIS C AB TEST: CPT | Performed by: NURSE PRACTITIONER

## 2019-10-28 ASSESSMENT — ENCOUNTER SYMPTOMS
EYE PAIN: 0
PARESTHESIAS: 0
NERVOUS/ANXIOUS: 0
DIZZINESS: 0
HEADACHES: 0
ARTHRALGIAS: 0
FREQUENCY: 0
JOINT SWELLING: 0
PALPITATIONS: 0
DYSURIA: 0
NAUSEA: 0
HEARTBURN: 0
DIARRHEA: 0
WEAKNESS: 0
SHORTNESS OF BREATH: 0
ABDOMINAL PAIN: 0
CHILLS: 0
COUGH: 0
CONSTIPATION: 1
MYALGIAS: 0
HEMATOCHEZIA: 0
FEVER: 0
HEMATURIA: 0
SORE THROAT: 0

## 2019-10-28 ASSESSMENT — MIFFLIN-ST. JEOR: SCORE: 1619.89

## 2019-10-28 ASSESSMENT — ACTIVITIES OF DAILY LIVING (ADL): CURRENT_FUNCTION: NO ASSISTANCE NEEDED

## 2019-10-28 NOTE — LETTER
October 30, 2019      Javier Tamez  [address] [place] [address]        Dear Joi,    We are writing to inform you of your test results.    Your recent lab results were normal.     Resulted Orders   Hepatitis C antibody   Result Value Ref Range    Hepatitis C Antibody Nonreactive NR^Nonreactive      Comment:      Assay performance characteristics have not been established for newborns,   infants, and children         If you have any questions or concerns, please call the clinic at the number listed above.       Sincerely,        Candice Payan NP

## 2019-10-28 NOTE — PROGRESS NOTES
"SUBJECTIVE:   Javier Tamez is a 74 year old male who presents for Preventive Visit.      Are you in the first 12 months of your Medicare coverage?  No    Healthy Habits:     In general, how would you rate your overall health?  Good    Frequency of exercise:  4-5 days/week    Duration of exercise:  15-30 minutes    Do you usually eat at least 4 servings of fruit and vegetables a day, include whole grains    & fiber and avoid regularly eating high fat or \"junk\" foods?  Yes    Taking medications regularly:  Yes    Medication side effects:  None    Ability to successfully perform activities of daily living:  No assistance needed    Home Safety:  Throw rugs in the hallway and lack of grab bars in the bathroom    Hearing Impairment:  No hearing concerns    In the past 6 months, have you been bothered by leaking of urine?  No    In general, how would you rate your overall mental or emotional health?  Good      PHQ-2 Total Score: 0    Additional concerns today:  No    Do you feel safe in your environment? Yes    Do you have a Health Care Directive? Yes: Advance Directive has been received and scanned.      Fall risk       Cognitive Screening   1) Repeat 3 items (Leader, Season, Table)    2) Clock draw: NORMAL  3) 3 item recall: Recalls 3 objects  Results: 3 items recalled: COGNITIVE IMPAIRMENT LESS LIKELY    Mini-CogTM Copyright [employer]. Licensed by the author for use in [facility]; reprinted with permission (leigha@.[email or web address]). All rights reserved.      Do you have sleep apnea, excessive snoring or daytime drowsiness?: no    Reviewed and updated as needed this visit by clinical staff  Tobacco  Allergies  Meds  Med Hx  Surg Hx  Fam Hx  Soc Hx        Reviewed and updated as needed this visit by Provider        Social History     Tobacco Use     Smoking status: Never Smoker     Smokeless tobacco: Never Used   Substance Use Topics     Alcohol use: Yes     Comment: 3-4 glasses of wine weekly     If you drink " alcohol do you typically have >3 drinks per day or >7 drinks per week? No    Alcohol Use 10/26/2019   Prescreen: >3 drinks/day or >7 drinks/week? No   Prescreen: >3 drinks/day or >7 drinks/week? -               Current providers sharing in care for this patient include:   Patient Care Team:  Guillermo Kong MD as PCP - General (Internal Medicine)  Guillermo Kong MD as Assigned PCP    The following health maintenance items are reviewed in Epic and correct as of today:  Health Maintenance   Topic Date Due     HEPATITIS C SCREENING  1945     MEDICARE ANNUAL WELLNESS VISIT  11/30/2017     INFLUENZA VACCINE (1) 09/01/2019     FALL RISK ASSESSMENT  09/18/2019     DTAP/TDAP/TD IMMUNIZATION (2 - Td) 08/16/2023     ADVANCE CARE PLANNING  10/10/2023     LIPID  10/11/2024     COLONOSCOPY  03/23/2027     PHQ-2  Completed     PNEUMOCOCCAL IMMUNIZATION 65+ LOW/MEDIUM RISK  Completed     ZOSTER IMMUNIZATION  Completed     AORTIC ANEURYSM SCREENING (SYSTEM ASSIGNED)  Completed     IPV IMMUNIZATION  Aged Out     MENINGITIS IMMUNIZATION  Aged Out     BP Readings from Last 3 Encounters:   10/28/19 90/62   05/08/19 104/60   01/10/19 108/68    Wt Readings from Last 3 Encounters:   10/28/19 85.3 kg (188 lb)   05/08/19 84.3 kg (185 lb 14.4 oz)   10/09/18 82.6 kg (182 lb)                  Patient Active Problem List   Diagnosis     IBS (irritable bowel syndrome)     Aortic root dilatation (H)     Hypercholesterolemia     Adenomatous polyp     Atypical chest pain     Coronary artery disease involving native coronary artery without angina pectoris     Status post right hip replacement     Status post left hip replacement     Past Surgical History:   Procedure Laterality Date     ARTHROPLASTY HIP ANTERIOR Right 10/24/2017    Procedure: ARTHROPLASTY HIP ANTERIOR;  RIGHT TOTAL HIP ARTHROPLASTY DIRECT ANTERIOR APPROACH;  Surgeon: Meño Avalos MD;  Location: [facility] OR     ARTHROPLASTY HIP ANTERIOR Left 10/9/2018    Procedure:  ARTHROPLASTY HIP ANTERIOR;  LEFT TOTAL HIP ARTHROPLASTY DIRECT ANTERIOR APPROACH (DEPUY)^;  Surgeon: Meño Avalos MD;  Location: [facility] OR     C NONSPECIFIC PROCEDURE      kidney stone     COLONOSCOPY      Adenomatous polyp removed     VASECTOMY         Social History     Tobacco Use     Smoking status: Never Smoker     Smokeless tobacco: Never Used   Substance Use Topics     Alcohol use: Yes     Comment: 3-4 glasses of wine weekly     Family History   Problem Relation Age of Onset     C.A.D. Father          of MI at age 63, 1ST MI age 39     Diabetes Father      Cerebrovascular Disease Mother         Stroke age 69     Breast Cancer Mother          age 75         Current Outpatient Medications   Medication Sig Dispense Refill     Ascorbic Acid (VITAMIN C PO) Take 1,000 mg by mouth every morning        aspirin (ASA) 81 MG tablet Take 1 tablet (81 mg) by mouth daily       Cyanocobalamin (VITAMIN B 12 PO) Take 1,000 mcg by mouth every morning       lisinopril (PRINIVIL/ZESTRIL) 2.5 MG tablet TAKE ONE TABLET BY MOUTH ONCE DAILY 90 tablet 2     nitroglycerin (NITROSTAT) 0.4 MG sublingual tablet For chest pain place 1 tablet under the tongue every 5 minutes for 3 doses. If symptoms persist 5 minutes after 1st dose call 911. (Patient taking differently: Place 0.4 mg under the tongue every 5 minutes as needed For chest pain place 1 tablet under the tongue every 5 minutes for 3 doses. If symptoms persist 5 minutes after 1st dose call 911.) 25 tablet 3     Probiotic Product (PROBIOTIC PO) Take 1 capsule by mouth every morning       rosuvastatin (CRESTOR) 40 MG tablet Take 1 tablet (40 mg) by mouth every evening 90 tablet 1     tadalafil (CIALIS) 5 MG tablet TAKE 1 TABLET BY MOUTH DAILY AS NEEDED 4 tablet 22     VITAMIN D, CHOLECALCIFEROL, PO Take 1,000 Units by mouth every morning        celecoxib (CELEBREX) 200 MG capsule Take 1 capsule (200 mg) by mouth daily 30 capsule 0         Review of Systems  "  Constitutional: Negative for chills and fever.   HENT: Negative for congestion, ear pain, hearing loss and sore throat.    Eyes: Negative for pain and visual disturbance.   Respiratory: Negative for cough and shortness of breath.    Cardiovascular: Positive for chest pain. Negative for palpitations and peripheral edema.   Gastrointestinal: Positive for constipation. Negative for abdominal pain, diarrhea, heartburn, hematochezia and nausea.   Genitourinary: Negative for discharge, dysuria, frequency, genital sores, hematuria, impotence and urgency.   Musculoskeletal: Negative for arthralgias, joint swelling and myalgias.   Skin: Negative for rash.   Neurological: Negative for dizziness, weakness, headaches and paresthesias.   Psychiatric/Behavioral: Negative for mood changes. The patient is not nervous/anxious.          OBJECTIVE:   BP 90/62 (BP Location: Right arm, Patient Position: Sitting, Cuff Size: Adult Large)   Pulse 120   Temp 98.3  F (36.8  C) (Oral)   Resp 16   Ht 1.803 m (5' 11\")   Wt 85.3 kg (188 lb)   SpO2 95%   BMI 26.22 kg/m   Estimated body mass index is 26.22 kg/m  as calculated from the following:    Height as of this encounter: 1.803 m (5' 11\").    Weight as of this encounter: 85.3 kg (188 lb).  Physical Exam  GENERAL: healthy, alert and no distress  NECK: no adenopathy, no asymmetry, masses, or scars and thyroid normal to palpation  RESP: lungs clear to auscultation - no rales, rhonchi or wheezes  CV: regular rate and rhythm, normal S1 S2, no S3 or S4, no murmur, click or rub, no peripheral edema and peripheral pulses strong  ABDOMEN: soft, nontender, no hepatosplenomegaly, no masses and bowel sounds normal  PSYCH: mentation appears normal, affect normal/bright        ASSESSMENT / PLAN:       ICD-10-CM    1. Healthcare maintenance Z00.00 Hepatitis C antibody   2. Status post left hip replacement Z96.642        End of Life Planning:  Patient currently has an advanced directive: Yes.  " "Practitioner is supportive of decision.    COUNSELING:  Reviewed preventive health counseling, as reflected in patient instructions    Estimated body mass index is 26.22 kg/m  as calculated from the following:    Height as of this encounter: 1.803 m (5' 11\").    Weight as of this encounter: 85.3 kg (188 lb).         reports that he has never smoked. He has never used smokeless tobacco.      Appropriate preventive services were discussed with this patient, including applicable screening as appropriate for cardiovascular disease, diabetes, osteopenia/osteoporosis, and glaucoma.  As appropriate for age/gender, discussed screening for colorectal cancer, prostate cancer, breast cancer, and cervical cancer. Checklist reviewing preventive services available has been given to the patient.    Reviewed patients plan of care and provided an AVS. The Basic Care Plan (routine screening as documented in Health Maintenance) for Javier meets the Care Plan requirement. This Care Plan has been established and reviewed with the Patient.    Counseling Resources:  ATP IV Guidelines  Pooled Cohorts Equation Calculator  Breast Cancer Risk Calculator  FRAX Risk Assessment  ICSI Preventive Guidelines  Dietary Guidelines for Americans, 2010  USDA's MyPlate  ASA Prophylaxis  Lung CA Screening    Candice Payan NP  [facility]    Identified Health Risks:  "

## 2019-10-28 NOTE — TELEPHONE ENCOUNTER
Patient was seen this morning and was told to come back in next month. Patient is not sure why he needs to come back. Please advise. Ok to call and lul [phone]

## 2019-10-28 NOTE — NURSING NOTE
"Patient here for a wellness visit and is not fasting.  BP 90/62 (BP Location: Right arm, Patient Position: Sitting, Cuff Size: Adult Large)   Pulse 120   Temp 98.3  F (36.8  C) (Oral)   Resp 16   Ht 1.803 m (5' 11\")   Wt 85.3 kg (188 lb)   SpO2 95%   BMI 26.22 kg/m      "

## 2019-10-28 NOTE — TELEPHONE ENCOUNTER
Please see message below and advise.     Per 10/28/19 office visit note, patient was advised to return in one year. Will route to ensure patient was not given any other instructions on when to return.

## 2019-10-29 ENCOUNTER — TELEPHONE (OUTPATIENT)
Dept: ENDOCRINOLOGY | Facility: CLINIC | Age: 74
End: 2019-10-29

## 2019-10-29 DIAGNOSIS — E04.1 THYROID NODULE: Primary | ICD-10-CM

## 2019-10-29 LAB — HCV AB SERPL QL IA: NONREACTIVE

## 2019-10-29 NOTE — TELEPHONE ENCOUNTER
Patient wants to verify when he needs to follow up with Dr. Smith.  He understood that it would be one year from 05/19 visit, but his PCP yesterday advised him that typically it would be 6 month follow up.  Please contact patient to clarify.

## 2019-10-29 NOTE — TELEPHONE ENCOUNTER
Patient will sched us.    Next 5 appointments (look out 90 days)    Nov 26, 2019 10:30 AM CST  [facility] with Isabel [facility] MD  [facility] [address]  [address]  [place] [address]  [phone]   Dec 31, 2019  9:00 AM CST  Return Visit with Miguelito Sim MD  [facility] ([facility]) [address]  [facility] [facility] [id number] [phone]        Cielo Valenzuela CMA  [facility]  [place]

## 2019-10-29 NOTE — TELEPHONE ENCOUNTER
Thyroid ultrasound signed  He can get ultrasound done through radiology  Follow-up in clinic 1 week after that  Okay to add on  Please check with any availability.     [facility] radiology scheduleing   [place]  [phone]   [facility] Radiology scheduling  [place]  [phone]     Please call and schedule the recommended test as discussed in clinic visit. These are the numbers to call.

## 2019-10-29 NOTE — TELEPHONE ENCOUNTER
Looks like he need 6 mo f/u US of thyroid, pended.  When do you want to see him?    Cielo Valenzuela, [facility]  [place]

## 2019-11-12 ENCOUNTER — TELEPHONE (OUTPATIENT)
Dept: CARDIOLOGY | Facility: CLINIC | Age: 74
End: 2019-11-12

## 2019-11-12 NOTE — TELEPHONE ENCOUNTER
Patient called, has an Annual Dr. Sim OV Dec. 31,2019.   Patient will be having a thyroid US on  Nov 22,  to check pulmonary nodules ordered by PCP.  Patient wondering if he should have a Chest CT with contrast prior to Dr. Sim's OV.     Dx: Aortic root dilatation.   Last CT Chest done 1-10-19- 1. Mild ectasia of the ascending thoracic aorta 3.6 x 3.5 cm, not  significantly changed.    Will message Dr. Sim.

## 2019-11-22 ENCOUNTER — HOSPITAL ENCOUNTER (OUTPATIENT)
Dept: ULTRASOUND IMAGING | Facility: CLINIC | Age: 74
Discharge: HOME | End: 2019-11-22
Attending: INTERNAL MEDICINE | Admitting: INTERNAL MEDICINE
Payer: COMMERCIAL

## 2019-11-22 DIAGNOSIS — E04.1 THYROID NODULE: ICD-10-CM

## 2019-11-22 PROCEDURE — 76536 US EXAM OF HEAD AND NECK: CPT

## 2019-11-26 ENCOUNTER — OFFICE VISIT (OUTPATIENT)
Dept: ENDOCRINOLOGY | Facility: CLINIC | Age: 74
End: 2019-11-26
Payer: COMMERCIAL

## 2019-11-26 VITALS
WEIGHT: 188 LBS | HEART RATE: 70 BPM | DIASTOLIC BLOOD PRESSURE: 66 MMHG | TEMPERATURE: 97.6 F | SYSTOLIC BLOOD PRESSURE: 112 MMHG | BODY MASS INDEX: 26.32 KG/M2 | HEIGHT: 71 IN | OXYGEN SATURATION: 98 % | RESPIRATION RATE: 18 BRPM

## 2019-11-26 DIAGNOSIS — E04.1 THYROID NODULE: Primary | ICD-10-CM

## 2019-11-26 PROCEDURE — 99214 OFFICE O/P EST MOD 30 MIN: CPT | Performed by: INTERNAL MEDICINE

## 2019-11-26 ASSESSMENT — MIFFLIN-ST. JEOR: SCORE: 1619.89

## 2019-11-26 NOTE — NURSING NOTE
ENDOCRINOLOGY INTAKE FORM    Patient Name:  Javier Tamez  :  1945    Is patient Diabetic?   No  Does patient have non-diabetic or other endocrine issues?  Yes: hypercholesterolemia, thyroid nodule    Vitals: There were no vitals taken for this visit.  BMI= There is no height or weight on file to calculate BMI.    Smoking and Alcohol use:  Social History     Tobacco Use     Smoking status: Never Smoker     Smokeless tobacco: Never Used   Substance Use Topics     Alcohol use: Yes     Comment: 3-4 glasses of wine weekly     Drug use: No     Cielo Valenzuela CMA  [facility]  [place]

## 2019-11-26 NOTE — PATIENT INSTRUCTIONS
[facility] & [place] locations   Dr Smith, Endocrinology Department      [facility]   [address]  [place] [address]  Appointment [phone]  Fax: [phone]  [place]: Monday and Tuesday         [facility]   [address]  [place] [address]  Appointment [phone]  Fax: [phone]  [place]: Wednesday and Thursday            Please check the cost coverage and copay with insurance before recommended tests, services and medications (especially if new medications are prescribed).     If ordered, please get blood work done 1 week prior to your next appointment so they will be available to Dr. Smith at your visit.    Thyroid ultrasound in 1 year (2020)  Follow up after that.     [facility] radiology scheduleing   [place]  [phone]   [facility] Radiology scheduling  [place]  [phone]     Please call and schedule the recommended test as discussed in clinic visit. These are the numbers to call.

## 2019-11-26 NOTE — LETTER
11/26/2019         RE: Javier Tamez  [address] [place] [address]        Dear Colleague,    Thank you for referring your patient, Javier Tamez, to the [facility]. Please see a copy of my visit note below.    Name: Javier Tamez  Seen in consultation with Guillermo Kong for thyroid nodule.   HPI:  Javier Tamez is a 74 year old male who presents for the evaluation of thyroid nodule.    Complex past medical history including history of irritable bowel syndrome, coronary artery disease, aortic root dilatation, dyslipidemia, status post right and left hip replacement.    Patient had CTA chest done in January 2019 for ascending aorta enlargement which showed incidental finding of thyroid nodule.  This was followed by thyroid ultrasound as noted below   Thyroid ultrasound January 2019 showing 2 thyroid nodules.  S/p FNA of left nodule 5/2019 c/w Nondiagnostic specimen.   Repeat US 11/2019: Small subcentimeter cystic nodule noted on left side. Dominant nodule on left side has decreased in size.  Right nodule appears stable.    History of radiation exposure: NO  FH of thyroid problem: son has hypothyroidism. No FH of thyroid cancer.  History of thyroid dysfunction: NO  Palpitations:  No  Changes to hair or skin: No  Diarrhea/Constipation:h/o IBS  Dysphagia or Shortness of breath:No  Tremors:No  Difficulty sleeping:No  Changes in weight: No    PMH/PSH:  Past Medical History:   Diagnosis Date     Aortic root dilatation (H)      Aortic stenosis      Arthritis      Atypical chest pain 1/28/2015     Cerebral artery occlusion with cerebral infarction (H)     TIA  1992     Essential hypertension with goal blood pressure less than 140/90 11/26/2016    takes lisinopril for preventitive     Family history of heart disease      Hypercholesterolemia 1/3/2014     Diagnosis updated by automated process. Provider to review and confirm.     Hyperlipidemia LDL goal <70      IBS (irritable  bowel syndrome)      Other chronic pain      Past Surgical History:   Procedure Laterality Date     ARTHROPLASTY HIP ANTERIOR Right 10/24/2017    Procedure: ARTHROPLASTY HIP ANTERIOR;  RIGHT TOTAL HIP ARTHROPLASTY DIRECT ANTERIOR APPROACH;  Surgeon: Meño Avalos MD;  Location: [facility]     ARTHROPLASTY HIP ANTERIOR Left 10/9/2018    Procedure: ARTHROPLASTY HIP ANTERIOR;  LEFT TOTAL HIP ARTHROPLASTY DIRECT ANTERIOR APPROACH (DEPUY)^;  Surgeon: Meño Avalos MD;  Location: [facility] OR     C NONSPECIFIC PROCEDURE      kidney stone     COLONOSCOPY      Adenomatous polyp removed     VASECTOMY       Family Hx:  Family History   Problem Relation Age of Onset     C.A.D. 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     Comment:    Social Needs     Financial resource strain: Not on file     Food insecurity:     Worry: Not on file     Inability: Not on file     Transportation needs:     Medical: Not on file     Non-medical: Not on file   Tobacco Use     Smoking status: Never Smoker     Smokeless tobacco: Never Used   Substance and Sexual Activity     Alcohol use: Yes     Comment: 3-4 glasses of wine weekly     Drug use: No     Sexual activity: Yes     Birth control/protection: Surgical   Lifestyle     Physical activity:     Days per week: Not on file     Minutes per session: Not on file     Stress: Not on file   Relationships     Social connections:     Talks on phone: Not on file     Gets together: Not on file     Attends Voodoo service: Not on file     Active member of  "club or organization: Not on file     Attends meetings of clubs or organizations: Not on file     Relationship status: Not on file     Intimate partner violence:     Fear of current or ex partner: Not on file     Emotionally abused: Not on file     Physically abused: Not on file     Forced sexual activity: Not on file   Other Topics Concern     Parent/sibling w/ CABG, MI or angioplasty before 65F 55M? Yes     Comment: father       Service Not Asked     Blood Transfusions Not Asked     Caffeine Concern Yes     Comment: 2 cups coffee daily, occ soda      Occupational Exposure Not Asked     Hobby Hazards Not Asked     Sleep Concern No     Stress Concern Not Asked     Weight Concern Not Asked     Special Diet No     Back Care Not Asked     Exercise Yes     Comment: 3-4 times weekly      Bike Helmet Not Asked     Seat Belt Yes     Self-Exams Not Asked   Social History Narrative    R[place] [facility] or [facility] (3x/week).           MEDICATIONS:  has a current medication list which includes the following prescription(s): ascorbic acid, aspirin, celecoxib, cyanocobalamin, lisinopril, nitroglycerin, probiotic product, rosuvastatin, tadalafil, and cholecalciferol.    Review of Systems  10 point ROS neg other than the symptoms noted above in the HPI.    Physical Exam   VS: /66 (BP Location: Right arm, Patient Position: Chair, Cuff Size: Adult Large)   Pulse 70   Temp 97.6  F (36.4  C) (Oral)   Resp 18   Ht 1.803 m (5' 11\")   Wt 85.3 kg (188 lb)   SpO2 98%   BMI 26.22 kg/m     GENERAL: AXOX3, NAD, well dressed, answering questions appropriately, appears stated age.  HEENT: No exopthalmous, no proptosis, EOMI, no lig lag, no retraction  Neck/NECK: Thyroid normal in size, non tender, no nodules were palpated.  Lymph: No lymphadenopathy in neck.  NEUROLOGY: CN grossly intact, no tremors  PSYCH: normal affect and mood    LABS:  TFTs:  ENDO THYROID LABS-[facility] Latest Ref Rng & Units 5/8/2019   TSH 0.40 - " 4.00 mU/L 0.86     ENDO THYROID LABS-[facility] Latest Ref Rng & Units 11/30/2016 10/15/2015   TSH 0.40 - 4.00 mU/L 1.08 0.77     ENDO THYROID LABS-[facility] Latest Ref Rng & Units 1/3/2014   TSH 0.40 - 4.00 mU/L 1.16     Thyroid Ultrasound:  ULTRASOUND THYROID   1/18/2019 2:17 PM      HISTORY: Multiple thyroid nodules.     COMPARISON: None.     FINDINGS: The right and left lobes of the thyroid gland measure 4.9 x  2.0 x 1.2 cm and 5.1 x 2.1 x 2.0 cm respectively. The thyroid isthmus  measures 0.1 cm in thickness. Thyroid echotexture is homogeneous. Two  nodules are present in the thyroid gland as follows:     Nodule 1 - 0.9 x 0.7 x 0.4 cm cystic nodule containing a few internal  echogenicities in the mid right lobe.     Nodule 2 - 2.8 x 2.0 x 1.5 cm cystic nodule containing a few mildly  thickened septations and internal echogenicities in the mid left lobe.     No microcalcifications or increased blood flow associated with the  nodules.                                                                      IMPRESSION: Two cystic thyroid nodules, the largest a 2.8 x 2.0 x 1.5  cm mildly complex cystic nodule in the mid left lobe of the thyroid  gland. No significant solid tissue is associated with the nodules.    ULTRASOUND THYROID 11/22/2019 10:22 AM      HISTORY: Thyroid nodule.      COMPARISON: Thyroid ultrasound 1/18/2019.     FINDINGS: Thyroid ultrasound demonstrates an enlarged thyroid gland.  The right lobe measures 5.1 x 2.0 x 1.9 cm. The left lobe measures 4.9  x 1.8 x 2.3 cm. The isthmus is normal in thickness. Thyroid parenchyma  is homogenous in echotexture.     Thyroid nodules as follows:   Right Lobe:   1. Anechoic cyst mid lobe measures 0.9 x 0.8 x 0.7 cm, previously 0.9  x 0.7 x 0.4 cm. No abnormal color Doppler flow.     Isthmus: None.     Left Lobe:   2. Mildly complex cystic lesion mid left lobe measures 1.6 x 1.2 x 1.1  cm, previously 2.8 x 2.0 x 1.5 cm. This has decreased in size since  prior exam and contains  several scattered echogenic foci with ringdown  artifact suggesting colloid crystals.  3. New upper pole cyst measures 0.7 x 0.6 x 0.6 cm. No solid nodular  tissue is appreciated.                                                                      IMPRESSION: Enlarged thyroid gland with bilateral cystic lesions. No  other follow-up required unless clinically warranted.  All pertinent notes, labs, and images personally reviewed by me.     A/P  Mr.Nicholas MARIO Tamez is a 74 year old here for the evaluation of thyroid nodule:  #1 Thyroid Nodule:  No history of radiation  No family history of thyroid cancer  No compressive symptoms  He is not on thyroid hormone replacement.  Thyroid hormones are in acceptable range  Thyroid nodules as noted above mostly cystic.  Previous biopsy 2019 of dominant left solid nodule was nondiagnostic  Follow-up thyroid ultrasound November 2019-showing stable right nodule, decreased size of dominant left nodule and new small subcentimeter cystic nodule on left side.  Plan:  In the setting of no major risk factors, stable appearance of nodules on ultrasound and cystic appearance of nodules plan to continue monitor.  Will recommend repeat thyroid US in 1 year (11/2020) to assess for interval change in size and characteristics of thyroid nodules.      More than 50% of the time spent with Mr. Tamez on counseling / coordinating his care.      Follow-up:  1 year.    Isabel Smith MD  Endocrinology   [facility]/[place]    Cc: Guillermo Kong    Addendum to above note and clinic visit:    Labs reviewed.    See result note/telephone encounter.            Again, thank you for allowing me to participate in the care of your patient.        Sincerely,        Isabel Smith MD

## 2019-11-26 NOTE — PROGRESS NOTES
Name: Javier Tamez  Seen in consultation with Guillermo Kong for thyroid nodule.   HPI:  Javier Tamez is a 74 year old male who presents for the evaluation of thyroid nodule.    Complex past medical history including history of irritable bowel syndrome, coronary artery disease, aortic root dilatation, dyslipidemia, status post right and left hip replacement.    Patient had CTA chest done in January 2019 for ascending aorta enlargement which showed incidental finding of thyroid nodule.  This was followed by thyroid ultrasound as noted below   Thyroid ultrasound January 2019 showing 2 thyroid nodules.  S/p FNA of left nodule 5/2019 c/w Nondiagnostic specimen.   Repeat US 11/2019: Small subcentimeter cystic nodule noted on left side. Dominant nodule on left side has decreased in size.  Right nodule appears stable.    History of radiation exposure: NO  FH of thyroid problem: son has hypothyroidism. No FH of thyroid cancer.  History of thyroid dysfunction: NO  Palpitations:  No  Changes to hair or skin: No  Diarrhea/Constipation:h/o IBS  Dysphagia or Shortness of breath:No  Tremors:No  Difficulty sleeping:No  Changes in weight: No    PMH/PSH:  Past Medical History:   Diagnosis Date     Aortic root dilatation (H)      Aortic stenosis      Arthritis      Atypical chest pain 1/28/2015     Cerebral artery occlusion with cerebral infarction (H)     TIA  1992     Essential hypertension with goal blood pressure less than 140/90 11/26/2016    takes lisinopril for preventitive     Family history of heart disease      Hypercholesterolemia 1/3/2014     Diagnosis updated by automated process. Provider to review and confirm.     Hyperlipidemia LDL goal <70      IBS (irritable bowel syndrome)      Other chronic pain      Past Surgical History:   Procedure Laterality Date     ARTHROPLASTY HIP ANTERIOR Right 10/24/2017    Procedure: ARTHROPLASTY HIP ANTERIOR;  RIGHT TOTAL HIP ARTHROPLASTY DIRECT ANTERIOR APPROACH;  Surgeon:  Meño Avalos MD;  Location: [facility]     ARTHROPLASTY HIP ANTERIOR Left 10/9/2018    Procedure: ARTHROPLASTY HIP ANTERIOR;  LEFT TOTAL HIP ARTHROPLASTY DIRECT ANTERIOR APPROACH (DEPUY)^;  Surgeon: Meño Avalos MD;  Location: [facility] OR     C NONSPECIFIC PROCEDURE      kidney stone     COLONOSCOPY      Adenomatous polyp removed     VASECTOMY       Family Hx:  Family History   Problem Relation Age of Onset     C.A.D. 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     Comment:    Social Needs     Financial resource strain: Not on file     Food insecurity:     Worry: Not on file     Inability: Not on file     Transportation needs:     Medical: Not on file     Non-medical: Not on file   Tobacco Use     Smoking status: Never Smoker     Smokeless tobacco: Never Used   Substance and Sexual Activity     Alcohol use: Yes     Comment: 3-4 glasses of wine weekly     Drug use: No     Sexual activity: Yes     Birth control/protection: Surgical   Lifestyle     Physical activity:     Days per week: Not on file     Minutes per session: Not on file     Stress: Not on file   Relationships     Social connections:     Talks on phone: Not on file     Gets together: Not on file     Attends Hoahaoism service: Not on file     Active member of club or organization: Not on file     Attends meetings of clubs or organizations: Not on file     Relationship status: Not on file     Intimate partner violence:     Fear of current or ex partner: Not on file     Emotionally abused: Not on file      "Physically abused: Not on file     Forced sexual activity: Not on file   Other Topics Concern     Parent/sibling w/ CABG, MI or angioplasty before 65F 55M? Yes     Comment: father       Service Not Asked     Blood Transfusions Not Asked     Caffeine Concern Yes     Comment: 2 cups coffee daily, occ soda      Occupational Exposure Not Asked     Hobby Hazards Not Asked     Sleep Concern No     Stress Concern Not Asked     Weight Concern Not Asked     Special Diet No     Back Care Not Asked     Exercise Yes     Comment: 3-4 times weekly      Bike Helmet Not Asked     Seat Belt Yes     Self-Exams Not Asked   Social History Narrative    Rides bicycle or goes to [employer] regularly (3x/week).           MEDICATIONS:  has a current medication list which includes the following prescription(s): ascorbic acid, aspirin, celecoxib, cyanocobalamin, lisinopril, nitroglycerin, probiotic product, rosuvastatin, tadalafil, and cholecalciferol.    Review of Systems  10 point ROS neg other than the symptoms noted above in the HPI.    Physical Exam   VS: /66 (BP Location: Right arm, Patient Position: Chair, Cuff Size: Adult Large)   Pulse 70   Temp 97.6  F (36.4  C) (Oral)   Resp 18   Ht 1.803 m (5' 11\")   Wt 85.3 kg (188 lb)   SpO2 98%   BMI 26.22 kg/m    GENERAL: AXOX3, NAD, well dressed, answering questions appropriately, appears stated age.  HEENT: No exopthalmous, no proptosis, EOMI, no lig lag, no retraction  Neck/NECK: Thyroid normal in size, non tender, no nodules were palpated.  Lymph: No lymphadenopathy in neck.  NEUROLOGY: CN grossly intact, no tremors  PSYCH: normal affect and mood    LABS:  TFTs:  ENDO THYROID LABS-[facility] Latest Ref Rng & Units 5/8/2019   TSH 0.40 - 4.00 mU/L 0.86     ENDO THYROID LABS-[facility] Latest Ref Rng & Units 11/30/2016 10/15/2015   TSH 0.40 - 4.00 mU/L 1.08 0.77     ENDO THYROID LABS-[facility] Latest Ref Rng & Units 1/3/2014   TSH 0.40 - 4.00 mU/L 1.16     Thyroid Ultrasound:  ULTRASOUND THYROID   " 1/18/2019 2:17 PM      HISTORY: Multiple thyroid nodules.     COMPARISON: None.     FINDINGS: The right and left lobes of the thyroid gland measure 4.9 x  2.0 x 1.2 cm and 5.1 x 2.1 x 2.0 cm respectively. The thyroid isthmus  measures 0.1 cm in thickness. Thyroid echotexture is homogeneous. Two  nodules are present in the thyroid gland as follows:     Nodule 1 - 0.9 x 0.7 x 0.4 cm cystic nodule containing a few internal  echogenicities in the mid right lobe.     Nodule 2 - 2.8 x 2.0 x 1.5 cm cystic nodule containing a few mildly  thickened septations and internal echogenicities in the mid left lobe.     No microcalcifications or increased blood flow associated with the  nodules.                                                                      IMPRESSION: Two cystic thyroid nodules, the largest a 2.8 x 2.0 x 1.5  cm mildly complex cystic nodule in the mid left lobe of the thyroid  gland. No significant solid tissue is associated with the nodules.    ULTRASOUND THYROID 11/22/2019 10:22 AM      HISTORY: Thyroid nodule.      COMPARISON: Thyroid ultrasound 1/18/2019.     FINDINGS: Thyroid ultrasound demonstrates an enlarged thyroid gland.  The right lobe measures 5.1 x 2.0 x 1.9 cm. The left lobe measures 4.9  x 1.8 x 2.3 cm. The isthmus is normal in thickness. Thyroid parenchyma  is homogenous in echotexture.     Thyroid nodules as follows:   Right Lobe:   1. Anechoic cyst mid lobe measures 0.9 x 0.8 x 0.7 cm, previously 0.9  x 0.7 x 0.4 cm. No abnormal color Doppler flow.     Isthmus: None.     Left Lobe:   2. Mildly complex cystic lesion mid left lobe measures 1.6 x 1.2 x 1.1  cm, previously 2.8 x 2.0 x 1.5 cm. This has decreased in size since  prior exam and contains several scattered echogenic foci with ringdown  artifact suggesting colloid crystals.  3. New upper pole cyst measures 0.7 x 0.6 x 0.6 cm. No solid nodular  tissue is appreciated.                                                                       IMPRESSION: Enlarged thyroid gland with bilateral cystic lesions. No  other follow-up required unless clinically warranted.  All pertinent notes, labs, and images personally reviewed by me.     A/P  Mr.Nicholas MARIO Tamez is a 74 year old here for the evaluation of thyroid nodule:  #1 Thyroid Nodule:  No history of radiation  No family history of thyroid cancer  No compressive symptoms  He is not on thyroid hormone replacement.  Thyroid hormones are in acceptable range  Thyroid nodules as noted above mostly cystic.  Previous biopsy 2019 of dominant left solid nodule was nondiagnostic  Follow-up thyroid ultrasound November 2019-showing stable right nodule, decreased size of dominant left nodule and new small subcentimeter cystic nodule on left side.  Plan:  In the setting of no major risk factors, stable appearance of nodules on ultrasound and cystic appearance of nodules plan to continue monitor.  Will recommend repeat thyroid US in 1 year (11/2020) to assess for interval change in size and characteristics of thyroid nodules.      More than 50% of the time spent with Mr. Tamez on counseling / coordinating his care.      Follow-up:  1 year.    Isabel Smith MD  Endocrinology   [facility]/[place]    Cc: Guillermo Kong    Addendum to above note and clinic visit:    Labs reviewed.    See result note/telephone encounter.

## 2019-12-06 DIAGNOSIS — E78.00 HYPERCHOLESTEROLEMIA: ICD-10-CM

## 2019-12-06 DIAGNOSIS — I77.810 AORTIC ROOT DILATATION (H): ICD-10-CM

## 2019-12-06 DIAGNOSIS — I10 ESSENTIAL HYPERTENSION WITH GOAL BLOOD PRESSURE LESS THAN 140/90: ICD-10-CM

## 2019-12-06 RX ORDER — ROSUVASTATIN CALCIUM 40 MG/1
40 TABLET, COATED ORAL EVERY EVENING
Qty: 90 TABLET | Refills: 0 | Status: SHIPPED | OUTPATIENT
Start: 2019-12-06 | End: 2020-03-11

## 2019-12-06 RX ORDER — ROSUVASTATIN CALCIUM 40 MG/1
40 TABLET, COATED ORAL EVERY EVENING
Qty: 90 TABLET | Refills: 1 | Status: CANCELLED | OUTPATIENT
Start: 2019-12-06

## 2019-12-06 RX ORDER — LISINOPRIL 2.5 MG/1
2.5 TABLET ORAL DAILY
Qty: 90 TABLET | Refills: 2 | Status: SHIPPED | OUTPATIENT
Start: 2019-12-06 | End: 2020-08-21

## 2019-12-06 NOTE — TELEPHONE ENCOUNTER
Requested Prescriptions   Pending Prescriptions Disp Refills     lisinopril (PRINIVIL/ZESTRIL) 2.5 MG tablet 90 tablet 2     Sig: Take 1 tablet (2.5 mg) by mouth daily       There is no refill protocol information for this order        rosuvastatin (CRESTOR) 40 MG tablet 90 tablet 1     Sig: Take 1 tablet (40 mg) by mouth every evening       There is no refill protocol information for this order      Last Written Prescription Date:  5/28/19 and   Last Fill Quantity: 90,  # refills: 1   Last office visit: 10/28/2019 with prescribing provider:  918/19 ----darrin Payan  10/28/19  Future Office Visit:   Next 5 appointments (look out 90 days)    Dec 31, 2019  9:00 AM CST  Return Visit with Miguelito Sim MD  [facility] ([facility]) [address] [address] [address] [phone]

## 2019-12-15 ENCOUNTER — HEALTH MAINTENANCE LETTER (OUTPATIENT)
Age: 74
End: 2019-12-15

## 2019-12-26 ENCOUNTER — PRE VISIT (OUTPATIENT)
Dept: CARDIOLOGY | Facility: CLINIC | Age: 74
End: 2019-12-26

## 2019-12-31 ENCOUNTER — OFFICE VISIT (OUTPATIENT)
Dept: CARDIOLOGY | Facility: CLINIC | Age: 74
End: 2019-12-31
Payer: COMMERCIAL

## 2019-12-31 VITALS
DIASTOLIC BLOOD PRESSURE: 56 MMHG | WEIGHT: 189.3 LBS | HEART RATE: 104 BPM | SYSTOLIC BLOOD PRESSURE: 102 MMHG | BODY MASS INDEX: 26.5 KG/M2 | HEIGHT: 71 IN

## 2019-12-31 DIAGNOSIS — E78.00 HYPERCHOLESTEROLEMIA: ICD-10-CM

## 2019-12-31 DIAGNOSIS — R00.0 TACHYCARDIA: ICD-10-CM

## 2019-12-31 DIAGNOSIS — R93.1 AGATSTON CAC SCORE, >400: Primary | ICD-10-CM

## 2019-12-31 DIAGNOSIS — I77.810 AORTIC ROOT DILATATION (H): ICD-10-CM

## 2019-12-31 PROCEDURE — 93000 ELECTROCARDIOGRAM COMPLETE: CPT | Performed by: INTERNAL MEDICINE

## 2019-12-31 PROCEDURE — 99214 OFFICE O/P EST MOD 30 MIN: CPT | Performed by: INTERNAL MEDICINE

## 2019-12-31 RX ORDER — EZETIMIBE 10 MG/1
10 TABLET ORAL DAILY
Qty: 90 TABLET | Refills: 3 | Status: SHIPPED | OUTPATIENT
Start: 2019-12-31 | End: 2020-12-23

## 2019-12-31 ASSESSMENT — MIFFLIN-ST. JEOR: SCORE: 1625.79

## 2019-12-31 NOTE — PROGRESS NOTES
HPI and Plan:   See dictation    Orders Placed This Encounter   Procedures     Lipid Profile     ALT     Lipid Profile     Follow-Up with Cardiac Advanced Practice Provider     Follow-Up with Cardiologist     EKG 12-lead complete w/read - Clinics       Orders Placed This Encounter   Medications     ezetimibe (ZETIA) 10 MG tablet     Sig: Take 1 tablet (10 mg) by mouth daily     Dispense:  90 tablet     Refill:  3       There are no discontinued medications.      Encounter Diagnoses   Name Primary?     Tachycardia      Hypercholesterolemia      Agatston CAC score, >400 Yes     Aortic root dilatation (H)        CURRENT MEDICATIONS:  Current Outpatient Medications   Medication Sig Dispense Refill     Ascorbic Acid (VITAMIN C PO) Take 1,000 mg by mouth every morning        aspirin (ASA) 81 MG tablet Take 1 tablet (81 mg) by mouth daily       Cyanocobalamin (VITAMIN B 12 PO) Take 1,000 mcg by mouth every morning       ezetimibe (ZETIA) 10 MG tablet Take 1 tablet (10 mg) by mouth daily 90 tablet 3     lisinopril (PRINIVIL/ZESTRIL) 2.5 MG tablet Take 1 tablet (2.5 mg) by mouth daily 90 tablet 2     nitroglycerin (NITROSTAT) 0.4 MG sublingual tablet For chest pain place 1 tablet under the tongue every 5 minutes for 3 doses. If symptoms persist 5 minutes after 1st dose call 911. (Patient taking differently: Place 0.4 mg under the tongue every 5 minutes as needed For chest pain place 1 tablet under the tongue every 5 minutes for 3 doses. If symptoms persist 5 minutes after 1st dose call 911.) 25 tablet 3     Probiotic Product (PROBIOTIC PO) Take 1 capsule by mouth every morning       rosuvastatin (CRESTOR) 40 MG tablet Take 1 tablet (40 mg) by mouth every evening 90 tablet 0     tadalafil (CIALIS) 5 MG tablet TAKE 1 TABLET BY MOUTH DAILY AS NEEDED 4 tablet 22     VITAMIN D, CHOLECALCIFEROL, PO Take 1,000 Units by mouth every morning        celecoxib (CELEBREX) 200 MG capsule Take 1 capsule (200 mg) by mouth daily (Patient not  taking: Reported on 2019) 30 capsule 0       ALLERGIES   No Known Allergies    PAST MEDICAL HISTORY:  Past Medical History:   Diagnosis Date     Aortic root dilatation (H)      Aortic stenosis      Arthritis      Atypical chest pain 2015     Cerebral artery occlusion with cerebral infarction (H)     TIA       Essential hypertension with goal blood pressure less than 140/90 2016    takes lisinopril for preventitive     Family history of heart disease      Hypercholesterolemia 1/3/2014     Diagnosis updated by automated process. Provider to review and confirm.     Hyperlipidemia LDL goal <70      IBS (irritable bowel syndrome)      Other chronic pain        PAST SURGICAL HISTORY:  Past Surgical History:   Procedure Laterality Date     ARTHROPLASTY HIP ANTERIOR Right 10/24/2017    Procedure: ARTHROPLASTY HIP ANTERIOR;  RIGHT TOTAL HIP ARTHROPLASTY DIRECT ANTERIOR APPROACH;  Surgeon: Meño Avalos MD;  Location: [facility]     ARTHROPLASTY HIP ANTERIOR Left 10/9/2018    Procedure: ARTHROPLASTY HIP ANTERIOR;  LEFT TOTAL HIP ARTHROPLASTY DIRECT ANTERIOR APPROACH (DEPUY)^;  Surgeon: Meño Avalos MD;  Location: [facility] OR     C NONSPECIFIC PROCEDURE      kidney stone     COLONOSCOPY      Adenomatous polyp removed     VASECTOMY         FAMILY HISTORY:  Family History   Problem Relation Age of Onset     C.A.D. 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     Comment:    Social Needs     Financial resource strain: None     Food insecurity:     Worry: None     Inability: None      Transportation needs:     Medical: None     Non-medical: None   Tobacco Use     Smoking status: Never Smoker     Smokeless tobacco: Never Used   Substance and Sexual Activity     Alcohol use: Yes     Comment: 3-4 glasses of wine weekly     Drug use: No     Sexual activity: Yes     Birth control/protection: Surgical   Lifestyle     Physical activity:     Days per week: None     Minutes per session: None     Stress: None   Relationships     Social connections:     Talks on phone: None     Gets together: None     Attends Christian service: None     Active member of club or organization: None     Attends meetings of clubs or organizations: None     Relationship status: None     Intimate partner violence:     Fear of current or ex partner: None     Emotionally abused: None     Physically abused: None     Forced sexual activity: None   Other Topics Concern     Parent/sibling w/ CABG, MI or angioplasty before 65F 55M? Yes     Comment: father       Service Not Asked     Blood Transfusions Not Asked     Caffeine Concern Yes     Comment: 2 cups coffee daily, occ soda      Occupational Exposure Not Asked     Hobby Hazards Not Asked     Sleep Concern No     Stress Concern Not Asked     Weight Concern Not Asked     Special Diet No     Back Care Not Asked     Exercise Yes     Comment: 3-4 times weekly      Bike Helmet Not Asked     Seat Belt Yes     Self-Exams Not Asked   Social History Narrative    Rides bicycle or [place] [facility] (3x/week).        Review of Systems:  Skin:  Negative       Eyes:  Positive for glasses reading  ENT:  Negative      Respiratory:  Negative       Cardiovascular:  Negative      Gastroenterology: Negative      Genitourinary:  Positive for erectile dysfunction;nocturia    Musculoskeletal:  Negative      Neurologic:  Negative      Psychiatric:  Negative      Heme/Lymph/Imm:  Negative      Endocrine:  Negative        Physical Exam:  Vitals: /56 (BP Location: Right arm, Patient  "Position: Sitting, Cuff Size: Adult Regular)   Pulse 104   Ht 1.803 m (5' 11\")   Wt 85.9 kg (189 lb 4.8 oz)   BMI 26.40 kg/m      Constitutional:  cooperative, alert and oriented, well developed, well nourished, in no acute distress        Skin:  warm and dry to the touch, no apparent skin lesions or masses noted          Head:  normocephalic, no masses or lesions        Eyes:  pupils equal and round, conjunctivae and lids unremarkable, sclera white, no xanthalasma, EOMS intact, no nystagmus        Lymph:      ENT:  no pallor or cyanosis, dentition good        Neck:  carotid pulses are full and equal bilaterally;no carotid bruit        Respiratory:  normal breath sounds, clear to auscultation, normal A-P diameter, normal symmetry, normal respiratory excursion, no use of accessory muscles         Cardiac: regular rhythm;normal S1 and S2;no S3 or S4;no murmurs, gallops or rubs detected                pulses full and equal                                        GI:           Extremities and Muscular Skeletal:  no edema;no spinal abnormalities noted;normal muscle strength and tone              Neurological:  no gross motor deficits        Psych:  affect appropriate, oriented to time, person and place        CC  Guillermo Kong MD  [address] [facility] [address]  [place] [address]              "

## 2019-12-31 NOTE — PROGRESS NOTES
Service Date: 2019      HISTORY OF PRESENT ILLNESS:  Mr. Tamez is a very nice 74-year-old gentleman with past medical history significant for hypercholesterolemia, a very strong family history of coronary artery disease with his father dying of his second myocardial infarction at 62, but first myocardial infarction at age 39.  Grandfather  at age 35 and a brother at 68, who also had a myocardial infarction for which he received a stent in [place].      Prior workup includes a calcium score which returned at 4316, putting him in the top 1% for risk.  He has an ascending aorta that has been measured as dilated by echocardiograms, but CT scans have measured it at 3.6 to 3.7, the most recent being earlier this year.  He also has multiple calcified granulomas noted on chest x-ray.  He had a stress test earlier this fall for which he went 12-1/2 minutes, and the stress echo appeared to be normal.  He also had negative stress nuclear scans in 2018.      Humza returns to clinic stating he thinks he is doing well.  He exercises 3-4 times a week doing a combination of both aerobic and resistance activity.  He tries to follow a healthy diet and tries to watch his weight.  He is having no exertional chest, arm, neck, jaw or shoulder discomfort.  No dyspnea on exertion, orthopnea or PND.  No palpitations, lightheadedness, dizziness, syncope or near-syncope.  He notes no side effects or problems with his medical regimen.      He does come with a bunch of questions.  He had an insurance physical and he is concerned about several of his lab values which are all within normal limits, but towards the upper end.  I told him this is not a concern.  His liver function tests are in the normal range as well as his hemoglobin A1c.  I pulled up data going back years showing him that his liver function tests have been in the normal range throughout.  His glucoses for the vast majority are all within normal limits with a couple  outside the normal range, undoubtedly being nonfasting blood draws.      We also looked at the CT scans over the years, which have shown that his aorta by CT scan is not dilated, and by echo, they can be dilated.  We talked about the tangential cuts that an echo sometimes makes and that the CT scan is the more accurate test.  We also talked about calcified granulomas noted in his lung and lymph nodes.      ASSESSMENT AND PLAN:  Humza appears to be doing well from a cardiac standpoint without clinical evidence of ischemia and this is supported by his stress echocardiogram from 09/2019      Blood pressure is very well controlled at 102/56.      Heart rate was 104 and review of the chart shows he is quite often has faster heart rates noted.  Back in October, it was 120.  In May, it was 107.  EKG today demonstrates normal sinus rhythm, and by the time we did the EKG after appointment, it is down to 84.  I suspect Humza is just anxious and nervous when he comes in.  He is just in sinus tachycardia that slows down over time.  As stated, he did have a stress echo done this past fall, for which he is able to go 12-1/2 minutes with appropriate heart rate and blood pressure response.      We reviewed all of his medications.  We talked about the various trials over the years including a more recent ODYSSEY and FOURIER trial.  At this time, we decided we are going to get more aggressive with risk factor modification given his calcium score in the top 1% and his very strong family history.  Cholesterol profile shows total cholesterol 118, HDL is 52, LDL 58, triglycerides are 39.  We will add Zetia in an effort to try to drive LDL below 50.  He questions why he has the diagnosis of hyperlipidemia.  Looking through Care Everywhere, fasting lipid profile from 2001 showed LDL of 145 and cholesterol of 213.      We talked about the importance of regular exercise and exercise, at least 5 times a week aerobically, twice a week resistance  "and twice a week for flexibility and balance.      We also talked about diet, the importance of eating a high fiber, predominantly plant-based diet.  I recommend he consider reading Collin's book, \"How Not to Die.\"      I will check a fasting lipid profile in 4-6 weeks after starting his Zetia.  Otherwise, I will follow up with my YUNG in 1 year.  I will see him back in 2 years.  If he should have any problems, I would be glad to see him sooner.         FLAQUITO ANNA MD, [facility]             D: 2019   T: 2019   MT: LUCY      Name:     ANILA MEDINA   MRN:      [id number]        Account:      [id number]   :      1945           Service Date: 2019      Document: [id number]    "

## 2019-12-31 NOTE — LETTER
2019      Guillermo Kong MD, MD  [address] [facility] [address]  [facility] [address]      RE: Javier Tamez       Dear Colleague,    I had the pleasure of seeing Javier Tamez in the [facility].    Service Date: 2019      HISTORY OF PRESENT ILLNESS:  Mr. Tamez is a very nice 74-year-old gentleman with past medical history significant for hypercholesterolemia, a very strong family history of coronary artery disease with his father dying of his second myocardial infarction at 62, but first myocardial infarction at age 39.  Grandfather  at age 35 and a brother at 68, who also had a myocardial infarction for which he received a stent in [place].      Prior workup includes a calcium score which returned at 4316, putting him in the top 1% for risk.  He has an ascending aorta that has been measured as dilated by echocardiograms, but CT scans have measured it at 3.6 to 3.7, the most recent being earlier this year.  He also has multiple calcified granulomas noted on chest x-ray.  He had a stress test earlier this fall for which he went 12-1/2 minutes, and the stress echo appeared to be normal.  He also had negative stress nuclear scans in 2018.      Humza returns to clinic stating he thinks he is doing well.  He exercises 3-4 times a week doing a combination of both aerobic and resistance activity.  He tries to follow a healthy diet and tries to watch his weight.  He is having no exertional chest, arm, neck, jaw or shoulder discomfort.  No dyspnea on exertion, orthopnea or PND.  No palpitations, lightheadedness, dizziness, syncope or near-syncope.  He notes no side effects or problems with his medical regimen.      He does come with a bunch of questions.  He had an insurance physical and he is concerned about several of his lab values which are all within normal limits, but towards the upper end.  I told him this is not a concern.  His liver function tests are in the normal  range as well as his hemoglobin A1c.  I pulled up data going back years showing him that his liver function tests have been in the normal range throughout.  His glucoses for the vast majority are all within normal limits with a couple outside the normal range, undoubtedly being nonfasting blood draws.      We also looked at the CT scans over the years, which have shown that his aorta by CT scan is not dilated, and by echo, they can be dilated.  We talked about the tangential cuts that an echo sometimes makes and that the CT scan is the more accurate test.  We also talked about calcified granulomas noted in his lung and lymph nodes.      ASSESSMENT AND PLAN:  Humza appears to be doing well from a cardiac standpoint without clinical evidence of ischemia and this is supported by his stress echocardiogram from 09/2019      Blood pressure is very well controlled at 102/56.      Heart rate was 104 and review of the chart shows he is quite often has faster heart rates noted.  Back in October, it was 120.  In May, it was 107.  EKG today demonstrates normal sinus rhythm, and by the time we did the EKG after appointment, it is down to 84.  I suspect Humza is just anxious and nervous when he comes in.  He is just in sinus tachycardia that slows down over time.  As stated, he did have a stress echo done this past fall, for which he is able to go 12-1/2 minutes with appropriate heart rate and blood pressure response.      We reviewed all of his medications.  We talked about the various trials over the years including a more recent ODYSSEY and FOURIER trial.  At this time, we decided we are going to get more aggressive with risk factor modification given his calcium score in the top 1% and his very strong family history.  Cholesterol profile shows total cholesterol 118, HDL is 52, LDL 58, triglycerides are 39.  We will add Zetia in an effort to try to drive LDL below 50.  He questions why he has the diagnosis of hyperlipidemia.   "Looking through Care Everywhere, fasting lipid profile from  showed LDL of 145 and cholesterol of 213.      We talked about the importance of regular exercise and exercise, at least 5 times a week aerobically, twice a week resistance and twice a week for flexibility and balance.      We also talked about diet, the importance of eating a high fiber, predominantly plant-based diet.  I recommend he consider reading Collin's book, \"How Not to Die.\"      I will check a fasting lipid profile in 4-6 weeks after starting his Zetia.  Otherwise, I will follow up with my YUNG in 1 year.  I will see him back in 2 years.  If he should have any problems, I would be glad to see him sooner.         FLAQUITO ANNA MD, [facility]             D: 2019   T: 2019   MT: LUCY      Name:     ANILA MEDINA   MRN:      [id number]        Account:      [id number]   :      1945           Service Date: 2019      Document: [id number]         Outpatient Encounter Medications as of 2019   Medication Sig Dispense Refill     Ascorbic Acid (VITAMIN C PO) Take 1,000 mg by mouth every morning        aspirin (ASA) 81 MG tablet Take 1 tablet (81 mg) by mouth daily       Cyanocobalamin (VITAMIN B 12 PO) Take 1,000 mcg by mouth every morning       ezetimibe (ZETIA) 10 MG tablet Take 1 tablet (10 mg) by mouth daily 90 tablet 3     lisinopril (PRINIVIL/ZESTRIL) 2.5 MG tablet Take 1 tablet (2.5 mg) by mouth daily 90 tablet 2     nitroglycerin (NITROSTAT) 0.4 MG sublingual tablet For chest pain place 1 tablet under the tongue every 5 minutes for 3 doses. If symptoms persist 5 minutes after 1st dose call 911. (Patient taking differently: Place 0.4 mg under the tongue every 5 minutes as needed For chest pain place 1 tablet under the tongue every 5 minutes for 3 doses. If symptoms persist 5 minutes after 1st dose call 911.) 25 tablet 3     Probiotic Product (PROBIOTIC PO) Take 1 capsule by mouth every morning       " rosuvastatin (CRESTOR) 40 MG tablet Take 1 tablet (40 mg) by mouth every evening 90 tablet 0     tadalafil (CIALIS) 5 MG tablet TAKE 1 TABLET BY MOUTH DAILY AS NEEDED 4 tablet 22     VITAMIN D, CHOLECALCIFEROL, PO Take 1,000 Units by mouth every morning        celecoxib (CELEBREX) 200 MG capsule Take 1 capsule (200 mg) by mouth daily (Patient not taking: Reported on 12/31/2019) 30 capsule 0     No facility-administered encounter medications on file as of 12/31/2019.        Again, thank you for allowing me to participate in the care of your patient.      Sincerely,    Miguelito Sim MD     [facility]

## 2019-12-31 NOTE — LETTER
12/31/2019    Guillermo Kong MD, MD  [address]  [facility] [address]    RE: Javier Tamez       Dear Colleague,    I had the pleasure of seeing Javier Tamez in the [facility].    HPI and Plan:   See dictation    Orders Placed This Encounter   Procedures     Lipid Profile     ALT     Lipid Profile     Follow-Up with Cardiac Advanced Practice Provider     Follow-Up with Cardiologist     EKG 12-lead complete w/read - Clinics       Orders Placed This Encounter   Medications     ezetimibe (ZETIA) 10 MG tablet     Sig: Take 1 tablet (10 mg) by mouth daily     Dispense:  90 tablet     Refill:  3       There are no discontinued medications.      Encounter Diagnoses   Name Primary?     Tachycardia      Hypercholesterolemia      Agatston CAC score, >400 Yes     Aortic root dilatation (H)        CURRENT MEDICATIONS:  Current Outpatient Medications   Medication Sig Dispense Refill     Ascorbic Acid (VITAMIN C PO) Take 1,000 mg by mouth every morning        aspirin (ASA) 81 MG tablet Take 1 tablet (81 mg) by mouth daily       Cyanocobalamin (VITAMIN B 12 PO) Take 1,000 mcg by mouth every morning       ezetimibe (ZETIA) 10 MG tablet Take 1 tablet (10 mg) by mouth daily 90 tablet 3     lisinopril (PRINIVIL/ZESTRIL) 2.5 MG tablet Take 1 tablet (2.5 mg) by mouth daily 90 tablet 2     nitroglycerin (NITROSTAT) 0.4 MG sublingual tablet For chest pain place 1 tablet under the tongue every 5 minutes for 3 doses. If symptoms persist 5 minutes after 1st dose call 911. (Patient taking differently: Place 0.4 mg under the tongue every 5 minutes as needed For chest pain place 1 tablet under the tongue every 5 minutes for 3 doses. If symptoms persist 5 minutes after 1st dose call 911.) 25 tablet 3     Probiotic Product (PROBIOTIC PO) Take 1 capsule by mouth every morning       rosuvastatin (CRESTOR) 40 MG tablet Take 1 tablet (40 mg) by mouth every evening 90 tablet 0     tadalafil (CIALIS)  5 MG tablet TAKE 1 TABLET BY MOUTH DAILY AS NEEDED 4 tablet 22     VITAMIN D, CHOLECALCIFEROL, PO Take 1,000 Units by mouth every morning        celecoxib (CELEBREX) 200 MG capsule Take 1 capsule (200 mg) by mouth daily (Patient not taking: Reported on 2019) 30 capsule 0       ALLERGIES   No Known Allergies    PAST MEDICAL HISTORY:  Past Medical History:   Diagnosis Date     Aortic root dilatation (H)      Aortic stenosis      Arthritis      Atypical chest pain 2015     Cerebral artery occlusion with cerebral infarction (H)     TIA       Essential hypertension with goal blood pressure less than 140/90 2016    takes lisinopril for preventitive     Family history of heart disease      Hypercholesterolemia 1/3/2014     Diagnosis updated by automated process. Provider to review and confirm.     Hyperlipidemia LDL goal <70      IBS (irritable bowel syndrome)      Other chronic pain        PAST SURGICAL HISTORY:  Past Surgical History:   Procedure Laterality Date     ARTHROPLASTY HIP ANTERIOR Right 10/24/2017    Procedure: ARTHROPLASTY HIP ANTERIOR;  RIGHT TOTAL HIP ARTHROPLASTY DIRECT ANTERIOR APPROACH;  Surgeon: Meño Avalos MD;  Location: [facility] OR     ARTHROPLASTY HIP ANTERIOR Left 10/9/2018    Procedure: ARTHROPLASTY HIP ANTERIOR;  LEFT TOTAL HIP ARTHROPLASTY DIRECT ANTERIOR APPROACH (DEPUY)^;  Surgeon: Meño Avalos MD;  Location: [facility] OR     C NONSPECIFIC PROCEDURE      kidney stone     COLONOSCOPY      Adenomatous polyp removed     VASECTOMY         FAMILY HISTORY:  Family History   Problem Relation Age of Onset     C.A.D. 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     Comment:    Social Needs     Financial resource strain: None     Food insecurity:     Worry: None     Inability: None     Transportation needs:     Medical: None     Non-medical: None   Tobacco Use     Smoking status: Never Smoker     Smokeless tobacco: Never Used   Substance and Sexual Activity     Alcohol use: Yes     Comment: 3-4 glasses of wine weekly     Drug use: No     Sexual activity: Yes     Birth control/protection: Surgical   Lifestyle     Physical activity:     Days per week: None     Minutes per session: None     Stress: None   Relationships     Social connections:     Talks on phone: None     Gets together: None     Attends Christianity service: None     Active member of club or organization: None     Attends meetings of clubs or organizations: None     Relationship status: None     Intimate partner violence:     Fear of current or ex partner: None     Emotionally abused: None     Physically abused: None     Forced sexual activity: None   Other Topics Concern     Parent/sibling w/ CABG, MI or angioplasty before 65F 55M? Yes     Comment: father       Service Not Asked     Blood Transfusions Not Asked     Caffeine Concern Yes     Comment: 2 cups coffee daily, occ soda      Occupational Exposure Not Asked     Hobby Hazards Not Asked     Sleep Concern No     Stress Concern Not Asked     Weight Concern Not Asked     Special Diet No     Back Care Not Asked     Exercise Yes     Comment: 3-4 times weekly      Bike Helmet Not Asked     Seat Belt Yes     Self-Exams Not Asked   Social History Narrative    Rides bicycle or goes to [facility] regularly (3x/week).        Review of Systems:  Skin:  Negative       Eyes:  Positive for glasses reading  ENT:  Negative      Respiratory:  Negative       Cardiovascular:  Negative      Gastroenterology: Negative      Genitourinary:  Positive for erectile  "dysfunction;nocturia    Musculoskeletal:  Negative      Neurologic:  Negative      Psychiatric:  Negative      Heme/Lymph/Imm:  Negative      Endocrine:  Negative        Physical Exam:  Vitals: /56 (BP Location: Right arm, Patient Position: Sitting, Cuff Size: Adult Regular)   Pulse 104   Ht 1.803 m (5' 11\")   Wt 85.9 kg (189 lb 4.8 oz)   BMI 26.40 kg/m       Constitutional:  cooperative, alert and oriented, well developed, well nourished, in no acute distress        Skin:  warm and dry to the touch, no apparent skin lesions or masses noted          Head:  normocephalic, no masses or lesions        Eyes:  pupils equal and round, conjunctivae and lids unremarkable, sclera white, no xanthalasma, EOMS intact, no nystagmus        Lymph:      ENT:  no pallor or cyanosis, dentition good        Neck:  carotid pulses are full and equal bilaterally;no carotid bruit        Respiratory:  normal breath sounds, clear to auscultation, normal A-P diameter, normal symmetry, normal respiratory excursion, no use of accessory muscles         Cardiac: regular rhythm;normal S1 and S2;no S3 or S4;no murmurs, gallops or rubs detected                pulses full and equal                                        GI:           Extremities and Muscular Skeletal:  no edema;no spinal abnormalities noted;normal muscle strength and tone              Neurological:  no gross motor deficits        Psych:  affect appropriate, oriented to time, person and place        CC  Guillermo Kong MD  [facility] [address] [facility] [address]                Thank you for allowing me to participate in the care of your patient.      Sincerely,     Miguelito Sim MD     [facility]    cc:   Guillermo Kong MD  [address] [place] [address]        "

## 2020-02-06 ENCOUNTER — DOCUMENTATION ONLY (OUTPATIENT)
Dept: CARDIOLOGY | Facility: CLINIC | Age: 75
End: 2020-02-06

## 2020-02-06 DIAGNOSIS — E78.00 HYPERCHOLESTEROLEMIA: ICD-10-CM

## 2020-02-06 DIAGNOSIS — E78.00 HYPERCHOLESTEROLEMIA: Primary | ICD-10-CM

## 2020-02-06 LAB
ALT SERPL W P-5'-P-CCNC: 82 U/L (ref 0–70)
CHOLEST SERPL-MCNC: 100 MG/DL
HDLC SERPL-MCNC: 58 MG/DL
LDLC SERPL CALC-MCNC: 33 MG/DL
NONHDLC SERPL-MCNC: 42 MG/DL
TRIGL SERPL-MCNC: 46 MG/DL

## 2020-02-06 PROCEDURE — 36415 COLL VENOUS BLD VENIPUNCTURE: CPT | Performed by: INTERNAL MEDICINE

## 2020-02-06 PROCEDURE — 80061 LIPID PANEL: CPT | Performed by: INTERNAL MEDICINE

## 2020-02-06 PROCEDURE — 84460 ALANINE AMINO (ALT) (SGPT): CPT | Performed by: INTERNAL MEDICINE

## 2020-02-06 NOTE — PROGRESS NOTES
Miguelito Sim MD Anderson, Barbara E, RN 5 minutes ago (11:38 AM)         Yes thank you.  Nice result.        Called patient to review FLP results and recommendations for f/up in 12/2020, LVM to call back.

## 2020-02-06 NOTE — PROGRESS NOTES
Lipid panel 2/6/2020 noted, ordered for follow up after adding zetia 10mg daily.    LDL=33 (improved)  Patient has a history of elevated Ca+ score >4000 / 99th percentile    Will message Dr. Sim to review      2/10/2020 reply from Dr. Sim:  Yes we can recheck in one month. It is not 3x ULN for LFT's which is the cutoff for d/cing med    Contacted patient to review recommendation for repeat alt in 1 month. Scheduled lab at [facility] site for 3/10/2020.

## 2020-02-07 NOTE — PROGRESS NOTES
Patient returned call. Reviewed FLP results and Dr Sim's recommendation to proceed with f/up in December 2020 as scheduled. Pt verbalized understanding, but pointed out that his ALT, which was also drawn, was high. Pt denies any increased ETOH or tylenol usage. Message to Dr Sim.

## 2020-03-10 ENCOUNTER — TELEPHONE (OUTPATIENT)
Dept: CARDIOLOGY | Facility: CLINIC | Age: 75
End: 2020-03-10

## 2020-03-10 DIAGNOSIS — E78.00 HYPERCHOLESTEROLEMIA: ICD-10-CM

## 2020-03-10 DIAGNOSIS — N52.8 OTHER MALE ERECTILE DYSFUNCTION: ICD-10-CM

## 2020-03-10 DIAGNOSIS — E78.00 HYPERCHOLESTEROLEMIA: Primary | ICD-10-CM

## 2020-03-10 DIAGNOSIS — N52.9 ERECTILE DYSFUNCTION, UNSPECIFIED ERECTILE DYSFUNCTION TYPE: ICD-10-CM

## 2020-03-10 LAB — ALT SERPL W P-5'-P-CCNC: 94 U/L (ref 0–70)

## 2020-03-10 PROCEDURE — 36415 COLL VENOUS BLD VENIPUNCTURE: CPT | Performed by: INTERNAL MEDICINE

## 2020-03-10 PROCEDURE — 84460 ALANINE AMINO (ALT) (SGPT): CPT | Performed by: INTERNAL MEDICINE

## 2020-03-10 NOTE — TELEPHONE ENCOUNTER
Routing refill request to provider for review/approval because:  Patient has nitroglycerin on medication list

## 2020-03-10 NOTE — TELEPHONE ENCOUNTER
"Requested Prescriptions   Pending Prescriptions Disp Refills     tadalafil (CIALIS) 5 MG tablet  Last Written Prescription Date:  3/19/19  Last Fill Quantity: 4,  # refills: 22   Last office visit: 10/28/2019 with prescribing provider:  Schuyler   Future Office Visit:   4 tablet 22       Erectile Dysfuction Protocol Failed - 3/10/2020  2:26 PM        Failed - Absence of nitrates on medication list        Passed - Absence of Alpha Blockers on Med list        Passed - Recent (12 mo) or future (30 days) visit within the authorizing provider's specialty     Patient has had an office visit with the authorizing provider or a provider within the authorizing providers department within the previous 12 mos or has a future within next 30 days. See \"Patient Info\" tab in inbasket, or \"Choose Columns\" in Meds & Orders section of the refill encounter.              Passed - Medication is active on med list        Passed - Patient is age 18 or older             "

## 2020-03-10 NOTE — TELEPHONE ENCOUNTER
ALT drawn today, was 94 (increased from 82 on 2/6/20). Order was placed by Dr Sim to follow LFTs after the addition of zetia 10mg daily. Per Dr Sim, cutoff for cessation of med is an LFT at 3x the upper limit of normal.

## 2020-03-10 NOTE — TELEPHONE ENCOUNTER
Attempted to contact patient to review alt results and Dr. Sim's recommendation to recheck in 3 months and stop any alcohol use. Will check tylenol use.   Patient is on zetia but not a statin.  Left message for patient to call back.    1600 spoke with patient to review alt results. Patient states he does not use tylenol. He will cut back and hopefully stop alcohol use. He states he generally as an occasional glass of wine so is comfortable not drinking for 3 months.  Order placed for repeat alt in 3 months.

## 2020-03-11 DIAGNOSIS — E78.00 HYPERCHOLESTEROLEMIA: ICD-10-CM

## 2020-03-11 RX ORDER — ROSUVASTATIN CALCIUM 40 MG/1
40 TABLET, COATED ORAL EVERY EVENING
Qty: 90 TABLET | Refills: 3 | Status: SHIPPED | OUTPATIENT
Start: 2020-03-11 | End: 2021-01-11

## 2020-03-11 RX ORDER — TADALAFIL 5 MG/1
TABLET ORAL
Qty: 4 TABLET | Refills: 22 | Status: SHIPPED | OUTPATIENT
Start: 2020-03-11 | End: 2021-01-09

## 2020-03-11 NOTE — TELEPHONE ENCOUNTER
Call from patient needs a crestor refll.   He also wanted to re-discuss his alt score. Reviewed again to decrease or stop use of tylenol and alcohol with his statin. He will check alt in 3 months.

## 2020-06-19 ENCOUNTER — DOCUMENTATION ONLY (OUTPATIENT)
Dept: CARDIOLOGY | Facility: CLINIC | Age: 75
End: 2020-06-19

## 2020-06-19 NOTE — PROGRESS NOTES
Call from patient, he was given a topical cream for his toenail fungus issues by the dermatolgoist - ciclopirox and wanted to check with Dr. Sim before starting.  Per micromedex, no interaction between ciclopirox and rosuvastatin or zetia.    Patient scheduled his repeat alt on 6/24/2020. He has been taking both the rosuvastatin and zetia. Last alt was 94. He stopped using tylenol and stopped alcohol use.    Will message Dr. Sim to review

## 2020-06-22 NOTE — PROGRESS NOTES
Miguelito Sim MD Anderson, Jennifer ROSA, RN 11 hours ago (9:10 PM)          Thanks, go ahead        Spoke to patient, reviewed OK to use fungal cream per Dr Sim. Pt verbalized understanding, very appreciative for the call back.

## 2020-06-23 ENCOUNTER — TELEPHONE (OUTPATIENT)
Dept: CARDIOLOGY | Facility: CLINIC | Age: 75
End: 2020-06-23

## 2020-06-24 ENCOUNTER — DOCUMENTATION ONLY (OUTPATIENT)
Dept: CARDIOLOGY | Facility: CLINIC | Age: 75
End: 2020-06-24

## 2020-06-24 DIAGNOSIS — E78.00 HYPERCHOLESTEROLEMIA: ICD-10-CM

## 2020-06-24 LAB — ALT SERPL W P-5'-P-CCNC: 33 U/L (ref 5–30)

## 2020-06-24 PROCEDURE — 84460 ALANINE AMINO (ALT) (SGPT): CPT | Performed by: INTERNAL MEDICINE

## 2020-06-24 PROCEDURE — 36415 COLL VENOUS BLD VENIPUNCTURE: CPT | Performed by: INTERNAL MEDICINE

## 2020-06-24 NOTE — PROGRESS NOTES
Alt 6/24/2020 noted. Ordered to follow elevated readings. Patient stopped all alcohol use x 3 months.    ALT=33 (improved from 94).    Will message Dr. Sim to review      6/24/2020 reply from Dr. Sim:  I assume he still on his rosuvastatin and Zetia.  If so continue.  Point out that he needs to control his drinking because it does affect his liver.  The combination of rosuvastatin plus Zetia plus alcohol causes a chemical hepatitis.    Attempted to contact patient to review Dr. Sim's recommendations. Left message for patient to call back.    6/25/2020  Attempted to contact patient to review Dr. Sim's recommendations. Left message for patient to call back.

## 2020-06-24 NOTE — LETTER
"  June 30, 2020       TO: Javier Tamez   [address] [place] [address]       Dear Mr. Tamez,    The results of your recent lab was reviewed by Dr. Sim.  Dr. Sim's recommendation is - Please continue  Rosuvastatin and Zetia if currently taking. \"Point out that he needs to control his drinking because it does affect his liver. The combination of rosuvastatin plus zetia plus alcohol causes a chemical hepatitis.\"   l  Results for orders placed or performed in visit on 06/24/20   ALT     Status: Abnormal   Result Value Ref Range    ALT 33 (H) 5 - 30 U/L       Please call Team 2 with any questions or concerns.     Sincerely,    [facility]        "

## 2020-06-24 NOTE — TELEPHONE ENCOUNTER
I assume he still on his rosuvastatin and Zetia.  If so continue.  Point out that he needs to control his drinking because it does affect his liver.  The combination of rosuvastatin plus Zetia plus alcohol causes a chemical hepatitis.

## 2020-06-26 NOTE — PROGRESS NOTES
"Left another message for patient to call back to review lab work completed 6/24 and message from Dr Sim- \"I assume he still on his rosuvastatin and Zetia.  If so continue.  Point out that he needs to control his drinking because it does affect his liver.  The combination of rosuvastatin plus Zetia plus alcohol causes a chemical hepatitis.\"   "

## 2020-07-07 ENCOUNTER — VIRTUAL VISIT (OUTPATIENT)
Dept: FAMILY MEDICINE | Facility: OTHER | Age: 75
End: 2020-07-07

## 2020-07-08 NOTE — PROGRESS NOTES
"Date: 2020 16:39:29  Clinician: Katelyn Cooper  Clinician NPI: [id number]  Patient: ANILA MEDINA  Patient : 1945  Patient Address: [address] [place] [address] [address], [place] [address]  Patient Phone: [phone]  Visit Protocol: URI  Patient Summary:  ANILA is a 75 year old ( : 1945 ) male who initiated a Visit for COVID-19 (Coronavirus) evaluation and screening. When asked the question \"Please sign me up to receive news, health information and promotions from [employer].\", ANILA responded \"No\".    ANILA states his symptoms started today.   His symptoms consist of wheezing, nausea, diarrhea, malaise, a headache, myalgia, anosmia, facial pain or pressure, a cough, and nasal congestion. He is experiencing mild difficulty breathing with activities but can speak normally in full sentences.   Symptom details     Nasal secretions: The color of his mucus is white.    Cough: ANILA coughs every 5-10 minutes and his cough is not more bothersome at night. Phlegm comes into his throat when he coughs. He does not believe his cough is caused by post-nasal drip. The color of the phlegm is white.     Wheezing: ANILA has not ever been diagnosed with asthma. Additional wheezing details as reported by the patient (free text): juststarted.       Facial pain or pressure: The facial pain or pressure feels worse when bending over or leaning forward.     Headache: He states the headache is moderate (4-6 on a 10 point pain scale).      ANILA denies having teeth pain, ageusia, vomiting, rhinitis, ear pain, chills, sore throat, and fever. He also denies having recent facial or sinus surgery in the past 60 days and taking antibiotic medication in the past month.   Precipitating events  He has recently been exposed to someone with influenza. ANILA has been in close contact with the following high risk individuals: pregnant women, children under the age of 5, people with asthma, heart disease or diabetes, " immunocompromised people, and adults 65 or older.   Pertinent COVID-19 (Coronavirus) information  In the past 14 days, ANILA has not worked in a congregate living setting.   He does not work or volunteer as healthcare worker or a  and does not work or volunteer in a healthcare facility.   ANILA also has not lived in a congregate living setting in the past 14 days. He does not live with a healthcare worker.   ANILA has not had a close contact with a laboratory-confirmed COVID-19 patient within 14 days of symptom onset.   Pertinent medical history  ANILA does not need a return to work/school note.   Weight: 184 lbs   ANILA does not smoke or use smokeless tobacco.   Additional information as reported by the patient (free text): was with family members from 4 different states [place]2 [place]   Weight: 184 lbs  A synchronous phone visit was initiated by the provider for the following reason: wheezing    MEDICATIONS: aspirin-acetaminophen-caffeine oral, Zetia oral, rosuvastatin oral, lisinopril-hydrochlorothiazide oral, ALLERGIES: NKDA  Clinician Response:  Dear ANILA,  I am sorry you are not feeling well. Additional information was needed to clarify some of the details provided during your Visit. Because I was unable to reach you, I would like you to be seen in person to further discuss your health history and symptoms so you get the most appropriate care for you.  You will not be charged for this Visit. Thank you for trusting us with your care.  COVID-19 (Coronavirus) General Information  Because there is currently no vaccine to prevent infection, the best way to protect yourself is to avoid being exposed to this virus. Common symptoms of COVID-19 include but are not limited to fever, cough, and shortness of breath. These symptoms appear 2-14 days after you are exposed to the virus that causes COVID-19. Click here for more information from the CDC on how to protect yourself.   If you are sick with COVID-19 or suspect you are infected with the virus that causes COVID-19, follow the steps here from the CDC to help prevent the disease from spreading to people in your home and community.  Click here for general information from the CDC on testing.  If you develop any of these emergency warning signs for COVID-19, get medical attention immediately:     Trouble breathing    Persistent pain or pressure in the chest    New confusion or inability to arouse    Bluish lips or face      Call your doctor or clinic before going in. Call 911 if you have a medical emergency and notify the  you have or think you may have COVID-19.  For more detailed and up to date information on COVID-19 (Coronavirus), please visit the CDC website.   Diagnosis: Unable to contact patient  Diagnosis ICD: R69  Additional Clinician Notes: I tried to reach you because of your concerns about wheezing. I would recommend that you go to the ER to be evaluated especially if you are wheezing or short of breath.  Synchronous Triage: phone, status: incomplete, duration: 161 seconds

## 2020-07-09 DIAGNOSIS — Z20.822 COVID-19 RULED OUT: Primary | ICD-10-CM

## 2020-07-09 PROCEDURE — U0003 INFECTIOUS AGENT DETECTION BY NUCLEIC ACID (DNA OR RNA); SEVERE ACUTE RESPIRATORY SYNDROME CORONAVIRUS 2 (SARS-COV-2) (CORONAVIRUS DISEASE [COVID-19]), AMPLIFIED PROBE TECHNIQUE, MAKING USE OF HIGH THROUGHPUT TECHNOLOGIES AS DESCRIBED BY CMS-2020-01-R: HCPCS | Performed by: FAMILY MEDICINE

## 2020-07-09 PROCEDURE — 99207 ZZC NO BILLABLE SERVICE THIS VISIT: CPT

## 2020-07-10 LAB
SARS-COV-2 RNA SPEC QL NAA+PROBE: NOT DETECTED
SPECIMEN SOURCE: NORMAL

## 2020-07-11 ENCOUNTER — NURSE TRIAGE (OUTPATIENT)
Dept: NURSING | Facility: CLINIC | Age: 75
End: 2020-07-11

## 2020-07-11 NOTE — TELEPHONE ENCOUNTER
Coronavirus (COVID-19) Notification    Lab Result   Lab test 2019-nCoV rRt-PCR OR SARS-COV-2 PCR    Nasopharyngeal AND/OR Oropharyngeal swab is NEGATIVE for 2019-nCoV RNA [OR] SARS-COV-2 RNA (COVID-19) RNA    Your result was negative. This means that we didn't find the virus that causes COVID-19 in your sample. A test may show negative when you do actually have the virus. This can happen when the virus is in the early stages of infection, before you feel illness symptoms.    If you have symptoms   Stay home and away from others (self-isolate) until you meet ALL of the guidelines below:    You've had no fever--and no medicine that reduces fever--for 3 full days (72 hours). And      Your other symptoms have gotten better. For example, your cough or breathing has improved. And     At least 10 days have passed since your symptoms started.    Stephanie Cotton RN/FNA

## 2020-08-07 DIAGNOSIS — I25.10 CORONARY ARTERY DISEASE INVOLVING NATIVE CORONARY ARTERY OF NATIVE HEART WITHOUT ANGINA PECTORIS: ICD-10-CM

## 2020-08-07 RX ORDER — NITROGLYCERIN 0.4 MG/1
TABLET SUBLINGUAL
Qty: 25 TABLET | Refills: 1 | Status: SHIPPED | OUTPATIENT
Start: 2020-08-07 | End: 2021-01-09

## 2020-08-21 DIAGNOSIS — I77.810 AORTIC ROOT DILATATION (H): ICD-10-CM

## 2020-08-21 DIAGNOSIS — I10 ESSENTIAL HYPERTENSION WITH GOAL BLOOD PRESSURE LESS THAN 140/90: ICD-10-CM

## 2020-08-21 RX ORDER — LISINOPRIL 2.5 MG/1
TABLET ORAL
Qty: 90 TABLET | Refills: 0 | Status: SHIPPED | OUTPATIENT
Start: 2020-08-21 | End: 2020-10-30

## 2020-08-21 NOTE — LETTER
[facility]  [address], [address]  [place], [place]  [address]                                            TEL:[phone]  FAX:[phone]      Javier Taemz  [address] [place] [address] [address] [place] [address]      August 21, 2020    Dear Javier       We have received a refill request from your pharmacy for your medication - Lisinopril. Many medications require routine follow-up with your provider and a review of your chart indicates that you will be due for an appointment in November. We have sent a one time, 90 day refill to your pharmacy to allow you time to schedule an appointment.     Please call [phone] to schedule an appointment.  We are offering virtual appointments by video or phone instead of having patients be seen in person and can do the Medicare Wellness exam though a video visit if you are interested in this.  We look forward to seeing you in the near future.      Thank you,     [facility]

## 2020-08-21 NOTE — TELEPHONE ENCOUNTER
Medication is being filled for 1 time refill only due to:  Due for appointment in November.     Letter mailed to patient advising him to schedule an appointment in November.

## 2020-09-08 ENCOUNTER — DOCUMENTATION ONLY (OUTPATIENT)
Dept: LAB | Facility: CLINIC | Age: 75
End: 2020-09-08

## 2020-09-11 ENCOUNTER — TELEPHONE (OUTPATIENT)
Dept: INTERNAL MEDICINE | Facility: CLINIC | Age: 75
End: 2020-09-11

## 2020-09-11 DIAGNOSIS — Z00.00 LABORATORY EXAMINATION ORDERED AS PART OF A ROUTINE GENERAL MEDICAL EXAMINATION: Primary | ICD-10-CM

## 2020-09-11 DIAGNOSIS — Z00.00 LABORATORY EXAMINATION ORDERED AS PART OF A ROUTINE GENERAL MEDICAL EXAMINATION: ICD-10-CM

## 2020-09-11 DIAGNOSIS — Z12.5 ENCOUNTER FOR SCREENING FOR MALIGNANT NEOPLASM OF PROSTATE: ICD-10-CM

## 2020-09-11 LAB — PSA SERPL-ACNC: 0.28 UG/L (ref 0–4)

## 2020-09-11 PROCEDURE — 36415 COLL VENOUS BLD VENIPUNCTURE: CPT | Performed by: INTERNAL MEDICINE

## 2020-09-11 PROCEDURE — G0103 PSA SCREENING: HCPCS | Performed by: INTERNAL MEDICINE

## 2020-12-23 ENCOUNTER — TELEPHONE (OUTPATIENT)
Dept: CARDIOLOGY | Facility: CLINIC | Age: 75
End: 2020-12-23

## 2020-12-23 DIAGNOSIS — I77.810 AORTIC ROOT DILATATION (H): Primary | ICD-10-CM

## 2020-12-23 DIAGNOSIS — E78.00 HYPERCHOLESTEROLEMIA: ICD-10-CM

## 2020-12-23 RX ORDER — EZETIMIBE 10 MG/1
10 TABLET ORAL DAILY
Qty: 30 TABLET | Refills: 1 | Status: SHIPPED | OUTPATIENT
Start: 2020-12-23 | End: 2021-01-11

## 2020-12-23 NOTE — TELEPHONE ENCOUNTER
Call from patient, he would like a call from scheduling to set up his annual visit. He would like to be seen in January. Per Dr. Sim - to see YUNG with labs.    Message to scheduling to contact patient  zetia refilled for 30 days per patient request

## 2020-12-29 ENCOUNTER — VIRTUAL VISIT (OUTPATIENT)
Dept: INTERNAL MEDICINE | Facility: CLINIC | Age: 75
End: 2020-12-29
Payer: COMMERCIAL

## 2020-12-29 DIAGNOSIS — E78.5 HYPERLIPIDEMIA LDL GOAL <100: ICD-10-CM

## 2020-12-29 DIAGNOSIS — R93.1 AGATSTON CAC SCORE, >400: ICD-10-CM

## 2020-12-29 DIAGNOSIS — N52.01 ERECTILE DYSFUNCTION DUE TO ARTERIAL INSUFFICIENCY: ICD-10-CM

## 2020-12-29 DIAGNOSIS — I10 ESSENTIAL HYPERTENSION WITH GOAL BLOOD PRESSURE LESS THAN 140/90: Primary | ICD-10-CM

## 2020-12-29 DIAGNOSIS — I77.810 AORTIC ROOT DILATATION (H): ICD-10-CM

## 2020-12-29 PROCEDURE — 99214 OFFICE O/P EST MOD 30 MIN: CPT | Mod: 95 | Performed by: INTERNAL MEDICINE

## 2020-12-29 NOTE — PROGRESS NOTES
"Telephone visit: 11 minute conversation.   [id number]---[id number]    Javier Tamez is a 75 year old male who is being evaluated via a billable telephone visit.      The patient has been notified of following:     \"This telephone visit will be conducted via a call between you and your physician/provider. We have found that certain health care needs can be provided without the need for a physical exam.  This service lets us provide the care you need with a short phone conversation.  If a prescription is necessary we can send it directly to your pharmacy.  If lab work is needed we can place an order for that and you can then stop by our lab to have the test done at a later time.    Telephone visits are billed at different rates depending on your insurance coverage. During this emergency period, for some insurers they may be billed the same as an in-person visit.  Please reach out to your insurance provider with any questions.    If during the course of the call the physician/provider feels a telephone visit is not appropriate, you will not be charged for this service.\"    Patient has given verbal consent for Telephone visit?  Yes    What phone number would you like to be contacted at? [phone]    How would you like to obtain your AVS? [place]    [place]     Javier Tamez is a 75 year old male who presents via phone visit today for the following health issues:    HPI     Hyperlipidemia Follow-Up      Are you regularly taking any medication or supplement to lower your cholesterol?   Yes- crestor    Are you having muscle aches or other side effects that you think could be caused by your cholesterol lowering medication?  No    Hypertension Follow-up      Do you check your blood pressure regularly outside of the clinic? No     Are you following a low salt diet? Yes    Are your blood pressures ever more than 140 on the top number (systolic) OR more   than 90 on the bottom number (diastolic), for example 140/90? " No      How many servings of fruits and vegetables do you eat daily?  4 or more    On average, how many sweetened beverages do you drink each day (Examples: soda, juice, sweet tea, etc.  Do NOT count diet or artificially sweetened beverages)?   0    How many days per week do you exercise enough to make your heart beat faster? 4    How many minutes a day do you exercise enough to make your heart beat faster? 30 - 60  How many days per week do you miss taking your medication? 0    The patient is tolerating his current medications without any adverse effects.    The patient saw Dr Sim of cardiology on 12/31/2019.   He is following the patient for elevated Agatston score and mild aortic root dilatation.   His BP looked favorable.   Zetia was added at that time to his chronic rosuvastatin 40 mg daily.   His labs looked favorable on 2/6/2020.    He is scheduled to see Sadie Leavitt of cardiology on 1/29/2021. Fasting labs are scheduled to be drawn on 1/27, two days in advance. Lipid panel and BMP are ordered--advised the patient that I have added an ALT.     Cialis was refilled in March 2020. This has worked satisfactorily for him.     Past medical, family and social histories as well as medications reviewed and updated as needed.    Review of Systems   REVIEW OF SYSTEMS: The following systems have been completely reviewed and are negative except as noted above:   Constitutional, respiratory, cardiovascular, and neurologic systems.         Objective          Vitals:  No vitals were obtained today due to virtual visit.    PSYCH: Alert and oriented times 3; coherent speech, normal   rate and volume, able to articulate logical thoughts.  RESP: No cough, no audible wheezing, able to talk in full sentences  Remainder of exam unable to be completed due to telephone visits         Assessment/Plan:  (I10) Essential hypertension with goal blood pressure less than 140/90  (primary encounter diagnosis)  Comment: BP  reportedly well controlled. Continue current meds.     (E78.5) Hyperlipidemia LDL goal <100  Comment: Continue current meds. Update labs in January 2021.  Plan: **ALT FUTURE 2mo          (R93.1) Agatston CAC score, >400  (I77.810) Aortic root dilatation (H)  Comment: F/u with Cardiology in January 2021 as planned.     (N52.01) Erectile dysfunction due to arterial insufficiency  Comment: Continue Cialis.     Patient Instructions   Update labs on 1/27/2021, and see cardiology on 1/29/2021 as scheduled.     Continue current medications.     See me near the end of 2021 for your Annual Wellness Exam.        Phone call duration:  11 minutes

## 2021-01-01 NOTE — PATIENT INSTRUCTIONS
Update labs on 1/27/2021, and see cardiology on 1/29/2021 as scheduled.     Continue current medications.     See me near the end of 2021 for your Annual Wellness Exam.

## 2021-01-08 ENCOUNTER — TELEPHONE (OUTPATIENT)
Dept: CARDIOLOGY | Facility: CLINIC | Age: 76
End: 2021-01-08

## 2021-01-08 NOTE — TELEPHONE ENCOUNTER
Voicemail received from patient calling with refill requests due to insurance changes.     Returned patient's call to discuss further but no answer, left message to call back.

## 2021-01-09 ENCOUNTER — ANCILLARY PROCEDURE (OUTPATIENT)
Dept: GENERAL RADIOLOGY | Facility: CLINIC | Age: 76
End: 2021-01-09
Attending: PHYSICIAN ASSISTANT
Payer: COMMERCIAL

## 2021-01-09 ENCOUNTER — OFFICE VISIT (OUTPATIENT)
Dept: URGENT CARE | Facility: URGENT CARE | Age: 76
End: 2021-01-09
Payer: COMMERCIAL

## 2021-01-09 ENCOUNTER — MYC MEDICAL ADVICE (OUTPATIENT)
Dept: INTERNAL MEDICINE | Facility: CLINIC | Age: 76
End: 2021-01-09

## 2021-01-09 ENCOUNTER — MYC MEDICAL ADVICE (OUTPATIENT)
Dept: CARDIOLOGY | Facility: CLINIC | Age: 76
End: 2021-01-09

## 2021-01-09 VITALS
HEART RATE: 72 BPM | RESPIRATION RATE: 16 BRPM | TEMPERATURE: 98 F | SYSTOLIC BLOOD PRESSURE: 112 MMHG | OXYGEN SATURATION: 96 % | BODY MASS INDEX: 26.6 KG/M2 | WEIGHT: 190 LBS | DIASTOLIC BLOOD PRESSURE: 64 MMHG | HEIGHT: 71 IN

## 2021-01-09 DIAGNOSIS — I77.810 AORTIC ROOT DILATATION (H): ICD-10-CM

## 2021-01-09 DIAGNOSIS — I10 ESSENTIAL HYPERTENSION WITH GOAL BLOOD PRESSURE LESS THAN 140/90: ICD-10-CM

## 2021-01-09 DIAGNOSIS — M79.641 PAIN OF RIGHT HAND: ICD-10-CM

## 2021-01-09 DIAGNOSIS — L03.113 CELLULITIS OF RIGHT HAND: Primary | ICD-10-CM

## 2021-01-09 DIAGNOSIS — E78.00 HYPERCHOLESTEROLEMIA: ICD-10-CM

## 2021-01-09 DIAGNOSIS — I25.10 CORONARY ARTERY DISEASE INVOLVING NATIVE CORONARY ARTERY OF NATIVE HEART WITHOUT ANGINA PECTORIS: Primary | ICD-10-CM

## 2021-01-09 PROCEDURE — 73130 X-RAY EXAM OF HAND: CPT | Mod: RT | Performed by: RADIOLOGY

## 2021-01-09 PROCEDURE — 99214 OFFICE O/P EST MOD 30 MIN: CPT | Performed by: PHYSICIAN ASSISTANT

## 2021-01-09 RX ORDER — CEPHALEXIN 500 MG/1
500 CAPSULE ORAL 4 TIMES DAILY
Qty: 40 CAPSULE | Refills: 0 | Status: SHIPPED | OUTPATIENT
Start: 2021-01-09 | End: 2021-01-19

## 2021-01-09 ASSESSMENT — MIFFLIN-ST. JEOR: SCORE: 1618.96

## 2021-01-09 NOTE — PROGRESS NOTES
"  HPI:  Javier Tamez is a 76 year old male who presents for evaluation of R hand swelling, erythema, and pain onset last night following a fall earlier in the day. He slipped on an icy sidewalk and landed on his hands. He reports symptoms began several hours after the fall. He did sustain a small abrasion on his R 3rd and 5th finger in the fall. No treatments tried. Patient reports no fever/chills, numbness/tingling, or any other symptoms.     Past Medical History:   Diagnosis Date     Aortic root dilatation (H)      Aortic stenosis      Arthritis      Atypical chest pain 1/28/2015     Cerebral artery occlusion with cerebral infarction (H)     TIA  1992     Essential hypertension with goal blood pressure less than 140/90 11/26/2016    takes lisinopril for preventitive     Family history of heart disease      Hypercholesterolemia 1/3/2014     Diagnosis updated by automated process. Provider to review and confirm.     Hyperlipidemia LDL goal <70      IBS (irritable bowel syndrome)      Other chronic pain        Vitals:    01/09/21 1033   BP: 112/64   BP Location: Right arm   Patient Position: Chair   Cuff Size: Adult Regular   Pulse: 72   Resp: 16   Temp: 98  F (36.7  C)   TempSrc: Oral   SpO2: 96%   Weight: 86.2 kg (190 lb)   Height: 1.803 m (5' 11\")       Physical Exam  Vitals signs and nursing note reviewed.   Cardiovascular:      Pulses:           Radial pulses are 2+ on the right side.   Pulmonary:      Effort: Pulmonary effort is normal.   Musculoskeletal:      Right hand: He exhibits decreased range of motion (slightly reduced flexion of 3rd/4th fingers), tenderness and swelling. Normal sensation noted.        Hands:    Skin:     Findings: Erythema present.   Neurological:      Mental Status: He is alert.         Labs/Imaging:  No results found for this or any previous visit (from the past 24 hour(s)).      Clinical Decision Making:    Xray negative for fracture per my read. Given the erythema and the fact " that symptoms began several hours after the fall, I suspect pain is not due to a sprain but more likely he has mild cellulitis. No sign of infectious tenosynovitis as there is no 'sausage digit' appearance. Rx Keflex.  See patient instructions below.    At the end of the encounter, I discussed results, diagnosis, medications. Discussed red flags for immediate return to clinic/ER, as well as indications for follow up if no improvement. Patient understood and agreed to plan. Patient was stable for discharge.      ICD-10-CM    1. Cellulitis of right hand  L03.113 cephALEXin (KEFLEX) 500 MG capsule   2. Pain of right hand  M79.641 XR Hand Right G/E 3 Views         If not improving or if condition worsens, follow up with your Primary Care Provider    LORRIE Mccartney, MEHREEN  [facility]    Patient Instructions   -Complete antibiotics as prescribed. Take with food to help prevent stomach upset.   -Elevate the affected limb as much as possible. This will help keep the swelling down.  -Keep the infected area clean; warm water soak with mild soap for 10-15 minutes 3 times a day. Pat dry.  -Take Tylenol 650mg every 4 hours by mouth for pain/fever.  Do not exceed 4000mg of acetaminophen from any source in a 24 hour period. -If develop a fever, increased redness, pain, drainage or swelling from the area, should contact primary care clinic/provider.  -Symptoms should be improving within 48 hours and resolved in next 7-10 days.

## 2021-01-09 NOTE — PATIENT INSTRUCTIONS
-Complete antibiotics as prescribed. Take with food to help prevent stomach upset.   -Elevate the affected limb as much as possible. This will help keep the swelling down.  -Keep the infected area clean; warm water soak with mild soap for 10-15 minutes 3 times a day. Pat dry.  -Take Tylenol 650mg every 4 hours by mouth for pain/fever.  Do not exceed 4000mg of acetaminophen from any source in a 24 hour period. -If develop a fever, increased redness, pain, drainage or swelling from the area, should contact primary care clinic/provider.  -Symptoms should be improving within 48 hours and resolved in next 7-10 days.

## 2021-01-10 ENCOUNTER — NURSE TRIAGE (OUTPATIENT)
Dept: NURSING | Facility: CLINIC | Age: 76
End: 2021-01-10

## 2021-01-10 NOTE — TELEPHONE ENCOUNTER
Humza is calling and states that he was in yesterday and slipped and fell.  Today is calling regarding xray results.  FNA relayed results.      COVID 19 Nurse Triage Plan/Patient Instructions    Please be aware that novel coronavirus (COVID-19) may be circulating in the community. If you develop symptoms such as fever, cough, or SOB or if you have concerns about the presence of another infection including coronavirus (COVID-19), please contact your health care provider or visit [email or web address].     Disposition/Instructions    Home care recommended. Follow home care protocol based instructions.    Thank you for taking steps to prevent the spread of this virus.  o Limit your contact with others.  o Wear a simple mask to cover your cough.  o Wash your hands well and often.    Resources    [facility]: About COVID-19: [email or web address]    CDC: What to Do If You're Sick: www.cdc.gov/coronavirus/2019-ncov/about/steps-when-sick.html    CDC: Ending Home Isolation: www.cdc.gov/coronavirus/2019-ncov/hcp/disposition-in-home-patients.html     CDC: Caring for Someone: www.cdc.gov/coronavirus/2019-ncov/if-you-are-sick/care-for-someone.html     [facility]: Interim Guidance for Hospital Discharge to Home: www.health.[facility].[place]/diseases/coronavirus/hcp/hospdischarge.pdf    [facility] clinical trials (COVID-19 research studies): clinicalaffairs.[facility]-clinical-trials     Below are the COVID-19 hotlines at the [place] Department of Health ([facility]). Interpreters are available.   o For health questions: Call 181-258-4187 or 1-204.974.9050 (7 a.m. to 7 p.m.)  o For questions about schools and childcare: Call 343-127-6711 or 1-188.665.8781 (7 a.m. to 7 p.m.)                       Additional Information    Negative: Lab result questions    Negative: [1] Caller is not with the child AND [2] is reporting urgent symptoms    Negative: Medication or pharmacy questions    Negative: Caller is rude or angry     Negative: Caller cannot be reached by phone    Negative: Caller has already spoken to PCP or another triager    Negative: RN needs further essential information from caller in order to complete triage    Negative: Requesting regular office appointment    Negative: Requesting referral to a specialist    Negative: [1] Caller requesting nonurgent health information AND [2] PCP's office is the best resource    Health Information question, no triage required and triager able to answer question    Protocols used: INFORMATION ONLY CALL - NO TRIAGE-P-AH

## 2021-01-11 RX ORDER — ROSUVASTATIN CALCIUM 40 MG/1
40 TABLET, COATED ORAL EVERY EVENING
Qty: 90 TABLET | Refills: 0 | Status: SHIPPED | OUTPATIENT
Start: 2021-01-11 | End: 2021-01-29

## 2021-01-11 RX ORDER — NITROGLYCERIN 0.4 MG/1
TABLET SUBLINGUAL
Qty: 25 TABLET | Refills: 1 | Status: SHIPPED | OUTPATIENT
Start: 2021-01-11 | End: 2021-02-01

## 2021-01-11 RX ORDER — EZETIMIBE 10 MG/1
10 TABLET ORAL DAILY
Qty: 90 TABLET | Refills: 0 | Status: SHIPPED | OUTPATIENT
Start: 2021-01-11 | End: 2021-03-16

## 2021-01-11 RX ORDER — LISINOPRIL 2.5 MG/1
TABLET ORAL
Qty: 90 TABLET | Refills: 0 | Status: SHIPPED | OUTPATIENT
Start: 2021-01-11 | End: 2021-03-18

## 2021-01-11 NOTE — TELEPHONE ENCOUNTER
Pending Prescriptions:                       Disp   Refills    lisinopril (ZESTRIL) 2.5 MG tablet        90 tab*0            Sig: TAKE 1 TABLET BY MOUTH ONCE DAILY (DUE FOR APPT           IN NOVEMBER)    Patient had Virtual Visit with Dr. Kong 12/29/2020. Per Virtual Visit notes, patient will be having labs drawn prior to Cardiology appointment and this includes BMP.     Medication is being filled for 1 time refill only due to:  Patient needs labs and has appointment the end of January.

## 2021-01-11 NOTE — TELEPHONE ENCOUNTER
My Chart message from patient:  Please send request for the following to [facility] :     Rosuvastatin 40mg -90 days                                                         fax [phone]             [phone]     Ezetimibe 10mg 90 days     Nitroglycerin 0.4 mg 25tablets     I switched form [employer] to [employer]  this year.     Thanks     Spoke with patient, his new mail order pharmacy under [employer] insurance is [facility]. He states he has never used his NTG and it probably fell off the list but he would like to have a bottle on hand for emergencies.

## 2021-01-13 DIAGNOSIS — I10 ESSENTIAL HYPERTENSION WITH GOAL BLOOD PRESSURE LESS THAN 140/90: ICD-10-CM

## 2021-01-13 DIAGNOSIS — I77.810 AORTIC ROOT DILATATION (H): ICD-10-CM

## 2021-01-14 RX ORDER — LISINOPRIL 2.5 MG/1
TABLET ORAL
Qty: 90 TABLET | Refills: 0 | OUTPATIENT
Start: 2021-01-14

## 2021-01-15 ENCOUNTER — HEALTH MAINTENANCE LETTER (OUTPATIENT)
Age: 76
End: 2021-01-15

## 2021-01-27 DIAGNOSIS — I77.810 AORTIC ROOT DILATATION (H): ICD-10-CM

## 2021-01-27 DIAGNOSIS — E78.00 HYPERCHOLESTEROLEMIA: ICD-10-CM

## 2021-01-27 LAB
ANION GAP SERPL CALCULATED.3IONS-SCNC: 1 MMOL/L (ref 3–14)
BUN SERPL-MCNC: 11 MG/DL (ref 7–30)
CALCIUM SERPL-MCNC: 8.9 MG/DL (ref 8.5–10.1)
CHLORIDE SERPL-SCNC: 106 MMOL/L (ref 94–109)
CHOLEST SERPL-MCNC: 84 MG/DL
CO2 SERPL-SCNC: 32 MMOL/L (ref 20–32)
CREAT SERPL-MCNC: 0.84 MG/DL (ref 0.66–1.25)
GFR SERPL CREATININE-BSD FRML MDRD: 86 ML/MIN/{1.73_M2}
GLUCOSE SERPL-MCNC: 94 MG/DL (ref 70–99)
HDLC SERPL-MCNC: 47 MG/DL
LDLC SERPL CALC-MCNC: 28 MG/DL
NONHDLC SERPL-MCNC: 37 MG/DL
POTASSIUM SERPL-SCNC: 4.3 MMOL/L (ref 3.4–5.3)
SODIUM SERPL-SCNC: 139 MMOL/L (ref 133–144)
TRIGL SERPL-MCNC: 45 MG/DL

## 2021-01-27 PROCEDURE — 36415 COLL VENOUS BLD VENIPUNCTURE: CPT | Performed by: INTERNAL MEDICINE

## 2021-01-27 PROCEDURE — 80048 BASIC METABOLIC PNL TOTAL CA: CPT | Performed by: INTERNAL MEDICINE

## 2021-01-27 PROCEDURE — 80061 LIPID PANEL: CPT | Performed by: INTERNAL MEDICINE

## 2021-01-27 PROCEDURE — 80076 HEPATIC FUNCTION PANEL: CPT | Performed by: INTERNAL MEDICINE

## 2021-01-29 ENCOUNTER — OFFICE VISIT (OUTPATIENT)
Dept: CARDIOLOGY | Facility: CLINIC | Age: 76
End: 2021-01-29
Payer: COMMERCIAL

## 2021-01-29 ENCOUNTER — DOCUMENTATION ONLY (OUTPATIENT)
Dept: CARDIOLOGY | Facility: CLINIC | Age: 76
End: 2021-01-29

## 2021-01-29 VITALS
SYSTOLIC BLOOD PRESSURE: 112 MMHG | BODY MASS INDEX: 25.9 KG/M2 | HEART RATE: 88 BPM | WEIGHT: 185 LBS | DIASTOLIC BLOOD PRESSURE: 72 MMHG | HEIGHT: 71 IN

## 2021-01-29 DIAGNOSIS — I77.810 AORTIC ROOT DILATATION (H): ICD-10-CM

## 2021-01-29 DIAGNOSIS — E78.00 HYPERCHOLESTEROLEMIA: ICD-10-CM

## 2021-01-29 DIAGNOSIS — I25.10 CORONARY ARTERY DISEASE INVOLVING NATIVE CORONARY ARTERY OF NATIVE HEART WITHOUT ANGINA PECTORIS: Primary | ICD-10-CM

## 2021-01-29 LAB
ALBUMIN SERPL-MCNC: 3.9 G/DL (ref 3.4–5)
ALP SERPL-CCNC: 84 U/L (ref 40–150)
ALT SERPL W P-5'-P-CCNC: 79 U/L (ref 0–70)
AST SERPL W P-5'-P-CCNC: 47 U/L (ref 0–45)
BILIRUB DIRECT SERPL-MCNC: 0.2 MG/DL (ref 0–0.2)
BILIRUB SERPL-MCNC: 0.8 MG/DL (ref 0.2–1.3)
PROT SERPL-MCNC: 7.3 G/DL (ref 6.8–8.8)

## 2021-01-29 PROCEDURE — 99215 OFFICE O/P EST HI 40 MIN: CPT | Performed by: NURSE PRACTITIONER

## 2021-01-29 RX ORDER — ROSUVASTATIN CALCIUM 20 MG/1
20 TABLET, COATED ORAL DAILY
Qty: 90 TABLET | Refills: 3 | Status: SHIPPED | OUTPATIENT
Start: 2021-01-29 | End: 2021-11-10

## 2021-01-29 ASSESSMENT — MIFFLIN-ST. JEOR: SCORE: 1596.02

## 2021-01-29 NOTE — PROGRESS NOTES
History of Present Illness:    Javier Tamez is a 76 year old male followed here by Dr. Sim with a history of dyslipidemia, strong family history of CAD with father dying after his second MI at the age of 62 with first MI at 39, Grandfather  at 35 and brother had MI at 68 with stenting in [place].    Humza returns today for his annual follow up today. His coronary calcium score is elevated to 4316 placing him in the 1% percentile for age and gender matched control groups. Echo has noted a dilated ascending aorta which is not apparent on CT scan at 3.6cm to 3.7cm on CT in  and . He had calcified granulomas on a CXR.     Last stress test  sowed no evidence of ischemia.    Last year lipids showed LDL 58 and Zetia was added; baseline LDL was 145 in ; LDL 2 days ago is 28 HDL 47 TG 45.    He is very concerned about the aorta size and his ALT. He has multiple questions today about his aorta as well. I have added a Hepatic panel and will message him a plan when those labs return.    He described some substernal chest symptoms which are nonexertional; this dull when it happens it lasts seconds. This seems unchanged from previous. He has not needed Nitroglycerine as this has not been sustained.    Impression/Plan:     1. CAD by calcium scoring with strong FH of CAD  Continue aggressive risk factor modification  Stress echo normal in mid   -I have messaged Dr Sim to see when next surveillance stress test needs to be done  -fleeting chest pain seems atypical    2. Dyslipidemia-controlled    3. ALT elevated since 2019  -He is concerned about this   -I will check a Hepatic panel  -if up, I will consult with Dr. Kong about need ultrasound versus dropping the Crestor back to 20mg with a recheck    4. Mildly dilated aorta  BP controlled  I have messaged Dr. Sim to assess when next image by CT should be done.      He will see Dr. Sim in one year.    40 minutes spent on the date  of the encounter doing chart review, review of test results, patient visit, documentation and discussion with other provider(s)     Addendum: message from Dr. Sim and Hepatic panel back  I called patient  -reduce crestor to 20mg daily and stay on zetia since LDL 28  Check hepatic panel in 2 months  No need for CT per Roney  Stress echo in one year at annual visit  All reviewed with patient and my chart message sent.    Sadie Leavitt, MSN, APRN-BC, CNP  Cardiology    Orders Placed This Encounter   Procedures     Hepatic panel     Hepatic panel     Lipid Profile     Basic metabolic panel     No orders of the defined types were placed in this encounter.    There are no discontinued medications.      Encounter Diagnoses   Name Primary?     Hypercholesterolemia      Aortic root dilatation (H)      Coronary artery disease involving native coronary artery of native heart without angina pectoris Yes       CURRENT MEDICATIONS:  Current Outpatient Medications   Medication Sig Dispense Refill     Ascorbic Acid (VITAMIN C PO) Take 1,000 mg by mouth every morning        aspirin (ASA) 81 MG tablet Take 1 tablet (81 mg) by mouth daily       Cyanocobalamin (VITAMIN B 12 PO) Take 1,000 mcg by mouth every morning       ezetimibe (ZETIA) 10 MG tablet Take 1 tablet (10 mg) by mouth daily 90 tablet 0     lisinopril (ZESTRIL) 2.5 MG tablet TAKE 1 TABLET BY MOUTH ONCE DAILY 90 tablet 0     Probiotic Product (PROBIOTIC PO) Take 1 capsule by mouth every morning       rosuvastatin (CRESTOR) 40 MG tablet Take 1 tablet (40 mg) by mouth every evening 90 tablet 0     VITAMIN D, CHOLECALCIFEROL, PO Take 1,000 Units by mouth every morning        nitroGLYcerin (NITROSTAT) 0.4 MG sublingual tablet For chest pain place 1 tablet under the tongue every 5 minutes for 3 doses. If symptoms persist 5 minutes after 1st dose call 911. (Patient not taking: Reported on 1/29/2021) 25 tablet 1       ALLERGIES   No Known Allergies    PAST MEDICAL  HISTORY:  Past Medical History:   Diagnosis Date     Aortic root dilatation (H)      Aortic stenosis      Arthritis      Atypical chest pain 2015     Cerebral artery occlusion with cerebral infarction (H)     TIA       Essential hypertension with goal blood pressure less than 140/90 2016    takes lisinopril for preventitive     Family history of heart disease      Hypercholesterolemia 1/3/2014     Diagnosis updated by automated process. Provider to review and confirm.     Hyperlipidemia LDL goal <70      IBS (irritable bowel syndrome)      Other chronic pain        PAST SURGICAL HISTORY:  Past Surgical History:   Procedure Laterality Date     ARTHROPLASTY HIP ANTERIOR Right 10/24/2017    Procedure: ARTHROPLASTY HIP ANTERIOR;  RIGHT TOTAL HIP ARTHROPLASTY DIRECT ANTERIOR APPROACH;  Surgeon: Meño Avalos MD;  Location:  OR     ARTHROPLASTY HIP ANTERIOR Left 10/9/2018    Procedure: ARTHROPLASTY HIP ANTERIOR;  LEFT TOTAL HIP ARTHROPLASTY DIRECT ANTERIOR APPROACH (DEPUY)^;  Surgeon: Meño Avalos MD;  Location:  OR     COLONOSCOPY      Adenomatous polyp removed     VASECTOMY       ZZC NONSPECIFIC PROCEDURE  's    kidney stone       FAMILY HISTORY:  Family History   Problem Relation Age of Onset     C.A.D. 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     Comment:    Social Needs     Financial resource strain: None     Food insecurity     Worry: None     Inability: None     Transportation needs     Medical: None     Non-medical: None   Tobacco Use     Smoking  "status: Never Smoker     Smokeless tobacco: Never Used   Substance and Sexual Activity     Alcohol use: Yes     Comment: 3-4 glasses of wine weekly     Drug use: No     Sexual activity: Yes     Birth control/protection: Surgical   Lifestyle     Physical activity     Days per week: None     Minutes per session: None     Stress: None   Relationships     Social connections     Talks on phone: None     Gets together: None     Attends Rastafarian service: None     Active member of club or organization: None     Attends meetings of clubs or organizations: None     Relationship status: None     Intimate partner violence     Fear of current or ex partner: None     Emotionally abused: None     Physically abused: None     Forced sexual activity: None   Other Topics Concern     Parent/sibling w/ CABG, MI or angioplasty before 65F 55M? Yes     Comment: father       Service Not Asked     Blood Transfusions Not Asked     Caffeine Concern Yes     Comment: 2 cups coffee daily, occ soda      Occupational Exposure Not Asked     Hobby Hazards Not Asked     Sleep Concern No     Stress Concern Not Asked     Weight Concern Not Asked     Special Diet No     Back Care Not Asked     Exercise Yes     Comment: 3-4 times weekly      Bike Helmet Not Asked     Seat Belt Yes     Self-Exams Not Asked   Social History Narrative    Rides bicycle or goes to [facility] regularly (3x/week).        Review of Systems:  Skin:  Negative       Eyes:  Positive for glasses reading  ENT:  Negative      Respiratory:  Negative       Cardiovascular:  Negative   has arotic root dilation - \"feeling different in the chest\"  Gastroenterology: Negative   IBS  Genitourinary:  Positive for erectile dysfunction;nocturia 1-2 times  Musculoskeletal:  Negative      Neurologic:  Negative      Psychiatric:  Negative      Heme/Lymph/Imm:  Negative      Endocrine:  Negative        Physical Exam:  Vitals: /72 (BP Location: Right arm, Patient Position: Sitting, Cuff Size: " "Adult Regular)   Pulse 88   Ht 1.803 m (5' 10.98\")   Wt 83.9 kg (185 lb)   BMI 25.81 kg/m      Constitutional:           Skin:             Head:           Eyes:           Lymph:      ENT:           Neck:           Respiratory:            Cardiac:                                                           GI:           Extremities and Muscular Skeletal:                 Neurological:           Psych:         Recent Lab Results:  LIPID RESULTS:  Lab Results   Component Value Date    CHOL 84 01/27/2021    HDL 47 01/27/2021    LDL 28 01/27/2021    TRIG 45 01/27/2021    CHOLHDLRATIO 2.2 10/15/2015       LIVER ENZYME RESULTS:  Lab Results   Component Value Date    AST 47 (H) 01/27/2021    ALT 79 (H) 01/27/2021       CBC RESULTS:  Lab Results   Component Value Date    WBC 6.0 09/18/2018    RBC 5.05 09/18/2018    HGB 12.3 (L) 10/10/2018    HCT 48.4 09/18/2018    MCV 96 09/18/2018    MCH 32.3 09/18/2018    MCHC 33.7 09/18/2018    RDW 13.5 09/18/2018     09/18/2018       BMP RESULTS:  Lab Results   Component Value Date     01/27/2021    POTASSIUM 4.3 01/27/2021    CHLORIDE 106 01/27/2021    CO2 32 01/27/2021    ANIONGAP 1 (L) 01/27/2021    GLC 94 01/27/2021    BUN 11 01/27/2021    CR 0.84 01/27/2021    GFRESTIMATED 86 01/27/2021    GFRESTBLACK >90 01/27/2021    ALEXANDRA 8.9 01/27/2021        A1C RESULTS:  No results found for: A1C    INR RESULTS:  No results found for: INR        CC  No referring provider defined for this encounter.                "

## 2021-01-29 NOTE — TELEPHONE ENCOUNTER
I tried calling Humza but got no answer.  I do not think we need to follow his aorta as the CT scan shows that it is in the normal range.  We can do a stress echo next year given his very high calcium score.  Obviously we will do it sooner if he should develop symptoms.  If you could follow-up and call him that would be great.  Thanks

## 2021-01-29 NOTE — LETTER
2021    Guillermo Kong MD, MD  [address]  [facility] [address]    RE: Javier Tamez       Dear Colleague,    I had the pleasure of seeing Javier Tamez in the [facility].    History of Present Illness:    Javier Tamez is a 76 year old male followed here by Dr. Sim with a history of dyslipidemia, strong family history of CAD with father dying after his second MI at the age of 62 with first MI at 39, Grandfather  at 35 and brother had MI at 68 with stenting in [place].    Humza returns today for his annual follow up today. His coronary calcium score is elevated to 4316 placing him in the 1% percentile for age and gender matched control groups. Echo has noted a dilated ascending aorta which is not apparent on CT scan at 3.6cm to 3.7cm on CT in  and . He had calcified granulomas on a CXR.     Last stress test  sowed no evidence of ischemia.    Last year lipids showed LDL 58 and Zetia was added; baseline LDL was 145 in ; LDL 2 days ago is 28 HDL 47 TG 45.    He is very concerned about the aorta size and his ALT. He has multiple questions today about his aorta as well. I have added a Hepatic panel and will message him a plan when those labs return.    He described some substernal chest symptoms which are nonexertional; this dull when it happens it lasts seconds. This seems unchanged from previous. He has not needed Nitroglycerine as this has not been sustained.    Impression/Plan:     1. CAD by calcium scoring with strong FH of CAD  Continue aggressive risk factor modification  Stress echo normal in mid   -I have messaged Dr Sim to see when next surveillance stress test needs to be done  -fleeting chest pain seems atypical    2. Dyslipidemia-controlled    3. ALT elevated since 2019  -He is concerned about this   -I will check a Hepatic panel  -if up, I will consult with Dr. Kong about need ultrasound versus dropping the  Crestor back to 20mg with a recheck    4. Mildly dilated aorta  BP controlled  I have messaged Dr. Sim to assess when next image by CT should be done.      He will see Dr. Sim in one year.    40 minutes spent on the date of the encounter doing chart review, review of test results, patient visit, documentation and discussion with other provider(s)     Addendum: message from Dr. Sim and Hepatic panel back  I called patient  -reduce crestor to 20mg daily and stay on zetia since LDL 28  Check hepatic panel in 2 months  No need for CT per Roney  Stress echo in one year at annual visit  All reviewed with patient and my chart message sent.    Sadie Leavitt, MSN, APRN-BC, CNP  Cardiology    Orders Placed This Encounter   Procedures     Hepatic panel     Hepatic panel     Lipid Profile     Basic metabolic panel     No orders of the defined types were placed in this encounter.    There are no discontinued medications.      Encounter Diagnoses   Name Primary?     Hypercholesterolemia      Aortic root dilatation (H)      Coronary artery disease involving native coronary artery of native heart without angina pectoris Yes       CURRENT MEDICATIONS:  Current Outpatient Medications   Medication Sig Dispense Refill     Ascorbic Acid (VITAMIN C PO) Take 1,000 mg by mouth every morning        aspirin (ASA) 81 MG tablet Take 1 tablet (81 mg) by mouth daily       Cyanocobalamin (VITAMIN B 12 PO) Take 1,000 mcg by mouth every morning       ezetimibe (ZETIA) 10 MG tablet Take 1 tablet (10 mg) by mouth daily 90 tablet 0     lisinopril (ZESTRIL) 2.5 MG tablet TAKE 1 TABLET BY MOUTH ONCE DAILY 90 tablet 0     Probiotic Product (PROBIOTIC PO) Take 1 capsule by mouth every morning       rosuvastatin (CRESTOR) 40 MG tablet Take 1 tablet (40 mg) by mouth every evening 90 tablet 0     VITAMIN D, CHOLECALCIFEROL, PO Take 1,000 Units by mouth every morning        nitroGLYcerin (NITROSTAT) 0.4 MG sublingual tablet For chest  pain place 1 tablet under the tongue every 5 minutes for 3 doses. If symptoms persist 5 minutes after 1st dose call 911. (Patient not taking: Reported on 2021) 25 tablet 1       ALLERGIES   No Known Allergies    PAST MEDICAL HISTORY:  Past Medical History:   Diagnosis Date     Aortic root dilatation (H)      Aortic stenosis      Arthritis      Atypical chest pain 2015     Cerebral artery occlusion with cerebral infarction (H)     TIA       Essential hypertension with goal blood pressure less than 140/90 2016    takes lisinopril for preventitive     Family history of heart disease      Hypercholesterolemia 1/3/2014     Diagnosis updated by automated process. Provider to review and confirm.     Hyperlipidemia LDL goal <70      IBS (irritable bowel syndrome)      Other chronic pain        PAST SURGICAL HISTORY:  Past Surgical History:   Procedure Laterality Date     ARTHROPLASTY HIP ANTERIOR Right 10/24/2017    Procedure: ARTHROPLASTY HIP ANTERIOR;  RIGHT TOTAL HIP ARTHROPLASTY DIRECT ANTERIOR APPROACH;  Surgeon: Meño Avalos MD;  Location:  OR     ARTHROPLASTY HIP ANTERIOR Left 10/9/2018    Procedure: ARTHROPLASTY HIP ANTERIOR;  LEFT TOTAL HIP ARTHROPLASTY DIRECT ANTERIOR APPROACH (DEPUY)^;  Surgeon: Meño Avalos MD;  Location:  OR     COLONOSCOPY      Adenomatous polyp removed     VASECTOMY       ZC NONSPECIFIC PROCEDURE      kidney stone       FAMILY HISTORY:  Family History   Problem Relation Age of Onset     C.A.D. 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      "Comment:    Social Needs     Financial resource strain: None     Food insecurity     Worry: None     Inability: None     Transportation needs     Medical: None     Non-medical: None   Tobacco Use     Smoking status: Never Smoker     Smokeless tobacco: Never Used   Substance and Sexual Activity     Alcohol use: Yes     Comment: 3-4 glasses of wine weekly     Drug use: No     Sexual activity: Yes     Birth control/protection: Surgical   Lifestyle     Physical activity     Days per week: None     Minutes per session: None     Stress: None   Relationships     Social connections     Talks on phone: None     Gets together: None     Attends Congregational service: None     Active member of club or organization: None     Attends meetings of clubs or organizations: None     Relationship status: None     Intimate partner violence     Fear of current or ex partner: None     Emotionally abused: None     Physically abused: None     Forced sexual activity: None   Other Topics Concern     Parent/sibling w/ CABG, MI or angioplasty before 65F 55M? Yes     Comment: father       Service Not Asked     Blood Transfusions Not Asked     Caffeine Concern Yes     Comment: 2 cups coffee daily, occ soda      Occupational Exposure Not Asked     Hobby Hazards Not Asked     Sleep Concern No     Stress Concern Not Asked     Weight Concern Not Asked     Special Diet No     Back Care Not Asked     Exercise Yes     Comment: 3-4 times weekly      Bike Helmet Not Asked     Seat Belt Yes     Self-Exams Not Asked   Social History Narrative    Rides bicycle or [place] to [facility] regularly (3x/week).        Review of Systems:  Skin:  Negative       Eyes:  Positive for glasses reading  ENT:  Negative      Respiratory:  Negative       Cardiovascular:  Negative   has arotic root dilation - \"feeling different in the chest\"  Gastroenterology: Negative   IBS  Genitourinary:  Positive for erectile dysfunction;nocturia 1-2 times  Musculoskeletal:  " "Negative      Neurologic:  Negative      Psychiatric:  Negative      Heme/Lymph/Imm:  Negative      Endocrine:  Negative        Physical Exam:  Vitals: /72 (BP Location: Right arm, Patient Position: Sitting, Cuff Size: Adult Regular)   Pulse 88   Ht 1.803 m (5' 10.98\")   Wt 83.9 kg (185 lb)   BMI 25.81 kg/m      Constitutional:           Skin:             Head:           Eyes:           Lymph:      ENT:           Neck:           Respiratory:            Cardiac:                                                           GI:           Extremities and Muscular Skeletal:                 Neurological:           Psych:         Recent Lab Results:  LIPID RESULTS:  Lab Results   Component Value Date    CHOL 84 01/27/2021    HDL 47 01/27/2021    LDL 28 01/27/2021    TRIG 45 01/27/2021    CHOLHDLRATIO 2.2 10/15/2015       LIVER ENZYME RESULTS:  Lab Results   Component Value Date    AST 47 (H) 01/27/2021    ALT 79 (H) 01/27/2021       CBC RESULTS:  Lab Results   Component Value Date    WBC 6.0 09/18/2018    RBC 5.05 09/18/2018    HGB 12.3 (L) 10/10/2018    HCT 48.4 09/18/2018    MCV 96 09/18/2018    MCH 32.3 09/18/2018    MCHC 33.7 09/18/2018    RDW 13.5 09/18/2018     09/18/2018       BMP RESULTS:  Lab Results   Component Value Date     01/27/2021    POTASSIUM 4.3 01/27/2021    CHLORIDE 106 01/27/2021    CO2 32 01/27/2021    ANIONGAP 1 (L) 01/27/2021    GLC 94 01/27/2021    BUN 11 01/27/2021    CR 0.84 01/27/2021    GFRESTIMATED 86 01/27/2021    GFRESTBLACK >90 01/27/2021    ALEXANDRA 8.9 01/27/2021        A1C RESULTS:  No results found for: A1C    INR RESULTS:  No results found for: INR        Thank you for allowing me to participate in the care of your patient.    Sincerely,     JAS Zapata [facility]  "

## 2021-01-29 NOTE — PROGRESS NOTES
Patient contacted via phone    See addendum on my office note from today  -orders placed for stress echo

## 2021-01-29 NOTE — PATIENT INSTRUCTIONS
I will let you know your liver tests and plan    I will await Dr Sim's reply about stress testing and aorta measurments    See us in one year

## 2021-01-29 NOTE — PROGRESS NOTES
Saw him for his annual and many many questions    Most I handled but    He is concerned about aorta    On echo it has appeared to be 4.1cm  But on CT 2015 3.5 X 3.7  On CT  3.6 X 3.5cm  So no change in 4 years time      I told him I would ask you when to image again    He is on Lisinopril with     And last stress echo fine (elevated cor calcium score); previous nuke's fine    He wants to know next stress test, last was 6-2019    Let me know

## 2021-02-01 DIAGNOSIS — I25.10 CORONARY ARTERY DISEASE INVOLVING NATIVE CORONARY ARTERY OF NATIVE HEART WITHOUT ANGINA PECTORIS: ICD-10-CM

## 2021-02-01 RX ORDER — NITROGLYCERIN 0.4 MG/1
TABLET SUBLINGUAL
Qty: 25 TABLET | Refills: 1 | Status: SHIPPED | OUTPATIENT
Start: 2021-02-01 | End: 2023-07-26

## 2021-02-01 NOTE — TELEPHONE ENCOUNTER
Received refill request for:  NTG  Last OV was: 1/29/21  Labs/EKG: FLP 1/27/21  F/U scheduled: na  New script sent to: [facility]    Pharmacy requesting clarification on Crestor dose. Per Sadie Leavitt NP Crestor decreased to 20 mg daily on 1/29/2021. Patient instructed to use up his 40 mg tablets by cutting in half. New Rx with 20 mg tabs escribed. Clarified with pharmacist.     Davie Vee LPN  [facility]  [phone]

## 2021-03-11 ENCOUNTER — TELEPHONE (OUTPATIENT)
Dept: INTERNAL MEDICINE | Facility: CLINIC | Age: 76
End: 2021-03-11

## 2021-03-11 DIAGNOSIS — N52.8 OTHER MALE ERECTILE DYSFUNCTION: ICD-10-CM

## 2021-03-11 DIAGNOSIS — N52.9 ERECTILE DYSFUNCTION, UNSPECIFIED ERECTILE DYSFUNCTION TYPE: ICD-10-CM

## 2021-03-11 NOTE — TELEPHONE ENCOUNTER
Per chart, medication discontinued 1-9-21--no reason given.    Routing refill request to provider for review/approval because:  Drug not active on patient's medication list    Please advise, thanks.

## 2021-03-11 NOTE — TELEPHONE ENCOUNTER
tadalafil (CIALIS) 5 MG tablet       Last Written Prescription Date:  03/11/20  Last Fill Quantity: 4,   # refills: 22  Last Office Visit:   Future Office visit:   VV 12/29*20    Routing refill request to provider for review/approval because:  Drug not active on patient's medication list  PATIENT IS REQUESTING QTY. 15 or the 10MG  Because the cost is much cheaper

## 2021-03-15 RX ORDER — TADALAFIL 5 MG/1
TABLET ORAL
Qty: 4 TABLET | Refills: 0 | Status: SHIPPED | OUTPATIENT
Start: 2021-03-15 | End: 2021-03-16

## 2021-03-16 DIAGNOSIS — E78.00 HYPERCHOLESTEROLEMIA: ICD-10-CM

## 2021-03-16 RX ORDER — TADALAFIL 10 MG/1
10 TABLET ORAL DAILY PRN
Qty: 15 TABLET | Refills: 11 | Status: SHIPPED | OUTPATIENT
Start: 2021-03-16 | End: 2022-03-18

## 2021-03-16 RX ORDER — EZETIMIBE 10 MG/1
10 TABLET ORAL DAILY
Qty: 90 TABLET | Refills: 3 | Status: SHIPPED | OUTPATIENT
Start: 2021-03-16 | End: 2022-01-24

## 2021-03-16 NOTE — TELEPHONE ENCOUNTER
Patient calls to check status of refill.  Please send to [facility] in [place].  Please see note below on Qty.  Wrong dose and qty. was sent in yesterday by Candice Payan.  For best price Qty. 15 and the 10MG tablets.

## 2021-03-16 NOTE — TELEPHONE ENCOUNTER
Medication Refilled: Zetia  Last office visit: 2021 with Zhanna Leavitt  Last Labs/EK21  Next office visit: 2022 orders in [facility]   Pharmacy sent to: Percy Rios RN

## 2021-03-17 DIAGNOSIS — I10 ESSENTIAL HYPERTENSION WITH GOAL BLOOD PRESSURE LESS THAN 140/90: ICD-10-CM

## 2021-03-17 DIAGNOSIS — I77.810 AORTIC ROOT DILATATION (H): ICD-10-CM

## 2021-03-18 RX ORDER — LISINOPRIL 2.5 MG/1
TABLET ORAL
Qty: 90 TABLET | Refills: 2 | Status: SHIPPED | OUTPATIENT
Start: 2021-03-18 | End: 2021-10-26

## 2021-03-18 NOTE — TELEPHONE ENCOUNTER
Pending Prescriptions:                       Disp   Refills    lisinopril (ZESTRIL) 2.5 MG tablet [Pharm*90 tab*2            Sig: TAKE 1 TABLET BY MOUTH ONCE DAILY    Prescription approved per [facility] Refill Protocol.

## 2021-03-25 DIAGNOSIS — E78.00 HYPERCHOLESTEROLEMIA: Primary | ICD-10-CM

## 2021-03-25 DIAGNOSIS — E78.00 HYPERCHOLESTEROLEMIA: ICD-10-CM

## 2021-03-25 LAB
ALBUMIN SERPL-MCNC: 3.8 G/DL (ref 3.4–5)
ALP SERPL-CCNC: 67 U/L (ref 40–150)
ALT SERPL W P-5'-P-CCNC: 65 U/L (ref 0–70)
AST SERPL W P-5'-P-CCNC: 40 U/L (ref 0–45)
BILIRUB DIRECT SERPL-MCNC: 0.2 MG/DL (ref 0–0.2)
BILIRUB SERPL-MCNC: 0.8 MG/DL (ref 0.2–1.3)
PROT SERPL-MCNC: 7.3 G/DL (ref 6.8–8.8)

## 2021-03-25 PROCEDURE — 80076 HEPATIC FUNCTION PANEL: CPT | Performed by: NURSE PRACTITIONER

## 2021-03-25 PROCEDURE — 36415 COLL VENOUS BLD VENIPUNCTURE: CPT | Performed by: NURSE PRACTITIONER

## 2021-03-25 NOTE — RESULT ENCOUNTER NOTE
Called pt, no answer, left voicemail requesting a return call to discuss non-urgent lab results and recommendations from MIKEY Parker and also discussed that [employer] message would also be sent.   JEANE Puentes 12:06 PM 3/25/2021

## 2021-03-25 NOTE — RESULT ENCOUNTER NOTE
Received return call from pt. He confirms he got MyChart result note and agrees to continues medications as is and agrees to fasting lab appt in [facility] on 6/25/21 at 7:30am- appt scheduled.   JEANE Puentes 1:57 PM 3/25/2021

## 2021-05-19 ENCOUNTER — TRANSFERRED RECORDS (OUTPATIENT)
Dept: HEALTH INFORMATION MANAGEMENT | Facility: CLINIC | Age: 76
End: 2021-05-19

## 2021-06-25 DIAGNOSIS — E78.00 HYPERCHOLESTEROLEMIA: ICD-10-CM

## 2021-06-25 LAB
ALBUMIN SERPL-MCNC: 3.8 G/DL (ref 3.4–5)
ALP SERPL-CCNC: 65 U/L (ref 40–150)
ALT SERPL W P-5'-P-CCNC: 59 U/L (ref 0–70)
AST SERPL W P-5'-P-CCNC: 39 U/L (ref 0–45)
BILIRUB DIRECT SERPL-MCNC: 0.2 MG/DL (ref 0–0.2)
BILIRUB SERPL-MCNC: 0.8 MG/DL (ref 0.2–1.3)
CHOLEST SERPL-MCNC: 93 MG/DL
HDLC SERPL-MCNC: 53 MG/DL
LDLC SERPL CALC-MCNC: 32 MG/DL
NONHDLC SERPL-MCNC: 40 MG/DL
PROT SERPL-MCNC: 7.3 G/DL (ref 6.8–8.8)
TRIGL SERPL-MCNC: 38 MG/DL

## 2021-06-25 PROCEDURE — 80061 LIPID PANEL: CPT | Performed by: NURSE PRACTITIONER

## 2021-06-25 PROCEDURE — 80076 HEPATIC FUNCTION PANEL: CPT | Performed by: NURSE PRACTITIONER

## 2021-06-25 PROCEDURE — 36415 COLL VENOUS BLD VENIPUNCTURE: CPT | Performed by: NURSE PRACTITIONER

## 2021-07-25 ENCOUNTER — TRANSFERRED RECORDS (OUTPATIENT)
Dept: HEALTH INFORMATION MANAGEMENT | Facility: CLINIC | Age: 76
End: 2021-07-25

## 2021-09-04 ENCOUNTER — HEALTH MAINTENANCE LETTER (OUTPATIENT)
Age: 76
End: 2021-09-04

## 2021-09-09 ENCOUNTER — TELEPHONE (OUTPATIENT)
Dept: CARDIOLOGY | Facility: CLINIC | Age: 76
End: 2021-09-09

## 2021-09-09 DIAGNOSIS — R93.1 AGATSTON CAC SCORE, >400: Primary | ICD-10-CM

## 2021-09-09 DIAGNOSIS — R07.9 CHEST PAIN: ICD-10-CM

## 2021-09-09 NOTE — TELEPHONE ENCOUNTER
Left message for patient notifying him of recommendation for stress echo and YUNG follow up. Message to scheduling to arrange.

## 2021-09-09 NOTE — TELEPHONE ENCOUNTER
"Voicemail received from patient calling to ask for an appointment to discuss his aorta and recent chest pains.     Returned patient's call, pt states he has been getting intermittent chest pain. He states the pain occurs when he wakes up in the morning. No chest pain on exertion. He describes the pain as \"twinges\" in his jaw/left arm. No shortness of breath, nausea, diaphoresis, lightheadedness/dizzines.     Note from 1/29/21 by Dr Sim: \"I do not think we need to follow his aorta as the CT scan shows that it is in the normal range.  We can do a stress echo next year given his very high calcium score.  Obviously we will do it sooner if he should develop symptoms.\"     Reviewed note as above from with patient. Routed to Dr Sim.   "

## 2021-09-10 NOTE — TELEPHONE ENCOUNTER
Attempted to contact Patient again. Message left with recommendation of stress echo and YUNG visit.  Patient to call scheduling, phone # given.  Patient to call back with any questions or concerns.

## 2021-09-16 NOTE — PROGRESS NOTES
"CARDIOLOGY CLINIC     DOS: 21    Javier Tamez  : 1945, 76 year old  MRN: [id number]    Primary Cardiologist: Dr. Sim     Reason for Visit: recent chest pain     History of Present Illness:   Javier Tamez is a 76 year old male followed by Dr. Sim with a history of dyslipidemia, strong family history of CAD with father dying after his second MI at the age of 62 with first MI at 39, Grandfather  at 35 and brother had MI at 68 with stenting in [place]. Coronary calcium score is elevated to 4316 placing him in the 1% percentile for age and gender matched control groups.   Echo has noted a dilated ascending aorta which is not apparent on CT scan at 3.6cm to 3.7cm on CT in  and . He had calcified granulomas on a CXR.   Last stress test  sowed no evidence of ischemia.    Note from 21 by Dr Sim: \"I do not think we need to follow his aorta as the CT scan shows that it is in the normal range.  We can do a stress echo next year given his very high calcium score.  Obviously we will do it sooner if he should develop symptoms.\"    21 Fasting lipid profile lipids showed LDL 32 HDL 53 TG 38.    21 Patient called to ask for an appointment to discuss his aorta and recent chest pains. Patient states he has been getting intermittent chest pain. He states the pain occurs when he wakes up in the morning. No chest pain on exertion. He describes the pain as \"twinges\" in his jaw/left arm. No shortness of breath, nausea, diaphoresis, lightheadedness/dizzines. Stress echo ordered by Dr. Sim.     He has not needed Nitroglycerine as this has not been sustained.      21 stress echo   1. Above average exercise capacity, 96% max HR achieved.  2. There was no chest pain or significant ST changes with exercise.  3. Rest echo: Normal left ventricular function and wall motion at rest. The  visual ejection fraction is estimated at 55-60%.  4. Stress echo: This was a normal " stress echocardiogram with no evidence of  stress-induced ischemia. The visual ejection fraction is estimated at 65-70%.  No changes from stress echo 10/2019.    9/20/21 returns to cardiology clinic.  States his chest pain is better than it was a few weeks ago however he has fleeting dull pain in his chest, jaw and left arm.  He states it is not a chest pain but more of a fleeting dull pain.  He does not have the symptoms with exercise.  He notices these fleeting a dull pain at rest.  During his stress echocardiogram he experienced no chest pain chest pressure neck or arm pain.  He rides his bike consistently and does not have any symptoms.  His blood pressure is excellent and his LDL is 32.  Eats a healthy diet.    I have reviewed and updated the patient's Past Medical History, Social History, Family History and Medication List.    CURRENT MEDICATIONS:  Current Outpatient Medications   Medication Sig Dispense Refill     Ascorbic Acid (VITAMIN C PO) Take 1,000 mg by mouth every morning        aspirin (ASA) 81 MG tablet Take 1 tablet (81 mg) by mouth daily       Cyanocobalamin (VITAMIN B 12 PO) Take 1,000 mcg by mouth every morning       ezetimibe (ZETIA) 10 MG tablet Take 1 tablet (10 mg) by mouth daily 90 tablet 3     lisinopril (ZESTRIL) 2.5 MG tablet TAKE 1 TABLET BY MOUTH ONCE DAILY 90 tablet 2     nitroGLYcerin (NITROSTAT) 0.4 MG sublingual tablet For chest pain place 1 tablet under the tongue every 5 minutes for 3 doses. If symptoms persist 5 minutes after 1st dose call 911. 25 tablet 1     Probiotic Product (PROBIOTIC PO) Take 1 capsule by mouth every morning       rosuvastatin (CRESTOR) 20 MG tablet Take 1 tablet (20 mg) by mouth daily 90 tablet 3     tadalafil (CIALIS) 10 MG tablet Take 1 tablet (10 mg) by mouth daily as needed (erectile dysfunction) 15 tablet 11     VITAMIN D, CHOLECALCIFEROL, PO Take 1,000 Units by mouth every morning          ALLERGIES   No Known Allergies    PAST MEDICAL HISTORY:  Past  Medical History:   Diagnosis Date     Aortic root dilatation (H)      Aortic stenosis      Arthritis      Atypical chest pain 1/28/2015     Cerebral artery occlusion with cerebral infarction (H)     TIA  1992     Essential hypertension with goal blood pressure less than 140/90 11/26/2016    takes lisinopril for preventitive     Family history of heart disease      Hypercholesterolemia 1/3/2014     Diagnosis updated by automated process. Provider to review and confirm.     Hyperlipidemia LDL goal <70      IBS (irritable bowel syndrome)      Other chronic pain          ROS:  12-pt ROS is negative except for as noted above.      PHYSICAL EXAMINATION:  Vitals: /64, HR 79 bpm, weight 187 pounds.   Constitutional:  Patient is pleasant, alert, cooperative, and in NAD.   HEENT:  NCAT. PERRLA. EOM's intact.   Neck:  JVP no elevated  Pulmonary: clear  Cardiac: regular rhythm; normal S1 and S2;no S3 or S4;no murmurs, gallops or rubs detected                Abdomen:  Soft, non-tender abdomen, no hepatosplenomegaly appreciated.   Extremities:  no edema   Neurological:  No gross motor or sensory deficits.   Psych: Appropriate affect.      DIAGNOSTIC STUDIES:  Recent Lab Results:      Lab Results   Component Value Date    CHOL 93 06/25/2021    HDL 53 06/25/2021    LDL 32 06/25/2021    TRIG 38 06/25/2021    CHOLHDLRATIO 2.2 10/15/2015       IMPRESSION:    1. CAD by calcium scoring with strong FH of CAD  Recently called cardiology nurse complaining of chest pain.  Stress echocardiogram was done which was normal.  Today he states he has episodes of fleeting and dull pain in his chest his jaw and his left arm.  He has not taken a nitroglycerin because the pain is usually fleeting within a few seconds.  He exercises with no symptoms.  I reviewed the case with Dr. Robles.  He states the stress echocardiogram is 85% accurate.  He recommends to continue with the current regimen.  He suggested to encourage the patient to call if  "symptoms change or go to the emergency room if needed.  Continue aggressive risk factor modification  Stress echo normal  As needed nitroglycerin if needed  It appears that fleeting chest pain is atypical however he has a strong family history.     2. Dyslipidemia-controlled fasting lipid profile June 2021 showed an LDL of 32, HDL 53, cholesterol 93, triglycerides 38.  Is currently on Crestor.     3. Mildly dilated aorta on Echo, BP controlled   Echo has noted a dilated ascending aorta which is not apparent on CT scan at 3.6cm to 3.7cm on CT in 2015 and . He had calcified granulomas on a CXR.   Note from 1/29/21 by Dr Sim: \"I do not think we need to follow his aorta as the CT scan shows that it is in the normal range.  We can do a stress echo next year given his very high calcium score.  Obviously we will do it sooner if he should develop symptoms.\"               Follow-up:   1.  Follow-up with Dr. Robles in January.  Fasting lipid profile.    Thank you for the opportunity to be involved in this very pleasant patient's care please feel to contact me with any questions.    Today 32 minutes was spent today reviewing the chart, visiting the patient, and documenting the visit.      Kymberly MARK, MIKEY   [facility]  "

## 2021-09-17 ENCOUNTER — HOSPITAL ENCOUNTER (OUTPATIENT)
Dept: CARDIOLOGY | Facility: CLINIC | Age: 76
Discharge: HOME | End: 2021-09-17
Attending: NURSE PRACTITIONER | Admitting: NURSE PRACTITIONER
Payer: COMMERCIAL

## 2021-09-17 DIAGNOSIS — I25.10 CORONARY ARTERY DISEASE INVOLVING NATIVE CORONARY ARTERY OF NATIVE HEART WITHOUT ANGINA PECTORIS: ICD-10-CM

## 2021-09-17 PROCEDURE — 93321 DOPPLER ECHO F-UP/LMTD STD: CPT | Mod: 26 | Performed by: INTERNAL MEDICINE

## 2021-09-17 PROCEDURE — 255N000002 HC RX 255 OP 636: Performed by: NURSE PRACTITIONER

## 2021-09-17 PROCEDURE — 93018 CV STRESS TEST I&R ONLY: CPT | Performed by: INTERNAL MEDICINE

## 2021-09-17 PROCEDURE — 93016 CV STRESS TEST SUPVJ ONLY: CPT | Performed by: INTERNAL MEDICINE

## 2021-09-17 PROCEDURE — 999N000208 ECHO STRESS ECHOCARDIOGRAM

## 2021-09-17 PROCEDURE — 93325 DOPPLER ECHO COLOR FLOW MAPG: CPT | Mod: 26 | Performed by: INTERNAL MEDICINE

## 2021-09-17 PROCEDURE — 93350 STRESS TTE ONLY: CPT | Mod: 26 | Performed by: INTERNAL MEDICINE

## 2021-09-17 RX ADMIN — HUMAN ALBUMIN MICROSPHERES AND PERFLUTREN 3 ML: 10; .22 INJECTION, SOLUTION INTRAVENOUS at 10:24

## 2021-09-20 ENCOUNTER — OFFICE VISIT (OUTPATIENT)
Dept: CARDIOLOGY | Facility: CLINIC | Age: 76
End: 2021-09-20
Payer: COMMERCIAL

## 2021-09-20 ENCOUNTER — DOCUMENTATION ONLY (OUTPATIENT)
Dept: CARDIOLOGY | Facility: CLINIC | Age: 76
End: 2021-09-20

## 2021-09-20 VITALS
WEIGHT: 187.2 LBS | BODY MASS INDEX: 26.21 KG/M2 | HEIGHT: 71 IN | DIASTOLIC BLOOD PRESSURE: 64 MMHG | HEART RATE: 79 BPM | OXYGEN SATURATION: 98 % | SYSTOLIC BLOOD PRESSURE: 110 MMHG

## 2021-09-20 DIAGNOSIS — R93.1 AGATSTON CAC SCORE, >400: Primary | ICD-10-CM

## 2021-09-20 PROCEDURE — 99214 OFFICE O/P EST MOD 30 MIN: CPT | Performed by: NURSE PRACTITIONER

## 2021-09-20 ASSESSMENT — MIFFLIN-ST. JEOR: SCORE: 1606.26

## 2021-09-20 NOTE — LETTER
"2021    Guillermo Kong MD, MD  [address]  [facility] [address]    RE: Javier Tamez       Dear Colleague,    I had the pleasure of seeing Javier Tamez in the [facility].    CARDIOLOGY CLINIC     DOS: 21    Javier Tamez  : 1945, 76 year old  MRN: [id number]    Primary Cardiologist: Dr. Sim     Reason for Visit: recent chest pain     History of Present Illness:   Javier Tamez is a 76 year old male followed by Dr. Sim with a history of dyslipidemia, strong family history of CAD with father dying after his second MI at the age of 62 with first MI at 39, Grandfather  at 35 and brother had MI at 68 with stenting in [place]. Coronary calcium score is elevated to 4316 placing him in the 1% percentile for age and gender matched control groups.   Echo has noted a dilated ascending aorta which is not apparent on CT scan at 3.6cm to 3.7cm on CT in  and . He had calcified granulomas on a CXR.   Last stress test  sowed no evidence of ischemia.    Note from 21 by Dr Sim: \"I do not think we need to follow his aorta as the CT scan shows that it is in the normal range.  We can do a stress echo next year given his very high calcium score.  Obviously we will do it sooner if he should develop symptoms.\"    21 Fasting lipid profile lipids showed LDL 32 HDL 53 TG 38.    21 Patient called to ask for an appointment to discuss his aorta and recent chest pains. Patient states he has been getting intermittent chest pain. He states the pain occurs when he wakes up in the morning. No chest pain on exertion. He describes the pain as \"twinges\" in his jaw/left arm. No shortness of breath, nausea, diaphoresis, lightheadedness/dizzines. Stress echo ordered by Dr. Sim.     He has not needed Nitroglycerine as this has not been sustained.      21 stress echo   1. Above average exercise " capacity, 96% max HR achieved.  2. There was no chest pain or significant ST changes with exercise.  3. Rest echo: Normal left ventricular function and wall motion at rest. The  visual ejection fraction is estimated at 55-60%.  4. Stress echo: This was a normal stress echocardiogram with no evidence of  stress-induced ischemia. The visual ejection fraction is estimated at 65-70%.  No changes from stress echo 10/2019.    9/20/21 returns to cardiology clinic.  States his chest pain is better than it was a few weeks ago however he has fleeting dull pain in his chest, jaw and left arm.  He states it is not a chest pain but more of a fleeting dull pain.  He does not have the symptoms with exercise.  He notices these fleeting a dull pain at rest.  During his stress echocardiogram he experienced no chest pain chest pressure neck or arm pain.  He rides his bike consistently and does not have any symptoms.  His blood pressure is excellent and his LDL is 32.  Eats a healthy diet.    I have reviewed and updated the patient's Past Medical History, Social History, Family History and Medication List.    CURRENT MEDICATIONS:  Current Outpatient Medications   Medication Sig Dispense Refill     Ascorbic Acid (VITAMIN C PO) Take 1,000 mg by mouth every morning        aspirin (ASA) 81 MG tablet Take 1 tablet (81 mg) by mouth daily       Cyanocobalamin (VITAMIN B 12 PO) Take 1,000 mcg by mouth every morning       ezetimibe (ZETIA) 10 MG tablet Take 1 tablet (10 mg) by mouth daily 90 tablet 3     lisinopril (ZESTRIL) 2.5 MG tablet TAKE 1 TABLET BY MOUTH ONCE DAILY 90 tablet 2     nitroGLYcerin (NITROSTAT) 0.4 MG sublingual tablet For chest pain place 1 tablet under the tongue every 5 minutes for 3 doses. If symptoms persist 5 minutes after 1st dose call 911. 25 tablet 1     Probiotic Product (PROBIOTIC PO) Take 1 capsule by mouth every morning       rosuvastatin (CRESTOR) 20 MG tablet Take 1 tablet (20 mg) by mouth daily 90 tablet 3      tadalafil (CIALIS) 10 MG tablet Take 1 tablet (10 mg) by mouth daily as needed (erectile dysfunction) 15 tablet 11     VITAMIN D, CHOLECALCIFEROL, PO Take 1,000 Units by mouth every morning          ALLERGIES   No Known Allergies    PAST MEDICAL HISTORY:  Past Medical History:   Diagnosis Date     Aortic root dilatation (H)      Aortic stenosis      Arthritis      Atypical chest pain 1/28/2015     Cerebral artery occlusion with cerebral infarction (H)     TIA  1992     Essential hypertension with goal blood pressure less than 140/90 11/26/2016    takes lisinopril for preventitive     Family history of heart disease      Hypercholesterolemia 1/3/2014     Diagnosis updated by automated process. Provider to review and confirm.     Hyperlipidemia LDL goal <70      IBS (irritable bowel syndrome)      Other chronic pain          ROS:  12-pt ROS is negative except for as noted above.      PHYSICAL EXAMINATION:  Vitals: /64, HR 79 bpm, weight 187 pounds.   Constitutional:  Patient is pleasant, alert, cooperative, and in NAD.   HEENT:  NCAT. PERRLA. EOM's intact.   Neck:  JVP no elevated  Pulmonary: clear  Cardiac: regular rhythm; normal S1 and S2;no S3 or S4;no murmurs, gallops or rubs detected                Abdomen:  Soft, non-tender abdomen, no hepatosplenomegaly appreciated.   Extremities:  no edema   Neurological:  No gross motor or sensory deficits.   Psych: Appropriate affect.      DIAGNOSTIC STUDIES:  Recent Lab Results:      Lab Results   Component Value Date    CHOL 93 06/25/2021    HDL 53 06/25/2021    LDL 32 06/25/2021    TRIG 38 06/25/2021    CHOLHDLRATIO 2.2 10/15/2015       IMPRESSION:    1. CAD by calcium scoring with strong FH of CAD  Recently called cardiology nurse complaining of chest pain.  Stress echocardiogram was done which was normal.  Today he states he has episodes of fleeting and dull pain in his chest his jaw and his left arm.  He has not taken a nitroglycerin because the pain is usually  "fleeting within a few seconds.  He exercises with no symptoms.  I reviewed the case with Dr. Robles.  He states the stress echocardiogram is 85% accurate.  He recommends to continue with the current regimen.  He suggested to encourage the patient to call if symptoms change or go to the emergency room if needed.  Continue aggressive risk factor modification  Stress echo normal  As needed nitroglycerin if needed  It appears that fleeting chest pain is atypical however he has a strong family history.     2. Dyslipidemia-controlled fasting lipid profile June 2021 showed an LDL of 32, HDL 53, cholesterol 93, triglycerides 38.  Is currently on Crestor.     3. Mildly dilated aorta on Echo, BP controlled   Echo has noted a dilated ascending aorta which is not apparent on CT scan at 3.6cm to 3.7cm on CT in 2015 and . He had calcified granulomas on a CXR.   Note from 1/29/21 by Dr Sim: \"I do not think we need to follow his aorta as the CT scan shows that it is in the normal range.  We can do a stress echo next year given his very high calcium score.  Obviously we will do it sooner if he should develop symptoms.\"               Follow-up:   1.  Follow-up with Dr. Robles in January.  Fasting lipid profile.    Thank you for the opportunity to be involved in this very pleasant patient's care please feel to contact me with any questions.    Today 32 minutes was spent today reviewing the chart, visiting the patient, and documenting the visit.      MIKEY [facility]      Thank you for allowing me to participate in the care of your patient.      Sincerely,     JAS Washington CNP [facility]  cc:   No referring provider defined for this encounter.        "

## 2021-09-20 NOTE — PATIENT INSTRUCTIONS
Call [email or web address] nurse for any questions or concerns Mon-Fri 8am-4pm:                                                [phone]                                For concerns after hours: [phone]    Medication changes:   1. No changes  Plan from today:   1. See Dr. Sim in Jan.

## 2021-09-20 NOTE — PROGRESS NOTES
Called patient and left a message. Patient called back to the RN line. Gave me the message. I called him again and asked to call.   Will wait to hear from RN. Kymberly MARK, CNP

## 2021-10-21 ENCOUNTER — OFFICE VISIT (OUTPATIENT)
Dept: INTERNAL MEDICINE | Facility: CLINIC | Age: 76
End: 2021-10-21
Payer: COMMERCIAL

## 2021-10-21 VITALS
DIASTOLIC BLOOD PRESSURE: 62 MMHG | HEIGHT: 71 IN | WEIGHT: 188 LBS | HEART RATE: 82 BPM | SYSTOLIC BLOOD PRESSURE: 110 MMHG | RESPIRATION RATE: 18 BRPM | TEMPERATURE: 97.4 F | OXYGEN SATURATION: 93 % | BODY MASS INDEX: 26.32 KG/M2

## 2021-10-21 DIAGNOSIS — E04.1 THYROID NODULE: ICD-10-CM

## 2021-10-21 DIAGNOSIS — R93.1 AGATSTON CAC SCORE, >400: ICD-10-CM

## 2021-10-21 DIAGNOSIS — I77.810 AORTIC ROOT DILATATION (H): ICD-10-CM

## 2021-10-21 DIAGNOSIS — D36.9 ADENOMATOUS POLYP: ICD-10-CM

## 2021-10-21 DIAGNOSIS — E78.5 HYPERLIPIDEMIA LDL GOAL <100: ICD-10-CM

## 2021-10-21 DIAGNOSIS — I10 ESSENTIAL HYPERTENSION WITH GOAL BLOOD PRESSURE LESS THAN 140/90: ICD-10-CM

## 2021-10-21 DIAGNOSIS — Z00.00 ENCOUNTER FOR MEDICARE ANNUAL WELLNESS EXAM: Primary | ICD-10-CM

## 2021-10-21 DIAGNOSIS — Z12.5 ENCOUNTER FOR SCREENING FOR MALIGNANT NEOPLASM OF PROSTATE: ICD-10-CM

## 2021-10-21 PROCEDURE — 99397 PER PM REEVAL EST PAT 65+ YR: CPT | Performed by: INTERNAL MEDICINE

## 2021-10-21 PROCEDURE — 99214 OFFICE O/P EST MOD 30 MIN: CPT | Mod: 25 | Performed by: INTERNAL MEDICINE

## 2021-10-21 SDOH — ECONOMIC STABILITY: FOOD INSECURITY: WITHIN THE PAST 12 MONTHS, YOU WORRIED THAT YOUR FOOD WOULD RUN OUT BEFORE YOU GOT MONEY TO BUY MORE.: NEVER TRUE

## 2021-10-21 SDOH — HEALTH STABILITY: PHYSICAL HEALTH: ON AVERAGE, HOW MANY DAYS PER WEEK DO YOU ENGAGE IN MODERATE TO STRENUOUS EXERCISE (LIKE A BRISK WALK)?: 5 DAYS

## 2021-10-21 SDOH — ECONOMIC STABILITY: FOOD INSECURITY: WITHIN THE PAST 12 MONTHS, THE FOOD YOU BOUGHT JUST DIDN'T LAST AND YOU DIDN'T HAVE MONEY TO GET MORE.: NEVER TRUE

## 2021-10-21 SDOH — ECONOMIC STABILITY: TRANSPORTATION INSECURITY
IN THE PAST 12 MONTHS, HAS LACK OF TRANSPORTATION KEPT YOU FROM MEETINGS, WORK, OR FROM GETTING THINGS NEEDED FOR DAILY LIVING?: NO

## 2021-10-21 SDOH — ECONOMIC STABILITY: TRANSPORTATION INSECURITY
IN THE PAST 12 MONTHS, HAS THE LACK OF TRANSPORTATION KEPT YOU FROM MEDICAL APPOINTMENTS OR FROM GETTING MEDICATIONS?: NO

## 2021-10-21 SDOH — HEALTH STABILITY: PHYSICAL HEALTH: ON AVERAGE, HOW MANY MINUTES DO YOU ENGAGE IN EXERCISE AT THIS LEVEL?: 60 MIN

## 2021-10-21 SDOH — ECONOMIC STABILITY: INCOME INSECURITY: IN THE LAST 12 MONTHS, WAS THERE A TIME WHEN YOU WERE NOT ABLE TO PAY THE MORTGAGE OR RENT ON TIME?: NO

## 2021-10-21 ASSESSMENT — ENCOUNTER SYMPTOMS
COUGH: 0
WEAKNESS: 0
MYALGIAS: 0
DYSURIA: 0
NAUSEA: 0
JOINT SWELLING: 0
ABDOMINAL PAIN: 0
HEMATOCHEZIA: 0
FREQUENCY: 1
FEVER: 0
SORE THROAT: 0
HEADACHES: 0
CONSTIPATION: 1
NERVOUS/ANXIOUS: 0
SHORTNESS OF BREATH: 0
DIZZINESS: 0
HEARTBURN: 0
HEMATURIA: 0
CHILLS: 0
PARESTHESIAS: 0
ARTHRALGIAS: 0
PALPITATIONS: 0
DIARRHEA: 0
EYE PAIN: 0

## 2021-10-21 ASSESSMENT — ACTIVITIES OF DAILY LIVING (ADL): CURRENT_FUNCTION: NO ASSISTANCE NEEDED

## 2021-10-21 ASSESSMENT — SOCIAL DETERMINANTS OF HEALTH (SDOH)
WITHIN THE LAST YEAR, HAVE TO BEEN RAPED OR FORCED TO HAVE ANY KIND OF SEXUAL ACTIVITY BY YOUR PARTNER OR EX-PARTNER?: NO
HOW HARD IS IT FOR YOU TO PAY FOR THE VERY BASICS LIKE FOOD, HOUSING, MEDICAL CARE, AND HEATING?: NOT HARD AT ALL
WITHIN THE LAST YEAR, HAVE YOU BEEN HUMILIATED OR EMOTIONALLY ABUSED IN OTHER WAYS BY YOUR PARTNER OR EX-PARTNER?: NO
WITHIN THE LAST YEAR, HAVE YOU BEEN KICKED, HIT, SLAPPED, OR OTHERWISE PHYSICALLY HURT BY YOUR PARTNER OR EX-PARTNER?: NO
WITHIN THE LAST YEAR, HAVE YOU BEEN AFRAID OF YOUR PARTNER OR EX-PARTNER?: NO

## 2021-10-21 ASSESSMENT — MIFFLIN-ST. JEOR: SCORE: 1601.95

## 2021-10-21 ASSESSMENT — LIFESTYLE VARIABLES
HOW MANY STANDARD DRINKS CONTAINING ALCOHOL DO YOU HAVE ON A TYPICAL DAY: 1 OR 2
HOW OFTEN DO YOU HAVE A DRINK CONTAINING ALCOHOL: 2-3 TIMES A WEEK
HOW OFTEN DO YOU HAVE SIX OR MORE DRINKS ON ONE OCCASION: NEVER

## 2021-10-21 NOTE — PATIENT INSTRUCTIONS
Patient Education   Personalized Prevention Plan  You are due for the preventive services outlined below.  Your care team is available to assist you in scheduling these services.  If you have already completed any of these items, please share that information with your care team to update in your medical record.  Health Maintenance Due   Topic Date Due     ANNUAL REVIEW OF  ORDERS  Never done       Signs of Hearing Loss      Hearing much better with one ear can be a sign of hearing loss.   Hearing loss is a problem shared by many people. In fact, it is one of the most common health problems, particularly as people age. Most people age 65 and older have some hearing loss. By age 80, almost everyone does. Hearing loss often occurs slowly over the years. So you may not realize your hearing has gotten worse.  Have your hearing checked  Call your healthcare provider if you:    Have to strain to hear normal conversation    Have to watch other people s faces very carefully to follow what they re saying    Need to ask people to repeat what they ve said    Often misunderstand what people are saying    Turn the volume of the television or radio up so high that others complain    Feel that people are mumbling when they re talking to you    Find that the effort to hear leaves you feeling tired and irritated    Notice, when using the phone, that you hear better with one ear than the other  [email or web address]„¢ last reviewed this educational content on 1/1/2020 2000-2021 [employer]. All rights reserved. This information is not intended as a substitute for professional medical care. Always follow your healthcare professional's instructions.

## 2021-10-21 NOTE — PROGRESS NOTES
"SUBJECTIVE:   Javier Tamez is a 76 year old male who presents for Preventive Visit.      Patient has been advised of split billing requirements and indicates understanding: Yes   Are you in the first 12 months of your Medicare coverage?  No    Healthy Habits:     In general, how would you rate your overall health?  Excellent    Frequency of exercise:  4-5 days/week    Duration of exercise:  15-30 minutes    Do you usually eat at least 4 servings of fruit and vegetables a day, include whole grains    & fiber and avoid regularly eating high fat or \"junk\" foods?  Yes    Taking medications regularly:  Yes    Medication side effects:  None    Ability to successfully perform activities of daily living:  No assistance needed    Home Safety:  No safety concerns identified    Hearing Impairment:  Need to ask people to speak up or repeat themselves    In the past 6 months, have you been bothered by leaking of urine?  No    In general, how would you rate your overall mental or emotional health?  Excellent      PHQ-2 Total Score: 0    Additional concerns today:  Yes             Do you feel safe in your environment? Yes    Have you ever done Advance Care Planning? (For example, a Health Directive, POLST, or a discussion with a medical provider or your loved ones about your wishes): Yes, advance care planning is on file.       Fall risk  Fallen 2 or more times in the past year?: No  Any fall with injury in the past year?: No    Cognitive Screening   1) Repeat 3 items (Leader, Season, Table)    2) Clock draw: NORMAL  3) 3 item recall: Recalls 3 objects  Results: 3 items recalled: COGNITIVE IMPAIRMENT LESS LIKELY    Mini-CogTM Copyright TAMI Arnett. Licensed by the author for use in [facility]; reprinted with permission (leigha@.[email or web address]). All rights reserved.      Do you have sleep apnea, excessive snoring or daytime drowsiness?: no    Reviewed and updated as needed this visit by clinical staff  Tobacco  Allergies    " Med Hx  Surg Hx  Fam Hx  Soc Hx        Reviewed and updated as needed this visit by Provider                Social History     Tobacco Use     Smoking status: Never Smoker     Smokeless tobacco: Never Used   Substance Use Topics     Alcohol use: Yes     Comment: 3-4 glasses of wine weekly       Alcohol Use 10/21/2021   Prescreen: >3 drinks/day or >7 drinks/week? No   Prescreen: >3 drinks/day or >7 drinks/week? -       PROBLEMS TO ADD ON...In addition to an Annual Wellness Exam, we addressed hypertension, hyperlipidemia, and thyroid nodules.     The patient is tolerating his current medications without any adverse effects.  BP appears well controlled.      Most recent lipid panel from 6/2021 showed LDL-Cholesterol to be at target.    He has a history of thyroid nodules. We ordered a thyroid ultrasound. He is offered a referral for an endocrinology consult.     Current providers sharing in care for this patient include:   Patient Care Team:  Guillermo Kong MD as PCP - General (Internal Medicine)  Guillermo Kong MD as Assigned PCP  Kymberly Reis APRN CNP as Assigned Heart and Vascular Provider    The following health maintenance items are reviewed in Epic and correct as of today:  Health Maintenance Due   Topic Date Due     ANNUAL REVIEW OF HM ORDERS  Never done     MEDICARE ANNUAL WELLNESS VISIT  10/28/2020     Pneumonia Vaccine: Patient has received both pneumonia vaccinations.      Review of Systems   Constitutional: Negative for chills and fever.   HENT: Negative for congestion, ear pain, hearing loss and sore throat.    Eyes: Negative for pain and visual disturbance.   Respiratory: Negative for cough and shortness of breath.    Cardiovascular: Negative for chest pain, palpitations and peripheral edema.   Gastrointestinal: Positive for constipation. Negative for abdominal pain, diarrhea, heartburn, hematochezia and nausea.   Genitourinary: Positive for frequency and impotence. Negative for discharge,  "dysuria, genital sores, hematuria and urgency.   Musculoskeletal: Negative for arthralgias, joint swelling and myalgias.   Skin: Negative for rash.   Neurological: Negative for dizziness, weakness, headaches and paresthesias.   Psychiatric/Behavioral: Negative for mood changes. The patient is not nervous/anxious.        OBJECTIVE:   Pulse 82   Temp 97.4  F (36.3  C) (Tympanic)   Resp 18   Ht 1.791 m (5' 10.5\")   Wt 85.3 kg (188 lb)   SpO2 93%   BMI 26.59 kg/m   Estimated body mass index is 26.59 kg/m  as calculated from the following:    Height as of this encounter: 1.791 m (5' 10.5\").    Weight as of this encounter: 85.3 kg (188 lb).  Physical Exam  GENERAL: healthy, alert and no distress  EYES: Eyes grossly normal to inspection, PERRL and conjunctivae and sclerae normal  HENT: ear canals and TM's normal, nose and mouth without ulcers or lesions  NECK: no adenopathy, no asymmetry, masses, or scars and thyroid normal to palpation  RESP: lungs clear to auscultation - no rales, rhonchi or wheezes  CV: regular rate and rhythm, normal S1 S2, no S3 or S4, no murmur, click or rub, no peripheral edema and peripheral pulses strong  ABDOMEN: soft, nontender, no hepatosplenomegaly, no masses and bowel sounds normal  MS: no gross musculoskeletal defects noted, no edema  SKIN: no suspicious lesions or rashes  NEURO: Normal strength and tone, mentation intact and speech normal  PSYCH: mentation appears normal, affect normal/bright    Diagnostic Test Results:  Labs reviewed in Epic    ASSESSMENT / PLAN:   (Z00.00) Encounter for Medicare annual wellness exam  (primary encounter diagnosis)  Comment: Stable health. See epic orders.     (E78.5) Hyperlipidemia LDL goal <100  Comment: LDL at target. Continue current meds.   Plan: Comprehensive metabolic panel (BMP + Alb, Alk         Phos, ALT, AST, Total. Bili, TP), Lipid panel         reflex to direct LDL Fasting, OFFICE/OUTPT         VISIT,EST,LEVL III          (I10) Essential " "hypertension with goal blood pressure less than 140/90  Comment: BP at target. Continue current meds.   Plan: OFFICE/OUTPT VISIT,EST,LEVL III          (E04.1) Thyroid nodule  Comment: Ordered thyroid ultrasound and endocrinology consult.   Plan: Adult Endocrinology Referral, OFFICE/OUTPT         VISIT,EST,LEVL III, US Thyroid          (I77.810) Aortic root dilatation (H)  (R93.1) Agatston CAC score, >400  Comment: F/u with cardiology as planned.    (D36.9) Adenomatous polyp  Comment: Referred for colonoscopy.   Plan: Adult Gastro Ref - Procedure Only          (Z12.5) Encounter for screening for malignant neoplasm of prostate  Plan: PSA, screen            Patient has been advised of split billing requirements and indicates understanding: Yes  COUNSELING:  Reviewed preventive health counseling, as reflected in patient instructions    Estimated body mass index is 26.59 kg/m  as calculated from the following:    Height as of this encounter: 1.791 m (5' 10.5\").    Weight as of this encounter: 85.3 kg (188 lb).        He reports that he has never smoked. He has never used smokeless tobacco.      Appropriate preventive services were discussed with this patient, including applicable screening as appropriate for cardiovascular disease, diabetes, osteopenia/osteoporosis, and glaucoma.  As appropriate for age/gender, discussed screening for colorectal cancer, prostate cancer, breast cancer, and cervical cancer. Checklist reviewing preventive services available has been given to the patient.    Reviewed patients plan of care and provided an AVS. The Basic Care Plan (routine screening as documented in Health Maintenance) for Javier meets the Care Plan requirement. This Care Plan has been established and reviewed with the Patient.    Counseling Resources:  ATP IV Guidelines  Pooled Cohorts Equation Calculator  Breast Cancer Risk Calculator  Breast Cancer: Medication to Reduce Risk  FRAX Risk Assessment  ICSI Preventive " Guidelines  Dietary Guidelines for Americans, 2010  USDA's MyPlate  ASA Prophylaxis  Lung CA Screening    Guillermo Kong MD,   [facility]

## 2021-10-24 DIAGNOSIS — I77.810 AORTIC ROOT DILATATION (H): ICD-10-CM

## 2021-10-24 DIAGNOSIS — I10 ESSENTIAL HYPERTENSION WITH GOAL BLOOD PRESSURE LESS THAN 140/90: ICD-10-CM

## 2021-10-26 RX ORDER — LISINOPRIL 2.5 MG/1
TABLET ORAL
Qty: 90 TABLET | Refills: 3 | Status: SHIPPED | OUTPATIENT
Start: 2021-10-26 | End: 2022-06-15

## 2021-10-28 ENCOUNTER — TELEPHONE (OUTPATIENT)
Dept: CARDIOLOGY | Facility: CLINIC | Age: 76
End: 2021-10-28

## 2021-10-28 NOTE — TELEPHONE ENCOUNTER
Message from patient that he has a question about his January appointment.  Attempted to contact patient, left a message for him to call back.    Patient wanted to verify that his labs would be completed for the OV if he has them done the same day. Reviewed with patient that typically a one hour window is enough to get the results back.

## 2021-10-29 ENCOUNTER — TELEPHONE (OUTPATIENT)
Dept: CARDIOLOGY | Facility: CLINIC | Age: 76
End: 2021-10-29

## 2021-10-29 NOTE — TELEPHONE ENCOUNTER
Voicemail received from patient wondering if he needs an echo/other imaging for evaluation of his aortic root dilation and ascending aortic dilation prior to January follow up. Last echo was 10/2019. Routed to Dr Sim.

## 2021-11-01 NOTE — TELEPHONE ENCOUNTER
Spoke to patient, reviewed message from Dr Sim regarding CT/echo. Pt verbalized understanding, no further questions.

## 2021-11-10 DIAGNOSIS — E78.00 HYPERCHOLESTEROLEMIA: ICD-10-CM

## 2021-11-10 RX ORDER — ROSUVASTATIN CALCIUM 20 MG/1
20 TABLET, COATED ORAL DAILY
Qty: 90 TABLET | Refills: 0 | Status: SHIPPED | OUTPATIENT
Start: 2021-11-10 | End: 2022-02-03

## 2021-11-10 NOTE — TELEPHONE ENCOUNTER
Received refill request for:  Rosuvastatin  Last OV was: 9/20/2021 with MIKEY Cruz  Labs/EKG: last lipid 6/25/2021  F/U scheduled: 1/11/22 with Dr. Sim  New script sent to: [facility]

## 2021-12-14 ENCOUNTER — PRE VISIT (OUTPATIENT)
Dept: CARDIOLOGY | Facility: CLINIC | Age: 76
End: 2021-12-14
Payer: COMMERCIAL

## 2021-12-22 NOTE — TELEPHONE ENCOUNTER
"Hospital/TCU/ED for chronic condition Discharge Protocol    \"Hi, my name is Emmanuelle Cr, a registered nurse, and I am calling from [facility].  I am calling to follow up and see how things are going for you after your recent emergency visit/hospital/TCU stay.\"    Tell me how you are doing now that you are home?\" doing well      Discharge Instructions    \"Let's review your discharge instructions.  What is/are the follow-up recommendations?  Pt. Response: f/u with ortho    \"Has an appointment with your primary care provider been scheduled?\"   No (schedule appointment)-not needed    \"When you see the provider, I would recommend that you bring your medications with you.\"    Medications    \"Tell me what changed about your medicines when you discharged?\"    Changes to chronic meds?    2 or more - Epic MTM referral needed-declined    \"What questions do you have about your medications?\"    None     New diagnoses of heart failure, COPD, diabetes, or MI?    No       Was MTM referral placed (*Make sure to put transitions as reason for referral)?   No    Call Summary    \"What questions or concerns do you have about your recent visit and your follow-up care?\"     none    \"If you have questions or things don't continue to improve, we encourage you contact us through the main clinic number (give number).  Even if the clinic is not open, triage nurses are available 24/7 to help you.     We would like you to know that our clinic has extended hours (provide information).  We also have urgent care (provide details on closest location and hours/contact info)\"      \"Thank you for your time and take care!\"         " unable to assess immunization status...

## 2022-01-11 ENCOUNTER — LAB (OUTPATIENT)
Dept: LAB | Facility: CLINIC | Age: 77
End: 2022-01-11
Payer: COMMERCIAL

## 2022-01-11 ENCOUNTER — OFFICE VISIT (OUTPATIENT)
Dept: CARDIOLOGY | Facility: CLINIC | Age: 77
End: 2022-01-11
Attending: INTERNAL MEDICINE
Payer: COMMERCIAL

## 2022-01-11 VITALS
WEIGHT: 192.4 LBS | SYSTOLIC BLOOD PRESSURE: 96 MMHG | BODY MASS INDEX: 26.94 KG/M2 | OXYGEN SATURATION: 99 % | HEART RATE: 61 BPM | DIASTOLIC BLOOD PRESSURE: 58 MMHG | HEIGHT: 71 IN

## 2022-01-11 DIAGNOSIS — R07.89 ATYPICAL CHEST PAIN: ICD-10-CM

## 2022-01-11 DIAGNOSIS — E78.00 HYPERCHOLESTEROLEMIA: ICD-10-CM

## 2022-01-11 DIAGNOSIS — R93.1 AGATSTON CAC SCORE, >400: Primary | ICD-10-CM

## 2022-01-11 LAB
ALBUMIN SERPL-MCNC: 3.8 G/DL (ref 3.4–5)
ALP SERPL-CCNC: 60 U/L (ref 40–150)
ALT SERPL W P-5'-P-CCNC: 65 U/L (ref 0–70)
ANION GAP SERPL CALCULATED.3IONS-SCNC: 2 MMOL/L (ref 3–14)
AST SERPL W P-5'-P-CCNC: 33 U/L (ref 0–45)
BILIRUB DIRECT SERPL-MCNC: 0.3 MG/DL (ref 0–0.2)
BILIRUB SERPL-MCNC: 0.9 MG/DL (ref 0.2–1.3)
BUN SERPL-MCNC: 12 MG/DL (ref 7–30)
CALCIUM SERPL-MCNC: 9 MG/DL (ref 8.5–10.1)
CHLORIDE BLD-SCNC: 106 MMOL/L (ref 94–109)
CHOLEST SERPL-MCNC: 96 MG/DL
CO2 SERPL-SCNC: 30 MMOL/L (ref 20–32)
CREAT SERPL-MCNC: 0.79 MG/DL (ref 0.66–1.25)
FASTING STATUS PATIENT QL REPORTED: YES
GFR SERPL CREATININE-BSD FRML MDRD: >90 ML/MIN/1.73M2
GLUCOSE BLD-MCNC: 94 MG/DL (ref 70–99)
HDLC SERPL-MCNC: 52 MG/DL
LDLC SERPL CALC-MCNC: 37 MG/DL
NONHDLC SERPL-MCNC: 44 MG/DL
POTASSIUM BLD-SCNC: 3.9 MMOL/L (ref 3.4–5.3)
PROT SERPL-MCNC: 7.4 G/DL (ref 6.8–8.8)
SODIUM SERPL-SCNC: 138 MMOL/L (ref 133–144)
TRIGL SERPL-MCNC: 36 MG/DL

## 2022-01-11 PROCEDURE — 80061 LIPID PANEL: CPT | Performed by: NURSE PRACTITIONER

## 2022-01-11 PROCEDURE — 36415 COLL VENOUS BLD VENIPUNCTURE: CPT | Performed by: NURSE PRACTITIONER

## 2022-01-11 PROCEDURE — 82248 BILIRUBIN DIRECT: CPT | Performed by: NURSE PRACTITIONER

## 2022-01-11 PROCEDURE — 99215 OFFICE O/P EST HI 40 MIN: CPT | Performed by: INTERNAL MEDICINE

## 2022-01-11 PROCEDURE — 80053 COMPREHEN METABOLIC PANEL: CPT | Performed by: NURSE PRACTITIONER

## 2022-01-11 ASSESSMENT — MIFFLIN-ST. JEOR: SCORE: 1616.91

## 2022-01-11 NOTE — PROGRESS NOTES
HPI and Plan:   See dictation    Today's clinic visit entailed:  Review of the result(s) of each unique test - Stress echo, CT scan, lab work  Ordering of each unique test  Prescription drug management  40 minutes spent on the date of the encounter doing chart review, history and exam, documentation and further activities per the note  Provider  Link to [facility] Help Grid     The level of medical decision making during this visit was of moderate complexity.      Orders Placed This Encounter   Procedures     Basic metabolic panel     Lipid Profile     Follow-Up with Cardiologist     Exercise Stress Echocardiogram       No orders of the defined types were placed in this encounter.      There are no discontinued medications.      Encounter Diagnoses   Name Primary?     Agatston CAC score, >400 Yes     Hypercholesterolemia      Atypical chest pain        CURRENT MEDICATIONS:  Current Outpatient Medications   Medication Sig Dispense Refill     Ascorbic Acid (VITAMIN C PO) Take 1,000 mg by mouth every morning        aspirin (ASA) 81 MG tablet Take 1 tablet (81 mg) by mouth daily       Cyanocobalamin (VITAMIN B 12 PO) Take 1,000 mcg by mouth every morning       ezetimibe (ZETIA) 10 MG tablet Take 1 tablet (10 mg) by mouth daily 90 tablet 3     lisinopril (ZESTRIL) 2.5 MG tablet TAKE 1 TABLET BY MOUTH ONCE DAILY 90 tablet 3     nitroGLYcerin (NITROSTAT) 0.4 MG sublingual tablet For chest pain place 1 tablet under the tongue every 5 minutes for 3 doses. If symptoms persist 5 minutes after 1st dose call 911. 25 tablet 1     rosuvastatin (CRESTOR) 20 MG tablet Take 1 tablet (20 mg) by mouth daily 90 tablet 0     tadalafil (CIALIS) 10 MG tablet Take 1 tablet (10 mg) by mouth daily as needed (erectile dysfunction) 15 tablet 11     VITAMIN D, CHOLECALCIFEROL, PO Take 1,000 Units by mouth every morning        Probiotic Product (PROBIOTIC PO) Take 1 capsule by mouth every morning (Patient not taking: Reported on 10/21/2021)          ALLERGIES   No Known Allergies    PAST MEDICAL HISTORY:  Past Medical History:   Diagnosis Date     Aortic root dilatation (H)      Aortic stenosis      Arthritis      Atypical chest pain 2015     Cerebral artery occlusion with cerebral infarction (H)     TIA       Essential hypertension with goal blood pressure less than 140/90 2016    takes lisinopril for preventitive     Family history of heart disease      Hypercholesterolemia 1/3/2014     Diagnosis updated by automated process. Provider to review and confirm.     Hyperlipidemia LDL goal <70      IBS (irritable bowel syndrome)      Other chronic pain        PAST SURGICAL HISTORY:  Past Surgical History:   Procedure Laterality Date     ARTHROPLASTY HIP ANTERIOR Right 10/24/2017    Procedure: ARTHROPLASTY HIP ANTERIOR;  RIGHT TOTAL HIP ARTHROPLASTY DIRECT ANTERIOR APPROACH;  Surgeon: Meño Avalos MD;  Location:  OR     ARTHROPLASTY HIP ANTERIOR Left 10/09/2018    Procedure: ARTHROPLASTY HIP ANTERIOR;  LEFT TOTAL HIP ARTHROPLASTY DIRECT ANTERIOR APPROACH (DEPUY)^;  Surgeon: Meño Avalos MD;  Location:  OR     COLONOSCOPY  2011    Adenomatous polyp removed     COLONOSCOPY  2017    Two adenomatous polyps, repeat in 5 yrs     VASECTOMY       ZZC NONSPECIFIC PROCEDURE  1990    kidney stone       FAMILY HISTORY:  Family History   Problem Relation Age of Onset     Cerebrovascular Disease Mother         Stroke age 69     Breast Cancer Mother          age 75     C.A.D. 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 Yes     Comment: father       Service Not Asked     Blood Transfusions Not Asked     Caffeine Concern Yes     Comment: 2 cups coffee daily, occ soda      Occupational Exposure Not Asked     Hobby Hazards Not Asked     Sleep Concern No     Stress Concern Not Asked     Weight Concern Not Asked     Special Diet No     Back Care Not Asked     Exercise Yes     Comment: 3-4 times weekly      Bike Helmet Not Asked     Seat Belt Yes     Self-Exams Not Asked   Social History Narrative    Rides bicycle or goes to [facility] regularly (3x/week).      Social Determinants of Health     Financial Resource Strain: Low Risk      Difficulty of Paying Living Expenses: Not hard at all   Food Insecurity: No Food Insecurity     Worried About Running Out of Food in the Last Year: Never true     Ran Out of Food in the Last Year: Never true   Transportation Needs: No Transportation Needs     Lack of Transportation (Medical): No     Lack of Transportation (Non-Medical): No   Physical Activity: Sufficiently Active     Days of Exercise per Week: 5 days     Minutes of Exercise per Session: 60 min   Stress: Not on file   Social Connections: Not on file   Intimate Partner Violence: Not At Risk     Fear of Current or Ex-Partner: No     Emotionally Abused: No     Physically Abused: No     Sexually Abused: No   Housing Stability: Low Risk      Unable to Pay for Housing in the Last Year: No     Number of Places Lived in the Last Year: 1     Unstable Housing in the Last Year: No       Review of Systems:  Skin:  Negative       Eyes:  Negative      ENT:  Negative     "  Respiratory:  Negative       Cardiovascular:    Positive for Occosional discomfort in chest in morning or at night while resting.  Gastroenterology: Negative      Genitourinary:  Negative      Musculoskeletal:  Negative      Neurologic:  Negative      Psychiatric:  Negative      Heme/Lymph/Imm:  Negative      Endocrine:  Positive for thyroid disorder thyroid nodule    Physical Exam:  Vitals: BP 96/58 (BP Location: Right arm, Patient Position: Sitting, Cuff Size: Adult Regular)   Pulse 61   Ht 1.791 m (5' 10.5\")   Wt 87.3 kg (192 lb 6.4 oz)   SpO2 99%   BMI 27.22 kg/m      Constitutional:  cooperative, alert and oriented, well developed, well nourished, in no acute distress        Skin:  warm and dry to the touch, no apparent skin lesions or masses noted          Head:  normocephalic, no masses or lesions        Eyes:  pupils equal and round, conjunctivae and lids unremarkable, sclera white, no xanthalasma, EOMS intact, no nystagmus        Lymph:      ENT:  no pallor or cyanosis, dentition good        Neck:  carotid pulses are full and equal bilaterally;no carotid bruit        Respiratory:  normal breath sounds, clear to auscultation, normal A-P diameter, normal symmetry, normal respiratory excursion, no use of accessory muscles         Cardiac: regular rhythm;normal S1 and S2;no S3 or S4;no murmurs, gallops or rubs detected occasional premature beats              pulses full and equal                                        GI:           Extremities and Muscular Skeletal:  no edema;no spinal abnormalities noted;normal muscle strength and tone              Neurological:  no gross motor deficits        Psych:  affect appropriate, oriented to time, person and place        CC  Miguelito Sim MD  [address]  [place] [id number]              "

## 2022-01-11 NOTE — PROGRESS NOTES
Service Date: 2022    HISTORY OF PRESENT ILLNESS:  Humza is a very nice 77-year-old gentleman with past medical history significant for hypercholesterolemia, a very strong family history of coronary disease with his father dying of a second myocardial infarction at age 62 but first myocardial infarction at age 39.  Grandfather  at age 35.  Brother at 68 also had a myocardial infarction and received a stent.    Prior workup includes a calcium score which returned at 4316, putting him in the top 1% for risk.  He has an ascending aorta that was thought to be mildly dilated by echocardiogram, but a CT scan measures it to be 3.6 to 3.7.  Last CT scan was in 2019.  He also has multiple calcified granulomas noticed on his chest x-ray.  He had a negative stress nuclear scan in 2018 and most recently a negative stress echo in 2021 for which he was able to exercise 10 minutes of the standard Silvano protocol without symptoms, EKG changes or echocardiographic evidence of ischemia.    Humza returns to clinic and states he continues to work out at least 3 days a week doing a combination of both aerobic and resistance activity.  He states he never has any symptoms with this.  He states he occasionally will have a vague discomfort at nighttime, but as stated, he has no symptoms with activity and states that this is not new.    Humza has many questions.  He asks about his ascending aortic aneurysm, as he has done some reading about it.  He also asks whether he should repeat his calcium score.  He asked other questions regarding exercise, risk factor modification.    ASSESSMENT AND PLAN:  Humza appears to be doing quite well from a cardiac standpoint without clinical evidence of ischemia, and this is supported by his stress test of September, which we reviewed.    He has no symptoms to suggest heart failure or significant arrhythmia.  On physical exam, he does have premature beats, which is not new for Humza.    Blood pressure  is on the low side at 96/58 with a pulse of 61.  However, he has no symptoms of lightheadedness, dizziness, syncope or near syncope, so we will continue his medical regimen as is.    Weight is up a bit at 192.  Normally, he is around 188 and as he states this is just post-holiday and he will get it off without a problem.    Fasting lipid profile is outstanding.  Total cholesterol is 96, HDL is 52, LDL is 37, triglycerides are 36.  We talked about each individual parameter, what they mean, his cholesterol-to-HDL ratio, the benefits of continuing with his regular exercise, healthy diet, and weight.  He knows my wife is a Health and  and asks for her number.    Liver numbers are normal at 65.  BMP is normal with a creatinine of 0.79, giving him a GFR greater than 90.    We talked about the fact that you do not follow serial calcium scores, that it is not an accurate way to follow it and that we need to follow it by serial stress testing.  We talked about the fact that coronary artery disease starts on the outside and encroaches in.  We know he has got a lot of disease at least on the outside, but by physiologic studies he does not appear to have any ischemia, and we will continue to follow by physiologic tests.    We talked about his aorta and the fact that he does not have a dilated aorta, that the echocardiogram is inaccurate most likely due to a tangential measurement, but that a CT scan is more accurate and given the fact that it is not dilated we do not need to follow it sequentially.    We reviewed all of his medications.  We will continue them all as is.    In the past, we did back off on his rosuvastatin because cholesterol was down to 84, LDL was down to 28.    Over 40 minutes were spent with Humza discussing his various studies.  I will have him follow up with my YUNG in 1 year.  I will see him back in 2 years.  If he should have any problems, I would be glad to see him sooner.    Thank you for  allowing me to participate in his care.    Miguelito Sim MD, [facility]        D: 2022   T: 2022   MT: cari    Name:     ANILA MEDINA  MRN:      [id number]        Account:      [id number]   :      1945           Service Date: 2022       Document: [id number]

## 2022-01-11 NOTE — LETTER
1/11/2022    Guillermo Kong MD, MD  [address]  [facility] [address]    RE: Javier Tamez       Dear Colleague,     I had the pleasure of seeing Javier Tamez in the [facility].  HPI and Plan:   See dictation    Today's clinic visit entailed:  Review of the result(s) of each unique test - Stress echo, CT scan, lab work  Ordering of each unique test  Prescription drug management  40 minutes spent on the date of the encounter doing chart review, history and exam, documentation and further activities per the note  Provider  Link to [facility] Help Grid     The level of medical decision making during this visit was of moderate complexity.      Orders Placed This Encounter   Procedures     Basic metabolic panel     Lipid Profile     Follow-Up with Cardiologist     Exercise Stress Echocardiogram       No orders of the defined types were placed in this encounter.      There are no discontinued medications.      Encounter Diagnoses   Name Primary?     Agatston CAC score, >400 Yes     Hypercholesterolemia      Atypical chest pain        CURRENT MEDICATIONS:  Current Outpatient Medications   Medication Sig Dispense Refill     Ascorbic Acid (VITAMIN C PO) Take 1,000 mg by mouth every morning        aspirin (ASA) 81 MG tablet Take 1 tablet (81 mg) by mouth daily       Cyanocobalamin (VITAMIN B 12 PO) Take 1,000 mcg by mouth every morning       ezetimibe (ZETIA) 10 MG tablet Take 1 tablet (10 mg) by mouth daily 90 tablet 3     lisinopril (ZESTRIL) 2.5 MG tablet TAKE 1 TABLET BY MOUTH ONCE DAILY 90 tablet 3     nitroGLYcerin (NITROSTAT) 0.4 MG sublingual tablet For chest pain place 1 tablet under the tongue every 5 minutes for 3 doses. If symptoms persist 5 minutes after 1st dose call 911. 25 tablet 1     rosuvastatin (CRESTOR) 20 MG tablet Take 1 tablet (20 mg) by mouth daily 90 tablet 0     tadalafil (CIALIS) 10 MG tablet Take 1 tablet (10 mg) by mouth daily as needed (erectile dysfunction) 15  tablet 11     VITAMIN D, CHOLECALCIFEROL, PO Take 1,000 Units by mouth every morning        Probiotic Product (PROBIOTIC PO) Take 1 capsule by mouth every morning (Patient not taking: Reported on 10/21/2021)         ALLERGIES   No Known Allergies    PAST MEDICAL HISTORY:  Past Medical History:   Diagnosis Date     Aortic root dilatation (H)      Aortic stenosis      Arthritis      Atypical chest pain 2015     Cerebral artery occlusion with cerebral infarction (H)     TIA       Essential hypertension with goal blood pressure less than 140/90 2016    takes lisinopril for preventitive     Family history of heart disease      Hypercholesterolemia 1/3/2014     Diagnosis updated by automated process. Provider to review and confirm.     Hyperlipidemia LDL goal <70      IBS (irritable bowel syndrome)      Other chronic pain        PAST SURGICAL HISTORY:  Past Surgical History:   Procedure Laterality Date     ARTHROPLASTY HIP ANTERIOR Right 10/24/2017    Procedure: ARTHROPLASTY HIP ANTERIOR;  RIGHT TOTAL HIP ARTHROPLASTY DIRECT ANTERIOR APPROACH;  Surgeon: Meño Avalos MD;  Location:  OR     ARTHROPLASTY HIP ANTERIOR Left 10/09/2018    Procedure: ARTHROPLASTY HIP ANTERIOR;  LEFT TOTAL HIP ARTHROPLASTY DIRECT ANTERIOR APPROACH (DEPUY)^;  Surgeon: Meño Avalos MD;  Location:  OR     COLONOSCOPY  2011    Adenomatous polyp removed     COLONOSCOPY  2017    Two adenomatous polyps, repeat in 5 yrs     VASECTOMY       ZZC NONSPECIFIC PROCEDURE  1990    kidney stone       FAMILY HISTORY:  Family History   Problem Relation Age of Onset     Cerebrovascular Disease Mother         Stroke age 69     Breast Cancer Mother          age 75     C.A.D. 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 Yes     Comment: father       Service Not Asked     Blood Transfusions Not Asked     Caffeine Concern Yes     Comment: 2 cups coffee daily, occ soda      Occupational Exposure Not Asked     Hobby Hazards Not Asked     Sleep Concern No     Stress Concern Not Asked     Weight Concern Not Asked     Special Diet No     Back Care Not Asked     Exercise Yes     Comment: 3-4 times weekly      Bike Helmet Not Asked     Seat Belt Yes     Self-Exams Not Asked   Social History Narrative    Rides bicycle or goes to [facility] regularly (3x/week).      Social Determinants of Health     Financial Resource Strain: Low Risk      Difficulty of Paying Living Expenses: Not hard at all   Food Insecurity: No Food Insecurity     Worried About Running Out of Food in the Last Year: Never true     Ran Out of Food in the Last Year: Never true   Transportation Needs: No Transportation Needs     Lack of Transportation (Medical): No     Lack of Transportation (Non-Medical): No   Physical Activity: Sufficiently Active     Days of Exercise per Week: 5 days     Minutes of Exercise per Session: 60 min   Stress: Not on file   Social Connections: Not on file   Intimate Partner Violence: Not At Risk     Fear of Current or Ex-Partner: No     Emotionally Abused: No     Physically Abused: No     Sexually Abused: No   Housing Stability: Low Risk      Unable to Pay for  "Housing in the Last Year: No     Number of Places Lived in the Last Year: 1     Unstable Housing in the Last Year: No       Review of Systems:  Skin:  Negative       Eyes:  Negative      ENT:  Negative      Respiratory:  Negative       Cardiovascular:    Positive for Occosional discomfort in chest in morning or at night while resting.  Gastroenterology: Negative      Genitourinary:  Negative      Musculoskeletal:  Negative      Neurologic:  Negative      Psychiatric:  Negative      Heme/Lymph/Imm:  Negative      Endocrine:  Positive for thyroid disorder thyroid nodule    Physical Exam:  Vitals: BP 96/58 (BP Location: Right arm, Patient Position: Sitting, Cuff Size: Adult Regular)   Pulse 61   Ht 1.791 m (5' 10.5\")   Wt 87.3 kg (192 lb 6.4 oz)   SpO2 99%   BMI 27.22 kg/m      Constitutional:  cooperative, alert and oriented, well developed, well nourished, in no acute distress        Skin:  warm and dry to the touch, no apparent skin lesions or masses noted          Head:  normocephalic, no masses or lesions        Eyes:  pupils equal and round, conjunctivae and lids unremarkable, sclera white, no xanthalasma, EOMS intact, no nystagmus        Lymph:      ENT:  no pallor or cyanosis, dentition good        Neck:  carotid pulses are full and equal bilaterally;no carotid bruit        Respiratory:  normal breath sounds, clear to auscultation, normal A-P diameter, normal symmetry, normal respiratory excursion, no use of accessory muscles         Cardiac: regular rhythm;normal S1 and S2;no S3 or S4;no murmurs, gallops or rubs detected occasional premature beats              pulses full and equal                                        GI:           Extremities and Muscular Skeletal:  no edema;no spinal abnormalities noted;normal muscle strength and tone              Neurological:  no gross motor deficits        Psych:  affect appropriate, oriented to time, person and place        CC  Miguelito Sim MD  [address] [facility] " [address]  [place] [address]    Thank you for allowing me to participate in the care of your patient.      Sincerely,     Miguelito Sim MD     [facility]

## 2022-01-24 DIAGNOSIS — E78.00 HYPERCHOLESTEROLEMIA: ICD-10-CM

## 2022-01-24 RX ORDER — EZETIMIBE 10 MG/1
10 TABLET ORAL DAILY
Qty: 90 TABLET | Refills: 3 | Status: SHIPPED | OUTPATIENT
Start: 2022-01-24 | End: 2023-01-17

## 2022-01-24 NOTE — TELEPHONE ENCOUNTER
Medication Refilled: zetia  Last office visit: 2022 with Dr. Sim  Last Labs/EK2022 FLP  Next office visit: 2024 orders in [facility]  Pharmacy sent to: Percy Rios RN

## 2022-01-25 ENCOUNTER — HOSPITAL ENCOUNTER (OUTPATIENT)
Dept: ULTRASOUND IMAGING | Facility: CLINIC | Age: 77
Discharge: HOME | End: 2022-01-25
Attending: INTERNAL MEDICINE | Admitting: INTERNAL MEDICINE
Payer: COMMERCIAL

## 2022-01-25 DIAGNOSIS — E04.1 THYROID NODULE: ICD-10-CM

## 2022-01-25 PROCEDURE — 76536 US EXAM OF HEAD AND NECK: CPT

## 2022-01-26 ENCOUNTER — TRANSFERRED RECORDS (OUTPATIENT)
Dept: HEALTH INFORMATION MANAGEMENT | Facility: CLINIC | Age: 77
End: 2022-01-26
Payer: COMMERCIAL

## 2022-02-03 DIAGNOSIS — E78.00 HYPERCHOLESTEROLEMIA: ICD-10-CM

## 2022-02-03 RX ORDER — ROSUVASTATIN CALCIUM 20 MG/1
20 TABLET, COATED ORAL DAILY
Qty: 90 TABLET | Refills: 3 | Status: SHIPPED | OUTPATIENT
Start: 2022-02-03 | End: 2022-11-04

## 2022-02-04 ENCOUNTER — TELEPHONE (OUTPATIENT)
Dept: INTERNAL MEDICINE | Facility: CLINIC | Age: 77
End: 2022-02-04
Payer: COMMERCIAL

## 2022-02-04 NOTE — TELEPHONE ENCOUNTER
Patient has an appointment next week with Dr Smith. He said he had an ultra sound recently and there were no changes to his thyroid. He is asking if he still needs the appointment?

## 2022-02-07 NOTE — TELEPHONE ENCOUNTER
IMPRESSION:  Multiple bilateral cystic thyroid nodules measure up to  1.1 cm on the right and 1.5 cm on the left, not significantly changed  as compared to 11/22/2019 exam. No new thyroid nodules.      Stable nodules.  Can f/unit(s) with PCP

## 2022-03-16 DIAGNOSIS — N52.8 OTHER MALE ERECTILE DYSFUNCTION: ICD-10-CM

## 2022-03-16 DIAGNOSIS — N52.9 ERECTILE DYSFUNCTION, UNSPECIFIED ERECTILE DYSFUNCTION TYPE: ICD-10-CM

## 2022-03-16 NOTE — TELEPHONE ENCOUNTER
Patient calling  He has rx for Cialis but he wants to see if [facility] mail service would be cheaper. They need a PA for this medication started. Ok to call and lul [phone]  Phone number for [facility] [phone]

## 2022-03-17 ENCOUNTER — TELEPHONE (OUTPATIENT)
Dept: INTERNAL MEDICINE | Facility: CLINIC | Age: 77
End: 2022-03-17
Payer: COMMERCIAL

## 2022-03-17 DIAGNOSIS — N52.8 OTHER MALE ERECTILE DYSFUNCTION: ICD-10-CM

## 2022-03-17 DIAGNOSIS — N52.9 ERECTILE DYSFUNCTION, UNSPECIFIED ERECTILE DYSFUNCTION TYPE: ICD-10-CM

## 2022-03-17 NOTE — TELEPHONE ENCOUNTER
"Requested Prescriptions   Pending Prescriptions Disp Refills     tadalafil (CIALIS) 10 MG tablet [Pharmacy Med Name: Tadalafil Oral Tablet 10 MG] 15 tablet 0     Sig: TAKE ONE TABLET BY MOUTH DAILY AS NEEDED FOR ERECTILE DYSFUNCTION       Erectile Dysfuction Protocol Failed - 3/17/2022  3:17 PM        Failed - Absence of nitrates on medication list        Passed - Absence of Alpha Blockers on Med list        Passed - Recent (12 mo) or future (30 days) visit within the authorizing provider's specialty     Patient has had an office visit with the authorizing provider or a provider within the authorizing providers department within the previous 12 mos or has a future within next 30 days. See \"Patient Info\" tab in inbasket, or \"Choose Columns\" in Meds & Orders section of the refill encounter.              Passed - Medication is active on med list        Passed - Patient is age 18 or older             Routing refill request to provider for review/approval because:  Protocol failed.    Please advise, thanks.        "

## 2022-03-17 NOTE — TELEPHONE ENCOUNTER
Patient is calling  While he is waiting to see how much [facility] for tadalafil (CIALIS) 10 MG tablet he wants a rx sent to [facility] in [place]. Please advise. Ok to call and  [phone]

## 2022-03-17 NOTE — TELEPHONE ENCOUNTER
New prescription would need to be submitted to [facility] for patient.   Routing refill request to provider for review/approval because:  Patient has prescription for nitrates on medication list.      Sarah Canada RN  [facility]

## 2022-03-18 RX ORDER — TADALAFIL 10 MG/1
10 TABLET ORAL DAILY PRN
Qty: 15 TABLET | Refills: 11 | Status: SHIPPED | OUTPATIENT
Start: 2022-03-18 | End: 2023-02-09

## 2022-03-21 RX ORDER — TADALAFIL 10 MG/1
TABLET ORAL
Qty: 15 TABLET | Refills: 11 | Status: SHIPPED | OUTPATIENT
Start: 2022-03-21 | End: 2022-09-21

## 2022-03-21 NOTE — TELEPHONE ENCOUNTER
Please send new RX to [facility].  Patient will pay out of pocket rather than wait on [facility] with pending PA.

## 2022-03-21 NOTE — TELEPHONE ENCOUNTER
Prescription resent to pharmacy, Dr. Kong originally approved on 3/18/22 to [facility].    Sarah Canada RN  [facility]

## 2022-03-22 ENCOUNTER — TRANSFERRED RECORDS (OUTPATIENT)
Dept: HEALTH INFORMATION MANAGEMENT | Facility: CLINIC | Age: 77
End: 2022-03-22
Payer: COMMERCIAL

## 2022-05-11 ENCOUNTER — HOSPITAL ENCOUNTER (EMERGENCY)
Facility: CLINIC | Age: 77
Discharge: HOME | End: 2022-05-11
Attending: EMERGENCY MEDICINE | Admitting: EMERGENCY MEDICINE
Payer: COMMERCIAL

## 2022-05-11 ENCOUNTER — APPOINTMENT (OUTPATIENT)
Dept: CT IMAGING | Facility: CLINIC | Age: 77
End: 2022-05-11
Attending: EMERGENCY MEDICINE
Payer: COMMERCIAL

## 2022-05-11 ENCOUNTER — APPOINTMENT (OUTPATIENT)
Dept: GENERAL RADIOLOGY | Facility: CLINIC | Age: 77
End: 2022-05-11
Attending: EMERGENCY MEDICINE
Payer: COMMERCIAL

## 2022-05-11 VITALS
HEART RATE: 66 BPM | DIASTOLIC BLOOD PRESSURE: 61 MMHG | SYSTOLIC BLOOD PRESSURE: 123 MMHG | OXYGEN SATURATION: 98 % | TEMPERATURE: 99.4 F | RESPIRATION RATE: 16 BRPM

## 2022-05-11 DIAGNOSIS — S01.112A LACERATION OF LEFT EYEBROW, INITIAL ENCOUNTER: ICD-10-CM
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
## 2022-05-11 DIAGNOSIS — S61.412A LACERATION OF LEFT HAND, FOREIGN BODY PRESENCE UNSPECIFIED, INITIAL ENCOUNTER: ICD-10-CM

## 2022-05-11 DIAGNOSIS — S09.90XA CLOSED HEAD INJURY, INITIAL ENCOUNTER: ICD-10-CM

## 2022-05-11 PROCEDURE — 90715 TDAP VACCINE 7 YRS/> IM: CPT | Performed by: EMERGENCY MEDICINE

## 2022-05-11 PROCEDURE — 12001 RPR S/N/AX/GEN/TRNK 2.5CM/<: CPT

## 2022-05-11 PROCEDURE — 90471 IMMUNIZATION ADMIN: CPT | Performed by: EMERGENCY MEDICINE

## 2022-05-11 PROCEDURE — 12013 RPR F/E/E/N/L/M 2.6-5.0 CM: CPT

## 2022-05-11 PROCEDURE — 250N000011 HC RX IP 250 OP 636: Performed by: EMERGENCY MEDICINE

## 2022-05-11 PROCEDURE — 70450 CT HEAD/BRAIN W/O DYE: CPT

## 2022-05-11 PROCEDURE — 73130 X-RAY EXAM OF HAND: CPT | Mod: LT

## 2022-05-11 PROCEDURE — 250N000013 HC RX MED GY IP 250 OP 250 PS 637: Performed by: EMERGENCY MEDICINE

## 2022-05-11 PROCEDURE — 72125 CT NECK SPINE W/O DYE: CPT

## 2022-05-11 PROCEDURE — 99285 EMERGENCY DEPT VISIT HI MDM: CPT | Mod: 25

## 2022-05-11 RX ORDER — ACETAMINOPHEN 325 MG/1
650 TABLET ORAL ONCE
Status: COMPLETED | OUTPATIENT
Start: 2022-05-11 | End: 2022-05-11

## 2022-05-11 RX ADMIN — CLOSTRIDIUM TETANI TOXOID ANTIGEN (FORMALDEHYDE INACTIVATED), CORYNEBACTERIUM DIPHTHERIAE TOXOID ANTIGEN (FORMALDEHYDE INACTIVATED), BORDETELLA PERTUSSIS TOXOID ANTIGEN (GLUTARALDEHYDE INACTIVATED), BORDETELLA PERTUSSIS FILAMENTOUS HEMAGGLUTININ ANTIGEN (FORMALDEHYDE INACTIVATED), BORDETELLA PERTUSSIS PERTACTIN ANTIGEN, AND BORDETELLA PERTUSSIS FIMBRIAE 2/3 ANTIGEN 0.5 ML: 5; 2; 2.5; 5; 3; 5 INJECTION, SUSPENSION INTRAMUSCULAR at 18:12

## 2022-05-11 RX ADMIN — ACETAMINOPHEN 650 MG: 325 TABLET, FILM COATED ORAL at 18:12
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
## 2022-05-18 ENCOUNTER — OFFICE VISIT (OUTPATIENT)
Dept: INTERNAL MEDICINE | Facility: CLINIC | Age: 77
End: 2022-05-18
Payer: COMMERCIAL

## 2022-05-18 ENCOUNTER — APPOINTMENT (OUTPATIENT)
Dept: NURSING | Facility: CLINIC | Age: 77
End: 2022-05-18
Payer: COMMERCIAL

## 2022-05-18 VITALS
SYSTOLIC BLOOD PRESSURE: 118 MMHG | TEMPERATURE: 97.2 F | HEIGHT: 71 IN | BODY MASS INDEX: 25.76 KG/M2 | OXYGEN SATURATION: 95 % | WEIGHT: 184 LBS | RESPIRATION RATE: 14 BRPM | HEART RATE: 66 BPM | DIASTOLIC BLOOD PRESSURE: 72 MMHG

## 2022-05-18 DIAGNOSIS — Z48.02 ENCOUNTER FOR REMOVAL OF SUTURES: Primary | ICD-10-CM

## 2022-05-18 PROCEDURE — 99213 OFFICE O/P EST LOW 20 MIN: CPT | Performed by: INTERNAL MEDICINE

## 2022-05-18 NOTE — NURSING NOTE
"/72   Pulse 66   Temp 97.2  F (36.2  C) (Tympanic)   Resp 14   Ht 1.803 m (5' 11\")   Wt 83.5 kg (184 lb)   SpO2 95%   BMI 25.66 kg/m    Patient in for consult on: Pain at suture site.  "
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
## 2022-05-22 ENCOUNTER — NURSE TRIAGE (OUTPATIENT)
Dept: NURSING | Facility: CLINIC | Age: 77
End: 2022-05-22
Payer: COMMERCIAL

## 2022-05-25 ENCOUNTER — ALLIED HEALTH/NURSE VISIT (OUTPATIENT)
Dept: NURSING | Facility: CLINIC | Age: 77
End: 2022-05-25
Payer: COMMERCIAL

## 2022-05-25 DIAGNOSIS — Z53.9 DIAGNOSIS FOR ++++ WALK IN CLINIC ++++: Primary | ICD-10-CM

## 2022-05-25 NOTE — PROGRESS NOTES
Patient arrived for left hand suture removal. 7 sutures were placed in [facility] ER on 5/11/22. 14 days have passed. Sutures well visualized. Area was pink and tender to touch without drainage. Provider was asked to take a look. Dr. Kong determined that sutures were ok to take out.  Area was cleaned with wound cleanser. 6 sutures were easily removed. 1 suture was embedded in healed skin and took extra time to remove.     Skin was clean and dry. Wound was cleaned with wound cleanser. Bacitracin was applied. Home care advice given regarding wound care and s/sx of infection.

## 2022-06-15 DIAGNOSIS — I10 ESSENTIAL HYPERTENSION WITH GOAL BLOOD PRESSURE LESS THAN 140/90: ICD-10-CM

## 2022-06-15 DIAGNOSIS — I77.810 AORTIC ROOT DILATATION (H): ICD-10-CM

## 2022-06-15 RX ORDER — LISINOPRIL 2.5 MG/1
2.5 TABLET ORAL DAILY
Qty: 90 TABLET | Refills: 0 | Status: SHIPPED | OUTPATIENT
Start: 2022-06-15 | End: 2022-08-16

## 2022-07-21 ENCOUNTER — TELEPHONE (OUTPATIENT)
Dept: INTERNAL MEDICINE | Facility: CLINIC | Age: 77
End: 2022-07-21

## 2022-07-21 NOTE — TELEPHONE ENCOUNTER
Small scrap in achilles , 2.5 /3 inches looks infected/ red/ happened 4 to 5 days ago. It has scabed over a bit but not healing as quickly as he thought it would.      Patient is asking if some left over amoxicillin is useful    Best number to call back [phone]

## 2022-07-26 NOTE — TELEPHONE ENCOUNTER
Left another message on patient's voicemail asking patient to call back if he still has concerns or questions.  SAVANNA Tovar R.N.

## 2022-08-02 ENCOUNTER — OFFICE VISIT (OUTPATIENT)
Dept: URGENT CARE | Facility: URGENT CARE | Age: 77
End: 2022-08-02
Payer: COMMERCIAL

## 2022-08-02 VITALS
HEART RATE: 80 BPM | RESPIRATION RATE: 16 BRPM | TEMPERATURE: 97.9 F | SYSTOLIC BLOOD PRESSURE: 100 MMHG | OXYGEN SATURATION: 95 % | WEIGHT: 183 LBS | DIASTOLIC BLOOD PRESSURE: 60 MMHG | BODY MASS INDEX: 25.52 KG/M2

## 2022-08-02 DIAGNOSIS — J06.9 VIRAL URI WITH COUGH: Primary | ICD-10-CM

## 2022-08-02 LAB — SARS-COV-2 RNA RESP QL NAA+PROBE: NEGATIVE

## 2022-08-02 PROCEDURE — U0003 INFECTIOUS AGENT DETECTION BY NUCLEIC ACID (DNA OR RNA); SEVERE ACUTE RESPIRATORY SYNDROME CORONAVIRUS 2 (SARS-COV-2) (CORONAVIRUS DISEASE [COVID-19]), AMPLIFIED PROBE TECHNIQUE, MAKING USE OF HIGH THROUGHPUT TECHNOLOGIES AS DESCRIBED BY CMS-2020-01-R: HCPCS | Performed by: PHYSICIAN ASSISTANT

## 2022-08-02 PROCEDURE — U0005 INFEC AGEN DETEC AMPLI PROBE: HCPCS | Performed by: PHYSICIAN ASSISTANT

## 2022-08-02 PROCEDURE — 99213 OFFICE O/P EST LOW 20 MIN: CPT | Mod: CS | Performed by: PHYSICIAN ASSISTANT

## 2022-08-02 RX ORDER — CODEINE PHOSPHATE AND GUAIFENESIN 10; 100 MG/5ML; MG/5ML
1-2 SOLUTION ORAL
Qty: 120 ML | Refills: 0 | Status: SHIPPED | OUTPATIENT
Start: 2022-08-02 | End: 2022-09-21

## 2022-08-02 RX ORDER — BENZONATATE 100 MG/1
100 CAPSULE ORAL 3 TIMES DAILY PRN
Qty: 30 CAPSULE | Refills: 0 | Status: SHIPPED | OUTPATIENT
Start: 2022-08-02 | End: 2022-09-21

## 2022-08-02 NOTE — PROGRESS NOTES
Assessment & Plan     Viral URI with Cough  Acute problem. Lungs are clear on exam. No respiratory distress. We discussed viral illness at this time. Supportive care measures advised. Tessalon Perles Rx. Guaifenesin with codeine Rx prn cough at bedtime. Symptomatic Covid PCR swab done in clinic today.  Awaiting COVID-19 PCR result. Follow up if any worsening symptoms. Patient agrees.    - Symptomatic; Yes; 7/30/2022 COVID-19 Virus (Coronavirus) by PCR Nose  - benzonatate (TESSALON) 100 MG capsule  Dispense: 30 capsule; Refill: 0  - guaiFENesin-codeine (ROBITUSSIN AC) 100-10 MG/5ML solution  Dispense: 120 mL; Refill: 0         Return in about 1 week (around 8/9/2022) for Symptoms failing to improve.    Ree Bazan PA-C  [facility] CARE [place] is a 77 year old male who presents to clinic today for the following health issues:  Chief Complaint   Patient presents with     URI     4-5 days covid neg x2 at home      HPI      URI Adult    Onset of symptoms was 4 days ago.  Course of illness is same   Severity moderate  Current and Associated symptoms: cough - productive, fatigue, chills, LGF  Denies fever, CP, SOB  Treatment measures tried include OTC Cough med.  Predisposing factors include None.  Patient reports negative home Covid test      Review of Systems  Constitutional, HEENT, cardiovascular, pulmonary, GI, , musculoskeletal, neuro, skin, endocrine and psych systems are negative, except as otherwise noted.      Objective    /60 (BP Location: Right arm, Patient Position: Chair, Cuff Size: Adult Regular)   Pulse 80   Temp 97.9  F (36.6  C) (Oral)   Resp 16   Wt 83 kg (183 lb)   SpO2 95%   BMI 25.52 kg/m    Physical Exam   GENERAL: healthy, alert and no distress  HENT: ear canals and TM's normal,  mouth without ulcers or lesions  RESP: lungs clear to auscultation - no rales, rhonchi or wheezes  CV: regular rate and rhythm, normal S1 S2,   MS: no gross  musculoskeletal defects noted, no edema  SKIN: no suspicious lesions or rashes

## 2022-08-07 ENCOUNTER — NURSE TRIAGE (OUTPATIENT)
Dept: NURSING | Facility: CLINIC | Age: 77
End: 2022-08-07

## 2022-08-07 NOTE — TELEPHONE ENCOUNTER
Pt is calling.    He was seen in urgent care on Tuesday. Diagnosed with viral URI. Given script for chest congestion, cough.  COVID was negative at that time. Also on benzonatate and cough medication with codeine.  Now with nasal congestion, cough is more dry now. Runny nose.  Wondering if he can stop the meds given Tuesday an switch to meds for nasal congestion.  Or can he take a nasal decongestant with the other medications.  No fever.  He denies any difficulty breathing, chest pain, chest tightness.    Care advice reviewed. I advised him that a nasal decongestant is ok to add to the other two medications, as long as no guaifenesin in the ingredients, when taking the cough medication with codeine.  When to call back or be seen again, reviewed with the patient. He verbalized understanding and agreed to follow the care advice given.      Rocío Silveira RN  [facility] Nurse Advisor  8/7/2022 at 7:04 PM          Reason for Disposition    Care advice for mild cough, questions about    Common cold with no complications    Additional Information    Negative: SEVERE difficulty breathing (e.g., struggling for each breath, speaks in single words)    Negative: Sounds like a life-threatening emergency to the triager    Negative: [1] Difficulty breathing AND [2] not from stuffy nose (e.g., not relieved by cleaning out the nose)    Negative: Runny nose is caused by pollen or other allergies    Negative: Cough is main symptom    Negative: Severe sore throat    Negative: Fever > 104 F (40 C)    Negative: Patient sounds very sick or weak to the triager    Negative: [1] Fever > 101 F (38.3 C) AND [2] age > 60 years    Negative: [1] Fever > 100.0 F (37.8 C) AND [2] bedridden (e.g., nursing home patient, CVA, chronic illness, recovering from surgery)    Negative: [1] Fever > 100.0 F (37.8 C) AND [2] diabetes mellitus or weak immune system (e.g., HIV positive, cancer chemo, splenectomy, organ transplant, chronic steroids)     Negative: Fever present > 3 days (72 hours)    Negative: [1] Fever returns after gone for over 24 hours AND [2] symptoms worse or not improved    Negative: [1] Sinus pain (not just congestion) AND [2] fever    Negative: Earache    Negative: [1] SEVERE sore throat AND [2] present > 24 hours    Negative: [1] Sinus congestion (pressure, fullness) AND [2] present > 10 days    Negative: [1] Nasal discharge AND [2] present > 10 days    Negative: [1] Using nasal washes and pain medicine > 24 hours AND [2] sinus pain (lower forehead, cheekbone, or eye) persists    Negative: Sores with yellow scabs around the nasal opening    Protocols used: COMMON COLD-A-AH

## 2022-09-21 ENCOUNTER — OFFICE VISIT (OUTPATIENT)
Dept: INTERNAL MEDICINE | Facility: CLINIC | Age: 77
End: 2022-09-21
Payer: COMMERCIAL

## 2022-09-21 VITALS
SYSTOLIC BLOOD PRESSURE: 104 MMHG | OXYGEN SATURATION: 96 % | HEIGHT: 71 IN | BODY MASS INDEX: 25.9 KG/M2 | WEIGHT: 185 LBS | HEART RATE: 94 BPM | RESPIRATION RATE: 16 BRPM | TEMPERATURE: 97.7 F | DIASTOLIC BLOOD PRESSURE: 60 MMHG

## 2022-09-21 DIAGNOSIS — Z23 NEED FOR PROPHYLACTIC VACCINATION AND INOCULATION AGAINST INFLUENZA: ICD-10-CM

## 2022-09-21 DIAGNOSIS — Z01.818 PREOP GENERAL PHYSICAL EXAM: ICD-10-CM

## 2022-09-21 DIAGNOSIS — Z12.5 SCREENING PSA (PROSTATE SPECIFIC ANTIGEN): ICD-10-CM

## 2022-09-21 DIAGNOSIS — Z00.00 ENCOUNTER FOR ANNUAL WELLNESS EXAM IN MEDICARE PATIENT: Primary | ICD-10-CM

## 2022-09-21 DIAGNOSIS — H25.9 AGE-RELATED CATARACT OF BOTH EYES, UNSPECIFIED AGE-RELATED CATARACT TYPE: ICD-10-CM

## 2022-09-21 PROCEDURE — G0008 ADMIN INFLUENZA VIRUS VAC: HCPCS | Performed by: INTERNAL MEDICINE

## 2022-09-21 PROCEDURE — 90662 IIV NO PRSV INCREASED AG IM: CPT | Performed by: INTERNAL MEDICINE

## 2022-09-21 PROCEDURE — G0103 PSA SCREENING: HCPCS | Performed by: INTERNAL MEDICINE

## 2022-09-21 PROCEDURE — G0439 PPPS, SUBSEQ VISIT: HCPCS | Performed by: INTERNAL MEDICINE

## 2022-09-21 PROCEDURE — 36415 COLL VENOUS BLD VENIPUNCTURE: CPT | Performed by: INTERNAL MEDICINE

## 2022-09-21 ASSESSMENT — ACTIVITIES OF DAILY LIVING (ADL): CURRENT_FUNCTION: NO ASSISTANCE NEEDED

## 2022-09-21 NOTE — PROGRESS NOTES
"SUBJECTIVE:   Humza is a 77 year old who presents for Preventive Visit.      Patient has been advised of split billing requirements and indicates understanding: Yes  Are you in the first 12 months of your Medicare coverage?  No    Healthy Habits:    In general, how would you rate your overall health?  Very good    Frequency of exercise:  4-5 days/week    Duration of exercise:  30-45 minutes    Do you usually eat at least 4 servings of fruit and vegetables a day, include whole grains    & fiber and avoid regularly eating high fat or \"junk\" foods?  Yes    Taking medications regularly:  No    Barriers to taking medications:  None    Medication side effects:  None    Ability to successfully perform activities of daily living:  No assistance needed    Home Safety:  No safety concerns identified    Hearing Impairment:  No hearing concerns    In the past 6 months, have you been bothered by leaking of urine?  No    In general, how would you rate your overall mental or emotional health?  Very good      PHQ-2 Total Score:    Additional concerns today:  No             Do you feel safe in your environment? Yes    Have you ever done Advance Care Planning? (For example, a Health Directive, POLST, or a discussion with a medical provider or your loved ones about your wishes): Yes, advance care planning is on file.       Fall risk  Fallen 2 or more times in the past year?: No  Any fall with injury in the past year?: Yes  Timed Up and Go Test (>13.5 is fall risk; contact physician) : 5    Cognitive Screening   1) Repeat 3 items (Leader, Season, Table)    2) Clock draw: NORMAL  3) 3 item recall: Recalls 3 objects  Results: 3 items recalled: COGNITIVE IMPAIRMENT LESS LIKELY    Mini-CogTM Copyright TAMI Arnett. Licensed by the author for use in [facility] [place]; reprinted with permission ([email or web address]@.[email or web address]). All rights reserved.      Do you have sleep apnea, excessive snoring or daytime drowsiness?: no    Reviewed and updated as needed " this visit by clinical staff   Tobacco  Allergies  Meds   Med Hx  Surg Hx  Fam Hx  Soc Hx          Reviewed and updated as needed this visit by Provider                   Social History     Tobacco Use     Smoking status: Never Smoker     Smokeless tobacco: Never Used   Substance Use Topics     Alcohol use: Yes     Comment: 3-4 glasses of wine weekly     If you drink alcohol do you typically have >3 drinks per day or >7 drinks per week? No    Alcohol Use 10/21/2021   Prescreen: >3 drinks/day or >7 drinks/week? No   Prescreen: >3 drinks/day or >7 drinks/week? -     PROBLEMS TO ADD ON...see attached note.     Current providers sharing in care for this patient include:   Patient Care Team:  Guillermo Kong MD as PCP - General (Internal Medicine)  Guillermo Kong MD as Assigned PCP  Isabel Smith MD as Hospitalist (Endocrinology, Diabetes, and Metabolism)  Miguelito Sim MD as Assigned Heart and Vascular Provider    The following health maintenance items are reviewed in Epic and correct as of today:  Health Maintenance   Topic Date Due     COVID-19 Vaccine (4 - Booster for Moderna series) 01/04/2022     INFLUENZA VACCINE (1) 09/01/2022     MEDICARE ANNUAL WELLNESS VISIT  10/21/2022     FALL RISK ASSESSMENT  05/18/2023     ANNUAL REVIEW OF HM ORDERS  09/21/2023     ADVANCE CARE PLANNING  11/15/2026     LIPID  01/11/2027     DTAP/TDAP/TD IMMUNIZATION (4 - Td or Tdap) 05/11/2032     HEPATITIS C SCREENING  Completed     PHQ-2 (once per calendar year)  Completed     Pneumococcal Vaccine: 65+ Years  Completed     ZOSTER IMMUNIZATION  Completed     IPV IMMUNIZATION  Aged Out     MENINGITIS IMMUNIZATION  Aged Out     HEPATITIS B IMMUNIZATION  Aged Out     Lab work is in process  Pneumonia Vaccine:  Patient has received both pneumonia vaccinations (Prevnar-13 and Pneumovax-23)       Review of Systems  REVIEW OF SYSTEMS: The following systems have been completely reviewed and are negative except as noted  "above:   Constitutional, HEENT, respiratory, cardiovascular, gastrointestinal, genitourinary, musculoskeletal, dermatologic, hematologic, endocrine, psychiatric, and neurologic systems.      OBJECTIVE:   /60   Pulse 94   Temp 97.7  F (36.5  C) (Tympanic)   Resp 16   Ht 1.803 m (5' 11\")   Wt 83.9 kg (185 lb)   SpO2 96%   BMI 25.80 kg/m   Estimated body mass index is 25.8 kg/m  as calculated from the following:    Height as of this encounter: 1.803 m (5' 11\").    Weight as of this encounter: 83.9 kg (185 lb).     Physical Exam  GENERAL: healthy, alert and no distress  EYES: Eyes grossly normal to inspection, PERRL and conjunctivae and sclerae normal  HENT: ear canals and TM's normal, nose and mouth without ulcers or lesions  NECK: no adenopathy, no asymmetry, masses, or scars and thyroid normal to palpation  RESP: lungs clear to auscultation - no rales, rhonchi or wheezes  CV: regular rate and rhythm, normal S1 S2, no S3 or S4, no murmur, click or rub, no peripheral edema and peripheral pulses strong  ABDOMEN: soft, nontender, no hepatosplenomegaly, no masses and bowel sounds normal  MS: no gross musculoskeletal defects noted, no edema  SKIN: no suspicious lesions or rashes  NEURO: Normal strength and tone, mentation intact and speech normal  PSYCH: mentation appears normal, affect normal/bright    Diagnostic Test Results:  Labs reviewed in Epic    ASSESSMENT / PLAN:   (Z00.00) Encounter for annual wellness exam in Medicare patient  (primary encounter diagnosis)  Comment: Stable health. Updated Annual Wellness Visit. See epic orders.     (Z01.818) Preop general physical exam  Comment:  Satisfactory operative candidate with anesthesia as required.     (H25.9) Age-related cataract of both eyes, unspecified age-related cataract type  Comment: Reason for surgery.     (Z23) Need for prophylactic vaccination and inoculation against influenza  Plan: INFLUENZA, QUAD, HIGH DOSE, PF, 65YR + (FLUZONE        HD), " "ADMIN INFLUENZA (For MEDICARE Patients         ONLY) []          (Z12.5) Screening PSA (prostate specific antigen)  Comment: Update PSA.   Plan: PSA, screen    Patient has been advised of split billing requirements and indicates understanding: Yes    COUNSELING:  Reviewed preventive health counseling, as reflected in patient instructions    Estimated body mass index is 25.8 kg/m  as calculated from the following:    Height as of this encounter: 1.803 m (5' 11\").    Weight as of this encounter: 83.9 kg (185 lb).        He reports that he has never smoked. He has never used smokeless tobacco.      Appropriate preventive services were discussed with this patient, including applicable screening as appropriate for cardiovascular disease, diabetes, osteopenia/osteoporosis, and glaucoma.  As appropriate for age/gender, discussed screening for colorectal cancer, prostate cancer, breast cancer, and cervical cancer. Checklist reviewing preventive services available has been given to the patient.    Reviewed patients plan of care and provided an AVS. The Basic Care Plan (routine screening as documented in Health Maintenance) for Javier meets the Care Plan requirement. This Care Plan has been established and reviewed with the Patient.    Counseling Resources:  ATP IV Guidelines  Pooled Cohorts Equation Calculator  Breast Cancer Risk Calculator  Breast Cancer: Medication to Reduce Risk  FRAX Risk Assessment  ICSI Preventive Guidelines  Dietary Guidelines for Americans, 2010  [email or web address]'s MyPlate  ASA Prophylaxis  Lung CA Screening    Guillermo Kong MD,   [facility]    Patient Instructions     Everything looks fine!    Refills of medication have been faxed to your pharmacy by Dr Sim.     Lab results will be available soon on [employer].    See me in a year, sooner if problems.      "

## 2022-09-21 NOTE — PROGRESS NOTES
[place] [address] [facility] [address]  Phone: [phone]  Primary Provider: Guillermo Pérez  Pre-op Performing Provider: GUILLERMO PÉREZ      PREOPERATIVE EVALUATION:  Today's date: 9/21/2022    Javier Tamez is a 77 year old male who presents for a preoperative evaluation.    Surgical Information:  Surgery/Procedure: cataract removal, left and right  Surgery Location: [facility]; [place], [place]  Surgeon: unknown  Surgery Date: 10/26/2022 and 11/09/2022  Time of Surgery: am  Where patient plans to recover: At home with family  Fax number for surgical facility: [phone]    Type of Anesthesia Anticipated: to be determined    Assessment & Plan     The proposed surgical procedure is considered LOW risk.    (Z00.00) Encounter for annual wellness exam in Medicare patient  (primary encounter diagnosis)  Comment: Stable health. Updated Annual Wellness Visit. See epic orders.     (Z01.818) Preop general physical exam  Comment:  Satisfactory operative candidate with anesthesia as required.     (H25.9) Age-related cataract of both eyes, unspecified age-related cataract type  Comment: Reason for surgery.     (Z23) Need for prophylactic vaccination and inoculation against influenza  Plan: INFLUENZA, QUAD, HIGH DOSE, PF, 65YR + (FLUZONE        HD), ADMIN INFLUENZA (For MEDICARE Patients         ONLY) []          (Z12.5) Screening PSA (prostate specific antigen)  Comment: Update PSA.   Plan: PSA, screen                RECOMMENDATION:  APPROVAL GIVEN to proceed with proposed procedure, without further diagnostic evaluation.      Subjective     HPI related to upcoming procedure: No recent acute illness symptoms.     Preop Questions 9/20/2022   1. Have you ever had a heart attack or stroke? No   2. Have you ever had surgery on your heart or blood vessels, such as a stent placement, a coronary artery bypass, or surgery on an artery in your head, neck, heart,  or legs? No   3. Do you have chest pain with activity? No   4. Do you have a history of  heart failure? No   5. Do you currently have a cold, bronchitis or symptoms of other infection? No   6. Do you have a cough, shortness of breath, or wheezing? No   7. Do you or anyone in your family have previous history of blood clots? No   8. Do you or does anyone in your family have a serious bleeding problem such as prolonged bleeding following surgeries or cuts? No   9. Have you ever had problems with anemia or been told to take iron pills? No   10. Have you had any abnormal blood loss such as black, tarry or bloody stools? No   11. Have you ever had a blood transfusion? No   12. Are you willing to have a blood transfusion if it is medically needed before, during, or after your surgery? Yes   13. Have you or any of your relatives ever had problems with anesthesia? No   14. Do you have sleep apnea, excessive snoring or daytime drowsiness? UNKNOWN - snores, remote sleep study, not requiring CPAP   15. Do you have any artifical heart valves or other implanted medical devices like a pacemaker, defibrillator, or continuous glucose monitor? No   16. Do you have artificial joints? YES - s/p bilateral ZEYNEP   17. Are you allergic to latex? No       Health Care Directive:  Patient has a Health Care Directive on file      Preoperative Review of :   reviewed - controlled substances reflected in medication list.      Review of Systems  REVIEW OF SYSTEMS: The following systems have been completely reviewed and are negative except as noted above:   Constitutional, HEENT, respiratory, cardiovascular, gastrointestinal, genitourinary, musculoskeletal, dermatologic, hematologic, endocrine, psychiatric, and neurologic systems.      Patient Active Problem List    Diagnosis Date Noted     Thyroid nodule 11/26/2019     Priority: Medium     Status post left hip replacement 10/09/2018     Priority: Medium     Status post right hip replacement  10/24/2017     Priority: Medium     Agatston CAC score, >400 10/15/2015     Priority: Medium     Atypical chest pain 01/28/2015     Priority: Medium     Hypercholesterolemia 01/03/2014     Priority: Medium     Diagnosis updated by automated process. Provider to review and confirm.       Adenomatous polyp 01/03/2014     Priority: Medium     IBS (irritable bowel syndrome) 05/22/2013     Priority: Medium      Past Medical History:   Diagnosis Date     Aortic root dilatation (H)      Aortic stenosis      Arthritis      Atypical chest pain 1/28/2015     Cerebral artery occlusion with cerebral infarction (H)     TIA  1992     Essential hypertension with goal blood pressure less than 140/90 11/26/2016    takes lisinopril for preventitive     Family history of heart disease      Hypercholesterolemia 1/3/2014     Diagnosis updated by automated process. Provider to review and confirm.     Hyperlipidemia LDL goal <70      IBS (irritable bowel syndrome)      Other chronic pain      Past Surgical History:   Procedure Laterality Date     ARTHROPLASTY HIP ANTERIOR Right 10/24/2017    Procedure: ARTHROPLASTY HIP ANTERIOR;  RIGHT TOTAL HIP ARTHROPLASTY DIRECT ANTERIOR APPROACH;  Surgeon: Meño Avalos MD;  Location:  OR     ARTHROPLASTY HIP ANTERIOR Left 10/09/2018    Procedure: ARTHROPLASTY HIP ANTERIOR;  LEFT TOTAL HIP ARTHROPLASTY DIRECT ANTERIOR APPROACH (DEPUY)^;  Surgeon: Meño Avalos MD;  Location:  OR     COLONOSCOPY  01/01/2011    Adenomatous polyp removed     COLONOSCOPY  03/23/2017    Two adenomatous polyps, repeat in 5 yrs     VASECTOMY       [facility] NONSPECIFIC PROCEDURE  09/01/1990    kidney stone     Current Outpatient Medications   Medication Sig Dispense Refill     Ascorbic Acid (VITAMIN C PO) Take 1,000 mg by mouth every morning        aspirin (ASA) 81 MG tablet Take 1 tablet (81 mg) by mouth daily       Cyanocobalamin (VITAMIN B 12 PO) Take 1,000 mcg by mouth every morning       ezetimibe (ZETIA) 10 MG  "tablet Take 1 tablet (10 mg) by mouth daily 90 tablet 3     lisinopril (ZESTRIL) 2.5 MG tablet TAKE 1 TABLET BY MOUTH  DAILY 90 tablet 2     nitroGLYcerin (NITROSTAT) 0.4 MG sublingual tablet For chest pain place 1 tablet under the tongue every 5 minutes for 3 doses. If symptoms persist 5 minutes after 1st dose call 911. 25 tablet 1     rosuvastatin (CRESTOR) 20 MG tablet Take 1 tablet (20 mg) by mouth daily 90 tablet 3     tadalafil (CIALIS) 10 MG tablet Take 1 tablet (10 mg) by mouth daily as needed (erectile dysfunction) 15 tablet 11     VITAMIN D, CHOLECALCIFEROL, PO Take 1,000 Units by mouth every morning          No Known Allergies     Social History     Tobacco Use     Smoking status: Never Smoker     Smokeless tobacco: Never Used   Substance Use Topics     Alcohol use: Yes     Comment: 3-4 glasses of wine weekly     Family History   Problem Relation Age of Onset     Cerebrovascular Disease Mother         Stroke age 69     Breast Cancer Mother          age 75     C.A.D. Father          of MI at age 63, 1ST MI age 39     Diabetes Father      Cerebrovascular Disease Brother         Born 1948     History   Drug Use No         Objective     /60   Pulse 94   Temp 97.7  F (36.5  C) (Tympanic)   Resp 16   Ht 1.803 m (5' 11\")   Wt 83.9 kg (185 lb)   SpO2 96%   BMI 25.80 kg/m      Physical Exam  GENERAL APPEARANCE: healthy, alert and no distress  HENT: ear canals and TM's normal and nose and mouth without ulcers or lesions  RESP: lungs clear to auscultation - no rales, rhonchi or wheezes  CV: regular rate and rhythm, normal S1 S2, no S3 or S4 and no murmur, click or rub   ABDOMEN: soft, nontender, no HSM or masses and bowel sounds normal  NEURO: Normal strength and tone, sensory exam grossly normal, mentation intact and speech normal    Recent Labs   Lab Test 22  0809 21  0747    139   POTASSIUM 3.9 4.3   CR 0.79 0.84        Diagnostics:  Labs pending at this time.  Results will " be reviewed when available.   No EKG required for low risk surgery (cataract, skin procedure, breast biopsy, etc).    Revised Cardiac Risk Index (RCRI):  The patient has the following serious cardiovascular risks for perioperative complications:   - No serious cardiac risks = 0 points     RCRI Interpretation: 0 points: Class I (very low risk - 0.4% complication rate)           Signed Electronically by: Guillermo Kong MD,   Copy of this evaluation report is provided to requesting physician.

## 2022-09-21 NOTE — PATIENT INSTRUCTIONS
Patient Education   Personalized Prevention Plan  You are due for the preventive services outlined below.  Your care team is available to assist you in scheduling these services.  If you have already completed any of these items, please share that information with your care team to update in your medical record.  Health Maintenance Due   Topic Date Due    COVID-19 Vaccine (4 - Booster for Moderna series) 01/04/2022          Everything looks fine!    Refills of medication have been faxed to your pharmacy by Dr Sim.     Lab results will be available soon on [employer].    See me in a year, sooner if problems.

## 2022-09-22 LAB — PSA SERPL-MCNC: 0.32 UG/L (ref 0–4)

## 2022-11-04 DIAGNOSIS — E78.00 HYPERCHOLESTEROLEMIA: ICD-10-CM

## 2022-11-04 RX ORDER — ROSUVASTATIN CALCIUM 20 MG/1
20 TABLET, COATED ORAL DAILY
Qty: 90 TABLET | Refills: 0 | Status: SHIPPED | OUTPATIENT
Start: 2022-11-04 | End: 2023-01-17

## 2023-01-11 ENCOUNTER — LAB (OUTPATIENT)
Dept: LAB | Facility: CLINIC | Age: 78
End: 2023-01-11
Attending: INTERNAL MEDICINE
Payer: COMMERCIAL

## 2023-01-11 ENCOUNTER — HOSPITAL ENCOUNTER (OUTPATIENT)
Dept: CARDIOLOGY | Facility: CLINIC | Age: 78
Discharge: HOME | End: 2023-01-11
Attending: INTERNAL MEDICINE
Payer: COMMERCIAL

## 2023-01-11 DIAGNOSIS — R93.1 AGATSTON CAC SCORE, >400: ICD-10-CM

## 2023-01-11 DIAGNOSIS — E78.00 HYPERCHOLESTEROLEMIA: ICD-10-CM

## 2023-01-11 DIAGNOSIS — R07.89 ATYPICAL CHEST PAIN: ICD-10-CM

## 2023-01-11 LAB
ANION GAP SERPL CALCULATED.3IONS-SCNC: 9 MMOL/L (ref 7–15)
BUN SERPL-MCNC: 9.9 MG/DL (ref 8–23)
CALCIUM SERPL-MCNC: 9.6 MG/DL (ref 8.8–10.2)
CHLORIDE SERPL-SCNC: 102 MMOL/L (ref 98–107)
CHOLEST SERPL-MCNC: 96 MG/DL
CREAT SERPL-MCNC: 0.78 MG/DL (ref 0.67–1.17)
DEPRECATED HCO3 PLAS-SCNC: 30 MMOL/L (ref 22–29)
GFR SERPL CREATININE-BSD FRML MDRD: >90 ML/MIN/1.73M2
GLUCOSE SERPL-MCNC: 105 MG/DL (ref 70–99)
HDLC SERPL-MCNC: 52 MG/DL
LDLC SERPL CALC-MCNC: 37 MG/DL
NONHDLC SERPL-MCNC: 44 MG/DL
POTASSIUM SERPL-SCNC: 4.4 MMOL/L (ref 3.4–5.3)
SODIUM SERPL-SCNC: 141 MMOL/L (ref 136–145)
TRIGL SERPL-MCNC: 35 MG/DL

## 2023-01-11 PROCEDURE — 93325 DOPPLER ECHO COLOR FLOW MAPG: CPT | Mod: TC

## 2023-01-11 PROCEDURE — 93017 CV STRESS TEST TRACING ONLY: CPT

## 2023-01-11 PROCEDURE — 93018 CV STRESS TEST I&R ONLY: CPT | Performed by: INTERNAL MEDICINE

## 2023-01-11 PROCEDURE — C8928 TTE W OR W/O FOL W/CON,STRES: HCPCS

## 2023-01-11 PROCEDURE — 93350 STRESS TTE ONLY: CPT | Mod: 26 | Performed by: INTERNAL MEDICINE

## 2023-01-11 PROCEDURE — 255N000002 HC RX 255 OP 636: Performed by: INTERNAL MEDICINE

## 2023-01-11 PROCEDURE — 93016 CV STRESS TEST SUPVJ ONLY: CPT | Performed by: INTERNAL MEDICINE

## 2023-01-11 PROCEDURE — 80048 BASIC METABOLIC PNL TOTAL CA: CPT

## 2023-01-11 PROCEDURE — 36415 COLL VENOUS BLD VENIPUNCTURE: CPT

## 2023-01-11 PROCEDURE — 80061 LIPID PANEL: CPT

## 2023-01-11 PROCEDURE — 93325 DOPPLER ECHO COLOR FLOW MAPG: CPT | Mod: 26 | Performed by: INTERNAL MEDICINE

## 2023-01-11 PROCEDURE — 93321 DOPPLER ECHO F-UP/LMTD STD: CPT | Mod: 26 | Performed by: INTERNAL MEDICINE

## 2023-01-11 RX ADMIN — HUMAN ALBUMIN MICROSPHERES AND PERFLUTREN 9 ML: 10; .22 INJECTION, SOLUTION INTRAVENOUS at 09:23

## 2023-01-11 NOTE — PROGRESS NOTES
HISTORY OF PRESENT ILLNESS:    This is a 78 year old male who follows with Dr Sim at [facility]  His past medical history includes:  Coronary artery disease, hyperlipidemia, hypertension,     Mr Tamez has been followed in our clinic for years, mainly for cardiovascular prevention.   A stress ECHO (2015) showed no ischemia    Coronary CT calcium score (2015) was 4316, placing him in the top 1% for risk.  He has been on a statin and aspirin    NUC stress (2018) showed no ischemia  Preserved LVEF  Chest CT measured his ascending aorta at 3.6 x 3.5 cm (no change)    ECHO (2019) showed LVEF 50-55% without wall motion abnormalities, normal RV function, no significant valvular pathology, mild aortic root enlargement and borderline ascending aorta dilatation.    Exercise stress ECHO (2021) did not show any stress-induced ischemia.  LVEF 55%    Historically, he has not complained of any cardiovascular symptoms and exercises on a regular basis    He returns today for reassessment and surveillance stress test    Mr Tamez is very active   He denies any significant cardiovascular complaints.  We talked about his labs and ways to reduce sugars in his diet and add fiber.  He specifically denies any chest pain, shortness of breath, palpitations, orthopnea, or peripheral edema.    His stress ECHO (1/11/23) did not show any evidence of stress-induced ischemia  LVEF 55%  No significant valve pathology  Mild ascending aorta dilatation      Labs (1/11/23)  Sodium: 141  Potassium: 4.4  BUN: 9.9  Creatinine: 0.78  Total Cholesterol: 96  Triglycerides: 35  HDL: 52  LDL: 37    VITAL SIGNS:  BP:  110/60  Pulse:  85  Weight:  188 lb (stable)  BMI: 26      IMPRESSION AND PLAN:    Coronary Artery Disease:  -based off of abnormal calcium score (2015) 4316   -family hx of premature CAD  -denies angina  -stress ECHO show no ischemia LVEF 55% (1/2023)  -on ASA and statin    Hypertension:  -on Lisinopril 2.5 mg  -BP  controlled    Hyperlipidemia:  -on Crestor 20 mg, Zetia  -LDL 37    The total time for the visit today was 22 minutes which includes patient visit, reviewing of records, discussion, and placing of orders of the outpatient coordination of cardiovascular care as described.  The level of medical decision making during this visit was of mild-moderate complexity.  Thank you for allowing me to participate in their care.      No orders of the defined types were placed in this encounter.      Orders Placed This Encounter   Medications     ezetimibe (ZETIA) 10 MG tablet     Sig: Take 1 tablet (10 mg) by mouth daily     Dispense:  90 tablet     Refill:  3     rosuvastatin (CRESTOR) 20 MG tablet     Sig: Take 1 tablet (20 mg) by mouth daily     Dispense:  90 tablet     Refill:  3       Medications Discontinued During This Encounter   Medication Reason     ezetimibe (ZETIA) 10 MG tablet Reorder     rosuvastatin (CRESTOR) 20 MG tablet Reorder         Encounter Diagnosis   Name Primary?     Hypercholesterolemia        CURRENT MEDICATIONS:  Current Outpatient Medications   Medication Sig Dispense Refill     Ascorbic Acid (VITAMIN C PO) Take 1,000 mg by mouth every morning        aspirin (ASA) 81 MG tablet Take 1 tablet (81 mg) by mouth daily       Cyanocobalamin (VITAMIN B 12 PO) Take 1,000 mcg by mouth every morning       ezetimibe (ZETIA) 10 MG tablet Take 1 tablet (10 mg) by mouth daily 90 tablet 3     lisinopril (ZESTRIL) 2.5 MG tablet TAKE 1 TABLET BY MOUTH  DAILY 90 tablet 2     nitroGLYcerin (NITROSTAT) 0.4 MG sublingual tablet For chest pain place 1 tablet under the tongue every 5 minutes for 3 doses. If symptoms persist 5 minutes after 1st dose call 911. 25 tablet 1     rosuvastatin (CRESTOR) 20 MG tablet Take 1 tablet (20 mg) by mouth daily 90 tablet 3     tadalafil (CIALIS) 10 MG tablet Take 1 tablet (10 mg) by mouth daily as needed (erectile dysfunction) 15 tablet 11     VITAMIN D, CHOLECALCIFEROL, PO Take 1,000 Units  by mouth every morning          ALLERGIES   No Known Allergies    PAST MEDICAL HISTORY:  Past Medical History:   Diagnosis Date     Aortic root dilatation (H)      Aortic stenosis      Arthritis      Atypical chest pain 2015     Cerebral artery occlusion with cerebral infarction (H)     TIA       Essential hypertension with goal blood pressure less than 140/90 2016    takes lisinopril for preventitive     Family history of heart disease      Hypercholesterolemia 1/3/2014     Diagnosis updated by automated process. Provider to review and confirm.     Hyperlipidemia LDL goal <70      IBS (irritable bowel syndrome)      Other chronic pain        PAST SURGICAL HISTORY:  Past Surgical History:   Procedure Laterality Date     ARTHROPLASTY HIP ANTERIOR Right 10/24/2017    Procedure: ARTHROPLASTY HIP ANTERIOR;  RIGHT TOTAL HIP ARTHROPLASTY DIRECT ANTERIOR APPROACH;  Surgeon: Meño Avalos MD;  Location:  OR     ARTHROPLASTY HIP ANTERIOR Left 10/09/2018    Procedure: ARTHROPLASTY HIP ANTERIOR;  LEFT TOTAL HIP ARTHROPLASTY DIRECT ANTERIOR APPROACH (DEPUY)^;  Surgeon: Meño Avalos MD;  Location:  OR     COLONOSCOPY  2011    Adenomatous polyp removed     COLONOSCOPY  2017    Two adenomatous polyps, repeat in 5 yrs     VASECTOMY       ZZC NONSPECIFIC PROCEDURE  1990    kidney stone       FAMILY HISTORY:  Family History   Problem Relation Age of Onset     Cerebrovascular Disease Mother         Stroke age 69     Breast Cancer Mother          age 75     C.A.D. 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 Yes     Comment: father      Caffeine Concern Yes     Comment: 2 cups coffee daily, occ soda      Sleep Concern No     Special Diet No     Exercise Yes     Comment: 3-4 times weekly      Seat Belt Yes   Social History Narrative    Rides bicycle or [place] regularly (3x/week).      Social Determinants of Health     Financial Resource Strain: Low Risk      Difficulty of Paying Living Expenses: Not hard at all   Food Insecurity: No Food Insecurity     Worried About Running Out of Food in the Last Year: Never true     Ran Out of Food in the Last Year: Never true   Transportation Needs: No Transportation Needs     Lack of Transportation (Medical): No     Lack of Transportation (Non-Medical): No   Intimate Partner Violence: Not At Risk     Fear of Current or Ex-Partner: No     Emotionally Abused: No     Physically Abused: No     Sexually Abused: No       Review of Systems:  Skin:          Eyes:         ENT:         Respiratory:  Negative       Cardiovascular:    Positive for Occosional discomfort in chest in morning or at night while resting  Gastroenterology:        Genitourinary:         Musculoskeletal:         Neurologic:         Psychiatric:         Heme/Lymph/Imm:         Endocrine:           Physical Exam:  Vitals: /60 (BP Location: Right arm, Patient Position: Sitting, Cuff Size: Adult Regular)   Pulse 85   Ht 1.803 m (5' 11\")   Wt 85.6 kg (188 lb 11.2 oz)   SpO2 96%   BMI 26.32 kg/m      Constitutional:  cooperative        Skin:  warm and dry to the touch          Head:  normocephalic        Eyes:  pupils equal and round        Lymph:      ENT:  no pallor or cyanosis        Neck:  no " carotid bruit;JVP normal        Respiratory:  normal respiratory excursion;clear to auscultation         Cardiac: regular rhythm;normal S1 and S2;no S3 or S4;no murmurs, gallops or rubs detected                pulses full and equal                                        GI:  abdomen soft        Extremities and Muscular Skeletal:  no edema              Neurological:  no gross motor deficits        Psych:  affect appropriate, oriented to time, person and place          CC  No referring provider defined for this encounter.

## 2023-01-17 ENCOUNTER — OFFICE VISIT (OUTPATIENT)
Dept: CARDIOLOGY | Facility: CLINIC | Age: 78
End: 2023-01-17
Payer: COMMERCIAL

## 2023-01-17 VITALS
BODY MASS INDEX: 26.42 KG/M2 | OXYGEN SATURATION: 96 % | WEIGHT: 188.7 LBS | SYSTOLIC BLOOD PRESSURE: 110 MMHG | DIASTOLIC BLOOD PRESSURE: 60 MMHG | HEIGHT: 71 IN | HEART RATE: 85 BPM

## 2023-01-17 DIAGNOSIS — E78.00 HYPERCHOLESTEROLEMIA: ICD-10-CM

## 2023-01-17 PROCEDURE — 99214 OFFICE O/P EST MOD 30 MIN: CPT | Performed by: NURSE PRACTITIONER

## 2023-01-17 RX ORDER — EZETIMIBE 10 MG/1
10 TABLET ORAL DAILY
Qty: 90 TABLET | Refills: 3 | Status: SHIPPED | OUTPATIENT
Start: 2023-01-17 | End: 2023-12-21

## 2023-01-17 RX ORDER — ROSUVASTATIN CALCIUM 20 MG/1
20 TABLET, COATED ORAL DAILY
Qty: 90 TABLET | Refills: 3 | Status: SHIPPED | OUTPATIENT
Start: 2023-01-17 | End: 2023-12-21

## 2023-01-17 NOTE — LETTER
1/17/2023    Guillermo Kong MD, MD  [address] [facility] [address]  [facility] [address]    RE: Javier Tamez       Dear Colleague,     I had the pleasure of seeing Javier Tamez in the [facility].  HISTORY OF PRESENT ILLNESS:    This is a 78 year old male who follows with Dr Sim at [facility]  His past medical history includes:  Coronary artery disease, hyperlipidemia, hypertension,     Mr Tamez has been followed in our clinic for years, mainly for cardiovascular prevention.   A stress ECHO (2015) showed no ischemia    Coronary CT calcium score (2015) was 4316, placing him in the top 1% for risk.  He has been on a statin and aspirin    NUC stress (2018) showed no ischemia  Preserved LVEF  Chest CT measured his ascending aorta at 3.6 x 3.5 cm (no change)    ECHO (2019) showed LVEF 50-55% without wall motion abnormalities, normal RV function, no significant valvular pathology, mild aortic root enlargement and borderline ascending aorta dilatation.    Exercise stress ECHO (2021) did not show any stress-induced ischemia.  LVEF 55%    Historically, he has not complained of any cardiovascular symptoms and exercises on a regular basis    He returns today for reassessment and surveillance stress test    Mr Tamez is very active   He denies any significant cardiovascular complaints.  We talked about his labs and ways to reduce sugars in his diet and add fiber.  He specifically denies any chest pain, shortness of breath, palpitations, orthopnea, or peripheral edema.    His stress ECHO (1/11/23) did not show any evidence of stress-induced ischemia  LVEF 55%  No significant valve pathology  Mild ascending aorta dilatation      Labs (1/11/23)  Sodium: 141  Potassium: 4.4  BUN: 9.9  Creatinine: 0.78  Total Cholesterol: 96  Triglycerides: 35  HDL: 52  LDL: 37    VITAL SIGNS:  BP:  110/60  Pulse:  85  Weight:  188 lb (stable)  BMI: 26      IMPRESSION AND PLAN:    Coronary Artery  Disease:  -based off of abnormal calcium score (2015) 4316   -family hx of premature CAD  -denies angina  -stress ECHO show no ischemia LVEF 55% (1/2023)  -on ASA and statin    Hypertension:  -on Lisinopril 2.5 mg  -BP controlled    Hyperlipidemia:  -on Crestor 20 mg, Zetia  -LDL 37    The total time for the visit today was 22 minutes which includes patient visit, reviewing of records, discussion, and placing of orders of the outpatient coordination of cardiovascular care as described.  The level of medical decision making during this visit was of mild-moderate complexity.  Thank you for allowing me to participate in their care.      No orders of the defined types were placed in this encounter.      Orders Placed This Encounter   Medications     ezetimibe (ZETIA) 10 MG tablet     Sig: Take 1 tablet (10 mg) by mouth daily     Dispense:  90 tablet     Refill:  3     rosuvastatin (CRESTOR) 20 MG tablet     Sig: Take 1 tablet (20 mg) by mouth daily     Dispense:  90 tablet     Refill:  3       Medications Discontinued During This Encounter   Medication Reason     ezetimibe (ZETIA) 10 MG tablet Reorder     rosuvastatin (CRESTOR) 20 MG tablet Reorder         Encounter Diagnosis   Name Primary?     Hypercholesterolemia        CURRENT MEDICATIONS:  Current Outpatient Medications   Medication Sig Dispense Refill     Ascorbic Acid (VITAMIN C PO) Take 1,000 mg by mouth every morning        aspirin (ASA) 81 MG tablet Take 1 tablet (81 mg) by mouth daily       Cyanocobalamin (VITAMIN B 12 PO) Take 1,000 mcg by mouth every morning       ezetimibe (ZETIA) 10 MG tablet Take 1 tablet (10 mg) by mouth daily 90 tablet 3     lisinopril (ZESTRIL) 2.5 MG tablet TAKE 1 TABLET BY MOUTH  DAILY 90 tablet 2     nitroGLYcerin (NITROSTAT) 0.4 MG sublingual tablet For chest pain place 1 tablet under the tongue every 5 minutes for 3 doses. If symptoms persist 5 minutes after 1st dose call 911. 25 tablet 1     rosuvastatin (CRESTOR) 20 MG tablet  Take 1 tablet (20 mg) by mouth daily 90 tablet 3     tadalafil (CIALIS) 10 MG tablet Take 1 tablet (10 mg) by mouth daily as needed (erectile dysfunction) 15 tablet 11     VITAMIN D, CHOLECALCIFEROL, PO Take 1,000 Units by mouth every morning          ALLERGIES   No Known Allergies    PAST MEDICAL HISTORY:  Past Medical History:   Diagnosis Date     Aortic root dilatation (H)      Aortic stenosis      Arthritis      Atypical chest pain 2015     Cerebral artery occlusion with cerebral infarction (H)     TIA       Essential hypertension with goal blood pressure less than 140/90 2016    takes lisinopril for preventitive     Family history of heart disease      Hypercholesterolemia 1/3/2014     Diagnosis updated by automated process. Provider to review and confirm.     Hyperlipidemia LDL goal <70      IBS (irritable bowel syndrome)      Other chronic pain        PAST SURGICAL HISTORY:  Past Surgical History:   Procedure Laterality Date     ARTHROPLASTY HIP ANTERIOR Right 10/24/2017    Procedure: ARTHROPLASTY HIP ANTERIOR;  RIGHT TOTAL HIP ARTHROPLASTY DIRECT ANTERIOR APPROACH;  Surgeon: Meño Avalos MD;  Location:  OR     ARTHROPLASTY HIP ANTERIOR Left 10/09/2018    Procedure: ARTHROPLASTY HIP ANTERIOR;  LEFT TOTAL HIP ARTHROPLASTY DIRECT ANTERIOR APPROACH (DEPUY)^;  Surgeon: Meño Avalos MD;  Location:  OR     COLONOSCOPY  2011    Adenomatous polyp removed     COLONOSCOPY  2017    Two adenomatous polyps, repeat in 5 yrs     VASECTOMY       ZZC NONSPECIFIC PROCEDURE  1990    kidney stone       FAMILY HISTORY:  Family History   Problem Relation Age of Onset     Cerebrovascular Disease Mother         Stroke age 69     Breast Cancer Mother          age 75     C.A.D. 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 Yes     Comment: father      Caffeine Concern Yes     Comment: 2 cups coffee daily, occ soda      Sleep Concern No     Special Diet No     Exercise Yes     Comment: 3-4 times weekly      Seat Belt Yes   Social History Narrative    Rides bicycle or goes to Y regularly (3x/week).      Social Determinants of Health     Financial Resource Strain: Low Risk      Difficulty of Paying Living Expenses: Not hard at all   Food Insecurity: No Food Insecurity     Worried About Running Out of Food in the Last Year: Never true     Ran Out of Food in the Last Year: Never true   Transportation Needs: No Transportation Needs     Lack of Transportation (Medical): No     Lack of Transportation (Non-Medical): No   Intimate Partner Violence: Not At Risk     Fear of Current or Ex-Partner: No     Emotionally Abused: No     Physically Abused: No     Sexually Abused: No       Review of Systems:  Skin:          Eyes:         ENT:         Respiratory:  Negative       Cardiovascular:    Positive for Occosional discomfort in chest in morning or at night while resting  Gastroenterology:        Genitourinary:         Musculoskeletal:         Neurologic:         Psychiatric:         Heme/Lymph/Imm:         Endocrine:           Physical Exam:  Vitals: /60 (BP Location: Right arm, Patient Position: Sitting, Cuff Size: Adult Regular)   Pulse 85   Ht 1.803 m (5' 11\")   Wt 85.6 kg (188 lb 11.2 oz)   SpO2 96%   BMI " 26.32 kg/m      Constitutional:  cooperative        Skin:  warm and dry to the touch          Head:  normocephalic        Eyes:  pupils equal and round        Lymph:      ENT:  no pallor or cyanosis        Neck:  no carotid bruit;JVP normal        Respiratory:  normal respiratory excursion;clear to auscultation         Cardiac: regular rhythm;normal S1 and S2;no S3 or S4;no murmurs, gallops or rubs detected                pulses full and equal                                        GI:  abdomen soft        Extremities and Muscular Skeletal:  no edema              Neurological:  no gross motor deficits        Psych:  affect appropriate, oriented to time, person and place          CC  No referring provider defined for this encounter.    Thank you for allowing me to participate in the care of your patient.      Sincerely,     JAS Guerrero CNP     [facility]

## 2023-01-17 NOTE — PATIENT INSTRUCTIONS
Continue current medical regimen  Return in 1 yr  It was a pleasure seeing you today     Please do not hesitate to call my nurse team with any questions or concerns:  [phone]    Scheduling number:  [phone]    JAS Bellamy, CNP

## 2023-02-08 ENCOUNTER — TELEPHONE (OUTPATIENT)
Dept: INTERNAL MEDICINE | Facility: CLINIC | Age: 78
End: 2023-02-08

## 2023-02-08 DIAGNOSIS — N52.9 ERECTILE DYSFUNCTION, UNSPECIFIED ERECTILE DYSFUNCTION TYPE: ICD-10-CM

## 2023-02-08 DIAGNOSIS — N52.8 OTHER MALE ERECTILE DYSFUNCTION: ICD-10-CM

## 2023-02-08 NOTE — TELEPHONE ENCOUNTER
Patient is requesting a larger dosage or larger quantity of Tadalafil 10 MG.     Pharmacy - [facility] in [place].

## 2023-02-09 RX ORDER — TADALAFIL 20 MG/1
TABLET ORAL
Qty: 15 TABLET | Refills: 11 | Status: SHIPPED | OUTPATIENT
Start: 2023-02-09 | End: 2024-02-12 | Stop reason: DRUGHIGH

## 2023-02-09 NOTE — TELEPHONE ENCOUNTER
Will E-prescribe Rx for 20 mg tabs. Need to know the number of tabs that he is requesting per fill.

## 2023-02-15 ENCOUNTER — TRANSFERRED RECORDS (OUTPATIENT)
Dept: HEALTH INFORMATION MANAGEMENT | Facility: CLINIC | Age: 78
End: 2023-02-15

## 2023-07-21 ENCOUNTER — TELEPHONE (OUTPATIENT)
Dept: CARDIOLOGY | Facility: CLINIC | Age: 78
End: 2023-07-21
Payer: COMMERCIAL

## 2023-07-21 NOTE — TELEPHONE ENCOUNTER
[facility] Call Center    Phone Message    May a detailed message be left on voicemail: yes     Reason for Call: Symptoms or Concerns     If patient has red-flag symptoms, warm transfer to triage line    Current symptom or concern: Occasional chest pains into left arm,  more intense now than before.  Some chest pains today    Symptoms have been present for:  2 week(s)    Has patient previously been seen for this? Yes    By : JAS Bellamy CNP    Date: 1-17-23    Are there any new or worsening symptoms? Yes:       Action Taken: Other: cardio    Travel Screening: Not Applicable     Thank you!  [facility]

## 2023-07-21 NOTE — TELEPHONE ENCOUNTER
Patient has noticed  intermittand twinges in his chest while sitting and they las 1-3 seconds.  They are coming more frequent in the last 2 weeks and would like to see a provider sooner than August with Dr. Sim.  He denies any SOB, diaphoresis,nausea.  He denies any discomfort with activity.  He has had some discomfort in his left arm.  He has nitroglycerin and Cialis.  He has not been taking the Cialis.  Reviewed how to take nitroglycerin and if he needs to call 911.  He is travelling and will not be back in [place] until next week.  Scheduling number given and will see if he can get a sooner appt with any provider.  Patient verbalized understanding.

## 2023-07-26 ENCOUNTER — OFFICE VISIT (OUTPATIENT)
Dept: CARDIOLOGY | Facility: CLINIC | Age: 78
End: 2023-07-26
Payer: COMMERCIAL

## 2023-07-26 VITALS
HEIGHT: 71 IN | SYSTOLIC BLOOD PRESSURE: 96 MMHG | DIASTOLIC BLOOD PRESSURE: 60 MMHG | WEIGHT: 181 LBS | OXYGEN SATURATION: 92 % | HEART RATE: 87 BPM | BODY MASS INDEX: 25.34 KG/M2

## 2023-07-26 DIAGNOSIS — I25.119 CORONARY ARTERY DISEASE INVOLVING NATIVE CORONARY ARTERY OF NATIVE HEART WITH ANGINA PECTORIS (H): ICD-10-CM

## 2023-07-26 DIAGNOSIS — I25.10 CORONARY ARTERY DISEASE INVOLVING NATIVE CORONARY ARTERY OF NATIVE HEART WITHOUT ANGINA PECTORIS: Primary | ICD-10-CM

## 2023-07-26 PROCEDURE — 93000 ELECTROCARDIOGRAM COMPLETE: CPT | Performed by: PHYSICIAN ASSISTANT

## 2023-07-26 PROCEDURE — 99214 OFFICE O/P EST MOD 30 MIN: CPT | Performed by: PHYSICIAN ASSISTANT

## 2023-07-26 RX ORDER — NITROGLYCERIN 0.4 MG/1
TABLET SUBLINGUAL
Qty: 25 TABLET | Refills: 1 | Status: SHIPPED | OUTPATIENT
Start: 2023-07-26

## 2023-07-26 NOTE — PROGRESS NOTES
CARDIOLOGY CLINIC PROGRESS NOTE    DOS: 2023      Javier Tamez  : 1945, 78 year old  MRN: [id number]      History:  This is a 78 year old male who follows with Dr Sim at [facility]  His past medical history includes:  Coronary artery disease, hyperlipidemia, hypertension,      Mr Tamez has been followed in our clinic for years, mainly for cardiovascular prevention.       A stress ECHO () showed no ischemia.      Coronary CT calcium score () was 4316, placing him in the top 1% for risk.  He has been on a statin and aspirin.     NUC stress () showed no ischemia  Preserved LVEF.  Chest CT measured his ascending aorta at 3.6 x 3.5 cm (no change).     ECHO () showed LVEF 50-55% without wall motion abnormalities, normal RV function, no significant valvular pathology, mild aortic root enlargement and borderline ascending aorta dilatation.     Exercise stress ECHO () did not show any stress-induced ischemia.  LVEF 55%.     Stress ECHO (23) did not show any evidence of stress-induced ischemia, LVEF 55%, no significant valve pathology. Mild ascending aorta dilatation.      Historically, he has not complained of any cardiovascular symptoms and exercises on a regular basis  Since he saw  in January, he has continued to do well for the most part.  But in April he was sitting, and he had some fullness in his chest and some mild nausea. It passed after about 5 minutes.   Over the past couple weeks, he has had increased frequency of twinges in his chest. It is a dull passing pain.  It goes away quickly, but he can have another shortly after.   No SOB, no LH, no dizziness, no palpitations.   He exercises - does weights, sit ups, squats.  He golfed 9 holes this morning and felt ok.  He did use a cart.  He water skied on 23 and felt good.  No sxs with these activities.   BP is lower here today 90/60 and 96/60.   EKG 2023 shows SR without any concerning  ischemic changes.       ROS:  Skin:        Eyes:       ENT:       Respiratory:  Negative    Cardiovascular:    Positive for;chest pain  Gastroenterology:      Genitourinary:       Musculoskeletal:       Neurologic:       Psychiatric:       Heme/Lymph/Imm:       Endocrine:         PAST MEDICAL HISTORY:  Past Medical History:   Diagnosis Date     Aortic root dilatation (H)      Aortic stenosis      Arthritis      Atypical chest pain 1/28/2015     Cerebral artery occlusion with cerebral infarction (H)     TIA  1992     Essential hypertension with goal blood pressure less than 140/90 11/26/2016    takes lisinopril for preventitive     Family history of heart disease      Hypercholesterolemia 1/3/2014     Diagnosis updated by automated process. Provider to review and confirm.     Hyperlipidemia LDL goal <70      IBS (irritable bowel syndrome)      Other chronic pain        PAST SURGICAL HISTORY:  Past Surgical History:   Procedure Laterality Date     ARTHROPLASTY HIP ANTERIOR Right 10/24/2017    Procedure: ARTHROPLASTY HIP ANTERIOR;  RIGHT TOTAL HIP ARTHROPLASTY DIRECT ANTERIOR APPROACH;  Surgeon: Meño Avalos MD;  Location:  OR     ARTHROPLASTY HIP ANTERIOR Left 10/09/2018    Procedure: ARTHROPLASTY HIP ANTERIOR;  LEFT TOTAL HIP ARTHROPLASTY DIRECT ANTERIOR APPROACH (DEPUY)^;  Surgeon: Meño Avalos MD;  Location:  OR     COLONOSCOPY  01/01/2011    Adenomatous polyp removed     COLONOSCOPY  03/23/2017    Two adenomatous polyps, repeat in 5 yrs     VASECTOMY       ZZC NONSPECIFIC PROCEDURE  09/01/1990    kidney stone       SOCIAL HISTORY:  Social History     Socioeconomic History     Marital status:      Number of children: 4   Occupational History     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 Yes     Comment: father      Caffeine Concern Yes     Comment: 2 cups coffee daily, occ soda      Sleep Concern No     Special Diet No     Exercise Yes     Comment: 3-4 times weekly      Seat Belt Yes   Social History Narrative    Rides bicycle or goes to Y regularly (3x/week).      Social Determinants of Health     Financial Resource Strain: Low Risk  (10/21/2021)    Overall Financial Resource Strain (CARDIA)      Difficulty of Paying Living Expenses: Not hard at all   Food Insecurity: No Food Insecurity (10/21/2021)    Hunger Vital Sign      Worried About Running Out of Food in the Last Year: Never true      Ran Out of Food in the Last Year: Never true   Transportation Needs: No Transportation Needs (10/21/2021)    PRAPARE - Transportation      Lack of Transportation (Medical): No      Lack of Transportation (Non-Medical): No   Physical Activity: Sufficiently Active (10/21/2021)    Exercise Vital Sign      Days of Exercise per Week: 5 days      Minutes of Exercise per Session: 60 min   Intimate Partner Violence: Not At Risk (10/21/2021)    Humiliation, Afraid, Rape, and Kick questionnaire      Fear of Current or Ex-Partner: No      Emotionally Abused: No      Physically Abused: No      Sexually Abused: No   Housing Stability: Low Risk  (10/21/2021)    Housing Stability Vital Sign      Unable to Pay for Housing in the Last Year: No      Number of Places Lived in the Last Year: 1      Unstable Housing in the Last Year: No       FAMILY HISTORY:  Family History   Problem Relation Age of Onset     Cerebrovascular Disease Mother         Stroke age 69     Breast Cancer Mother          age 75     C.A.D. Father          of MI at age 63, 1ST MI age 39     Diabetes Father      Cerebrovascular  "Disease Brother         Born 1948       MEDS: Ascorbic Acid (VITAMIN C PO), Take 1,000 mg by mouth every morning   aspirin (ASA) 81 MG tablet, Take 1 tablet (81 mg) by mouth daily  Cyanocobalamin (VITAMIN B 12 PO), Take 1,000 mcg by mouth every morning  ezetimibe (ZETIA) 10 MG tablet, Take 1 tablet (10 mg) by mouth daily  rosuvastatin (CRESTOR) 20 MG tablet, Take 1 tablet (20 mg) by mouth daily  tadalafil (CIALIS) 20 MG tablet, One-half to one tablet prior to sex daily.  VITAMIN D, CHOLECALCIFEROL, PO, Take 1,000 Units by mouth every morning     No current facility-administered medications on file prior to visit.      ALLERGIES: No Known Allergies    PHYSICAL EXAM:  Vitals: BP 96/60 (BP Location: Right arm, Patient Position: Sitting, Cuff Size: Adult Regular)   Pulse 87   Ht 1.803 m (5' 11\")   Wt 82.1 kg (181 lb)   SpO2 92%   BMI 25.24 kg/m    Constitutional:  cooperative, alert and oriented, well developed, well nourished, in no acute distress        Skin:  warm and dry to the touch        Head:  normocephalic        Eyes:  pupils equal and round;conjunctivae and lids unremarkable;sclera white        ENT:  no pallor or cyanosis        Neck:  JVP normal        Respiratory:  normal respiratory excursion;clear to auscultation        Cardiac: regular rhythm;normal S1 and S2;no S3 or S4;no murmurs, gallops or rubs detected                  GI:  abdomen soft;BS normoactive        Vascular: pulses full and equal                                      Extremities and Musculoskeletal:  no edema        Neurological:  no gross motor deficits;affect appropriate            LABS/DATA:  I reviewed the following:  EKG 7/26/2023:  SR, no acute ischemic changes      ASSESSMENT/PLAN:  Coronary Artery Disease, nonobstructive  -family hx of premature CAD  -based off of abnormal calcium score (2015) 4316   -stress ECHO show no ischemia LVEF 55% (1/2023)  -now with an episode in April of chest fullness with some nausea and now with a " few more episodes of chest twinges at rest, no clear exertional angina in an active patient  -EKG 7/26/2023: SR. No acute ischemic changes  -we discussed that though he just had a stress echo in January, his symptoms are new since then, so we will repeat a stress echo  -Continue ASA and statin  -I did refill his SL nitroglycerin. He does have PRN Cialis so we discussed to avoid taking these medications within 24 hours of each other  -We discussed when to call 911  -He already has follow up with Dr. Sim 8/16/23 which he will keep       Hypertension  -on Lisinopril 2.5 mg  -BP low   -Will hold this for now       Hyperlipidemia  -on Crestor 20 mg, Zetia  -1/11/23 FLP: LDL 37      Follow up:  Stress echo  Currently scheduled to see Dr. Sim 8/16/23     Rachel Taylor PA-C

## 2023-07-26 NOTE — LETTER
2023    Guillermo Kong MD, MD  [address] [facility] [address]  [facility] [address]    RE: Javier Tamez       Dear Colleague,     I had the pleasure of seeing Javier Tamez in the [facility].      CARDIOLOGY CLINIC PROGRESS NOTE    DOS: 2023      Javier Tamez  : 1945, 78 year old  MRN: [id number]      History:  This is a 78 year old male who follows with Dr Sim at [facility]  His past medical history includes:  Coronary artery disease, hyperlipidemia, hypertension,      Mr Tamez has been followed in our clinic for years, mainly for cardiovascular prevention.       A stress ECHO () showed no ischemia.      Coronary CT calcium score () was 4316, placing him in the top 1% for risk.  He has been on a statin and aspirin.     NUC stress () showed no ischemia  Preserved LVEF.  Chest CT measured his ascending aorta at 3.6 x 3.5 cm (no change).     ECHO () showed LVEF 50-55% without wall motion abnormalities, normal RV function, no significant valvular pathology, mild aortic root enlargement and borderline ascending aorta dilatation.     Exercise stress ECHO () did not show any stress-induced ischemia.  LVEF 55%.     Stress ECHO (23) did not show any evidence of stress-induced ischemia, LVEF 55%, no significant valve pathology. Mild ascending aorta dilatation.      Historically, he has not complained of any cardiovascular symptoms and exercises on a regular basis  Since he saw  in January, he has continued to do well for the most part.  But in April he was sitting, and he had some fullness in his chest and some mild nausea. It passed after about 5 minutes.   Over the past couple weeks, he has had increased frequency of twinges in his chest. It is a dull passing pain.  It goes away quickly, but he can have another shortly after.   No SOB, no LH, no dizziness, no palpitations.   He exercises - does weights, sit ups, squats.  He golfed 9  holes this morning and felt ok.  He did use a cart.  He water skied on 7/4/23 and felt good.  No sxs with these activities.   BP is lower here today 90/60 and 96/60.   EKG 7/26/2023 shows SR without any concerning ischemic changes.       ROS:  Skin:        Eyes:       ENT:       Respiratory:  Negative    Cardiovascular:    Positive for;chest pain  Gastroenterology:      Genitourinary:       Musculoskeletal:       Neurologic:       Psychiatric:       Heme/Lymph/Imm:       Endocrine:         PAST MEDICAL HISTORY:  Past Medical History:   Diagnosis Date    Aortic root dilatation (H)     Aortic stenosis     Arthritis     Atypical chest pain 1/28/2015    Cerebral artery occlusion with cerebral infarction (H)     TIA  1992    Essential hypertension with goal blood pressure less than 140/90 11/26/2016    takes lisinopril for preventitive    Family history of heart disease     Hypercholesterolemia 1/3/2014     Diagnosis updated by automated process. Provider to review and confirm.    Hyperlipidemia LDL goal <70     IBS (irritable bowel syndrome)     Other chronic pain        PAST SURGICAL HISTORY:  Past Surgical History:   Procedure Laterality Date    ARTHROPLASTY HIP ANTERIOR Right 10/24/2017    Procedure: ARTHROPLASTY HIP ANTERIOR;  RIGHT TOTAL HIP ARTHROPLASTY DIRECT ANTERIOR APPROACH;  Surgeon: Meño Avalos MD;  Location:  OR    ARTHROPLASTY HIP ANTERIOR Left 10/09/2018    Procedure: ARTHROPLASTY HIP ANTERIOR;  LEFT TOTAL HIP ARTHROPLASTY DIRECT ANTERIOR APPROACH (DEPUY)^;  Surgeon: Meño Avalos MD;  Location:  OR    COLONOSCOPY  01/01/2011    Adenomatous polyp removed    COLONOSCOPY  03/23/2017    Two adenomatous polyps, repeat in 5 yrs    VASECTOMY      ZZC NONSPECIFIC PROCEDURE  09/01/1990    kidney stone       SOCIAL HISTORY:  Social History     Socioeconomic History    Marital status:     Number of children: 4   Occupational History    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 Yes     Comment: father     Caffeine Concern Yes     Comment: 2 cups coffee daily, occ soda     Sleep Concern No    Special Diet No    Exercise Yes     Comment: 3-4 times weekly     Seat Belt Yes   Social History Narrative    Rides bicycle or goes to Y regularly (3x/week).      Social Determinants of Health     Financial Resource Strain: Low Risk  (10/21/2021)    Overall Financial Resource Strain (CARDIA)     Difficulty of Paying Living Expenses: Not hard at all   Food Insecurity: No Food Insecurity (10/21/2021)    Hunger Vital Sign     Worried About Running Out of Food in the Last Year: Never true     Ran Out of Food in the Last Year: Never true   Transportation Needs: No Transportation Needs (10/21/2021)    PRAPARE - Transportation     Lack of Transportation (Medical): No     Lack of Transportation (Non-Medical): No   Physical Activity: Sufficiently Active (10/21/2021)    Exercise Vital Sign     Days of Exercise per Week: 5 days     Minutes of Exercise per Session: 60 min   Intimate Partner Violence: Not At Risk (10/21/2021)    Humiliation, Afraid, Rape, and Kick questionnaire     Fear of Current or Ex-Partner: No     Emotionally Abused: No     Physically Abused: No     Sexually Abused: No   Housing Stability: Low Risk  (10/21/2021)    Housing Stability Vital Sign     Unable to Pay for Housing in the Last Year: No     Number of Places Lived in the Last Year: 1     Unstable Housing in the Last Year: No       FAMILY HISTORY:  Family History   Problem Relation Age of Onset    Cerebrovascular Disease Mother       "   Stroke age 69    Breast Cancer Mother          age 75    C.A.D. Father          of MI at age 63, 1ST MI age 39    Diabetes Father     Cerebrovascular Disease Brother         Born 1947       MEDS: Ascorbic Acid (VITAMIN C PO), Take 1,000 mg by mouth every morning   aspirin (ASA) 81 MG tablet, Take 1 tablet (81 mg) by mouth daily  Cyanocobalamin (VITAMIN B 12 PO), Take 1,000 mcg by mouth every morning  ezetimibe (ZETIA) 10 MG tablet, Take 1 tablet (10 mg) by mouth daily  rosuvastatin (CRESTOR) 20 MG tablet, Take 1 tablet (20 mg) by mouth daily  tadalafil (CIALIS) 20 MG tablet, One-half to one tablet prior to sex daily.  VITAMIN D, CHOLECALCIFEROL, PO, Take 1,000 Units by mouth every morning     No current facility-administered medications on file prior to visit.      ALLERGIES: No Known Allergies    PHYSICAL EXAM:  Vitals: BP 96/60 (BP Location: Right arm, Patient Position: Sitting, Cuff Size: Adult Regular)   Pulse 87   Ht 1.803 m (5' 11\")   Wt 82.1 kg (181 lb)   SpO2 92%   BMI 25.24 kg/m    Constitutional:  cooperative, alert and oriented, well developed, well nourished, in no acute distress        Skin:  warm and dry to the touch        Head:  normocephalic        Eyes:  pupils equal and round;conjunctivae and lids unremarkable;sclera white        ENT:  no pallor or cyanosis        Neck:  JVP normal        Respiratory:  normal respiratory excursion;clear to auscultation        Cardiac: regular rhythm;normal S1 and S2;no S3 or S4;no murmurs, gallops or rubs detected                  GI:  abdomen soft;BS normoactive        Vascular: pulses full and equal                                      Extremities and Musculoskeletal:  no edema        Neurological:  no gross motor deficits;affect appropriate            LABS/DATA:  I reviewed the following:  EKG 2023:  SR, no acute ischemic changes      ASSESSMENT/PLAN:  Coronary Artery Disease, nonobstructive  -family hx of premature CAD  -based off of " abnormal calcium score (2015) 4316   -stress ECHO show no ischemia LVEF 55% (1/2023)  -now with an episode in April of chest fullness with some nausea and now with a few more episodes of chest twinges at rest, no clear exertional angina in an active patient  -EKG 7/26/2023: SR. No acute ischemic changes  -we discussed that though he just had a stress echo in January, his symptoms are new since then, so we will repeat a stress echo  -Continue ASA and statin  -I did refill his SL nitroglycerin. He does have PRN Cialis so we discussed to avoid taking these medications within 24 hours of each other  -We discussed when to call 911  -He already has follow up with Dr. Sim 8/16/23 which he will keep       Hypertension  -on Lisinopril 2.5 mg  -BP low   -Will hold this for now       Hyperlipidemia  -on Crestor 20 mg, Zetia  -1/11/23 FLP: LDL 37      Follow up:  Stress echo  Currently scheduled to see Dr. Sim 8/16/23     Rachel Taylor PA-C      Thank you for allowing me to participate in the care of your patient.      Sincerely,     Rachel Tyalor PA-C     [facility]  cc:   No referring provider defined for this encounter.

## 2023-07-26 NOTE — PATIENT INSTRUCTIONS
You had a stress test in January that looked good.   But since then, you have had a couple episodes of chest fullness and chest twinges.    Due to these new symptoms, we will repeat a stress test.   We will get this within the week.   You are already scheduled to see Dr. Sim 8/16/23 and he will review results with you.     Hold the lisinopril for now because your blood pressure is on the lower end of normal today.     I did send a refill for the nitroglycerin to [facility] to use if you have chest discomfort that is worse or does not go away.  Remember not to take nitroglycerin within 24 hours of Cialis.     If you have severe symptoms, please call 911.

## 2023-08-08 ENCOUNTER — HOSPITAL ENCOUNTER (OUTPATIENT)
Dept: CARDIOLOGY | Facility: CLINIC | Age: 78
Discharge: HOME | End: 2023-08-08
Attending: PHYSICIAN ASSISTANT | Admitting: PHYSICIAN ASSISTANT
Payer: COMMERCIAL

## 2023-08-08 DIAGNOSIS — I25.119 CORONARY ARTERY DISEASE INVOLVING NATIVE CORONARY ARTERY OF NATIVE HEART WITH ANGINA PECTORIS (H): ICD-10-CM

## 2023-08-08 PROCEDURE — 93321 DOPPLER ECHO F-UP/LMTD STD: CPT | Mod: TC

## 2023-08-08 PROCEDURE — 93325 DOPPLER ECHO COLOR FLOW MAPG: CPT | Mod: TC

## 2023-08-08 PROCEDURE — 93018 CV STRESS TEST I&R ONLY: CPT | Performed by: INTERNAL MEDICINE

## 2023-08-08 PROCEDURE — 93321 DOPPLER ECHO F-UP/LMTD STD: CPT | Mod: 26 | Performed by: INTERNAL MEDICINE

## 2023-08-08 PROCEDURE — 93016 CV STRESS TEST SUPVJ ONLY: CPT | Performed by: INTERNAL MEDICINE

## 2023-08-08 PROCEDURE — 93325 DOPPLER ECHO COLOR FLOW MAPG: CPT | Mod: 26 | Performed by: INTERNAL MEDICINE

## 2023-08-08 PROCEDURE — 93350 STRESS TTE ONLY: CPT | Mod: 26 | Performed by: INTERNAL MEDICINE

## 2023-08-16 ENCOUNTER — OFFICE VISIT (OUTPATIENT)
Dept: CARDIOLOGY | Facility: CLINIC | Age: 78
End: 2023-08-16
Attending: INTERNAL MEDICINE
Payer: COMMERCIAL

## 2023-08-16 VITALS
OXYGEN SATURATION: 93 % | HEART RATE: 78 BPM | HEIGHT: 71 IN | SYSTOLIC BLOOD PRESSURE: 102 MMHG | WEIGHT: 180.3 LBS | DIASTOLIC BLOOD PRESSURE: 65 MMHG | BODY MASS INDEX: 25.24 KG/M2

## 2023-08-16 DIAGNOSIS — R07.89 ATYPICAL CHEST PAIN: Primary | ICD-10-CM

## 2023-08-16 DIAGNOSIS — E78.00 HYPERCHOLESTEROLEMIA: ICD-10-CM

## 2023-08-16 DIAGNOSIS — R93.1 AGATSTON CAC SCORE, >400: ICD-10-CM

## 2023-08-16 PROCEDURE — 99215 OFFICE O/P EST HI 40 MIN: CPT | Performed by: INTERNAL MEDICINE

## 2023-08-16 NOTE — LETTER
2023    Guillermo Kong MD, MD  [address] [facility] [address]  [facility] [address]    RE: Javier MARIO Tamez       Dear Colleague,     I had the pleasure of seeing Javier Tamez in the [facility].  HPI and Plan:    Humza is a very nice 78-year-old gentleman with past medical history significant for hypercholesterolemia, a very strong family history of coronary disease with his father dying of a second myocardial infarction at age 62 but first myocardial infarction at age 39.  Grandfather  at age 35.  Brother at 68 also had a myocardial infarction and received a stent.     Prior workup includes a calcium score () which returned at 4316, putting him in the top 1% for risk.  He has an ascending aorta that was thought to be mildly dilated by echocardiogram, but a CT scan measures it to be 3.6 to 3.7.  Last CT scan was in 2019.  He also has multiple calcified granulomas noticed on his chest x-ray.  He had a negative stress nuclear scan in 2018 and most recently a negative stress echo in 2021 for which he was able to exercise 10 minutes of the standard Silvano protocol without symptoms, EKG changes or echocardiographic evidence of ischemia.    Humza called us because he was having what sounds like a very atypical chest pain.  Worse with sneezing a fleeting kind of pain he states is happening more more often happening on a daily basis.  He continues to have no chest discomfort with his workouts or activity.  He does not notice any positional variability states can happen when he is lying in bed at nighttime.  He has no prolonged episodes of the discomfort but is just very worried with his family history and his calcium score that he is having angina.     Humza continues to work out at least 3 days a week doing a combination of both aerobic and resistance activity.  He states he never has any symptoms with this.  He has had his atypical symptoms in the past but they are just becoming way more  frequent.    Because of this we repeated his stress echocardiogram for which she was able to go 9 and half minutes without symptoms, EKG changes or echocardiographic evidence of ischemia.    Despite this he continues to be haunted by his symptoms and I am seeing him urgently today.     ASSESSMENT AND PLAN: Humza and I had a nice discussion regarding his calcium score, symptoms and stress test.  I have explained that the accuracy of the stress test is about 85% but at this time he has a very atypical chest pain that may be GI in origin or possibly musculoskeletal.  It is not does not sound at all like cardiac pain and this supported by a very good stress echocardiogram.  After some discussion we decided we will continue with medical management.  I did discuss coronary angiography, risk benefits and alternatives.  Although I told him I do not think it is indicated at this time.  He states he is satisfied with that.    He has no symptoms to suggest heart failure or significant arrhythmia.  On physical exam, he does have premature beats, which is not new for Humza.     Previously we had discontinued his lisinopril due to low blood pressures he asks about this again.  We talked about the fact that his blood pressure right now is in the ideal range.  We do not need to reinitiate his lisinopril.    He is 78 and technically primary prevention.  However with his calcium score I think we should continue aspirin at this time I will decrease it to 81 mg on a Monday Wednesday Friday regiment.     Weight is down 280 pounds giving a body mass index of 25.2 with close on.    I have congratulated on his healthy lifestyle and encouraged him to continue to do so.  He states he thinks he needs to do more aerobic activity and do better with his eating.  He knows my wife is a health and  and states he is going to get in contact with her.     Fasting lipid profile is outstanding total cholesterol is 96, HDL 52, LDL 37 and  triglycerides of 35.  We talked about the LDL hypothesis.     We reviewed all of his medications.  We will continue them all as is.    Thank you for allowing me to participate in his care.     Miguelito Sim MD, [facility]       Today's clinic visit entailed:  Review of the result(s) of each unique test - lab work, stress echocardiogram, calcium score  Ordering of each unique test  Prescription drug management  55 minutes spent by me on the date of the encounter doing chart review, history and exam, documentation and further activities per the note  Provider  Link to [employer] Help Grid     The level of medical decision making during this visit was of moderate complexity.      No orders of the defined types were placed in this encounter.      No orders of the defined types were placed in this encounter.      There are no discontinued medications.      Encounter Diagnoses   Name Primary?    Atypical chest pain Yes    Agatston CAC score, >400     Hypercholesterolemia        CURRENT MEDICATIONS:  Current Outpatient Medications   Medication Sig Dispense Refill    Ascorbic Acid (VITAMIN C PO) Take 1,000 mg by mouth every morning       aspirin (ASA) 81 MG tablet Take 81 mg by mouth Every Mon, Wed, Fri Morning      Cyanocobalamin (VITAMIN B 12 PO) Take 1,000 mcg by mouth every morning      ezetimibe (ZETIA) 10 MG tablet Take 1 tablet (10 mg) by mouth daily 90 tablet 3    nitroGLYcerin (NITROSTAT) 0.4 MG sublingual tablet For chest pain place 1 tablet under the tongue every 5 minutes for 3 doses. If symptoms persist 5 minutes after 1st dose call 911. 25 tablet 1    rosuvastatin (CRESTOR) 20 MG tablet Take 1 tablet (20 mg) by mouth daily 90 tablet 3    tadalafil (CIALIS) 20 MG tablet One-half to one tablet prior to sex daily. 15 tablet 11    VITAMIN D, CHOLECALCIFEROL, PO Take 1,000 Units by mouth every morning          ALLERGIES   No Known Allergies    PAST MEDICAL HISTORY:  Past Medical History:   Diagnosis Date    Aortic root  dilatation (H)     Aortic stenosis     Arthritis     Atypical chest pain 2015    Cerebral artery occlusion with cerebral infarction (H)     TIA      Essential hypertension with goal blood pressure less than 140/90 2016    takes lisinopril for preventitive    Family history of heart disease     Hypercholesterolemia 1/3/2014     Diagnosis updated by automated process. Provider to review and confirm.    Hyperlipidemia LDL goal <70     IBS (irritable bowel syndrome)     Other chronic pain        PAST SURGICAL HISTORY:  Past Surgical History:   Procedure Laterality Date    ARTHROPLASTY HIP ANTERIOR Right 10/24/2017    Procedure: ARTHROPLASTY HIP ANTERIOR;  RIGHT TOTAL HIP ARTHROPLASTY DIRECT ANTERIOR APPROACH;  Surgeon: Meño Avalos MD;  Location:  OR    ARTHROPLASTY HIP ANTERIOR Left 10/09/2018    Procedure: ARTHROPLASTY HIP ANTERIOR;  LEFT TOTAL HIP ARTHROPLASTY DIRECT ANTERIOR APPROACH (DEPUY)^;  Surgeon: Meño Avalos MD;  Location:  OR    COLONOSCOPY  2011    Adenomatous polyp removed    COLONOSCOPY  2017    Two adenomatous polyps, repeat in 5 yrs    VASECTOMY      ZZC NONSPECIFIC PROCEDURE  1990    kidney stone       FAMILY HISTORY:  Family History   Problem Relation Age of Onset    Cerebrovascular Disease Mother         Stroke age 69    Breast Cancer Mother          age 75    C.A.D. 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 Yes     Comment: father     Caffeine Concern Yes     Comment: 2 cups coffee daily, occ soda     Sleep Concern No    Special Diet No    Exercise Yes     Comment: 3-4 times weekly     Seat Belt Yes   Social History Narrative    Rides bicycle or goes to Y regularly (3x/week).      Social Determinants of Health     Financial Resource Strain: Low Risk  (10/21/2021)    Overall Financial Resource Strain (CARDIA)     Difficulty of Paying Living Expenses: Not hard at all   Food Insecurity: No Food Insecurity (10/21/2021)    Hunger Vital Sign     Worried About Running Out of Food in the Last Year: Never true     Ran Out of Food in the Last Year: Never true   Transportation Needs: No Transportation Needs (10/21/2021)    PRAPARE - Transportation     Lack of Transportation (Medical): No     Lack of Transportation (Non-Medical): No   Physical Activity: Sufficiently Active (10/21/2021)    Exercise Vital Sign     Days of Exercise per Week: 5 days     Minutes of Exercise per Session: 60 min   Intimate Partner Violence: Not At Risk (10/21/2021)    Humiliation, Afraid, Rape, and Kick questionnaire     Fear of Current or Ex-Partner: No     Emotionally Abused: No     Physically Abused: No     Sexually Abused: No   Housing Stability: Low Risk  (10/21/2021)    Housing Stability Vital Sign     Unable to Pay for Housing in the Last Year: No     Number of Places Lived in the Last Year: 1     Unstable Housing in the Last Year: No       Review of Systems:  Skin:  Negative       Eyes:  Negative glasses reading  ENT:  Negative      Respiratory:  Negative for shortness of breath;dyspnea on exertion;cough;wheezing;sleep apnea;CPAP     Cardiovascular:  Negative for;palpitations;edema;dizziness;lightheadedness;syncope or  "near-syncope;fatigue;exercise intolerance Positive for;chest pain pain in the left arm and sometimes when he sneezes. when he sneezes he experiences some extreme pain on the left side of his chest  Gastroenterology: Negative nausea;vomiting;heartburn;reflux;excessive gas or bloating;poor appetite IBS  Genitourinary:  Negative nocturia 1-2 times  Musculoskeletal:  Negative joint pain hip pain on L side  Neurologic:  Negative numbness or tingling of feet;numbness or tingling of hands    Psychiatric:  Negative      Heme/Lymph/Imm:  Negative      Endocrine:  Positive for thyroid disorder thyroid nodule    Physical Exam:  Vitals: /65 (BP Location: Left arm, Patient Position: Sitting)   Pulse 78   Ht 1.803 m (5' 11\")   Wt 81.8 kg (180 lb 4.8 oz)   SpO2 93%   BMI 25.15 kg/m      Constitutional:  cooperative, alert and oriented, well developed, well nourished, in no acute distress        Skin:  warm and dry to the touch, no apparent skin lesions or masses noted          Head:  normocephalic, no masses or lesions        Eyes:  pupils equal and round, conjunctivae and lids unremarkable, sclera white, no xanthalasma, EOMS intact, no nystagmus        Lymph:      ENT:  no pallor or cyanosis        Neck:  no carotid bruit        Respiratory:  normal respiratory excursion;clear to auscultation         Cardiac: regular rhythm;normal S1 and S2;no S3 or S4;no murmurs, gallops or rubs detected                pulses full and equal                                        GI:           Extremities and Muscular Skeletal:  no edema;no spinal abnormalities noted;normal muscle strength and tone              Neurological:  no gross motor deficits        Psych:  affect appropriate, oriented to time, person and place            CC  Miguelito Sim MD  [address]  [place] [address]        Thank you for allowing me to participate in the care of your patient.      Sincerely,     Miguelito Sim MD     [facility] " [facility]

## 2023-08-16 NOTE — PROGRESS NOTES
HPI and Plan:    Humza is a very nice 78-year-old gentleman with past medical history significant for hypercholesterolemia, a very strong family history of coronary disease with his father dying of a second myocardial infarction at age 62 but first myocardial infarction at age 39.  Grandfather  at age 35.  Brother at 68 also had a myocardial infarction and received a stent.     Prior workup includes a calcium score () which returned at 4316, putting him in the top 1% for risk.  He has an ascending aorta that was thought to be mildly dilated by echocardiogram, but a CT scan measures it to be 3.6 to 3.7.  Last CT scan was in 2019.  He also has multiple calcified granulomas noticed on his chest x-ray.  He had a negative stress nuclear scan in 2018 and most recently a negative stress echo in 2021 for which he was able to exercise 10 minutes of the standard Silvano protocol without symptoms, EKG changes or echocardiographic evidence of ischemia.    Humza called us because he was having what sounds like a very atypical chest pain.  Worse with sneezing a fleeting kind of pain he states is happening more more often happening on a daily basis.  He continues to have no chest discomfort with his workouts or activity.  He does not notice any positional variability states can happen when he is lying in bed at nighttime.  He has no prolonged episodes of the discomfort but is just very worried with his family history and his calcium score that he is having angina.     Humza continues to work out at least 3 days a week doing a combination of both aerobic and resistance activity.  He states he never has any symptoms with this.  He has had his atypical symptoms in the past but they are just becoming way more frequent.    Because of this we repeated his stress echocardiogram for which she was able to go 9 and half minutes without symptoms, EKG changes or echocardiographic evidence of ischemia.    Despite this he continues to be  haunted by his symptoms and I am seeing him urgently today.     ASSESSMENT AND PLAN: Humza and I had a nice discussion regarding his calcium score, symptoms and stress test.  I have explained that the accuracy of the stress test is about 85% but at this time he has a very atypical chest pain that may be GI in origin or possibly musculoskeletal.  It is not does not sound at all like cardiac pain and this supported by a very good stress echocardiogram.  After some discussion we decided we will continue with medical management.  I did discuss coronary angiography, risk benefits and alternatives.  Although I told him I do not think it is indicated at this time.  He states he is satisfied with that.    He has no symptoms to suggest heart failure or significant arrhythmia.  On physical exam, he does have premature beats, which is not new for Humza.     Previously we had discontinued his lisinopril due to low blood pressures he asks about this again.  We talked about the fact that his blood pressure right now is in the ideal range.  We do not need to reinitiate his lisinopril.    He is 78 and technically primary prevention.  However with his calcium score I think we should continue aspirin at this time I will decrease it to 81 mg on a Monday Wednesday Friday regiment.     Weight is down 280 pounds giving a body mass index of 25.2 with close on.    I have congratulated on his healthy lifestyle and encouraged him to continue to do so.  He states he thinks he needs to do more aerobic activity and do better with his eating.  He knows my wife is a health and  and states he is going to get in contact with her.     Fasting lipid profile is outstanding total cholesterol is 96, HDL 52, LDL 37 and triglycerides of 35.  We talked about the LDL hypothesis.     We reviewed all of his medications.  We will continue them all as is.    Thank you for allowing me to participate in his care.     Miguelito Sim MD, FACC        Today's clinic visit entailed:  Review of the result(s) of each unique test - lab work, stress echocardiogram, calcium score  Ordering of each unique test  Prescription drug management  55 minutes spent by me on the date of the encounter doing chart review, history and exam, documentation and further activities per the note  Provider  Link to [facility] Help Grid     The level of medical decision making during this visit was of moderate complexity.      No orders of the defined types were placed in this encounter.      No orders of the defined types were placed in this encounter.      There are no discontinued medications.      Encounter Diagnoses   Name Primary?    Atypical chest pain Yes    Agatston CAC score, >400     Hypercholesterolemia        CURRENT MEDICATIONS:  Current Outpatient Medications   Medication Sig Dispense Refill    Ascorbic Acid (VITAMIN C PO) Take 1,000 mg by mouth every morning       aspirin (ASA) 81 MG tablet Take 81 mg by mouth Every Mon, Wed, Fri Morning      Cyanocobalamin (VITAMIN B 12 PO) Take 1,000 mcg by mouth every morning      ezetimibe (ZETIA) 10 MG tablet Take 1 tablet (10 mg) by mouth daily 90 tablet 3    nitroGLYcerin (NITROSTAT) 0.4 MG sublingual tablet For chest pain place 1 tablet under the tongue every 5 minutes for 3 doses. If symptoms persist 5 minutes after 1st dose call 911. 25 tablet 1    rosuvastatin (CRESTOR) 20 MG tablet Take 1 tablet (20 mg) by mouth daily 90 tablet 3    tadalafil (CIALIS) 20 MG tablet One-half to one tablet prior to sex daily. 15 tablet 11    VITAMIN D, CHOLECALCIFEROL, PO Take 1,000 Units by mouth every morning          ALLERGIES   No Known Allergies    PAST MEDICAL HISTORY:  Past Medical History:   Diagnosis Date    Aortic root dilatation (H)     Aortic stenosis     Arthritis     Atypical chest pain 1/28/2015    Cerebral artery occlusion with cerebral infarction (H)     TIA  1992    Essential hypertension with goal blood pressure less than 140/90  2016    takes lisinopril for preventitive    Family history of heart disease     Hypercholesterolemia 1/3/2014     Diagnosis updated by automated process. Provider to review and confirm.    Hyperlipidemia LDL goal <70     IBS (irritable bowel syndrome)     Other chronic pain        PAST SURGICAL HISTORY:  Past Surgical History:   Procedure Laterality Date    ARTHROPLASTY HIP ANTERIOR Right 10/24/2017    Procedure: ARTHROPLASTY HIP ANTERIOR;  RIGHT TOTAL HIP ARTHROPLASTY DIRECT ANTERIOR APPROACH;  Surgeon: Meño Avalos MD;  Location: [facility] OR    ARTHROPLASTY HIP ANTERIOR Left 10/09/2018    Procedure: ARTHROPLASTY HIP ANTERIOR;  LEFT TOTAL HIP ARTHROPLASTY DIRECT ANTERIOR APPROACH (DEPUY)^;  Surgeon: Meño Avalos MD;  Location: [facility] OR    COLONOSCOPY  2011    Adenomatous polyp removed    COLONOSCOPY  2017    Two adenomatous polyps, repeat in 5 yrs    VASECTOMY      ZZC NONSPECIFIC PROCEDURE  1990    kidney stone       FAMILY HISTORY:  Family History   Problem Relation Age of Onset    Cerebrovascular Disease Mother         Stroke age 69    Breast Cancer Mother          age 75    C.A.D. 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  Yes     Comment: father     Caffeine Concern Yes     Comment: 2 cups coffee daily, occ soda     Sleep Concern No    Special Diet No    Exercise Yes     Comment: 3-4 times weekly     Seat Belt Yes   Social History Narrative    Rides bicycle or goes to Y regularly (3x/week).      Social Determinants of Health     Financial Resource Strain: Low Risk  (10/21/2021)    Overall Financial Resource Strain (CARDIA)     Difficulty of Paying Living Expenses: Not hard at all   Food Insecurity: No Food Insecurity (10/21/2021)    Hunger Vital Sign     Worried About Running Out of Food in the Last Year: Never true     Ran Out of Food in the Last Year: Never true   Transportation Needs: No Transportation Needs (10/21/2021)    PRAPARE - Transportation     Lack of Transportation (Medical): No     Lack of Transportation (Non-Medical): No   Physical Activity: Sufficiently Active (10/21/2021)    Exercise Vital Sign     Days of Exercise per Week: 5 days     Minutes of Exercise per Session: 60 min   Intimate Partner Violence: Not At Risk (10/21/2021)    Humiliation, Afraid, Rape, and Kick questionnaire     Fear of Current or Ex-Partner: No     Emotionally Abused: No     Physically Abused: No     Sexually Abused: No   Housing Stability: Low Risk  (10/21/2021)    Housing Stability Vital Sign     Unable to Pay for Housing in the Last Year: No     Number of Places Lived in the Last Year: 1     Unstable Housing in the Last Year: No       Review of Systems:  Skin:  Negative       Eyes:  Negative glasses reading  ENT:  Negative      Respiratory:  Negative for shortness of breath;dyspnea on exertion;cough;wheezing;sleep apnea;CPAP     Cardiovascular:  Negative for;palpitations;edema;dizziness;lightheadedness;syncope or near-syncope;fatigue;exercise intolerance Positive for;chest pain pain in the left arm and sometimes when he sneezes. when he sneezes he experiences some extreme pain on the left side of his chest  Gastroenterology: Negative  "nausea;vomiting;heartburn;reflux;excessive gas or bloating;poor appetite IBS  Genitourinary:  Negative nocturia 1-2 times  Musculoskeletal:  Negative joint pain hip pain on L side  Neurologic:  Negative numbness or tingling of feet;numbness or tingling of hands    Psychiatric:  Negative      Heme/Lymph/Imm:  Negative      Endocrine:  Positive for thyroid disorder thyroid nodule    Physical Exam:  Vitals: /65 (BP Location: Left arm, Patient Position: Sitting)   Pulse 78   Ht 1.803 m (5' 11\")   Wt 81.8 kg (180 lb 4.8 oz)   SpO2 93%   BMI 25.15 kg/m      Constitutional:  cooperative, alert and oriented, well developed, well nourished, in no acute distress        Skin:  warm and dry to the touch, no apparent skin lesions or masses noted          Head:  normocephalic, no masses or lesions        Eyes:  pupils equal and round, conjunctivae and lids unremarkable, sclera white, no xanthalasma, EOMS intact, no nystagmus        Lymph:      ENT:  no pallor or cyanosis        Neck:  no carotid bruit        Respiratory:  normal respiratory excursion;clear to auscultation         Cardiac: regular rhythm;normal S1 and S2;no S3 or S4;no murmurs, gallops or rubs detected                pulses full and equal                                        GI:           Extremities and Muscular Skeletal:  no edema;no spinal abnormalities noted;normal muscle strength and tone              Neurological:  no gross motor deficits        Psych:  affect appropriate, oriented to time, person and place        CC  Miguelito Sim MD  [address]  [place] [id number]                "

## 2023-08-22 ENCOUNTER — PATIENT OUTREACH (OUTPATIENT)
Dept: CARE COORDINATION | Facility: CLINIC | Age: 78
End: 2023-08-22
Payer: COMMERCIAL

## 2023-09-05 ENCOUNTER — PATIENT OUTREACH (OUTPATIENT)
Dept: CARE COORDINATION | Facility: CLINIC | Age: 78
End: 2023-09-05
Payer: COMMERCIAL

## 2023-09-26 ENCOUNTER — TELEPHONE (OUTPATIENT)
Dept: CARDIOLOGY | Facility: CLINIC | Age: 78
End: 2023-09-26
Payer: COMMERCIAL

## 2023-09-26 ENCOUNTER — NURSE TRIAGE (OUTPATIENT)
Dept: CARDIOLOGY | Facility: CLINIC | Age: 78
End: 2023-09-26
Payer: COMMERCIAL

## 2023-09-26 ENCOUNTER — NURSE TRIAGE (OUTPATIENT)
Dept: INTERNAL MEDICINE | Facility: CLINIC | Age: 78
End: 2023-09-26
Payer: COMMERCIAL

## 2023-09-26 ENCOUNTER — TELEPHONE (OUTPATIENT)
Dept: CARDIOLOGY | Facility: CLINIC | Age: 78
End: 2023-09-26

## 2023-09-26 NOTE — TELEPHONE ENCOUNTER
[facility] Call Center    Phone Message    May a detailed message be left on voicemail: yes     Reason for Call: Symptoms or Concerns     If patient has red-flag symptoms, warm transfer to triage line    Current symptom or concern: Pt requesting to speak to a nurse regarding ongoing chest discomfort and he also noticed a hard lump on the left side of his chest.     Symptoms have been present for:  1 week(s)    Has patient previously been seen for this? No    Are there any new or worsening symptoms? Yes:     Action Taken: Patient transferred to: Triage    Travel Screening: Not Applicable    Thank you!  [facility]

## 2023-09-26 NOTE — TELEPHONE ENCOUNTER
"Please see triage note -  Triage call today - message left to return call. But will message Dr. Sim    Triage note  Received a call from the call center to discuss chest discomfort.  Spoke to Humza, who says in the last 7-10 days has had intermittent chest discomfort across his chest on both sides and sometimes across his back, and sometimes in his left arm. He says Dr. Sim is aware of these symptoms, although he feels it has become more persistent and frequent. He denies it worsens upon exertion. He describes it as sharp twinges. He says it also hurts his chest to sneeze. He also adds that 2 weeks ago, he noticed a lump or \"nodule\" about the size of a marble located \"high in the chest\" above the left breast area, that is not painful, swollen or discolored. He says he will contact Dr. Kong's office to look at the lump area, but wanted Dr. Sim to be aware of it. He wants to speak to the team about next steps in regards to his chest discomfort.  Advised a message will be sent to Dr. Sim's team for follow up     Last Dr. Sim OV 8-16-23  ASSESSMENT AND PLAN: Humza and I had a nice discussion regarding his calcium score, symptoms and stress test.  I have explained that the accuracy of the stress test is about 85% but at this time he has a very atypical chest pain that may be GI in origin or possibly musculoskeletal.  It is not does not sound at all like cardiac pain and this supported by a very good stress echocardiogram.  After some discussion we decided we will continue with medical management.  I did discuss coronary angiography, risk benefits and alternatives.  Although I told him I do not think it is indicated at this time.  He states he is satisfied with that.     "

## 2023-09-26 NOTE — TELEPHONE ENCOUNTER
"Triage call today - message left to return call. But will message Dr. Sim     Triage note   Received a call from the call center to discuss chest discomfort.  Spoke to Humza, who says in the last 7-10 days has had intermittent chest discomfort across his chest on both sides and sometimes across his back, and sometimes in his left arm. He says Dr. Sim is aware of these symptoms, although he feels it has become more persistent and frequent. He denies it worsens upon exertion. He describes it as sharp twinges. He says it also hurts his chest to sneeze. He also adds that 2 weeks ago, he noticed a lump or \"nodule\" about the size of a marble located \"high in the chest\" above the left breast area, that is not painful, swollen or discolored. He says he will contact Dr. Kong's office to look at the lump area, but wanted Dr. Sim to be aware of it. He wants to speak to the team about next steps in regards to his chest discomfort.  Advised a message will be sent to Dr. Sim's team for follow up      Last Dr. Sim OV 8-16-23   ASSESSMENT AND PLAN: Humza and I had a nice discussion regarding his calcium score, symptoms and stress test.  I have explained that the accuracy of the stress test is about 85% but at this time he has a very atypical chest pain that may be GI in origin or possibly musculoskeletal.  It is not does not sound at all like cardiac pain and this supported by a very good stress echocardiogram.  After some discussion we decided we will continue with medical management.  I did discuss coronary angiography, risk benefits and alternatives.  Although I told him I do not think it is indicated at this time.  He states he is satisfied with that.   "

## 2023-09-26 NOTE — TELEPHONE ENCOUNTER
"Received a call from the call center to discuss chest discomfort.  Spoke to Humza, who says in the last 7-10 days has had intermittent chest discomfort across his chest on both sides and sometimes across his back, and sometimes in his left arm. He says Dr. Sim is aware of these symptoms, although he feels it has become more persistent and frequent. He denies it worsens upon exertion. He describes it as sharp twinges. He says it also hurts his chest to sneeze. He also adds that 2 weeks ago, he noticed a lump or \"nodule\" about the size of a marble located \"high in the chest\" above the left breast area, that is not painful, swollen or discolored. He says he will contact Dr. Kong's office to look at the lump area, but wanted Dr. Sim to be aware of it. He wants to speak to the team about next steps in regards to his chest discomfort.  Advised a message will be sent to Dr. Sim's team for follow up.    1. LOCATION: \"Where does it hurt?\" Across the chest  2. RADIATION: \"Does the pain go anywhere else?\" (e.g., into neck, jaw, arms, back) back  3. ONSET: \"When did the chest pain begin?\" (Minutes, hours or days) 7-10 days ago  4. PATTERN: \"Does the pain come and go, or has it been constant since it started?\" \"Does it get worse with exertion?\" Intermittent, not worse with exertion  5. DURATION: \"How long does it last\" (e.g., seconds, minutes, hours) minutes  6. SEVERITY: \"How bad is the pain?\" (e.g., Scale 1-10; mild, moderate, or severe) Mild to moderate  - MILD (1-3): doesn't interfere with normal activities  - MODERATE (4-7): interferes with normal activities or awakens from sleep  - SEVERE (8-10): excruciating pain, unable to do any normal activities  7. CARDIAC RISK FACTORS: \"Do you have any history of heart problems or risk factors for heart disease?\" (e.g., angina, prior heart attack; diabetes, high blood pressure, high cholesterol, smoker, or strong family history of heart disease) atypical chest " "pain  8. PULMONARY RISK FACTORS: \"Do you have any history of lung disease?\" (e.g., blood clots in lung, asthma, emphysema, birth control pills)  9. CAUSE: \"What do you think is causing the chest pain?\"  10. OTHER SYMPTOMS: \"Do you have any other symptoms?\" (e.g., dizziness, nausea, vomiting, sweating, fever, difficulty breathing, cough)  11. PREGNANCY: \"Is there any chance you are pregnant?\" \"When was your last menstrual period?\"  Additional Information    Negative: SEVERE chest pain    Protocols used: Chest Pain-A-[place]    "

## 2023-09-26 NOTE — TELEPHONE ENCOUNTER
"Patient calling for an appointment asap for lump on chest.     Patient also wanted to update pcp on ongoing chest pain that is the same since summer - all symptoms are the same   See cardiology note from today,     Patient states cardiology  is calling him back after the cardiologist reviews his note/symptoms .    Patient states No chest pain right now- he states the pain is intermittent-see cardiology note.     Patient is calling about lump on left chest-  to left of sternum \"over heart area\"  Lump appeared 2 weeks ago   3/4\" in diameter- can feel it under his skin- and can see,protrudes from skin.   Lump is hard  No pain with palpation   Skin is normal color   No fevers or illness   No swollen lymph nodes anywhere else  Denied any other symptoms     Advised different/worsening/persistent chest pain and or longer than normal should go to ed/911 - patient declined going to ed - wants to wait for cardiology call back.   Reviewed all red flag symptoms     Open to other providers and other locations   Pcp is full, schedule for tomorrow.     Future Appointments 9/26/2023 - 3/24/2024        Date Visit Type Length Department Provider     9/27/2023  8:00 AM OFFICE VISIT 30 min [facility] Demetria Singleton, MEHREEN    Location Instructions:     [facility] is located at [address] [place] to [place] [facility] [place]. Free parking is available; access the lot by turning east from [address] onto [address].                       "

## 2023-09-27 ENCOUNTER — HOSPITAL ENCOUNTER (OUTPATIENT)
Dept: CT IMAGING | Facility: CLINIC | Age: 78
Discharge: HOME | End: 2023-09-27
Attending: PHYSICIAN ASSISTANT | Admitting: PHYSICIAN ASSISTANT
Payer: COMMERCIAL

## 2023-09-27 ENCOUNTER — OFFICE VISIT (OUTPATIENT)
Dept: FAMILY MEDICINE | Facility: CLINIC | Age: 78
End: 2023-09-27
Payer: COMMERCIAL

## 2023-09-27 ENCOUNTER — TELEPHONE (OUTPATIENT)
Dept: CARDIOLOGY | Facility: CLINIC | Age: 78
End: 2023-09-27

## 2023-09-27 ENCOUNTER — TELEPHONE (OUTPATIENT)
Dept: FAMILY MEDICINE | Facility: CLINIC | Age: 78
End: 2023-09-27

## 2023-09-27 VITALS
TEMPERATURE: 97.6 F | SYSTOLIC BLOOD PRESSURE: 115 MMHG | HEART RATE: 82 BPM | OXYGEN SATURATION: 96 % | HEIGHT: 71 IN | RESPIRATION RATE: 18 BRPM | BODY MASS INDEX: 25.06 KG/M2 | DIASTOLIC BLOOD PRESSURE: 70 MMHG | WEIGHT: 179 LBS

## 2023-09-27 DIAGNOSIS — C79.51 MALIGNANT NEOPLASM METASTATIC TO BONE (H): ICD-10-CM

## 2023-09-27 DIAGNOSIS — R22.2 CHEST MASS: Primary | ICD-10-CM

## 2023-09-27 DIAGNOSIS — I25.119 CORONARY ARTERY DISEASE INVOLVING NATIVE CORONARY ARTERY OF NATIVE HEART WITH ANGINA PECTORIS (H): ICD-10-CM

## 2023-09-27 DIAGNOSIS — R93.1 AGATSTON CAC SCORE, >400: ICD-10-CM

## 2023-09-27 DIAGNOSIS — R07.89 ATYPICAL CHEST PAIN: ICD-10-CM

## 2023-09-27 DIAGNOSIS — R22.2 CHEST MASS: ICD-10-CM

## 2023-09-27 LAB
CREAT BLD-MCNC: 0.8 MG/DL (ref 0.7–1.3)
EGFRCR SERPLBLD CKD-EPI 2021: >60 ML/MIN/1.73M2

## 2023-09-27 PROCEDURE — 71260 CT THORAX DX C+: CPT

## 2023-09-27 PROCEDURE — 250N000011 HC RX IP 250 OP 636: Performed by: PHYSICIAN ASSISTANT

## 2023-09-27 PROCEDURE — 82565 ASSAY OF CREATININE: CPT

## 2023-09-27 PROCEDURE — 250N000009 HC RX 250: Performed by: PHYSICIAN ASSISTANT

## 2023-09-27 PROCEDURE — 99214 OFFICE O/P EST MOD 30 MIN: CPT | Performed by: PHYSICIAN ASSISTANT

## 2023-09-27 RX ORDER — IOPAMIDOL 755 MG/ML
500 INJECTION, SOLUTION INTRAVASCULAR ONCE
Status: COMPLETED | OUTPATIENT
Start: 2023-09-27 | End: 2023-09-27

## 2023-09-27 RX ADMIN — IOPAMIDOL 90 ML: 755 INJECTION, SOLUTION INTRAVENOUS at 13:13

## 2023-09-27 RX ADMIN — SODIUM CHLORIDE 70 ML: 9 INJECTION, SOLUTION INTRAVENOUS at 13:13

## 2023-09-27 NOTE — PROGRESS NOTES
Assessment & Plan     Chest mass  2 cm firm fixed, painful with deep palpation. Further evaluation with CT (also to evaluate his atypical chest pain for the past few months, though sounds more muscular in nature).  I reviewed his previous CTA chest looking at the the slight enlargement of the ascending thoracic aorta. Similar size noted on his most recent ECHO.  Consider ultrasound if CT shows no cause for his mass.    Patient had colonoscopy in March of 2022 which showed some polyps but no continued colonoscopy recommended.  PSA normal in September 2022    - CT Chest w Contrast; Future    Atypical chest pain  As noted above.  - CT Chest w Contrast; Future    Agatston CAC score, >400  As noted above.  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 Had recent appointment in August. At that time his pain was thought to be     Review of external notes as documented elsewhere in note  Review of the result(s) of each unique test - see above  Ordering of each unique test    Demetria Singleton PA-C  [facility] GARY Ching is a 78 year old, presenting for the following health issues:  Derm Problem        9/27/2023     7:55 AM   Additional Questions   Roomed by Ev TAYLOR     Derm concern  Onset/Duration: 1 week  Description  Location: middle of chest  Character: raised  Itching: no  Intensity:  no pain  Progression of Symptoms:  worsening  Accompanying signs and symptoms:   Fever: No  Body aches or joint pain: No  Sore throat symptoms: No  Recent cold symptoms: No  History:           Previous episodes of similar rash: None  New exposures:  None  Recent travel: No  Exposure to similar rash: No  Precipitating or alleviating factors: none  Therapies tried and outcome: none      Patient reports a 2 cm lump/mass firm fixed unmovable, painful when pressed on. Dull pain and  "tingling for the past few months. Has a family  history of cardiac issues. He follows cardiology and recently saw them.      Review of Systems   GENERAL:  No fevers  RESP:  No shortness of breath  CARDIAC: As noted in HPI        Objective    /70 (BP Location: Right arm, Patient Position: Chair, Cuff Size: Adult Regular)   Pulse 82   Temp 97.6  F (36.4  C) (Oral)   Resp 18   Ht 1.803 m (5' 11\")   Wt 81.2 kg (179 lb)   SpO2 96%   BMI 24.97 kg/m    Body mass index is 24.97 kg/m .  Physical Exam   GENERAL: No acute distress  HEENT: Normocephalic  CHEST WALL: Left inferior aspect of the sternum with hard prominences about 1.5-2 cm  in size.  NEURO: Alert and non-focal      Physician Attestation   I, Demetria Singleton PA-C, was present with the medical/[employer] student who participated in the service and in the documentation of the note.  I have verified the history and personally performed the physical exam and medical decision making.  I agree with the assessment and plan of care as documented in the note.      Items personally reviewed: agree with the interpretation documented in the note.    Demetria Singleton PA-C              "

## 2023-09-27 NOTE — TELEPHONE ENCOUNTER
I spoke with patient regarding CT chest results. Next step is a CT abdomen/pelvis. This was ordered STAT. Please call radiology to see if we can get him in today (after 4 works for patient) or tomorrow morning also works.

## 2023-09-27 NOTE — TELEPHONE ENCOUNTER
Miguelito Sim MD Theis, Marcie J, RN  Caller: Unspecified (Yesterday, 10:53 AM)  He has had 2 stress echoes this year already that do not appear to show any ischemia.  His chest pain is quite atypical.  Lets try a stress nuclear scan but I think his chest pain is not cardiac and he should discuss it with his primary care doctor as well.

## 2023-09-27 NOTE — TELEPHONE ENCOUNTER
Spoke with patient, he is going to focus on the testing for the new cancer findings and will call if he thinks it is time to check his cardiac situation again.      CT chest 9/27/2023  1.  Expansile destructive bony lesions in the sternum and T6  concerning for metastatic disease.  2.  Pulmonary nodule measuring up to 1.7 cm, worrisome for metastatic  disease vs. less likely primary malignancy. CT scan of the abdomen and  pelvis recommended for further evaluation.    Patient is scheduled for CT Abd pelvis on 9/28/2023    Will message Dr. Sim with update.

## 2023-09-27 NOTE — TELEPHONE ENCOUNTER
Called pt-informed CT scan scheduled for 09/28/2023 at 7:10am-[facility].    Jalyn Rojas/ROMANA

## 2023-09-27 NOTE — TELEPHONE ENCOUNTER
[facility] Call Center    Phone Message    May a detailed message be left on voicemail: yes     Reason for Call: Other: Patient called wanting to speak with someone on the care team in regards to CT scan results. Patient stated they had CT scan done and shows possible bone cancer. Wanting to speak with someone to further discuss care plan on what they should do and if any additional testings are going to be required. Please call patient back to further discuss.     Action Taken: Other: Cardiology    Travel Screening: Not Applicable    Thank you!  [facility]

## 2023-09-28 ENCOUNTER — TELEPHONE (OUTPATIENT)
Dept: INTERNAL MEDICINE | Facility: CLINIC | Age: 78
End: 2023-09-28
Payer: COMMERCIAL

## 2023-09-28 ENCOUNTER — HOSPITAL ENCOUNTER (OUTPATIENT)
Dept: CT IMAGING | Facility: CLINIC | Age: 78
Discharge: HOME | End: 2023-09-28
Attending: PHYSICIAN ASSISTANT | Admitting: PHYSICIAN ASSISTANT
Payer: COMMERCIAL

## 2023-09-28 DIAGNOSIS — R07.89 ATYPICAL CHEST PAIN: Primary | ICD-10-CM

## 2023-09-28 DIAGNOSIS — C79.51 MALIGNANT NEOPLASM METASTATIC TO BONE (H): ICD-10-CM

## 2023-09-28 DIAGNOSIS — R22.2 CHEST MASS: ICD-10-CM

## 2023-09-28 PROCEDURE — 250N000009 HC RX 250: Performed by: PHYSICIAN ASSISTANT

## 2023-09-28 PROCEDURE — 74177 CT ABD & PELVIS W/CONTRAST: CPT

## 2023-09-28 PROCEDURE — 250N000011 HC RX IP 250 OP 636: Performed by: PHYSICIAN ASSISTANT

## 2023-09-28 RX ORDER — IOPAMIDOL 755 MG/ML
90 INJECTION, SOLUTION INTRAVASCULAR ONCE
Status: COMPLETED | OUTPATIENT
Start: 2023-09-28 | End: 2023-09-28

## 2023-09-28 RX ADMIN — IOPAMIDOL 90 ML: 755 INJECTION, SOLUTION INTRAVENOUS at 07:21

## 2023-09-28 RX ADMIN — SODIUM CHLORIDE 55 ML: 9 INJECTION, SOLUTION INTRAVENOUS at 07:23

## 2023-09-28 NOTE — TELEPHONE ENCOUNTER
Please apologize to patient. They have not finalized the results. The preliminary results shows no obvious cancer in the abdomen or pelvis.  I have not heard back from Dr. Kong. Has patient thought about if he would like to have referral to [facility] or [facility] Oncology team (typically we can get people in to [facility] quicker).

## 2023-09-28 NOTE — TELEPHONE ENCOUNTER
Referral placed. Can you call [facility] to see when we can get patient in? He has bone mets without obvious primary cancer (there is a nodule in the lung but radiology felt it was most likely a met and not the primary).

## 2023-09-28 NOTE — TELEPHONE ENCOUNTER
Patient calling, wondering if there are any updates on CT scan patient had done this morning. Please review and advise.     David BUNDY RN 9/28/2023 at 1:03 PM

## 2023-09-28 NOTE — TELEPHONE ENCOUNTER
Called patient and left voicemail to call back and ask to speak to any triage nurse.    Shadia MALDONADO RN

## 2023-09-28 NOTE — TELEPHONE ENCOUNTER
Called [facility] which went to voicemail as  line was busy.  Left detailed message for call back to [phone] on behalf of pt.    Shadia MALDONADO RN

## 2023-09-29 ENCOUNTER — TELEPHONE (OUTPATIENT)
Dept: CARDIOLOGY | Facility: CLINIC | Age: 78
End: 2023-09-29
Payer: COMMERCIAL

## 2023-09-29 NOTE — TELEPHONE ENCOUNTER
Agree they can call patient to schedule. As long as he is seen soon no need to contact us back. Patient is reliable and will follow through.

## 2023-09-29 NOTE — TELEPHONE ENCOUNTER
[facility] Call Center    Phone Message    May a detailed message be left on voicemail: yes     Reason for Call: Other: Pt would like to discuss some health issues, please reach out to further discuss.     Action Taken: Other: Cardiology    Travel Screening: Not Applicable    Thank you!  [facility]

## 2023-09-29 NOTE — TELEPHONE ENCOUNTER
Attempted to contact patient, left a message on both phones to call back to Team 2 R.N.s    1420 patient called with update. He will be seeing [facility] on 10/4/2023. He will call if he needs cardiology input for the next steps in treating his condition.    CT chest 9/27/2023  1.  Expansile destructive bony lesions in the sternum and T6 concerning for metastatic disease.  2.  Pulmonary nodule measuring up to 1.7 cm, worrisome for metastatic disease vs. less likely primary malignancy. CT scan of the abdomen and pelvis recommended for further evaluation.    CT abd 9/28/2023  1.  No evidence to suggest convincing metastatic disease within the abdomen or pelvis.  2.  Nonobstructing stone within the right kidney.  3.  Tiny hypodensity at the left liver suggests a very small cyst.

## 2023-09-29 NOTE — TELEPHONE ENCOUNTER
Leigh calling back from [facility].  She can see records and wondering if she should call him to schedule and if we need call back.  Advised can call patient.  They will see him next week for sure.  I advised as long as he is being seen soon she does not need to call us back.  JOSE to Demetria.  Bryanna Baltazar RN

## 2023-10-02 ENCOUNTER — PATIENT OUTREACH (OUTPATIENT)
Dept: ONCOLOGY | Facility: CLINIC | Age: 78
End: 2023-10-02
Payer: COMMERCIAL

## 2023-10-02 ENCOUNTER — VIRTUAL VISIT (OUTPATIENT)
Dept: ONCOLOGY | Facility: CLINIC | Age: 78
End: 2023-10-02
Attending: STUDENT IN AN ORGANIZED HEALTH CARE EDUCATION/TRAINING PROGRAM
Payer: COMMERCIAL

## 2023-10-02 DIAGNOSIS — C79.51 MALIGNANT NEOPLASM METASTATIC TO BONE (H): ICD-10-CM

## 2023-10-02 DIAGNOSIS — R22.2 CHEST MASS: Primary | ICD-10-CM

## 2023-10-02 DIAGNOSIS — M89.8X8 STERNAL MASS: Primary | ICD-10-CM

## 2023-10-02 DIAGNOSIS — R22.2 CHEST MASS: ICD-10-CM

## 2023-10-02 PROCEDURE — 99205 OFFICE O/P NEW HI 60 MIN: CPT | Mod: VID | Performed by: STUDENT IN AN ORGANIZED HEALTH CARE EDUCATION/TRAINING PROGRAM

## 2023-10-02 NOTE — NURSING NOTE
Is the patient currently in the state of [place]? YES    Visit mode:VIDEO    If the visit is dropped, the patient can be reconnected by: VIDEO VISIT: Text to cell phone:   Telephone Information:   Mobile [phone]       Will anyone else be joining the visit? NO  (If patient encounters technical issues they should call [phone] :[id number])    How would you like to obtain your AVS? MyChart    Are changes needed to the allergy or medication list? No    Reason for visit: Consult (First visit - consult)    Hira KIDD

## 2023-10-02 NOTE — LETTER
10/2/2023         RE: Javier Tamez  [address] [place] [address]        Dear Colleague,    Thank you for referring your patient, Javier Tamez, to the [facility]. Please see a copy of my visit note below.    Virtual Visit Details    Type of service:  Video Visit   Video Start Time: 3:30  Video End Time:4:13 PM    Originating Location (pt. Location): Home    Distant Location (provider location):  On-site  Platform used for Video Visit: [facility]    MEDICAL ONCOLOGY INITIAL CONSULT NOTE    PATIENT NAME: Javier Tamez  ENCOUNTER DATE: 10/2/2023    Care Team  Primary Oncologist: LUCINA  Surgery:  Radiation oncology:    REASON FOR CURRENT VISIT: Suspected lung cancer    HISTORY OF PRESENT ILLNESS:  Mr. Javier Tamez is a 78 year old  male with PMHx of HTN, HLD, who presents today over video call with concerns of a mass on his right anterior chest.  He states that this mass is palpable and has gotten a bit larger over the years.  He states that there is no pain with palpation, however, there is noticeable pain when he sneezes.  Patient also endorses occasional back pain.  Denies any shortness of breath, cough, wheezing, nausea, vomiting, headache, vision changes, weakness, or tingling.  In regards to his weight patient states that he is at a baseline of 182 pounds and is now at 177 and he has had a relatively stable appetite.  In terms of social history, patient lives in [place] with his wife Jennifer and has a blended family.  He has 3 sons and 8 grandkids that all live in [place].  He is usually busy with his grandkids and loves golfing.  He states that he has never smoked, and had this 3 to 4 glasses of wine per week, and does not engage in recreational drug use.  In regards to family history, his father had 2 heart attacks at ages 55 and 63, younger brother has had a heart attack and stroke.  His mother at age 76  from  breast cancer.     Oncologic  Hx:    Diagnosis: Stage (AJCC 8th edition)  TBD    Treatment: TBD    Intent of treatment: Palliative/Curative TBD    Oncologic course:    09/27/2023 Seen by PCP for 2 cm lump/mass that is firm, fixed, unmovable, painful when pressed on. Dull pain/tingling for the past few months.      09/27/2023 CT Chest w/ contrast:  Expansile destructive bony lesions in the sternum and T6 concerning for metastatic disease. Pulmonary nodule measuring up to 1.7 cm, worrisome for metastatic disease vs. less likely primary malignancy. CT scan of the abdomen and pelvis recommended for further evaluation.    09/28/23- CT ab pelvis: No evidence to suggest convincing metastatic disease within the abdomen or pelvis.      Interval Hx:    Javier Tamez is a 78 year old male pt who presents to clinic via video call regarding a suspected malignant lesion. We discussed the results of his CT CAP as listed above under oncologic course and had a lengthy discussion regarding next steps. Pt states that he will be seeing another medical oncologist at [facility] to have a second opinion/another initial consult. Depending on that meeting, he will determine whether he would like to continue his care here at [facility] or with [facility] and will inform the team by late Wednesday.    Of note, he will be going to [place] on Thursday and Friday.     Geriatric Assessment    Functional status (ECOG performance status): 1    ADL (transferring, eating):  Independent  IADL (finances, cooking, driving): N/A  Objective function (timed get up and go test, <12 seconds): N/A  Falls in past 6 months: None  Cognitive function:  At baseline. AxO x 3.  Incontinence: N/A  Mood/ depression (PHQ2/9):  Normal mood  Nutritional status (BMI, weight loss, appetite):  Normal BMI, no significant weight loss, normal appetite.   Comorbidity: HTN/HLD  Polypharmacy:  None.   Social support (''special people''): Wife Jennifer, 3 sons      REVIEW OF SYSTEMS: 14 point ROS  negative other than the symptoms noted above in the HPI.    Wt Readings from Last 4 Encounters:   09/27/23 81.2 kg (179 lb)   08/16/23 81.8 kg (180 lb 4.8 oz)   07/26/23 82.1 kg (181 lb)   01/17/23 85.6 kg (188 lb 11.2 oz)              Review of Systems:  A comprehensive ROS was performed and found to be negative or non-contributory with the exception of that noted in the HPI above.    Past Medical History:  Past Medical History:   Diagnosis Date    Aortic root dilatation (H24)     Aortic stenosis     Arthritis     Atypical chest pain 1/28/2015    Cerebral artery occlusion with cerebral infarction (H)     TIA  1992    Essential hypertension with goal blood pressure less than 140/90 11/26/2016    takes lisinopril for preventitive    Family history of heart disease     Hypercholesterolemia 1/3/2014     Diagnosis updated by automated process. Provider to review and confirm.    Hyperlipidemia LDL goal <70     IBS (irritable bowel syndrome)     Other chronic pain        Past Surgical History:  Past Surgical History:   Procedure Laterality Date    ARTHROPLASTY HIP ANTERIOR Right 10/24/2017    Procedure: ARTHROPLASTY HIP ANTERIOR;  RIGHT TOTAL HIP ARTHROPLASTY DIRECT ANTERIOR APPROACH;  Surgeon: Meño Avalos MD;  Location:  OR    ARTHROPLASTY HIP ANTERIOR Left 10/09/2018    Procedure: ARTHROPLASTY HIP ANTERIOR;  LEFT TOTAL HIP ARTHROPLASTY DIRECT ANTERIOR APPROACH (DEPUY)^;  Surgeon: Meño Avalos MD;  Location:  OR    COLONOSCOPY  01/01/2011    Adenomatous polyp removed    COLONOSCOPY  03/23/2017    Two adenomatous polyps, repeat in 5 yrs    VASECTOMY      ZZC NONSPECIFIC PROCEDURE  09/01/1990    kidney stone       Social History:  Social History     Socioeconomic History    Marital status:      Spouse name: None    Number of children: 4    Years of education: None    Highest education level: None   Occupational History    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 Yes     Comment: father     Caffeine Concern Yes     Comment: 2 cups coffee daily, occ soda     Sleep Concern No    Special Diet No    Exercise Yes     Comment: 3-4 times weekly     Seat Belt Yes   Social History Narrative    Rides bicycle or goes to Y regularly (3x/week).      Social Determinants of Health     Financial Resource Strain: Low Risk  (10/21/2021)    Overall Financial Resource Strain (CARDIA)     Difficulty of Paying Living Expenses: Not hard at all   Food Insecurity: No Food Insecurity (10/21/2021)    Hunger Vital Sign     Worried About Running Out of Food in the Last Year: Never true     Ran Out of Food in the Last Year: Never true   Transportation Needs: No Transportation Needs (10/21/2021)    PRAPARE - Transportation     Lack of Transportation (Medical): No     Lack of Transportation (Non-Medical): No   Physical Activity: Sufficiently Active (10/21/2021)    Exercise Vital Sign     Days of Exercise per Week: 5 days     Minutes of Exercise per Session: 60 min   Interpersonal Safety: Low Risk  (9/27/2023)    Interpersonal Safety     Do you feel physically and emotionally safe where you currently live?: Yes     Within the past 12 months, have you been hit, slapped, kicked or otherwise physically hurt by someone?: No     Within the past 12 months, have you been humiliated or emotionally abused in other ways by your partner or ex-partner?: No   Housing Stability: Low Risk  (10/21/2021)    Housing Stability Vital Sign     Unable to Pay for Housing in the Last Year: No     Number of Places Lived in the Last Year: 1      Unstable Housing in the Last Year: No        Family History  Family History   Problem Relation Age of Onset    Cerebrovascular Disease Mother         Stroke age 69    Breast Cancer Mother          age 75    C.A.D. Father          of MI at age 63, 1ST MI age 39    Diabetes Father     Cerebrovascular Disease Brother         Born 1947       Outpatient Medications:  Ascorbic Acid (VITAMIN C PO), Take 1,000 mg by mouth every morning   aspirin (ASA) 81 MG tablet, Take 81 mg by mouth Every Mon, Wed, Fri Morning  Cyanocobalamin (VITAMIN B 12 PO), Take 1,000 mcg by mouth every morning  ezetimibe (ZETIA) 10 MG tablet, Take 1 tablet (10 mg) by mouth daily  nitroGLYcerin (NITROSTAT) 0.4 MG sublingual tablet, For chest pain place 1 tablet under the tongue every 5 minutes for 3 doses. If symptoms persist 5 minutes after 1st dose call 911.  rosuvastatin (CRESTOR) 20 MG tablet, Take 1 tablet (20 mg) by mouth daily  tadalafil (CIALIS) 20 MG tablet, One-half to one tablet prior to sex daily.  VITAMIN D, CHOLECALCIFEROL, PO, Take 1,000 Units by mouth every morning     No current facility-administered medications on file prior to visit.       Physical Exam:    Deferred due to video visit.     Labs & Studies: I personally reviewed the following studies:    Recent Labs   Lab Test 10/10/18  0719 10/09/18  0705 18  0943 10/25/17  0619 10/24/17  0645 17  1500 16  0945   WBC  --   --  6.0  --   --  7.8 6.5   RBC  --   --  5.05  --   --  5.06 4.86   HGB 12.3* 15.5 16.3   < > 15.6 16.4 15.9   HCT  --   --  48.4  --   --  47.4 46.3   MCV  --   --  96  --   --  94 95   MCH  --   --  32.3  --   --  32.4 32.7   MCHC  --   --  33.7  --   --  34.6 34.3   RDW  --   --  13.5  --   --  12.8 12.7   PLT  --   --  215  --  200 209 212    < > = values in this interval not displayed.     Recent Labs   Lab Test 23  1309 23  0826 22  0809 21  0747   NA  --  141 138 139   POTASSIUM  --  4.4 3.9 4.3   CHLORIDE   --  102 106 106   CO2  --  30* 30 32   ANIONGAP  --  9 2* 1*   GLC  --  105* 94 94   BUN  --  9.9 12 11   CR 0.8 0.78 0.79 0.84   ALEXANDRA  --  9.6 9.0 8.9     Recent Labs   Lab Test 01/11/22  0809 06/25/21  0731 03/25/21  0733   PROTTOTAL 7.4 7.3 7.3   ALBUMIN 3.8 3.8 3.8   BILITOTAL 0.9 0.8 0.8   ALKPHOS 60 65 67   AST 33 39 40   ALT 65 59 65         ASSESSMENT AND PLAN:    #Sternal mass, LEft UL and T6 mass  - Discussed the results of the CT scan.   -Advised that a biopsy is warranted to determine the histology before further work-up can be initiated.  -We had a lengthy discussion regarding the risk and benefits associated with a biopsy and possible neck steps in regards to the results of the biopsy which included a PET scan.  -Tentatively have scheduled a biopsy within the next 1 or 2 weeks.  Will adjust accordingly depending on whether he decides to continue his care at [facility] or at [facility].  - Discussed that if the biopsy shows sarcoma, we will defer his care to a sarcoma specialist at [facility] (Dr. Ortiz or Dr. Arvizu) for further management.       Plan  IR biopsy of the sternal lesion   RTC with me as needed     On the day of service,  Preparation time:  10 minutes  Visit time: 60 minutes  Care Coordination:  10 minutes      Willy Olivera M.D.   of Medicine  Division of Hematology, Oncology and Transplantation  [facility]

## 2023-10-02 NOTE — PROGRESS NOTES
Virtual Visit Details    Type of service:  Video Visit   Video Start Time: 3:30  Video End Time:4:13 PM    Originating Location (pt. Location): Home    Distant Location (provider location):  On-site  Platform used for Video Visit: [facility]    MEDICAL ONCOLOGY INITIAL CONSULT NOTE    PATIENT NAME: Javier Tamez  ENCOUNTER DATE: 10/2/2023    Care Team  Primary Oncologist: LUCINA  Surgery:  Radiation oncology:    REASON FOR CURRENT VISIT: Suspected lung cancer    HISTORY OF PRESENT ILLNESS:  Mr. Javier Tamez is a 78 year old  male with PMHx of HTN, HLD, who presents today over video call with concerns of a mass on his right anterior chest.  He states that this mass is palpable and has gotten a bit larger over the years.  He states that there is no pain with palpation, however, there is noticeable pain when he sneezes.  Patient also endorses occasional back pain.  Denies any shortness of breath, cough, wheezing, nausea, vomiting, headache, vision changes, weakness, or tingling.  In regards to his weight patient states that he is at a baseline of 182 pounds and is now at 177 and he has had a relatively stable appetite.  In terms of social history, patient lives in [place] with his wife Jennifer and has a blended family.  He has 3 sons and 8 grandkids that all live in [place].  He is usually busy with his grandkids and loves golfing.  He states that he has never smoked, and had this 3 to 4 glasses of wine per week, and does not engage in recreational drug use.  In regards to family history, his father had 2 heart attacks at ages 55 and 63, younger brother has had a heart attack and stroke.  His mother at age 76  from  breast cancer.     Oncologic Hx:    Diagnosis: Stage (AJCC 8th edition)  TBD    Treatment: TBD    Intent of treatment: Palliative/Curative TBD    Oncologic course:    2023 Seen by PCP for 2 cm lump/mass that is firm, fixed, unmovable, painful when pressed on. Dull pain/tingling for the past  few months.      09/27/2023 CT Chest w/ contrast:  Expansile destructive bony lesions in the sternum and T6 concerning for metastatic disease. Pulmonary nodule measuring up to 1.7 cm, worrisome for metastatic disease vs. less likely primary malignancy. CT scan of the abdomen and pelvis recommended for further evaluation.    09/28/23- CT ab pelvis: No evidence to suggest convincing metastatic disease within the abdomen or pelvis.      Interval Hx:    Javier Tamez is a 78 year old male pt who presents to clinic via video call regarding a suspected malignant lesion. We discussed the results of his CT CAP as listed above under oncologic course and had a lengthy discussion regarding next steps. Pt states that he will be seeing another medical oncologist at [facility] to have a second opinion/another initial consult. Depending on that meeting, he will determine whether he would like to continue his care here at [facility] or with [facility] and will inform the team by late Wednesday.    Of note, he will be going to [place] on Thursday and Friday.     Geriatric Assessment    Functional status (ECOG performance status): 1    ADL (transferring, eating):  Independent  IADL (finances, cooking, driving): N/A  Objective function (timed get up and go test, <12 seconds): N/A  Falls in past 6 months: None  Cognitive function:  At baseline. AxO x 3.  Incontinence: N/A  Mood/ depression (PHQ2/9):  Normal mood  Nutritional status (BMI, weight loss, appetite):  Normal BMI, no significant weight loss, normal appetite.   Comorbidity: HTN/HLD  Polypharmacy:  None.   Social support (''special people''): Wife Jennifer, 3 sons      REVIEW OF SYSTEMS: 14 point ROS negative other than the symptoms noted above in the HPI.    Wt Readings from Last 4 Encounters:   09/27/23 81.2 kg (179 lb)   08/16/23 81.8 kg (180 lb 4.8 oz)   07/26/23 82.1 kg (181 lb)   01/17/23 85.6 kg (188 lb 11.2 oz)              Review of Systems:  A comprehensive  ROS was performed and found to be negative or non-contributory with the exception of that noted in the HPI above.    Past Medical History:  Past Medical History:   Diagnosis Date    Aortic root dilatation (H24)     Aortic stenosis     Arthritis     Atypical chest pain 1/28/2015    Cerebral artery occlusion with cerebral infarction (H)     TIA  1992    Essential hypertension with goal blood pressure less than 140/90 11/26/2016    takes lisinopril for preventitive    Family history of heart disease     Hypercholesterolemia 1/3/2014     Diagnosis updated by automated process. Provider to review and confirm.    Hyperlipidemia LDL goal <70     IBS (irritable bowel syndrome)     Other chronic pain        Past Surgical History:  Past Surgical History:   Procedure Laterality Date    ARTHROPLASTY HIP ANTERIOR Right 10/24/2017    Procedure: ARTHROPLASTY HIP ANTERIOR;  RIGHT TOTAL HIP ARTHROPLASTY DIRECT ANTERIOR APPROACH;  Surgeon: Meño Avalos MD;  Location:  OR    ARTHROPLASTY HIP ANTERIOR Left 10/09/2018    Procedure: ARTHROPLASTY HIP ANTERIOR;  LEFT TOTAL HIP ARTHROPLASTY DIRECT ANTERIOR APPROACH (DEPUY)^;  Surgeon: Meño Avalos MD;  Location:  OR    COLONOSCOPY  01/01/2011    Adenomatous polyp removed    COLONOSCOPY  03/23/2017    Two adenomatous polyps, repeat in 5 yrs    VASECTOMY      ZZC NONSPECIFIC PROCEDURE  09/01/1990    kidney stone       Social History:  Social History     Socioeconomic History    Marital status:      Spouse name: None    Number of children: 4    Years of education: None    Highest education level: None   Occupational History    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 Yes     Comment: father     Caffeine Concern Yes     Comment: 2 cups coffee daily, occ soda     Sleep Concern No    Special Diet No    Exercise Yes     Comment: 3-4 times weekly     Seat Belt Yes   Social History Narrative    Rides bicycle or goes to Y regularly (3x/week).      Social Determinants of Health     Financial Resource Strain: Low Risk  (10/21/2021)    Overall Financial Resource Strain (CARDIA)     Difficulty of Paying Living Expenses: Not hard at all   Food Insecurity: No Food Insecurity (10/21/2021)    Hunger Vital Sign     Worried About Running Out of Food in the Last Year: Never true     Ran Out of Food in the Last Year: Never true   Transportation Needs: No Transportation Needs (10/21/2021)    PRAPARE - Transportation     Lack of Transportation (Medical): No     Lack of Transportation (Non-Medical): No   Physical Activity: Sufficiently Active (10/21/2021)    Exercise Vital Sign     Days of Exercise per Week: 5 days     Minutes of Exercise per Session: 60 min   Interpersonal Safety: Low Risk  (2023)    Interpersonal Safety     Do you feel physically and emotionally safe where you currently live?: Yes     Within the past 12 months, have you been hit, slapped, kicked or otherwise physically hurt by someone?: No     Within the past 12 months, have you been humiliated or emotionally abused in other ways by your partner or ex-partner?: No   Housing Stability: Low Risk  (10/21/2021)    Housing Stability Vital Sign     Unable to Pay for Housing in the Last Year: No     Number of Places Lived in the Last Year: 1     Unstable Housing in the Last Year: No        Family History  Family History   Problem Relation Age of Onset    Cerebrovascular Disease Mother         Stroke age 69    Breast Cancer Mother          age 75    C.A.D. Father          of MI at age 63, 1ST MI age 39     Diabetes Father     Cerebrovascular Disease Brother         Born 1947       Outpatient Medications:  Ascorbic Acid (VITAMIN C PO), Take 1,000 mg by mouth every morning   aspirin (ASA) 81 MG tablet, Take 81 mg by mouth Every Mon, Wed, Fri Morning  Cyanocobalamin (VITAMIN B 12 PO), Take 1,000 mcg by mouth every morning  ezetimibe (ZETIA) 10 MG tablet, Take 1 tablet (10 mg) by mouth daily  nitroGLYcerin (NITROSTAT) 0.4 MG sublingual tablet, For chest pain place 1 tablet under the tongue every 5 minutes for 3 doses. If symptoms persist 5 minutes after 1st dose call 911.  rosuvastatin (CRESTOR) 20 MG tablet, Take 1 tablet (20 mg) by mouth daily  tadalafil (CIALIS) 20 MG tablet, One-half to one tablet prior to sex daily.  VITAMIN D, CHOLECALCIFEROL, PO, Take 1,000 Units by mouth every morning     No current facility-administered medications on file prior to visit.       Physical Exam:    Deferred due to video visit.     Labs & Studies: I personally reviewed the following studies:    Recent Labs   Lab Test 10/10/18  0719 10/09/18  0705 09/18/18  0943 10/25/17  0619 10/24/17  0645 09/29/17  1500 11/30/16  0945   WBC  --   --  6.0  --   --  7.8 6.5   RBC  --   --  5.05  --   --  5.06 4.86   HGB 12.3* 15.5 16.3   < > 15.6 16.4 15.9   HCT  --   --  48.4  --   --  47.4 46.3   MCV  --   --  96  --   --  94 95   MCH  --   --  32.3  --   --  32.4 32.7   MCHC  --   --  33.7  --   --  34.6 34.3   RDW  --   --  13.5  --   --  12.8 12.7   PLT  --   --  215  --  200 209 212    < > = values in this interval not displayed.     Recent Labs   Lab Test 09/27/23  1309 01/11/23  0826 01/11/22  0809 01/27/21  0747   NA  --  141 138 139   POTASSIUM  --  4.4 3.9 4.3   CHLORIDE  --  102 106 106   CO2  --  30* 30 32   ANIONGAP  --  9 2* 1*   GLC  --  105* 94 94   BUN  --  9.9 12 11   CR 0.8 0.78 0.79 0.84   ALEXANDRA  --  9.6 9.0 8.9     Recent Labs   Lab Test 01/11/22  0809 06/25/21  0731 03/25/21  0733   PROTTOTAL 7.4 7.3 7.3   ALBUMIN 3.8 3.8 3.8    BILITOTAL 0.9 0.8 0.8   ALKPHOS 60 65 67   AST 33 39 40   ALT 65 59 65         ASSESSMENT AND PLAN:    #Sternal mass, LEft UL and T6 mass  - Discussed the results of the CT scan.   -Advised that a biopsy is warranted to determine the histology before further work-up can be initiated.  -We had a lengthy discussion regarding the risk and benefits associated with a biopsy and possible neck steps in regards to the results of the biopsy which included a PET scan.  -Tentatively have scheduled a biopsy within the next 1 or 2 weeks.  Will adjust accordingly depending on whether he decides to continue his care at [facility] or at [facility].  - Discussed that if the biopsy shows sarcoma, we will defer his care to a sarcoma specialist at [facility] (Dr. Ortiz or Dr. Arvizu) for further management.       Plan  IR biopsy of the sternal lesion   RTC with me as needed     On the day of service,  Preparation time:  10 minutes  Visit time: 60 minutes  Care Coordination:  10 minutes      Willy Olivera M.D.   of Medicine  Division of Hematology, Oncology and Transplantation  [facility]

## 2023-10-02 NOTE — PROGRESS NOTES
New Patient Oncology Nurse Navigator Note     Referring provider: Dr. Walter Montoya  Per message from Dr. Montoya, pt needs urgent med onc work up.  Referring Clinic/Organization: [facility]  Referred to: Medical Oncology  Requested provider (if applicable): Dr. Olivera  Referral Received: 10/02/23       Evaluation for :   Diagnosis   R22.2 (ICD-10-CM) - Chest mass   C79.51 (ICD-10-CM) - Malignant neoplasm metastatic to bone (H)     Clinical History (per Nurse review of records provided):      09/27/2023 Seen by PCP for 2 cm lump/mass that is firm, fixed, unmovable, painful when pressed on. Dull pain/tingling for the past few months.     09/27/2023 CT Chest w/ contrast (bookmarked) showed:   IMPRESSION:  1.  Expansile destructive bony lesions in the sternum and T6  concerning for metastatic disease.  2.  Pulmonary nodule measuring up to 1.7 cm, worrisome for metastatic  disease vs. less likely primary malignancy. CT scan of the abdomen and  pelvis recommended for further evaluation.    Clinical Assessment / Barriers to Care (Per Nurse):    Never smoker  Records Location: [facility]     Records Needed: None  Additional testing needed prior to consult: TBD  Referral updates and Plan:   Writer called [facility] to discuss the referral to medical oncology. He confirmed he would be willing to do a video visit today with Dr. Olivera. His preferred location is [place] but he is aware he should be seen quickly by med onc. He has an appt on Wednesday at [facility]. All questions answered. [facility] thanked writer for the call.    ABY KiranN, RN, OCN  [facility] Oncology Nurse Navigator  [phone] / [phone]

## 2023-10-03 DIAGNOSIS — M89.8X8 STERNAL MASS: ICD-10-CM

## 2023-10-03 NOTE — CONSULTS
Outpatient IR Biopsy Referral    Patient is a 77 y/o male with a PMH of CAD, HTN, HDL, IBS, enlarging anterior sternal mass and lung nodule. IR has been asked to biopsy a sternal lesion for concerns of malignancy.        CT 9/27/23 MUSCULOSKELETAL: The palpable abnormality corresponds to an expansile  destructive bony lesion arising from the sternum measuring 3.5 x 2.4  cm. Additional destructive bony lesion with anterior wedge deformity  in T6.                                                                   IMPRESSION:  1.  Expansile destructive bony lesions in the sternum and T6  concerning for metastatic disease.  2.  Pulmonary nodule measuring up to 1.7 cm, worrisome for metastatic  disease vs. less likely primary malignancy. CT scan of the abdomen and  pelvis recommended for further evaluation.       Case and imaging CT 9/27/23 was reviewed with Dr. Faria from IR and CT guided biopsy of the sternal mass is approved. Series 3 Image 83    Procedure order, surgical pathology and leukemia lymphoma orders placed.    ASA should be held for 5 days prior to scheduled procedure.    If requesting team would like samples sent for anything else please enter them prior to scheduled procedure.    Primary team Dr. Jarod Benton [facility] made aware of IR recommendations via epic messaging.      Ashlie Yan DNP APRN  Interventional Radiology   IR on-call pager: [phone]

## 2023-10-04 ENCOUNTER — TRANSFERRED RECORDS (OUTPATIENT)
Dept: HEALTH INFORMATION MANAGEMENT | Facility: CLINIC | Age: 78
End: 2023-10-04
Payer: COMMERCIAL

## 2023-10-06 ENCOUNTER — TELEPHONE (OUTPATIENT)
Dept: INTERVENTIONAL RADIOLOGY/VASCULAR | Facility: CLINIC | Age: 78
End: 2023-10-06

## 2023-10-06 NOTE — TELEPHONE ENCOUNTER
[facility] Call Center    Phone Message    May a detailed message be left on voicemail: yes     Reason for Call: Other: Pt's son AJ would like to know how long of a wait time there is to be seen.      Notes on TE states Pt will call back to schedule after his [facility] visit  but AJ is wondering how quickly we are scheduling.     Please call AJ back at [phone]. Thank you.     Action Taken: Message routed to:  [facility] ([facility] IR    Travel Screening: Not Applicable

## 2023-10-12 ENCOUNTER — TELEPHONE (OUTPATIENT)
Dept: CARDIOLOGY | Facility: CLINIC | Age: 78
End: 2023-10-12
Payer: COMMERCIAL

## 2023-10-12 NOTE — TELEPHONE ENCOUNTER
Message left with Dr. Sim reply - Patient to keep clinic updated     Dr. Sim's reply 10-12-23   Okay.  Good luck with his cancer work-up.  Keep us informed.

## 2023-10-12 NOTE — TELEPHONE ENCOUNTER
Having PET Scan 10-13-23  - ordered by [facility] tomorrow in [facility].     Spoke with Patient who wanted to give Dr. Sim a update about possible Ca.  Has been seen by [facility] - Last progress note 10-10-23 in [facility].  Tests also in EPIC -    9-28 -2023 a CT Abd/Pelvis  CT Chest  9-27-23    Patient will call back when PET scan results available and if there is a plan going forward.     Last Dr. Sim OV 8-16-23  ASSESSMENT AND PLAN: Humza and I had a nice discussion regarding his calcium score, symptoms and stress test.  I have explained that the accuracy of the stress test is about 85% but at this time he has a very atypical chest pain that may be GI in origin or possibly musculoskeletal.  It is not does not sound at all like cardiac pain and this supported by a very good stress echocardiogram.  After some discussion we decided we will continue with medical management.  I did discuss coronary angiography, risk benefits and alternatives.  Although I told him I do not think it is indicated at this time.  He states he is satisfied with that.     He has no symptoms to suggest heart failure or significant arrhythmia.  On physical exam, he does have premature beats, which is not new for Humza.     Previously we had discontinued his lisinopril due to low blood pressures he asks about this again.  We talked about the fact that his blood pressure right now is in the ideal range.  We do not need to reinitiate his lisinopril.     He is 78 and technically primary prevention.  However with his calcium score I think we should continue aspirin at this time I will decrease it to 81 mg on a Monday Wednesday Friday regiment.     Weight is down 280 pounds giving a body mass index of 25.2 with close on.     I have congratulated on his healthy lifestyle and encouraged him to continue to do so.  He states he thinks he needs to do more aerobic activity and do better with his eating.  He knows my wife is a health and wellness   and states he is going to get in contact with her.     Fasting lipid profile is outstanding total cholesterol is 96, HDL 52, LDL 37 and triglycerides of 35.  We talked about the LDL hypothesis.     We reviewed all of his medications.  We will continue them all as is.

## 2023-10-12 NOTE — TELEPHONE ENCOUNTER
[facility] Call Center    Phone Message    May a detailed message be left on voicemail: yes     Reason for Call: Other: Pt called in said he wanted to get some feeback on if Dr york and his care team is up to date on his situation, please call pt back for further discussion at [phone].     Action Taken: Other: cardiology    Travel Screening: Not Applicable    Thank you!  [facility]

## 2023-10-13 ENCOUNTER — TRANSFERRED RECORDS (OUTPATIENT)
Dept: HEALTH INFORMATION MANAGEMENT | Facility: CLINIC | Age: 78
End: 2023-10-13
Payer: COMMERCIAL

## 2023-10-13 ENCOUNTER — MEDICAL CORRESPONDENCE (OUTPATIENT)
Dept: HEALTH INFORMATION MANAGEMENT | Facility: CLINIC | Age: 78
End: 2023-10-13
Payer: COMMERCIAL

## 2023-10-17 ENCOUNTER — TELEPHONE (OUTPATIENT)
Dept: CARDIOLOGY | Facility: CLINIC | Age: 78
End: 2023-10-17
Payer: COMMERCIAL

## 2023-10-17 ENCOUNTER — TELEPHONE (OUTPATIENT)
Dept: INTERNAL MEDICINE | Facility: CLINIC | Age: 78
End: 2023-10-17
Payer: COMMERCIAL

## 2023-10-17 ENCOUNTER — NURSE TRIAGE (OUTPATIENT)
Dept: CARDIOLOGY | Facility: CLINIC | Age: 78
End: 2023-10-17
Payer: COMMERCIAL

## 2023-10-17 NOTE — TELEPHONE ENCOUNTER
"Patient spoke to Triage with some concern of left-sided chest pain. Patient recently just had a CA diagnosis. He is unsure if his chest pain is cardiac or related to the CA diagnosis. He says he has a lesion on his sternum, right lung, vetebrae T6. Patient says he had a PET scan and results should have been sent to Dr. Sim. He has a biopsy on Monday. The pain comes and goes and doesn't last long. The pain he says has felt like twinges, sharp at times, dull throbbing, burning. He sometimes feels it in his armpits. The pain is not current. The pain has been occurring for weeks and has been a little more frequent. He has no other symptoms. He had a previous Echo done in September and did not have the CA diagnosis at that time. Patient wants to have Dr. Sim up to speed on his CA diagnosis. He also has some concern for teeth cleaning and how it relates to the biopsy/cardiac care. He will be seeing the dentist tomorrow. Routing this over to the nurses with Dr. Sim for further follow-up with patient.     1. LOCATION: \"Where does it hurt?\" Left side.  2. RADIATION: \"Does the pain go anywhere else?\" (e.g., into neck, jaw, arms, back) Sometimes his armpits.  3. ONSET: \"When did the chest pain begin?\" (Minutes, hours or days) For weeks. He says he had an Echo in September.  4. PATTERN: \"Does the pain come and go, or has it been constant since it started?\" \"Does it get worse with exertion?\" Comes and goes. Not current.  5. DURATION: \"How long does it last\" (e.g., seconds, minutes, hours) \"Not long.\"  6. SEVERITY: \"How bad is the pain?\" (e.g., Scale 1-10; mild, moderate, or severe) Says it feels like a twinge, sharp at times, dull throbbing, burning.   - MILD (1-3): doesn't interfere with normal activities  - MODERATE (4-7): interferes with normal activities or awakens from sleep  - SEVERE (8-10): excruciating pain, unable to do any normal activities  7. CARDIAC RISK FACTORS: \"Do you have any history of heart " "problems or risk factors for heart disease?\" (e.g., angina, prior heart attack; diabetes, high blood pressure, high cholesterol, smoker, or strong family history of heart disease) Atypical chest pain, Hypercholesterolemia, family history of CAD.  8. PULMONARY RISK FACTORS: \"Do you have any history of lung disease?\" (e.g., blood clots in lung, asthma, emphysema, birth control pills) Patient has CA diagnosis. He will be having biopsy on Monday. Lesion on his sternum, right lung, vertebra T6.  9. CAUSE: \"What do you think is causing the chest pain?\" He is unsure if Cardiac or related to CA diagnosis.  10. OTHER SYMPTOMS: \"Do you have any other symptoms?\" (e.g., dizziness, nausea, vomiting, sweating, fever, difficulty breathing, cough) No other symptoms.    Additional Information   Negative: SEVERE difficulty breathing (e.g., struggling for each breath, speaks in single words)   Negative: Chest pain lasting longer than 5 minutes and ANY of the following:         Pain is crushing, pressure-like, or heavy         Over 44 years old          Over 30 years old and one cardiac risk factor (e.g diabetes, high blood pressure, high cholesterol, smoker, or family history of heart disease)         History of heart disease (e.g. angina, heart attack, heart failure, bypass surgery, takes nitroglycerin)   Negative: Sounds like a life-threatening emergency to the triager    Protocols used: Chest Pain-A-[place]    "

## 2023-10-17 NOTE — TELEPHONE ENCOUNTER
Reason for Call:  Appointment Request    Patient requesting this type of appt: Pre-op    Requested provider: Guillermo Kong    Reason patient unable to be scheduled: Not within requested timeframe    When does patient want to be seen/preferred time: 1-2 days    Comments: Pt needs a pre op before procedure, 10/23    Could we send this information to you in [employer] or would you prefer to receive a phone call?:   Patient would prefer a phone call   Okay to leave a detailed message?: Yes at Cell number on file:    Telephone Information:   Mobile [phone]       Call taken on 10/17/2023 at 4:20 PM by Jose Angel Regalado

## 2023-10-17 NOTE — TELEPHONE ENCOUNTER
"Patient also left a voicemail on the team 2 voicemail and spoke to triage regarding concerns.     Pt has a history of nonobstructive CAD, negative stress test 8/2023. No history of valve repair/replacement. SBE not indicated. Called patient to discuss this as well as chest pain symptoms, message left.       Addendum 0975: Spoke to patient who confirmed he has never had valve surgery. Discussed that he does not require SBE for dental work.     His biopsy is scheduled for Monday. He notes the discomfort he has had is on the left side of chest. The mass is on his sternum. He describes the pain as a sharp twinge and also alternating with a dull, throbbing ache. Rates at 5/10 but he states it doesn't last long. Symptoms come on at any time, not triggered by any one particular activity. He does not feel pain when he pushes on the lump or area. He notes increased frequency of his pain since his cancer diagnosis in September. He has tried advil \"a while back\" but he hasn't really taken anything for his pain. He denies any other symptoms ie shortness of breath, N/V, diaphoresis, jaw pain, back pain, weight gain, swelling, dizziness/lightheadedness. Dr Sim messaged to review.   "

## 2023-10-17 NOTE — TELEPHONE ENCOUNTER
[facility] Call Center    Phone Message    May a detailed message be left on voicemail: yes     Reason for Call: Other: Pt would like a call back to discuss if he will need any antibiotics sent for his dental work scheduled tomorrow. Please return call to discuss.      Action Taken: Other: Cardiology    Travel Screening: Not Applicable    Thank you!  [facility]

## 2023-10-17 NOTE — TELEPHONE ENCOUNTER
Miguelito Sim MD  You16 minutes ago (12:47 PM)     I agree.  His pain does not sound cardiac in origin.  He can try over-the-counter remedies if he likes.  Or discuss this further with his primary care doctor.  You are right he has no indication for antibiotic prophylaxis.       Called patient back to discuss, detailed message left. Team 2 phone number provided to call back with any questions.

## 2023-10-23 ENCOUNTER — HOSPITAL ENCOUNTER (OUTPATIENT)
Dept: CT IMAGING | Facility: CLINIC | Age: 78
Discharge: HOME | End: 2023-10-23
Attending: STUDENT IN AN ORGANIZED HEALTH CARE EDUCATION/TRAINING PROGRAM | Admitting: STUDENT IN AN ORGANIZED HEALTH CARE EDUCATION/TRAINING PROGRAM
Payer: COMMERCIAL

## 2023-10-23 VITALS
HEART RATE: 64 BPM | RESPIRATION RATE: 16 BRPM | DIASTOLIC BLOOD PRESSURE: 75 MMHG | SYSTOLIC BLOOD PRESSURE: 117 MMHG | OXYGEN SATURATION: 95 %

## 2023-10-23 DIAGNOSIS — C79.51 MALIGNANT NEOPLASM METASTATIC TO BONE (H): ICD-10-CM

## 2023-10-23 DIAGNOSIS — M89.8X8 STERNAL MASS: ICD-10-CM

## 2023-10-23 DIAGNOSIS — C34.12 MALIGNANT NEOPLASM OF UPPER LOBE OF LEFT LUNG (H): ICD-10-CM

## 2023-10-23 LAB
ERYTHROCYTE [DISTWIDTH] IN BLOOD BY AUTOMATED COUNT: 12.1 % (ref 10–15)
HCT VFR BLD AUTO: 47.5 % (ref 40–53)
HGB BLD-MCNC: 16.2 G/DL (ref 13.3–17.7)
INR PPP: 1.04 (ref 0.85–1.15)
MCH RBC QN AUTO: 32.4 PG (ref 26.5–33)
MCHC RBC AUTO-ENTMCNC: 34.1 G/DL (ref 31.5–36.5)
MCV RBC AUTO: 95 FL (ref 78–100)
PLATELET # BLD AUTO: 203 10E3/UL (ref 150–450)
RBC # BLD AUTO: 5 10E6/UL (ref 4.4–5.9)
WBC # BLD AUTO: 6 10E3/UL (ref 4–11)

## 2023-10-23 PROCEDURE — 88305 TISSUE EXAM BY PATHOLOGIST: CPT | Mod: TC | Performed by: STUDENT IN AN ORGANIZED HEALTH CARE EDUCATION/TRAINING PROGRAM

## 2023-10-23 PROCEDURE — 250N000009 HC RX 250: Performed by: NURSE PRACTITIONER

## 2023-10-23 PROCEDURE — 272N000431 CT BONE BIOPSY SUPERFICIAL

## 2023-10-23 PROCEDURE — 36415 COLL VENOUS BLD VENIPUNCTURE: CPT | Performed by: NURSE PRACTITIONER

## 2023-10-23 PROCEDURE — 77012 CT SCAN FOR NEEDLE BIOPSY: CPT

## 2023-10-23 PROCEDURE — 85610 PROTHROMBIN TIME: CPT | Performed by: NURSE PRACTITIONER

## 2023-10-23 PROCEDURE — 250N000011 HC RX IP 250 OP 636: Performed by: NURSE PRACTITIONER

## 2023-10-23 PROCEDURE — 85027 COMPLETE CBC AUTOMATED: CPT | Performed by: NURSE PRACTITIONER

## 2023-10-23 RX ORDER — NALOXONE HYDROCHLORIDE 0.4 MG/ML
0.2 INJECTION, SOLUTION INTRAMUSCULAR; INTRAVENOUS; SUBCUTANEOUS
Status: DISCONTINUED | OUTPATIENT
Start: 2023-10-23 | End: 2023-10-24 | Stop reason: HOSPADM

## 2023-10-23 RX ORDER — FLUMAZENIL 0.1 MG/ML
0.2 INJECTION, SOLUTION INTRAVENOUS
Status: DISCONTINUED | OUTPATIENT
Start: 2023-10-23 | End: 2023-10-24 | Stop reason: HOSPADM

## 2023-10-23 RX ORDER — ACETAMINOPHEN 325 MG/1
650 TABLET ORAL EVERY 4 HOURS PRN
Status: DISCONTINUED | OUTPATIENT
Start: 2023-10-23 | End: 2023-10-24 | Stop reason: HOSPADM

## 2023-10-23 RX ORDER — NALOXONE HYDROCHLORIDE 0.4 MG/ML
0.4 INJECTION, SOLUTION INTRAMUSCULAR; INTRAVENOUS; SUBCUTANEOUS
Status: DISCONTINUED | OUTPATIENT
Start: 2023-10-23 | End: 2023-10-24 | Stop reason: HOSPADM

## 2023-10-23 RX ORDER — FENTANYL CITRATE 50 UG/ML
25-50 INJECTION, SOLUTION INTRAMUSCULAR; INTRAVENOUS EVERY 5 MIN PRN
Status: DISCONTINUED | OUTPATIENT
Start: 2023-10-23 | End: 2023-10-24 | Stop reason: HOSPADM

## 2023-10-23 RX ADMIN — FENTANYL CITRATE 50 MCG: 50 INJECTION, SOLUTION INTRAMUSCULAR; INTRAVENOUS at 11:54

## 2023-10-23 RX ADMIN — LIDOCAINE HYDROCHLORIDE 5 ML: 10 INJECTION, SOLUTION EPIDURAL; INFILTRATION; INTRACAUDAL; PERINEURAL at 11:56

## 2023-10-23 RX ADMIN — MIDAZOLAM 1 MG: 1 INJECTION INTRAMUSCULAR; INTRAVENOUS at 11:55

## 2023-10-23 NOTE — PROGRESS NOTES
Assisted Dr. Jimenez in sternal mass biopsy with conscious sedation. Pt tolerated procedure well with monitoring per unit standard. Medications given per MAR. No immediate adverse effects noted. Specimens placed in sterile container and brought to lab for analysis. Pt monitored until discharge. Pt and  given all instructions prior to procedure and all questions answered. Vital signs charted per flowsheet . Pt left in stable condition with wife.

## 2023-10-25 PROCEDURE — 88342 IMHCHEM/IMCYTCHM 1ST ANTB: CPT | Mod: 26 | Performed by: PATHOLOGY

## 2023-10-25 PROCEDURE — 88341 IMHCHEM/IMCYTCHM EA ADD ANTB: CPT | Mod: 26 | Performed by: PATHOLOGY

## 2023-10-25 PROCEDURE — 88305 TISSUE EXAM BY PATHOLOGIST: CPT | Mod: 26 | Performed by: PATHOLOGY

## 2023-10-25 PROCEDURE — 88360 TUMOR IMMUNOHISTOCHEM/MANUAL: CPT | Mod: 26 | Performed by: PATHOLOGY

## 2023-10-26 ENCOUNTER — NURSE TRIAGE (OUTPATIENT)
Dept: ONCOLOGY | Facility: CLINIC | Age: 78
End: 2023-10-26
Payer: COMMERCIAL

## 2023-10-26 DIAGNOSIS — C34.12 MALIGNANT NEOPLASM OF UPPER LOBE OF LEFT LUNG (H): ICD-10-CM

## 2023-10-26 DIAGNOSIS — C79.51 MALIGNANT NEOPLASM METASTATIC TO BONE (H): Primary | ICD-10-CM

## 2023-10-26 NOTE — TELEPHONE ENCOUNTER
Pt is calling to request phone call from Dr. Olivera.  States they got cut off yesterday when Dr. Olivera called.  Pt would like to discuss results of CT bone biopsy.    Informed caller that this message would be routed to Dr. Olivera and his nurse, Gage Luis RNCC    Pt voiced understanding.    Routed message to Care Team.

## 2023-10-26 NOTE — TELEPHONE ENCOUNTER
Reviewing open encounters patient had visit related to stated concern 9/27/23    Closing encounter.

## 2023-10-27 ENCOUNTER — TRANSFERRED RECORDS (OUTPATIENT)
Dept: HEALTH INFORMATION MANAGEMENT | Facility: CLINIC | Age: 78
End: 2023-10-27

## 2023-10-27 PROCEDURE — 81455 SO/HL 51/>GSAP DNA/DNA&RNA: CPT

## 2023-10-27 PROCEDURE — 99443 PR PHYSICIAN TELEPHONE EVALUATION 21-30 MIN: CPT | Mod: 93 | Performed by: STUDENT IN AN ORGANIZED HEALTH CARE EDUCATION/TRAINING PROGRAM

## 2023-10-27 NOTE — PROGRESS NOTES
Called on the phone to discuss the results of biopsy. This led to further discussion on next steps. He asked several questions on the treatment plan and we discussed the importance of NGS and PD_L1 expression in treatment decision making and he was willing to initiate the testing here.. I also suggested doing Brain MRI to complete staging and he was undecided on that. He is seeing another roncologist tomorrow and wants to make final decision of treating here vs outside later.  The entire conversation lasted about 30 mins.

## 2023-10-30 LAB
PATH REPORT.ADDENDUM SPEC: ABNORMAL
PATH REPORT.COMMENTS IMP SPEC: ABNORMAL
PATH REPORT.COMMENTS IMP SPEC: YES
PATH REPORT.FINAL DX SPEC: ABNORMAL
PATH REPORT.GROSS SPEC: ABNORMAL
PATH REPORT.MICROSCOPIC SPEC OTHER STN: ABNORMAL
PATH REPORT.RELEVANT HX SPEC: ABNORMAL
PHOTO IMAGE: ABNORMAL

## 2023-10-31 ENCOUNTER — TELEPHONE (OUTPATIENT)
Dept: CARDIOLOGY | Facility: CLINIC | Age: 78
End: 2023-10-31

## 2023-10-31 LAB
BKR LAB AP ADDL TEST(S) ADDED: YES
INTERPRETATION: NORMAL
LAB DIRECTOR COMMENTS: NORMAL
LAB DIRECTOR DISCLAIMER: NORMAL
LAB DIRECTOR INTERPRETATION: NORMAL
LAB DIRECTOR METHODOLOGY: NORMAL
LAB DIRECTOR RESULTS: NORMAL
SIGNIFICANT RESULTS: NORMAL
SPECIMEN DESCRIPTION: NORMAL
SPECIMEN DESCRIPTION: NORMAL
TEST DETAILS, MDL: NORMAL

## 2023-10-31 PROCEDURE — G0452 MOLECULAR PATHOLOGY INTERPR: HCPCS | Mod: 26 | Performed by: STUDENT IN AN ORGANIZED HEALTH CARE EDUCATION/TRAINING PROGRAM

## 2023-10-31 PROCEDURE — 81455 SO/HL 51/>GSAP DNA/DNA&RNA: CPT | Performed by: STUDENT IN AN ORGANIZED HEALTH CARE EDUCATION/TRAINING PROGRAM

## 2023-10-31 NOTE — TELEPHONE ENCOUNTER
Fax update received from [facility] for visit 10/27/23. 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 Pt had called to report chest pain on 10/17, assumed noncardiac due to multiple negative stress tests in 2023. Not mentions patient is lifelong non-smoker, cancer may have been caused by possible genetic mutation. Note indicates brain MRI is pending, NGS panel and PD-L1 expression are pending as well. Follow up is planned for mid-November. Dr Sim updated.

## 2023-11-02 ENCOUNTER — ANCILLARY PROCEDURE (OUTPATIENT)
Dept: MRI IMAGING | Facility: CLINIC | Age: 78
End: 2023-11-02
Attending: INTERNAL MEDICINE
Payer: COMMERCIAL

## 2023-11-02 DIAGNOSIS — C34.12 MALIGNANT NEOPLASM OF BRONCHUS OF LEFT UPPER LOBE (H): ICD-10-CM

## 2023-11-02 DIAGNOSIS — C79.51 SECONDARY MALIGNANT NEOPLASM OF BONE (H): ICD-10-CM

## 2023-11-02 PROCEDURE — 255N000002 HC RX 255 OP 636: Mod: JZ | Performed by: INTERNAL MEDICINE

## 2023-11-02 PROCEDURE — 70553 MRI BRAIN STEM W/O & W/DYE: CPT

## 2023-11-02 PROCEDURE — A9585 GADOBUTROL INJECTION: HCPCS | Mod: JZ | Performed by: INTERNAL MEDICINE

## 2023-11-02 RX ORDER — GADOBUTROL 604.72 MG/ML
8 INJECTION INTRAVENOUS ONCE
Status: COMPLETED | OUTPATIENT
Start: 2023-11-02 | End: 2023-11-02

## 2023-11-02 RX ADMIN — GADOBUTROL 8 ML: 604.72 INJECTION INTRAVENOUS at 11:15

## 2023-11-04 ENCOUNTER — HEALTH MAINTENANCE LETTER (OUTPATIENT)
Age: 78
End: 2023-11-04

## 2023-11-10 ENCOUNTER — TRANSFERRED RECORDS (OUTPATIENT)
Dept: HEALTH INFORMATION MANAGEMENT | Facility: CLINIC | Age: 78
End: 2023-11-10
Payer: COMMERCIAL

## 2023-11-13 ENCOUNTER — TELEPHONE (OUTPATIENT)
Dept: CARDIOLOGY | Facility: CLINIC | Age: 78
End: 2023-11-13
Payer: COMMERCIAL

## 2023-11-13 NOTE — TELEPHONE ENCOUNTER
Incoming records from 11-10-23 from [facility]   Records to be scanned.         ----------------------------------------------------------------------------------      --------------------------------------------------------------------------------        ------------------------------------------------------------------------------------      --------------------------------------------------------------------------------------        ------------------------------------------------------------------------------    -------------------------------------------------------------------------------------

## 2023-11-20 ENCOUNTER — TELEPHONE (OUTPATIENT)
Dept: CARDIOLOGY | Facility: CLINIC | Age: 78
End: 2023-11-20
Payer: COMMERCIAL

## 2023-11-20 DIAGNOSIS — Z92.21 STATUS POST CHEMOTHERAPY: Primary | ICD-10-CM

## 2023-11-20 DIAGNOSIS — R07.89 ATYPICAL CHEST PAIN: ICD-10-CM

## 2023-11-20 DIAGNOSIS — I25.119 CORONARY ARTERY DISEASE INVOLVING NATIVE CORONARY ARTERY OF NATIVE HEART WITH ANGINA PECTORIS (H): ICD-10-CM

## 2023-11-20 NOTE — TELEPHONE ENCOUNTER
[facility] Call Center    Phone Message    May a detailed message be left on voicemail: yes     Reason for Call: Other: Humza called requesting to speak with a member of his heart care team about starting Tagrisso to help treat his lung cancer. Humza wants to make sure the Tagrisso will not mess with his heart condition. Please reach out to Humza to discuss. Thank you!     Action Taken: Other: Cardiology    Travel Screening: Not Applicable    Thank you!  [facility]

## 2023-11-20 NOTE — TELEPHONE ENCOUNTER
[facility] note from 11/10 scanned to Epic-  Planning to start the patient on Tagrisso at standard dosage as soon as we are able to obtain insurance approval/financial assistance  Return to see me in 3 weeks for toxicity check, assuming it will take 1-2 weeks for him to get [the med] delivered to him  Plan to repeat imaging studies in 2-3mos    Dr Sim messageailyn.

## 2023-11-21 NOTE — TELEPHONE ENCOUNTER
Miguelito Sim MD  You15 hours ago (5:06 PM)     OK to take. 2-3% of the time patients can develop cardiomyopathy. Agree with follow up ECHO 2-3 mos     Spoke to patient and reviewed message from Dr Sim. He verbalized understanding and was agreeable to plan. Transferred to scheduling to arrange. Pt will be due for labs and OV w/ Dr Sim in Feb as well. He has follow up with oncology in January.

## 2023-12-01 ENCOUNTER — TRANSFERRED RECORDS (OUTPATIENT)
Dept: HEALTH INFORMATION MANAGEMENT | Facility: CLINIC | Age: 78
End: 2023-12-01
Payer: COMMERCIAL

## 2023-12-07 ENCOUNTER — TRANSFERRED RECORDS (OUTPATIENT)
Dept: HEALTH INFORMATION MANAGEMENT | Facility: CLINIC | Age: 78
End: 2023-12-07
Payer: COMMERCIAL

## 2023-12-18 ENCOUNTER — TRANSFERRED RECORDS (OUTPATIENT)
Dept: HEALTH INFORMATION MANAGEMENT | Facility: CLINIC | Age: 78
End: 2023-12-18
Payer: COMMERCIAL

## 2023-12-18 LAB
ALT SERPL-CCNC: 125 U/L (ref 7–40)
AST SERPL-CCNC: 83 U/L (ref 13–40)
CREATININE (EXTERNAL): 1.04 MG/DL (ref 0.5–1.2)
GFR ESTIMATED (EXTERNAL): 73.5 ML/MIN/1.73M2
GLUCOSE (EXTERNAL): 135 MG/DL (ref 73–126)
POTASSIUM (EXTERNAL): 4.3 MMOL/L (ref 3.5–5.1)

## 2023-12-21 ENCOUNTER — TELEPHONE (OUTPATIENT)
Dept: CARDIOLOGY | Facility: CLINIC | Age: 78
End: 2023-12-21
Payer: COMMERCIAL

## 2023-12-21 DIAGNOSIS — E78.00 HYPERCHOLESTEROLEMIA: ICD-10-CM

## 2023-12-21 RX ORDER — ROSUVASTATIN CALCIUM 20 MG/1
20 TABLET, COATED ORAL DAILY
Qty: 90 TABLET | Refills: 0 | Status: SHIPPED | OUTPATIENT
Start: 2023-12-21 | End: 2023-12-22

## 2023-12-21 RX ORDER — EZETIMIBE 10 MG/1
10 TABLET ORAL DAILY
Qty: 90 TABLET | Refills: 0 | Status: SHIPPED | OUTPATIENT
Start: 2023-12-21 | End: 2024-02-16

## 2023-12-21 NOTE — TELEPHONE ENCOUNTER
[facility] Call Center    Phone Message    May a detailed message be left on voicemail: yes     Reason for Call: Pt had  blood test done recently and woulike a call back to discuss  results.    Action Taken: Other: Cardiology    Travel Screening: Not Applicable    Thank you!  [facility]

## 2023-12-21 NOTE — TELEPHONE ENCOUNTER
No recent labs done in Epic or , last set of labs via [facility] on file are from 12/1/23 and include CBC/CMP.     Spoke to patient, says he had labs via [facility] which detected that his liver markers were elevated and he was told by high doctor these could be from his cholesterol medications. His ALT was up to 125 on 12/18 and two weeks prior to that it was 72. His AST was 83 on 12/18 and two weeks ago it was 62. He says he has been on crestor/zetia for years without problems. He does have a cold that started yesterday. He normally drinks 3-4 glasses of wine a week but stopped altogether once he started the Tagrisso a month ago. He does not take OTC meds regularly, took advil dual (which contains tylenol) a few times in the past few weeks but not regularly. Dr Sim messaged to review.     Labs requested from [facility], left a message for [facility] HIMs.

## 2023-12-22 RX ORDER — ROSUVASTATIN CALCIUM 20 MG/1
20 TABLET, COATED ORAL DAILY
COMMUNITY
Start: 2023-12-22 | End: 2024-02-12 | Stop reason: DRUGHIGH

## 2023-12-22 NOTE — TELEPHONE ENCOUNTER
Spoke with patient to review recommendations from Dr. Sim.    He is aware to decrease his crestor dose to 10mg.     He will check labs on 1/17/2024 and see Dr. Sim on 1/24/2024.

## 2023-12-22 NOTE — TELEPHONE ENCOUNTER
Miguelito Sim MD  You26 minutes ago (12:35 PM)     Lets cut his statin in half.  Recheck a fasting lipid profile and ALT in 1 month.  This can be done through the oncology clinic if they are checking blood anyways.       Detailed message left for patient with Dr Sim's instructions. Asked that he call back to confirm med change and where he would like to have the labs done.

## 2024-01-10 ENCOUNTER — TRANSFERRED RECORDS (OUTPATIENT)
Dept: HEALTH INFORMATION MANAGEMENT | Facility: CLINIC | Age: 79
End: 2024-01-10
Payer: COMMERCIAL

## 2024-01-17 ENCOUNTER — LAB (OUTPATIENT)
Dept: LAB | Facility: CLINIC | Age: 79
End: 2024-01-17
Payer: COMMERCIAL

## 2024-01-17 ENCOUNTER — TELEPHONE (OUTPATIENT)
Dept: CARDIOLOGY | Facility: CLINIC | Age: 79
End: 2024-01-17

## 2024-01-17 ENCOUNTER — HOSPITAL ENCOUNTER (OUTPATIENT)
Dept: CARDIOLOGY | Facility: CLINIC | Age: 79
Discharge: HOME | End: 2024-01-17
Attending: INTERNAL MEDICINE | Admitting: INTERNAL MEDICINE
Payer: COMMERCIAL

## 2024-01-17 DIAGNOSIS — I48.91 ATRIAL FIBRILLATION, UNSPECIFIED TYPE (H): ICD-10-CM

## 2024-01-17 DIAGNOSIS — Z92.21 STATUS POST CHEMOTHERAPY: ICD-10-CM

## 2024-01-17 DIAGNOSIS — R07.89 ATYPICAL CHEST PAIN: ICD-10-CM

## 2024-01-17 DIAGNOSIS — E78.00 HYPERCHOLESTEROLEMIA: ICD-10-CM

## 2024-01-17 DIAGNOSIS — E78.00 HYPERCHOLESTEROLEMIA: Primary | ICD-10-CM

## 2024-01-17 DIAGNOSIS — I25.119 CORONARY ARTERY DISEASE INVOLVING NATIVE CORONARY ARTERY OF NATIVE HEART WITH ANGINA PECTORIS (H): ICD-10-CM

## 2024-01-17 LAB
ALT SERPL W P-5'-P-CCNC: 70 U/L (ref 0–70)
ANION GAP SERPL CALCULATED.3IONS-SCNC: 10 MMOL/L (ref 7–15)
BUN SERPL-MCNC: 15 MG/DL (ref 8–23)
CALCIUM SERPL-MCNC: 9.5 MG/DL (ref 8.8–10.2)
CHLORIDE SERPL-SCNC: 102 MMOL/L (ref 98–107)
CREAT SERPL-MCNC: 0.8 MG/DL (ref 0.67–1.17)
DEPRECATED HCO3 PLAS-SCNC: 27 MMOL/L (ref 22–29)
EGFRCR SERPLBLD CKD-EPI 2021: >90 ML/MIN/1.73M2
GLUCOSE SERPL-MCNC: 89 MG/DL (ref 70–99)
LVEF ECHO: NORMAL
POTASSIUM SERPL-SCNC: 4.1 MMOL/L (ref 3.4–5.3)
SODIUM SERPL-SCNC: 139 MMOL/L (ref 135–145)

## 2024-01-17 PROCEDURE — 36415 COLL VENOUS BLD VENIPUNCTURE: CPT | Performed by: INTERNAL MEDICINE

## 2024-01-17 PROCEDURE — 80061 LIPID PANEL: CPT | Performed by: INTERNAL MEDICINE

## 2024-01-17 PROCEDURE — 93306 TTE W/DOPPLER COMPLETE: CPT

## 2024-01-17 PROCEDURE — 84460 ALANINE AMINO (ALT) (SGPT): CPT | Performed by: INTERNAL MEDICINE

## 2024-01-17 PROCEDURE — 80048 BASIC METABOLIC PNL TOTAL CA: CPT | Performed by: INTERNAL MEDICINE

## 2024-01-17 PROCEDURE — 93306 TTE W/DOPPLER COMPLETE: CPT | Mod: 26 | Performed by: INTERNAL MEDICINE

## 2024-01-17 NOTE — TELEPHONE ENCOUNTER
Reviewed with patient the finding of afib on his echo. He was aware. His brother had afib and was on the same meds as those Dr. Sim is recommending.    Reviewed the recommendation from Dr. Sim to start toprol XL 50 mg daily and elquis 5mg BID. Patient asks that we check first with Dr. Sim that these are compatible with his chemo drug: Tagrisso 80mg daily. This is treating his stage IV non-small cell lung cancer with bone metastasis. Notes from [facility] are in epic.    If Dr. Sim is OK with the combination, then he asks that we send the 2 new scripts to [facility] in [place] and leave him a message to pick them.    Will message Dr. Sim to review    He states he does not need an YUNG visit and will discuss the new meds at his visit next week.  Message sent to scheduling team to release the hold appointment for tomorrow

## 2024-01-17 NOTE — TELEPHONE ENCOUNTER
Message left to return call on both home # and Cell. Currently YUNG OV on hold with Stephanie CANADA Tomorrow 1-18-24 at 11 AM in [place] for possible A fib and to discuss new medications.  ** Message Sassie regarding hold if Patient does not want visit tomorrow.     New Medications could also be discuss at Pt call back if if agrees with starting new medications  Dr. Sim will finish the conversation at OV next weeK     Dr. Sim's reply - 1-17-24  Rate is not well-controlled he needs to be started on rate modulating medications.  Start with metoprolol succinate 50 mg daily.  He needs to be on anticoagulation I would recommend apixaban 5 mg twice daily.  Ideally he should be in in the next day or 2 to discuss these matters with an YUNG or an MD.  Otherwise you can discuss that with him on the phone and if he is willing to start the medications go ahead and I will finish the conversation when I see him next week.

## 2024-01-17 NOTE — TELEPHONE ENCOUNTER
Dr. Sim OV 1-24-24.    Echo tech called this morning that echo was done and Patient may be in A Fib, irregular rhythm,  HR ranging 100-160 bpm.  Echo results have not been read yet.       Briefly spoke with  regarding earlier call -   Recommended EKG at OV.

## 2024-01-17 NOTE — TELEPHONE ENCOUNTER
Echo and BMP/ALT done as below, lipids pending. Crestor was decreased to 10mg daily on 12/22 due to elevated LFTs. He is following closely with oncology for stage IV metastatic lung cancer, on osimertinib.     Left a message for patient to call back and verify if he is having any symptoms.       The visual ejection fraction is 50-55%.  Left ventricular systolic function is borderline reduced.  With rapid atrial fibrillation, the visual approximation of left ventricular  systolic function may be falsely decreased.  Aortic root dilatation is present.  The rhythm was rapid atrial fibrillation.  The development of atrial fibrillation is new since August 8th 2023 when the patient was in sinus rhythm at the time of his stress test. The study was technically difficult. The study was technically limited.  [place]: Dr ESTEVEZ       Component      Latest Ref Rng 1/17/2024  10:17 AM   Sodium      135 - 145 mmol/L 139    Potassium      3.4 - 5.3 mmol/L 4.1    Chloride      98 - 107 mmol/L 102    Carbon Dioxide (CO2)      22 - 29 mmol/L 27    Anion Gap      7 - 15 mmol/L 10    Urea Nitrogen      8.0 - 23.0 mg/dL 15.0    Creatinine      0.67 - 1.17 mg/dL 0.80    GFR Estimate      >60 mL/min/1.73m2 >90    Calcium      8.8 - 10.2 mg/dL 9.5    Glucose      70 - 99 mg/dL 89    ALT      0 - 70 U/L 70

## 2024-01-18 LAB
CHOLEST SERPL-MCNC: 126 MG/DL
FASTING STATUS PATIENT QL REPORTED: YES
HDLC SERPL-MCNC: 58 MG/DL
LDLC SERPL CALC-MCNC: 57 MG/DL
NONHDLC SERPL-MCNC: 68 MG/DL
TRIGL SERPL-MCNC: 53 MG/DL

## 2024-01-18 RX ORDER — METOPROLOL SUCCINATE 50 MG/1
50 TABLET, EXTENDED RELEASE ORAL DAILY
Qty: 90 TABLET | Refills: 3 | Status: SHIPPED | OUTPATIENT
Start: 2024-01-18 | End: 2024-04-24

## 2024-01-18 NOTE — TELEPHONE ENCOUNTER
Spoke to Geeta CHING at [facility], said Dr Browne is OK with patient taking the metoprolol and eliquis.     Miguelito Sim MD  [facility] Heart Team 2  FLP looks good.    Spoke to patient and reviewed information as above. He verbalized understanding, will go to the pharmacy to  the prescriptions. He will let us know if the eliquis is too expensive. Reviewed s/s of abnormal bleeding on eliquis.

## 2024-01-18 NOTE — TELEPHONE ENCOUNTER
Reply from Dr. Sim:  I would defer those questions to his oncologist.  They are more familiar with the nuances of chemotherapy that I am.  Clearly with his cancer he is hypercoagulable state and high risk for stroke.       0920 left a message for patient with Dr. Sim's recommendation.    Called [facility] to ask for Dr. Browne's input on starting the toprol XL and eliquis.      Addendum 0930: Talked to patient and reviewed that we have reached out to oncology to weigh in on the meds. He asks that we send the prescriptions to his pharmacy to have on hand/ready to be filled. Prescriptions sent.   Hilaria Rodrigez RN on 1/18/2024 at 9:35 AM

## 2024-01-24 ENCOUNTER — OFFICE VISIT (OUTPATIENT)
Dept: CARDIOLOGY | Facility: CLINIC | Age: 79
End: 2024-01-24
Payer: COMMERCIAL

## 2024-01-24 VITALS
WEIGHT: 181 LBS | DIASTOLIC BLOOD PRESSURE: 59 MMHG | HEIGHT: 71 IN | SYSTOLIC BLOOD PRESSURE: 100 MMHG | HEART RATE: 83 BPM | BODY MASS INDEX: 25.34 KG/M2

## 2024-01-24 DIAGNOSIS — I25.119 CORONARY ARTERY DISEASE INVOLVING NATIVE CORONARY ARTERY OF NATIVE HEART WITH ANGINA PECTORIS (H): ICD-10-CM

## 2024-01-24 DIAGNOSIS — Z79.01 CURRENT USE OF LONG TERM ANTICOAGULATION: ICD-10-CM

## 2024-01-24 DIAGNOSIS — R79.89 ELEVATED LIVER FUNCTION TESTS: ICD-10-CM

## 2024-01-24 DIAGNOSIS — R07.89 ATYPICAL CHEST PAIN: ICD-10-CM

## 2024-01-24 DIAGNOSIS — E78.00 HYPERCHOLESTEROLEMIA: ICD-10-CM

## 2024-01-24 DIAGNOSIS — I48.0 PAROXYSMAL A-FIB (H): Primary | ICD-10-CM

## 2024-01-24 DIAGNOSIS — R93.1 AGATSTON CAC SCORE, >400: ICD-10-CM

## 2024-01-24 PROCEDURE — 99215 OFFICE O/P EST HI 40 MIN: CPT | Performed by: INTERNAL MEDICINE

## 2024-01-24 PROCEDURE — 93000 ELECTROCARDIOGRAM COMPLETE: CPT | Mod: 76 | Performed by: INTERNAL MEDICINE

## 2024-01-24 RX ORDER — METOPROLOL TARTRATE 25 MG/1
25 TABLET, FILM COATED ORAL PRN
Qty: 25 TABLET | Refills: 4 | Status: SHIPPED | OUTPATIENT
Start: 2024-01-24 | End: 2024-04-09 | Stop reason: ALTCHOICE

## 2024-01-24 NOTE — PROGRESS NOTES
HPI and Plan:   Humza is a very nice 79-year-old gentleman with past medical history significant for hypercholesterolemia, a very strong family history of coronary disease with his father dying of a second myocardial infarction at age 62 but first myocardial infarction at age 39.  Grandfather  at age 35.  Brother at 68 also had a myocardial infarction and received a stent.     A calcium score ()  returned at 4316, putting him in the top 1% for risk.  He has borderline dilated ascending aorta by echocardiogram at 4.1.  2019 CT scan measured 3.7.    He had a negative stress nuclear scan in  and 2021 for which he was able to exercise 10 minutes of the standard Silvano protocol without symptoms, EKG changes or echocardiographic evidence of ischemia.     I saw Humza last summer because of an atypical chest pain.  Stress echo was again normal at 9 minutes of the standard Silvano protocol and unfortunately his chest pain turned out to be metastatic lung cancer to the bones.    Humza's cancer therapy caused a bump in his LFTs for which we decreased his rosuvastatin from 20 mg to 10 mg daily.    I am seeing Humza urgently today because a follow-up echo last week he showed up an asymptomatic rapid atrial fibrillation.  Initiated metoprolol succinate 50 mg daily and Eliquis 5 mg twice daily     Neck exercises 3 times a week without any exertional symptoms.     Humza returns to clinic and obviously has many questions.     ASSESSMENT AND PLAN: Humza has no symptoms to suggest ischemia or heart failure.    His exam and EKG demonstrates he is back in normal sinus rhythm today.  He relates he was completely asymptomatic with his A-fib despite 150 bpm.  We had a long discussion about A-fib, risk of cerebrovascular accident which for him is higher than his [id number] score because of his underlying lung cancer.  Ultimately decided we will continue with the metoprolol succinate 50 mg daily which she is tolerating quite well.  I  have given her metoprolol tartrate 25 mg to use as a pill in the pocket if need be.  I recommended that he get a [employer] mobile device or smart watch to monitor going forward especially given the fact that he appears to be completely asymptomatic when in A-fib.  And will continue with Eliquis therapy.    Fasting lipid profile has backslid somewhat because of decrease in rosuvastatin dose.  ALT has normalized.  I will cautiously try increasing his rosuvastatin back to 20 mg daily.  If indeed liver function tests go up I do not want to get in the way of his cancer therapy will except his LDL of 57.     Because we initiated Eliquis therapy I will discontinue his aspirin.     Weight is down a bit at 181 pounds.  He asks about take him supplements or [place] instant breakfast and I told him these would be okay and should not cause him any issues.     I have congratulated on his healthy lifestyle and encouraged him to continue to do so.      I will recheck a fasting lipid profile and ALT in 1 month.  I will have him see my YUNG in 3 months.  I will see him back in 6 months.  I have asked him to call us if he has a reoccurrence of his A-fib.  Or any other questions that may come up.  Thank you for allow me to participate in this patient's care.  Sincerely,                               Miguelito Sim MD [facility]      Today's clinic visit entailed:  Review of the result(s) of each unique test - EKG, echocardiogram, lab work  The following tests were independently interpreted by me as noted in my documentation: EKG  Ordering of each unique test  Prescription drug management  60 minutes spent by me on the date of the encounter doing chart review, history and exam, documentation and further activities per the note  Provider  Link to [facility] Help Grid     The level of medical decision making during this visit was of moderate complexity.      Orders Placed This Encounter   Procedures    Lipid Profile    ALT    Follow-Up with  Cardiology    Follow-Up with Cardiology YUNG    EKG 12-lead complete w/read - Clinics (performed today)    EKG 12-lead complete w/read - Clinics (performed today)       Orders Placed This Encounter   Medications    osimertinib (TAGRISSO) 80 MG tablet     Sig: Take 80 mg by mouth daily    metoprolol tartrate (LOPRESSOR) 25 MG tablet     Sig: Take 1 tablet (25 mg) by mouth as needed (Afib)     Dispense:  25 tablet     Refill:  4       Medications Discontinued During This Encounter   Medication Reason    aspirin (ASA) 81 MG tablet          Encounter Diagnoses   Name Primary?    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) Yes    Paroxysmal A-fib (H)     Current use of long term anticoagulation        CURRENT MEDICATIONS:  Current Outpatient Medications   Medication Sig Dispense Refill    apixaban ANTICOAGULANT (ELIQUIS ANTICOAGULANT) 5 MG tablet Take 1 tablet (5 mg) by mouth 2 times daily 180 tablet 3    Ascorbic Acid (VITAMIN C PO) Take 1,000 mg by mouth every morning       Cyanocobalamin (VITAMIN B 12 PO) Take 1,000 mcg by mouth every morning      ezetimibe (ZETIA) 10 MG tablet Take 1 tablet (10 mg) by mouth daily 90 tablet 0    metoprolol succinate ER (TOPROL XL) 50 MG 24 hr tablet Take 1 tablet (50 mg) by mouth daily 90 tablet 3    metoprolol tartrate (LOPRESSOR) 25 MG tablet Take 1 tablet (25 mg) by mouth as needed (Afib) 25 tablet 4    nitroGLYcerin (NITROSTAT) 0.4 MG sublingual tablet For chest pain place 1 tablet under the tongue every 5 minutes for 3 doses. If symptoms persist 5 minutes after 1st dose call 911. 25 tablet 1    osimertinib (TAGRISSO) 80 MG tablet Take 80 mg by mouth daily      rosuvastatin (CRESTOR) 20 MG tablet Take 20 mg by mouth daily      tadalafil (CIALIS) 20 MG tablet One-half to one tablet prior to sex daily. 15 tablet 11    VITAMIN D, CHOLECALCIFEROL, PO  Take 1,000 Units by mouth every morning          ALLERGIES   No Known Allergies    PAST MEDICAL HISTORY:  Past Medical History:   Diagnosis Date    Aortic root dilatation (H24)     Aortic stenosis     Arthritis     Atypical chest pain 2015    Cerebral artery occlusion with cerebral infarction (H)     TIA      Essential hypertension with goal blood pressure less than 140/90 2016    takes lisinopril for preventitive    Family history of heart disease     Hypercholesterolemia 1/3/2014     Diagnosis updated by automated process. Provider to review and confirm.    Hyperlipidemia LDL goal <70     IBS (irritable bowel syndrome)     Other chronic pain        PAST SURGICAL HISTORY:  Past Surgical History:   Procedure Laterality Date    ARTHROPLASTY HIP ANTERIOR Right 10/24/2017    Procedure: ARTHROPLASTY HIP ANTERIOR;  RIGHT TOTAL HIP ARTHROPLASTY DIRECT ANTERIOR APPROACH;  Surgeon: Meño Avalos MD;  Location:  OR    ARTHROPLASTY HIP ANTERIOR Left 10/09/2018    Procedure: ARTHROPLASTY HIP ANTERIOR;  LEFT TOTAL HIP ARTHROPLASTY DIRECT ANTERIOR APPROACH (DEPUY)^;  Surgeon: Meño Avalos MD;  Location:  OR    COLONOSCOPY  2011    Adenomatous polyp removed    COLONOSCOPY  2017    Two adenomatous polyps, repeat in 5 yrs    VASECTOMY      ZZC NONSPECIFIC PROCEDURE  1990    kidney stone       FAMILY HISTORY:  Family History   Problem Relation Age of Onset    Cerebrovascular Disease Mother         Stroke age 69    Breast Cancer Mother          age 75    C.A.D. 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 Yes     Comment: father     Caffeine Concern Yes     Comment: 2 cups coffee daily, occ soda     Sleep Concern No    Special Diet No    Exercise Yes     Comment: 3-4 times weekly     Seat Belt Yes   Social History Narrative    Rides bicycle or [place] [facility] regularly (3x/week).      Social Determinants of Health     Financial Resource Strain: Low Risk  (10/21/2021)    Overall Financial Resource Strain (CARDIA)     Difficulty of Paying Living Expenses: Not hard at all   Food Insecurity: No Food Insecurity (10/21/2021)    Hunger Vital Sign     Worried About Running Out of Food in the Last Year: Never true     Ran Out of Food in the Last Year: Never true   Transportation Needs: No Transportation Needs (10/21/2021)    PRAPARE - Transportation     Lack of Transportation (Medical): No     Lack of Transportation (Non-Medical): No   Physical Activity: Sufficiently Active (10/21/2021)    Exercise Vital Sign     Days of Exercise per Week: 5 days     Minutes of Exercise per Session: 60 min   Interpersonal Safety: Low Risk  (9/27/2023)    Interpersonal Safety     Do you feel physically and emotionally safe where you currently live?: Yes     Within the past 12 months, have you been hit, slapped, kicked or otherwise physically hurt by someone?: No     Within the past 12 months, have you been humiliated or emotionally abused in other ways by your partner or ex-partner?: No   Housing Stability: Low Risk  (10/21/2021)    Housing Stability Vital Sign     Unable to Pay for Housing in the Last Year: No     Number of Places Lived in the Last Year: 1     Unstable Housing in the Last  "Year: No       Review of Systems:  Skin:          Eyes:         ENT:         Respiratory:  Negative       Cardiovascular:  Negative      Gastroenterology:        Genitourinary:         Musculoskeletal:         Neurologic:         Psychiatric:         Heme/Lymph/Imm:         Endocrine:           Physical Exam:  Vitals: /59   Pulse 83   Ht 1.803 m (5' 11\")   Wt 82.1 kg (181 lb)   BMI 25.24 kg/m      Constitutional:  cooperative, alert and oriented, well developed, well nourished, in no acute distress        Skin:  warm and dry to the touch, no apparent skin lesions or masses noted          Head:  normocephalic, no masses or lesions        Eyes:  pupils equal and round, conjunctivae and lids unremarkable, sclera white, no xanthalasma, EOMS intact, no nystagmus        Lymph:      ENT:  no pallor or cyanosis        Neck:  no carotid bruit        Respiratory:  normal respiratory excursion;clear to auscultation         Cardiac: regular rhythm;normal S1 and S2;no S3 or S4;no murmurs, gallops or rubs detected                pulses full and equal                                        GI:           Extremities and Muscular Skeletal:  no edema;no spinal abnormalities noted;normal muscle strength and tone              Neurological:  no gross motor deficits        Psych:  affect appropriate, oriented to time, person and place        CC  Miguelito Sim MD  [address]  [place] [id number]                "

## 2024-01-24 NOTE — LETTER
2024    Guillermo Kong MD, MD  [address]  [facility] [address]    RE: Javier MARIO Tamez       Dear Colleague,     I had the pleasure of seeing Javier Tamez in the [facility].  HPI and Plan:   Humza is a very nice 79-year-old gentleman with past medical history significant for hypercholesterolemia, a very strong family history of coronary disease with his father dying of a second myocardial infarction at age 62 but first myocardial infarction at age 39.  Grandfather  at age 35.  Brother at 68 also had a myocardial infarction and received a stent.     A calcium score ()  returned at 4316, putting him in the top 1% for risk.  He has borderline dilated ascending aorta by echocardiogram at 4.1.  2019 CT scan measured 3.7.    He had a negative stress nuclear scan in  and 2021 for which he was able to exercise 10 minutes of the standard Silvano protocol without symptoms, EKG changes or echocardiographic evidence of ischemia.     I saw Humza last summer because of an atypical chest pain.  Stress echo was again normal at 9 minutes of the standard Silvano protocol and unfortunately his chest pain turned out to be metastatic lung cancer to the bones.    Humza's cancer therapy caused a bump in his LFTs for which we decreased his rosuvastatin from 20 mg to 10 mg daily.    I am seeing Humza urgently today because a follow-up echo last week he showed up an asymptomatic rapid atrial fibrillation.  Initiated metoprolol succinate 50 mg daily and Eliquis 5 mg twice daily     Neck exercises 3 times a week without any exertional symptoms.     Humza returns to clinic and obviously has many questions.     ASSESSMENT AND PLAN: Humza has no symptoms to suggest ischemia or heart failure.    His exam and EKG demonstrates he is back in normal sinus rhythm today.  He relates he was completely asymptomatic with his A-fib despite 150 bpm.  We had a long discussion about A-fib, risk of  cerebrovascular accident which for him is higher than his [id number] score because of his underlying lung cancer.  Ultimately decided we will continue with the metoprolol succinate 50 mg daily which she is tolerating quite well.  I have given her metoprolol tartrate 25 mg to use as a pill in the pocket if need be.  I recommended that he get a [employer] mobile device or smart watch to monitor going forward especially given the fact that he appears to be completely asymptomatic when in A-fib.  And will continue with Eliquis therapy.    Fasting lipid profile has backslid somewhat because of decrease in rosuvastatin dose.  ALT has normalized.  I will cautiously try increasing his rosuvastatin back to 20 mg daily.  If indeed liver function tests go up I do not want to get in the way of his cancer therapy will except his LDL of 57.     Because we initiated Eliquis therapy I will discontinue his aspirin.     Weight is down a bit at 181 pounds.  He asks about take him supplements or [place] instant breakfast and I told him these would be okay and should not cause him any issues.     I have congratulated on his healthy lifestyle and encouraged him to continue to do so.      I will recheck a fasting lipid profile and ALT in 1 month.  I will have him see my YUNG in 3 months.  I will see him back in 6 months.  I have asked him to call us if he has a reoccurrence of his A-fib.  Or any other questions that may come up.  Thank you for allow me to participate in this patient's care.  Sincerely,                               Miguelito Sim MD [facility]      Today's clinic visit entailed:  Review of the result(s) of each unique test - EKG, echocardiogram, lab work  The following tests were independently interpreted by me as noted in my documentation: EKG  Ordering of each unique test  Prescription drug management  60 minutes spent by me on the date of the encounter doing chart review, history and exam, documentation and further  activities per the note  Provider  Link to MDM Help Grid     The level of medical decision making during this visit was of moderate complexity.      Orders Placed This Encounter   Procedures    Lipid Profile    ALT    Follow-Up with Cardiology    Follow-Up with Cardiology YUNG    EKG 12-lead complete w/read - Clinics (performed today)    EKG 12-lead complete w/read - Clinics (performed today)       Orders Placed This Encounter   Medications    osimertinib (TAGRISSO) 80 MG tablet     Sig: Take 80 mg by mouth daily    metoprolol tartrate (LOPRESSOR) 25 MG tablet     Sig: Take 1 tablet (25 mg) by mouth as needed (Afib)     Dispense:  25 tablet     Refill:  4       Medications Discontinued During This Encounter   Medication Reason    aspirin (ASA) 81 MG tablet          Encounter Diagnoses   Name Primary?    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) Yes    Paroxysmal A-fib (H)     Current use of long term anticoagulation        CURRENT MEDICATIONS:  Current Outpatient Medications   Medication Sig Dispense Refill    apixaban ANTICOAGULANT (ELIQUIS ANTICOAGULANT) 5 MG tablet Take 1 tablet (5 mg) by mouth 2 times daily 180 tablet 3    Ascorbic Acid (VITAMIN C PO) Take 1,000 mg by mouth every morning       Cyanocobalamin (VITAMIN B 12 PO) Take 1,000 mcg by mouth every morning      ezetimibe (ZETIA) 10 MG tablet Take 1 tablet (10 mg) by mouth daily 90 tablet 0    metoprolol succinate ER (TOPROL XL) 50 MG 24 hr tablet Take 1 tablet (50 mg) by mouth daily 90 tablet 3    metoprolol tartrate (LOPRESSOR) 25 MG tablet Take 1 tablet (25 mg) by mouth as needed (Afib) 25 tablet 4    nitroGLYcerin (NITROSTAT) 0.4 MG sublingual tablet For chest pain place 1 tablet under the tongue every 5 minutes for 3 doses. If symptoms persist 5 minutes after 1st dose call 911. 25 tablet 1    osimertinib (TAGRISSO) 80  MG tablet Take 80 mg by mouth daily      rosuvastatin (CRESTOR) 20 MG tablet Take 20 mg by mouth daily      tadalafil (CIALIS) 20 MG tablet One-half to one tablet prior to sex daily. 15 tablet 11    VITAMIN D, CHOLECALCIFEROL, PO Take 1,000 Units by mouth every morning          ALLERGIES   No Known Allergies    PAST MEDICAL HISTORY:  Past Medical History:   Diagnosis Date    Aortic root dilatation (H24)     Aortic stenosis     Arthritis     Atypical chest pain 2015    Cerebral artery occlusion with cerebral infarction (H)     TIA      Essential hypertension with goal blood pressure less than 140/90 2016    takes lisinopril for preventitive    Family history of heart disease     Hypercholesterolemia 1/3/2014     Diagnosis updated by automated process. Provider to review and confirm.    Hyperlipidemia LDL goal <70     IBS (irritable bowel syndrome)     Other chronic pain        PAST SURGICAL HISTORY:  Past Surgical History:   Procedure Laterality Date    ARTHROPLASTY HIP ANTERIOR Right 10/24/2017    Procedure: ARTHROPLASTY HIP ANTERIOR;  RIGHT TOTAL HIP ARTHROPLASTY DIRECT ANTERIOR APPROACH;  Surgeon: Meño Avalos MD;  Location:  OR    ARTHROPLASTY HIP ANTERIOR Left 10/09/2018    Procedure: ARTHROPLASTY HIP ANTERIOR;  LEFT TOTAL HIP ARTHROPLASTY DIRECT ANTERIOR APPROACH (DEPUY)^;  Surgeon: Meño Avalos MD;  Location:  OR    COLONOSCOPY  2011    Adenomatous polyp removed    COLONOSCOPY  2017    Two adenomatous polyps, repeat in 5 yrs    VASECTOMY      Z NONSPECIFIC PROCEDURE  1990    kidney stone       FAMILY HISTORY:  Family History   Problem Relation Age of Onset    Cerebrovascular Disease Mother         Stroke age 69    Breast Cancer Mother          age 75    C.A.D. 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 Yes     Comment: father     Caffeine Concern Yes     Comment: 2 cups coffee daily, occ soda     Sleep Concern No    Special Diet No    Exercise Yes     Comment: 3-4 times weekly     Seat Belt Yes   Social History Narrative    Rides bicycle or goes to Y regularly (3x/week).      Social Determinants of Health     Financial Resource Strain: Low Risk  (10/21/2021)    Overall Financial Resource Strain (CARDIA)     Difficulty of Paying Living Expenses: Not hard at all   Food Insecurity: No Food Insecurity (10/21/2021)    Hunger Vital Sign     Worried About Running Out of Food in the Last Year: Never true     Ran Out of Food in the Last Year: Never true   Transportation Needs: No Transportation Needs (10/21/2021)    PRAPARE - Transportation     Lack of Transportation (Medical): No     Lack of Transportation (Non-Medical): No   Physical Activity: Sufficiently Active (10/21/2021)    Exercise Vital Sign     Days of Exercise per Week: 5 days     Minutes of Exercise per Session: 60 min   Interpersonal Safety: Low Risk  (9/27/2023)    Interpersonal Safety     Do you feel physically and emotionally safe where you currently live?: Yes     Within the past 12 months, have you been hit, slapped, kicked or otherwise physically hurt by someone?: No     Within the past 12 months, have you been humiliated or emotionally abused in other ways by your partner or  "ex-partner?: No   Housing Stability: Low Risk  (10/21/2021)    Housing Stability Vital Sign     Unable to Pay for Housing in the Last Year: No     Number of Places Lived in the Last Year: 1     Unstable Housing in the Last Year: No       Review of Systems:  Skin:          Eyes:         ENT:         Respiratory:  Negative       Cardiovascular:  Negative      Gastroenterology:        Genitourinary:         Musculoskeletal:         Neurologic:         Psychiatric:         Heme/Lymph/Imm:         Endocrine:           Physical Exam:  Vitals: /59   Pulse 83   Ht 1.803 m (5' 11\")   Wt 82.1 kg (181 lb)   BMI 25.24 kg/m      Constitutional:  cooperative, alert and oriented, well developed, well nourished, in no acute distress        Skin:  warm and dry to the touch, no apparent skin lesions or masses noted          Head:  normocephalic, no masses or lesions        Eyes:  pupils equal and round, conjunctivae and lids unremarkable, sclera white, no xanthalasma, EOMS intact, no nystagmus        Lymph:      ENT:  no pallor or cyanosis        Neck:  no carotid bruit        Respiratory:  normal respiratory excursion;clear to auscultation         Cardiac: regular rhythm;normal S1 and S2;no S3 or S4;no murmurs, gallops or rubs detected                pulses full and equal                                        GI:           Extremities and Muscular Skeletal:  no edema;no spinal abnormalities noted;normal muscle strength and tone              Neurological:  no gross motor deficits        Psych:  affect appropriate, oriented to time, person and place        CC  Miguelito Sim MD  [address]  [place] [address]      Thank you for allowing me to participate in the care of your patient.      Sincerely,     Miguelito Sim MD     [facility]  "

## 2024-01-25 ENCOUNTER — TELEPHONE (OUTPATIENT)
Dept: CARDIOLOGY | Facility: CLINIC | Age: 79
End: 2024-01-25
Payer: COMMERCIAL

## 2024-01-25 NOTE — TELEPHONE ENCOUNTER
Lipids/alt are due at the end of February.    Attempted to contact patient with update. Left a detailed message and sent a my chart also.    Mr. Joi Babcock,    Dr. Sim increased your rosuvastatin back to 20 mg daily. He would like to check a fasting lipid panel and ALT at the end of February; after you have been on the new dose for at least 30 days.    There are no cardiology labs needed with your January oncology visit.    Thank you for checking  Team 2 R.N.s  [phone]

## 2024-01-25 NOTE — TELEPHONE ENCOUNTER
[facility] Call Center    Phone Message    May a detailed message be left on voicemail: yes     Reason for Call: Order(s): Other:   Reason for requested: Pt states he is having labs done with [place] [facility] on 01/28/24 they are doing a CBC and CMP if we need anything else please fax them info or let him know  Date needed: ASAP  Provider name: Dr Sim     Action Taken: Other: Cardiology    Travel Screening: Not Applicable    Thank you!  [facility]

## 2024-02-05 ENCOUNTER — TELEPHONE (OUTPATIENT)
Dept: CARDIOLOGY | Facility: CLINIC | Age: 79
End: 2024-02-05
Payer: COMMERCIAL

## 2024-02-05 NOTE — TELEPHONE ENCOUNTER
[facility] Call [facility]    Phone Message    May a detailed message be left on voicemail: yes     Reason for Call: Other: The Cardio Mobile up and running and he would like to connect about what to do next.     Action Taken: Other: Cardiology    Travel Screening: Not Applicable    Thank you!  [facility]

## 2024-02-05 NOTE — TELEPHONE ENCOUNTER
Spoke with patient. He has set up the basic [employer] mobile unit. He did not buy the YUNG to save and send strips yet. He will monitor his rhythm to see what is happening and he can save strips on his phone.

## 2024-02-12 ENCOUNTER — TELEPHONE (OUTPATIENT)
Dept: CARDIOLOGY | Facility: CLINIC | Age: 79
End: 2024-02-12
Payer: COMMERCIAL

## 2024-02-12 ENCOUNTER — TELEPHONE (OUTPATIENT)
Dept: CARDIOLOGY | Facility: CLINIC | Age: 79
End: 2024-02-12

## 2024-02-12 DIAGNOSIS — U07.1 INFECTION DUE TO 2019 NOVEL CORONAVIRUS: Primary | ICD-10-CM

## 2024-02-12 NOTE — TELEPHONE ENCOUNTER
Patient calls back to reach out to primary care provider (Dr. Kong) to advise on Paxlovid for patient's recent COVID. Patient is currently taking medications that would contraindicate RN prescribing Paxlovid. Patient would like primary care provider advise.     Patient notified that primary care provider isn't in until tomorrow, but high priority message would be routed to cardiologist, Dr. Sim and Dr. Kong (primary care provider).    Thank you,  Juan R Ferrell, Triage RN [facility]  9:05 AM 2/12/2024

## 2024-02-12 NOTE — TELEPHONE ENCOUNTER
Call to patient to notify him of primary care provider message and that prescription for Paxlovid was sent over. Patient will wait for Dr. Sim to reach out to him regarding his input.     Thank you,  Juan R Ferrell, Triage RN [facility]  3:16 PM 2/12/2024

## 2024-02-12 NOTE — TELEPHONE ENCOUNTER
Please advise pt:    Dr Kong has reviewed his information and has reviewed potential drug interactions of his meds with Paxlovid.     Both rosuvastatin and Eliquis should be held while taking Paxlovid.     No significant risk in holding rosuvastatin.     Tiny risk of clot formation if he goes into atrial fibrillation while off of Eliquis. I think that this risk is low enough that benefits of taking Paxlovid probably outweigh risks.    I will forward this note to Dr Sim as well.  I'm guessing that he would agree.

## 2024-02-12 NOTE — TELEPHONE ENCOUNTER
[facility] Call [facility]    Phone Message    May a detailed message be left on voicemail: yes     Reason for Call: Other: Pt called in says he would like to speak with Dr. Sim regarding having covid and taking,taking Paxlovid medication wanted to find out if medication is okay since he has cancer, please call pt back for further discussion..     Action Taken: Other: cardiology    Travel Screening: Not Applicable    Thank you!  [facility]

## 2024-02-12 NOTE — TELEPHONE ENCOUNTER
[facility] Call [facility]    Phone Message    May a detailed message be left on voicemail: yes     Reason for Call: Other: Pt is requesting a call back ASAP to discuss medications he can take while on Paxlovid.  Specifically  ezetimibe.      Action Taken: Other: cardio    Travel Screening: Not Applicable    Thank you!  [facility]

## 2024-02-12 NOTE — TELEPHONE ENCOUNTER
Spoke with Patient who tested Covid + yesterday Sun 2-11-24.  Will be flying to [place] on Wednesday 2-14-24.    Patient spoke with Oncologist yesterday who states if was ok to take Plaxlovid on the Ca drugs but to contact cardiologist regarding cardiac medications such as statin and Eliquis. Should these be held?     Currently on Zetia, Eliquis, metoprolol Succinate, crestor,   PRN Nitroglycerine.     Last Dr. Sim OV 1-24-24   Humza has no symptoms to suggest ischemia or heart failure.  - past medical history significant for hypercholesterolemia, a very strong family history of coronary disease

## 2024-02-13 ENCOUNTER — TELEPHONE (OUTPATIENT)
Dept: CARDIOLOGY | Facility: CLINIC | Age: 79
End: 2024-02-13
Payer: COMMERCIAL

## 2024-02-13 ENCOUNTER — TELEPHONE (OUTPATIENT)
Dept: INTERNAL MEDICINE | Facility: CLINIC | Age: 79
End: 2024-02-13
Payer: COMMERCIAL

## 2024-02-13 DIAGNOSIS — U07.1 INFECTION DUE TO 2019 NOVEL CORONAVIRUS: Primary | ICD-10-CM

## 2024-02-13 RX ORDER — MOLNUPIRAVIR 200 MG/1
800 CAPSULE ORAL EVERY 12 HOURS
Qty: 40 EACH | Refills: 0 | Status: SHIPPED | OUTPATIENT
Start: 2024-02-13 | End: 2024-04-09

## 2024-02-13 NOTE — TELEPHONE ENCOUNTER
Paxlovid Rx has been sent to his pharmacy.     Please advise patient of this and of information in my note below.   Okay to take ezetimibe while on Paxlovid.

## 2024-02-13 NOTE — TELEPHONE ENCOUNTER
Patient was prescribed Paxlovid for Covid on 2/12/24 but it is quite costly and patient must hold a couple of his medications while on the Paxlovid.  He researched and found a different medication that he would like prescribed that would not require any holding of medications.  He is asking that MD prescribe Molnupraq (Lagevrio) to [facility] on [address]. SAVANNA Tovar R.N.

## 2024-02-13 NOTE — TELEPHONE ENCOUNTER
[facility] Call Center    Phone Message    May a detailed message be left on voicemail: yes     Reason for Call: Other: Pt has used a [employer]io [employer] since pt called and found he is in afib.  Also his /91  106/68  102/72   beats/min 84  86  116.  All were taken after pt first call at 1:10 pm this afternoon.   PT has not picked up the alternative medication - Molnuprira of a different med than Paxlovid for Covid due to pt feeling ok.  Covid symptoms are lessening by the day and currently he does not have any side effects.  Pt is more concerned about his heart now than any Covid 19 issues.     Action Taken: Message routed to:  [facility] [place]    Travel Screening: Not Applicable    Thank you!  [facility] [place]    Phone Message    May a detailed message be left on voicemail: yes     Reason for Call: Other:  Pt wants to know if Dr. Sim would think pt would be ok to leave on Thursday morning even though he tested positive on Sunday.  Pt has not tested since Sunday so doesn't know what his status is at this point for Covid.  Would Dr. Sim support this option?  Please call pt to advise.    Action Taken: Message routed to:  [facility] cardio    Travel Screening: Not Applicable    Thank you!  [facility]

## 2024-02-13 NOTE — LETTER
2/14/2024    Regarding:  Javier Tamez  [address] [place] [address]           To whom it may concern:     Javier Tamez is a patient in our clinic who was diagnosed with Covid-19 on 2/11/2024. By adhering to CDC guidelines to isolate for five days, he requires isolation through Friday 2/16/2024.   He is medically unable to attend his scheduled flight to scheduled to depart on 2/15/2024.      If you have any further questions or concerns, please contact our office.    Sincerely,        Guillermo Kong MD  [facility] Medical Director

## 2024-02-13 NOTE — TELEPHONE ENCOUNTER
Called patient and reviewed information from Dr Sim. He verbalized understanding, will reach out to primary. He has not started paxlovid/doesn't want to take it. He is still taking all of his regular medication including eliquis, has a call out to Dr Kong about an alternative to paxlovid.     He notes he took a [employer] mobile tracing in the last few days, says it came up as afib and wonders if that would change any of the recommendations. Pt has known PAF and is on elqiuis. Recommended he send a copy of the EKG via Meilimei for Dr Sim to review.       Addendum 165: Spoke to patient, says Dr Kong did prescribe him molnupiravir and was told he could remain on his eliquis. He is going to the pharmacy to pick it up. He is waiting to hear back from them regarding travel guidelines. He will try and send the kardia mobile tracing tonight via [employer]. Discussed that he should continue to monitor his BP at this time, call for persistently elevated or low readings. Encouraged good hydration and nutrition.

## 2024-02-13 NOTE — TELEPHONE ENCOUNTER
Please advise patient:    Patient should be aware that Lagevrio is not FDA-approved for Covid-19.   I have E-prescribed Rx.

## 2024-02-13 NOTE — TELEPHONE ENCOUNTER
Per Dr. Sim's recommendation: he needs to contact his PCP or infection control to discuss quarantine issues. To come to the heart clinic, it has to be a minimum of 7 days of no contact once there is a positive test. It would not be appropriate to fly if he is testing positive and not treating the viral load.    If he is on the paxlovid [place] and has to hold eliquis, it would be safer to wait to fly until he can resume the eliquis.

## 2024-02-13 NOTE — TELEPHONE ENCOUNTER
"From Dr. Kong's message from yesterday:  \"  Please advise pt:     Dr Kong has reviewed his information and has reviewed potential drug interactions of his meds with Paxlovid.      Both rosuvastatin and Eliquis should be held while taking Paxlovid.      No significant risk in holding rosuvastatin.      Tiny risk of clot formation if he goes into atrial fibrillation while off of Eliquis. I think that this risk is low enough that benefits of taking Paxlovid probably outweigh risks.      \"    Will route to primary care provider to advise on ezetimibe (ZETIA) 10 MG tablet and Paxlovid    Thank you,  Juan R Ferrell, Triage RN [facility]  10:38 AM 2/13/2024     "

## 2024-02-13 NOTE — TELEPHONE ENCOUNTER
Pt calling asking since he tested positive on 2/11/2024 and symptoms started on same day can he travel via plane to [place] on 2/15/2024 - RN reviewed CDC guidelines and needing to isolate for 5 days and that would be through Friday the 16th.    Pt is asking if provider can write a letter stating he has covid and cannot fly due to needing to isolate through the 16th so he could get a refund of his money      Best # [phone] ok      Please review and advise     Thank you     Leigh Leonard RN, BSN  [facility]

## 2024-02-14 ENCOUNTER — MYC MEDICAL ADVICE (OUTPATIENT)
Dept: CARDIOLOGY | Facility: CLINIC | Age: 79
End: 2024-02-14
Payer: COMMERCIAL

## 2024-02-14 NOTE — TELEPHONE ENCOUNTER
Patient was called but mail box full and unable to left voice mail .     Letter being requested is in patient mychart

## 2024-02-14 NOTE — TELEPHONE ENCOUNTER
"My chart message from patient:  Humza Tamez [facility] Heart Team 2  Phone Number: [phone]     Mobile device 2/13/24    I continue to try to attach a picture of the EKG reading  but repeatedly get the following My Chart message:    \"Oops! Looks like we ran into a network issue and couldn t upload your file at this moment. Please try again later.\"    Helalonzo, Mr. Tamez,    Unfortunately, we cannot help fix that. It is an IT issue.     The eliquis is helping decrease stroke risk. If the rate of the afib is stable then it would be unlikely the metoprolol dose would change.    Send the strip when you can for our records.    Thank you  Team 2 R.N.s  [phone]    "

## 2024-02-15 NOTE — TELEPHONE ENCOUNTER
Call to patient who states that he decided not to take the Paxlovid as patient is feeling better. No further action needed.    Thank you,  Juan R Ferrell, Triage RN [facility]  9:26 AM 2/15/2024

## 2024-02-16 DIAGNOSIS — E78.00 HYPERCHOLESTEROLEMIA: ICD-10-CM

## 2024-02-16 RX ORDER — EZETIMIBE 10 MG/1
10 TABLET ORAL DAILY
Qty: 90 TABLET | Refills: 4 | Status: SHIPPED | OUTPATIENT
Start: 2024-02-16

## 2024-02-21 ENCOUNTER — HOSPITAL ENCOUNTER (OUTPATIENT)
Dept: CT IMAGING | Facility: CLINIC | Age: 79
Discharge: HOME | End: 2024-02-21
Attending: INTERNAL MEDICINE | Admitting: INTERNAL MEDICINE
Payer: COMMERCIAL

## 2024-02-21 ENCOUNTER — TELEPHONE (OUTPATIENT)
Dept: CARDIOLOGY | Facility: CLINIC | Age: 79
End: 2024-02-21
Payer: COMMERCIAL

## 2024-02-21 DIAGNOSIS — C34.12 PRIMARY MALIGNANT NEOPLASM OF LEFT UPPER LOBE OF LUNG (H): ICD-10-CM

## 2024-02-21 LAB
CREAT BLD-MCNC: 0.8 MG/DL (ref 0.7–1.3)
EGFRCR SERPLBLD CKD-EPI 2021: >60 ML/MIN/1.73M2

## 2024-02-21 PROCEDURE — 71260 CT THORAX DX C+: CPT

## 2024-02-21 PROCEDURE — 82565 ASSAY OF CREATININE: CPT

## 2024-02-21 PROCEDURE — 250N000009 HC RX 250: Performed by: INTERNAL MEDICINE

## 2024-02-21 PROCEDURE — 250N000011 HC RX IP 250 OP 636: Performed by: INTERNAL MEDICINE

## 2024-02-21 RX ORDER — IOPAMIDOL 755 MG/ML
500 INJECTION, SOLUTION INTRAVASCULAR ONCE
Status: COMPLETED | OUTPATIENT
Start: 2024-02-21 | End: 2024-02-21

## 2024-02-21 RX ADMIN — IOPAMIDOL 89 ML: 755 INJECTION, SOLUTION INTRAVENOUS at 10:03

## 2024-02-21 RX ADMIN — SODIUM CHLORIDE 62 ML: 9 INJECTION, SOLUTION INTRAVENOUS at 10:03

## 2024-02-21 NOTE — TELEPHONE ENCOUNTER
[facility] Call [facility]    Phone Message    May a detailed message be left on voicemail: yes     Reason for Call: Other: Pt has dental appointment tomorrow and wants to know if he should be on any medications? Please reach out to further discuss.     Action Taken: Other: Cardiology    Travel Screening: Not Applicable    Thank you!  [facility]

## 2024-02-21 NOTE — TELEPHONE ENCOUNTER
Called patient to review that he does not need SBE for atrial fibrillation. He used this for 1-2 years post hip surgery, so wanted to verify.

## 2024-02-27 ENCOUNTER — LAB (OUTPATIENT)
Dept: LAB | Facility: CLINIC | Age: 79
End: 2024-02-27
Payer: COMMERCIAL

## 2024-02-27 ENCOUNTER — TELEPHONE (OUTPATIENT)
Dept: CARDIOLOGY | Facility: CLINIC | Age: 79
End: 2024-02-27

## 2024-02-27 DIAGNOSIS — E78.00 HYPERCHOLESTEROLEMIA: Primary | ICD-10-CM

## 2024-02-27 DIAGNOSIS — E78.00 HYPERCHOLESTEROLEMIA: ICD-10-CM

## 2024-02-27 LAB
ALT SERPL W P-5'-P-CCNC: 128 U/L (ref 0–70)
CHOLEST SERPL-MCNC: 106 MG/DL
FASTING STATUS PATIENT QL REPORTED: YES
HDLC SERPL-MCNC: 49 MG/DL
LDLC SERPL CALC-MCNC: 49 MG/DL
NONHDLC SERPL-MCNC: 57 MG/DL
TRIGL SERPL-MCNC: 38 MG/DL

## 2024-02-27 PROCEDURE — 80061 LIPID PANEL: CPT | Performed by: INTERNAL MEDICINE

## 2024-02-27 PROCEDURE — 84460 ALANINE AMINO (ALT) (SGPT): CPT | Performed by: INTERNAL MEDICINE

## 2024-02-27 PROCEDURE — 36415 COLL VENOUS BLD VENIPUNCTURE: CPT | Performed by: INTERNAL MEDICINE

## 2024-02-28 ENCOUNTER — TRANSFERRED RECORDS (OUTPATIENT)
Dept: HEALTH INFORMATION MANAGEMENT | Facility: CLINIC | Age: 79
End: 2024-02-28
Payer: COMMERCIAL

## 2024-02-28 RX ORDER — ROSUVASTATIN CALCIUM 10 MG/1
10 TABLET, COATED ORAL DAILY
COMMUNITY
Start: 2024-02-28 | End: 2024-04-24

## 2024-02-28 NOTE — TELEPHONE ENCOUNTER
Spoke with Patient and FLP/ALT results reviewed via My Chart.  Dr. Sim's reply to decrease the rosuvastatin to 10 mg and repeat FLP/ALT in 1 month.  Patient agrees and Lab orders place.    Patient to return call with any questions or concerns.     Dr Sim's reply 2-27-24    Yes.  Go back to 10 mg and recheck fasting lipid profile and ALT in 1 month

## 2024-03-06 NOTE — TELEPHONE ENCOUNTER
[facility] Call [facility]    Phone Message    May a detailed message be left on voicemail: yes     Reason for Call: Other: pt called yesterday requesting a call back but did not receive it.  Dr. Sim, pt is having a CT scan and a lump was found 10 days ago.  Pt is concerned/worried and would very much like to speak w/Dr. Sim after the results of the CT scan are available so pt has an understanding of what is happening w/him.     Action Taken: Message routed to:  [facility] ([facility]): cardio    Travel Screening: Not Applicable    Thank you!  [facility]                                                                      
Miguelito Sim MD Theis, Marcie J, RN  Caller: Unspecified (Yesterday, 10:53 AM)  He has had 2 stress echoes this year already that do not appear to show any ischemia.  His chest pain is quite atypical.  Lets try a stress nuclear scan but I think his chest pain is not cardiac and he should discuss it with his primary care doctor as well.     Called patient. He wants to have the CT chest today and be sure he knows what is involved with the lump on his chest. Once that is reviewed, he will call back to cardiology and decide if he wants to move ahead with the stress nuclear study.    Awaiting call from patient with his preference for the next step.  
25-Jun-2023

## 2024-03-12 ENCOUNTER — TELEPHONE (OUTPATIENT)
Dept: INTERNAL MEDICINE | Facility: CLINIC | Age: 79
End: 2024-03-12
Payer: COMMERCIAL

## 2024-03-12 NOTE — TELEPHONE ENCOUNTER
FYI - Status Update    Who is Calling: patient    Update: requesting an order for lab only PSA test placed by dr. Kong. We did schedule the appt already for 3/19.    Does caller want a call/response back: Yes     Could we send this information to you in "[employer]" or would you prefer to receive a phone call?:   Patient would prefer a phone call     Okay to leave a detailed message?: Yes at Cell number on file:    Telephone Information:   Mobile [phone]

## 2024-03-14 ENCOUNTER — LAB (OUTPATIENT)
Dept: LAB | Facility: CLINIC | Age: 79
End: 2024-03-14
Payer: COMMERCIAL

## 2024-03-14 DIAGNOSIS — E78.00 HYPERCHOLESTEROLEMIA: ICD-10-CM

## 2024-03-14 DIAGNOSIS — R35.0 URINARY FREQUENCY: ICD-10-CM

## 2024-03-14 DIAGNOSIS — Z12.5 SCREENING PSA (PROSTATE SPECIFIC ANTIGEN): ICD-10-CM

## 2024-03-14 LAB
ALBUMIN UR-MCNC: NEGATIVE MG/DL
APPEARANCE UR: CLEAR
BILIRUB UR QL STRIP: NEGATIVE
COLOR UR AUTO: YELLOW
GLUCOSE UR STRIP-MCNC: NEGATIVE MG/DL
HGB UR QL STRIP: NEGATIVE
KETONES UR STRIP-MCNC: NEGATIVE MG/DL
LEUKOCYTE ESTERASE UR QL STRIP: NEGATIVE
NITRATE UR QL: NEGATIVE
PH UR STRIP: 6 [PH] (ref 5–7)
SP GR UR STRIP: 1.02 (ref 1–1.03)
UROBILINOGEN UR STRIP-ACNC: 0.2 E.U./DL

## 2024-03-14 PROCEDURE — 36415 COLL VENOUS BLD VENIPUNCTURE: CPT

## 2024-03-14 PROCEDURE — 80061 LIPID PANEL: CPT

## 2024-03-14 PROCEDURE — 81003 URINALYSIS AUTO W/O SCOPE: CPT

## 2024-03-14 PROCEDURE — 84460 ALANINE AMINO (ALT) (SGPT): CPT

## 2024-03-14 PROCEDURE — G0103 PSA SCREENING: HCPCS

## 2024-03-15 LAB
ALT SERPL W P-5'-P-CCNC: 112 U/L (ref 0–70)
CHOLEST SERPL-MCNC: 103 MG/DL
FASTING STATUS PATIENT QL REPORTED: NO
HDLC SERPL-MCNC: 48 MG/DL
LDLC SERPL CALC-MCNC: 37 MG/DL
NONHDLC SERPL-MCNC: 55 MG/DL
PSA SERPL DL<=0.01 NG/ML-MCNC: 0.59 NG/ML (ref 0–6.5)
TRIGL SERPL-MCNC: 89 MG/DL

## 2024-03-18 ENCOUNTER — TELEPHONE (OUTPATIENT)
Dept: CARDIOLOGY | Facility: CLINIC | Age: 79
End: 2024-03-18

## 2024-03-18 NOTE — TELEPHONE ENCOUNTER
FLP and ALT done 3-14-24    Chol 103, HDL 48, LDL 37, TG 89.  . Was on 2-27-24 128.     Next YUNG OV 4-24-24.     Per Phone note Dr Sim's reply 2-27-24     Yes.  Go back to 10 mg and recheck fasting lipid profile and ALT in 1 month

## 2024-03-18 NOTE — TELEPHONE ENCOUNTER
Miguelito Sim MD Theis, Marcie J, RN2 hours ago (10:34 AM)     Improved. Continue as is       Detailed message left for patient reviewing FLP and ALT, along with Dr Sim's recommendation to continue current medications. Patient has follow up scheduled for April, will keep visit as planned. Team 2 number provided to call back with any questions.

## 2024-04-09 ENCOUNTER — OFFICE VISIT (OUTPATIENT)
Dept: INTERNAL MEDICINE | Facility: CLINIC | Age: 79
End: 2024-04-09
Payer: COMMERCIAL

## 2024-04-09 VITALS
TEMPERATURE: 98.1 F | RESPIRATION RATE: 18 BRPM | DIASTOLIC BLOOD PRESSURE: 60 MMHG | OXYGEN SATURATION: 94 % | BODY MASS INDEX: 25.48 KG/M2 | SYSTOLIC BLOOD PRESSURE: 102 MMHG | HEART RATE: 98 BPM | HEIGHT: 71 IN | WEIGHT: 182 LBS

## 2024-04-09 DIAGNOSIS — C79.51 MALIGNANT NEOPLASM METASTATIC TO BONE (H): ICD-10-CM

## 2024-04-09 DIAGNOSIS — I25.119 CORONARY ARTERY DISEASE INVOLVING NATIVE CORONARY ARTERY OF NATIVE HEART WITH ANGINA PECTORIS (H): ICD-10-CM

## 2024-04-09 DIAGNOSIS — R39.198 VOIDING DIFFICULTY: Primary | ICD-10-CM

## 2024-04-09 PROCEDURE — 99214 OFFICE O/P EST MOD 30 MIN: CPT | Performed by: NURSE PRACTITIONER

## 2024-04-09 RX ORDER — TAMSULOSIN HYDROCHLORIDE 0.4 MG/1
0.4 CAPSULE ORAL DAILY
Qty: 90 CAPSULE | Refills: 3 | Status: SHIPPED | OUTPATIENT
Start: 2024-04-09

## 2024-04-09 ASSESSMENT — PATIENT HEALTH QUESTIONNAIRE - PHQ9
SUM OF ALL RESPONSES TO PHQ QUESTIONS 1-9: 2
10. IF YOU CHECKED OFF ANY PROBLEMS, HOW DIFFICULT HAVE THESE PROBLEMS MADE IT FOR YOU TO DO YOUR WORK, TAKE CARE OF THINGS AT HOME, OR GET ALONG WITH OTHER PEOPLE: NOT DIFFICULT AT ALL
SUM OF ALL RESPONSES TO PHQ QUESTIONS 1-9: 2

## 2024-04-09 ASSESSMENT — ANXIETY QUESTIONNAIRES
3. WORRYING TOO MUCH ABOUT DIFFERENT THINGS: SEVERAL DAYS
4. TROUBLE RELAXING: SEVERAL DAYS
IF YOU CHECKED OFF ANY PROBLEMS ON THIS QUESTIONNAIRE, HOW DIFFICULT HAVE THESE PROBLEMS MADE IT FOR YOU TO DO YOUR WORK, TAKE CARE OF THINGS AT HOME, OR GET ALONG WITH OTHER PEOPLE: NOT DIFFICULT AT ALL
7. FEELING AFRAID AS IF SOMETHING AWFUL MIGHT HAPPEN: NOT AT ALL
1. FEELING NERVOUS, ANXIOUS, OR ON EDGE: NOT AT ALL
GAD7 TOTAL SCORE: 3
2. NOT BEING ABLE TO STOP OR CONTROL WORRYING: SEVERAL DAYS
7. FEELING AFRAID AS IF SOMETHING AWFUL MIGHT HAPPEN: NOT AT ALL
6. BECOMING EASILY ANNOYED OR IRRITABLE: NOT AT ALL
5. BEING SO RESTLESS THAT IT IS HARD TO SIT STILL: NOT AT ALL
GAD7 TOTAL SCORE: 3
8. IF YOU CHECKED OFF ANY PROBLEMS, HOW DIFFICULT HAVE THESE MADE IT FOR YOU TO DO YOUR WORK, TAKE CARE OF THINGS AT HOME, OR GET ALONG WITH OTHER PEOPLE?: NOT DIFFICULT AT ALL
GAD7 TOTAL SCORE: 3

## 2024-04-09 NOTE — NURSING NOTE
"Chief Complaint   Patient presents with    Prostate Problem     Prostate problem with frequent urination.     initial /60   Pulse 98   Temp 98.1  F (36.7  C) (Oral)   Resp 18   Ht 1.803 m (5' 11\")   Wt 82.6 kg (182 lb)   SpO2 94%   BMI 25.38 kg/m   Estimated body mass index is 25.38 kg/m  as calculated from the following:    Height as of this encounter: 1.803 m (5' 11\").    Weight as of this encounter: 82.6 kg (182 lb)..  bp completed using cuff size large  LILIAN WU LPN  "

## 2024-04-09 NOTE — PROGRESS NOTES
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    Weight as of this encounter: 82.6 kg (182 lb).         Patient Instructions   Flomax once daily     Urology referral if needed       John Paul Ching is a 79 year old, presenting for the following:  Prostate Problem (Prostate problem with frequent urination.)      Dr. Kong is primary. Since he saw him last, dx stage 4 lung cancer, nonsmoker. In January dx with a-fib. Lately experiencing frequent urination. PSA drawn 3/14 0.59, UA WNL.Taking tagrisso for lung cancer.    Urinary symptoms: getting up during the night 3-4x per night this started last fall, weaker stream, sometimes difficulty starting stream, denies dysuria hematuria. NO dribbling or incontinence. Feels he is able to fully empty bladder most of the time, sometimes needs to void twice to empty completely. Drinking \"fair amount\" of water, 1-2 cups of coffee per day, no alcohol. Bowel movements regular, takes metamucil.     Left hand \"seizes\" up like a cramp a few times over the last couple months, not painful, no preceding event, goes away on its own, lasts a minute or two.            4/9/2024     1:48 PM   Additional Questions   Roomed by [facility] " Directive  Patient has a Health Care Directive on file      History of Present Illness       Reason for visit:  Prostate cocerms  Symptom onset:  More than a month  Symptoms include:  Frequent urination  Symptom intensity:  Moderate  Symptom progression:  Staying the same  Had these symptoms before:  Yes  Has tried/received treatment for these symptoms:  No  What makes it worse:  No  What makes it better:  No    He eats 4 or more servings of fruits and vegetables daily.He consumes 2 sweetened beverage(s) daily.He exercises with enough effort to increase his heart rate 20 to 29 minutes per day.  He exercises with enough effort to increase his heart rate 5 days per week. He is missing 1 dose(s) of medications per week.                10/21/2021   General Health   How would you rate your overall physical health? Excellent         10/21/2021   Nutrition   At least 4 servings of fruits and vegetables/day Yes         10/21/2021   Exercise   Frequency of exercise: 4-5 days/week            10/21/2021   Activities of Daily Living- Home Safety   Needs help with the following daily activites NO assistance is needed   Safety concerns in the home None of the above          No data to display                  10/21/2021   Hearing Screening   Hearing concerns? Need to ask people to speak up or repeat themselves            No data to display                   Today's PHQ-9 Score:       4/9/2024     9:02 AM   PHQ-9 SCORE   PHQ-9 Total Score MyChart 2 (Minimal depression)   PHQ-9 Total Score 2         10/21/2021   Substance Use   Alcohol more than 3/day or more than 7/wk No     Social History     Tobacco Use    Smoking status: Never    Smokeless tobacco: Never   Vaping Use    Vaping Use: Never used   Substance Use Topics    Alcohol use: Not Currently    Drug use: No       ASCVD Risk   The ASCVD Risk score (Duran MARADIAGA, et al., 2019) failed to calculate for the following reasons:    The valid total cholesterol range is 130 to  "320 mg/dL            Reviewed and updated as needed this visit by Provider                      Current providers sharing in care for this patient include:  Patient Care Team:  Guillermo Kong MD as PCP - General (Internal Medicine)  Guillermo Kong MD as Assigned PCP  Isabel Smith MD as Hospitalist (Endocrinology, Diabetes, and Metabolism)  Miguelito Sim MD as MD (Cardiovascular Disease)  Rachel Taylor PA-C as Physician Assistant (Cardiovascular Disease)  Miguelito Sim MD as Assigned Heart and Vascular Provider  Willy Olivera MD as MD (Hematology & Oncology)  Willy Olivera MD as Assigned Cancer Care Provider  Yovany Kevin MD as MD (Hematology & Oncology)  Rachel Taylor PA-C as Physician Assistant (Cardiovascular Disease)    The following health maintenance items are reviewed in Epic and correct as of today:  Health Maintenance   Topic Date Due    MEDICARE ANNUAL WELLNESS VISIT  09/21/2023    FALL RISK ASSESSMENT  09/27/2024    LIPID  03/14/2025    ANNUAL REVIEW OF HM ORDERS  04/09/2025    GLUCOSE  01/17/2027    ADVANCE CARE PLANNING  09/21/2027    DTAP/TDAP/TD IMMUNIZATION (4 - Td or Tdap) 05/11/2032    HEPATITIS C SCREENING  Completed    PHQ-2 (once per calendar year)  Completed    INFLUENZA VACCINE  Completed    Pneumococcal Vaccine: 65+ Years  Completed    ZOSTER IMMUNIZATION  Completed    RSV VACCINE (Pregnancy & 60+)  Completed    COVID-19 Vaccine  Completed    IPV IMMUNIZATION  Aged Out    HPV IMMUNIZATION  Aged Out    MENINGITIS IMMUNIZATION  Aged Out    RSV MONOCLONAL ANTIBODY  Aged Out    COLORECTAL CANCER SCREENING  Discontinued         Review of Systems  Constitutional, neuro, ENT, endocrine, pulmonary, cardiac, gastrointestinal, genitourinary, musculoskeletal, integument and psychiatric systems are negative, except as otherwise noted.     Objective    Exam  /60   Pulse 98   Temp 98.1  F (36.7  C) (Oral)   Resp 18   Ht 1.803 m (5' 11\")   Wt " "82.6 kg (182 lb)   SpO2 94%   BMI 25.38 kg/m     Estimated body mass index is 25.38 kg/m  as calculated from the following:    Height as of this encounter: 1.803 m (5' 11\").    Weight as of this encounter: 82.6 kg (182 lb).    Physical Exam  GENERAL: alert and no distress  RESP: lungs clear to auscultation - no rales, rhonchi or wheezes  CV: regular rate and rhythm, normal S1 S2, no S3 or S4, no murmur, click or rub, no peripheral edema  MS: no gross musculoskeletal defects noted, no edema  NEURO: Normal strength and tone, mentation intact and speech normal  PSYCH: mentation appears normal, affect normal/bright         No data to display                       Signed Electronically by: JAS Haque CNP    "

## 2024-04-10 ENCOUNTER — TELEPHONE (OUTPATIENT)
Dept: INTERNAL MEDICINE | Facility: CLINIC | Age: 79
End: 2024-04-10
Payer: COMMERCIAL

## 2024-04-10 NOTE — TELEPHONE ENCOUNTER
Patient Quality Outreach    Patient is due for the following:   Physical Annual Wellness Visit    Next Steps:   Patient has upcoming appointment, these items will be addressed at that time.    Type of outreach:    Chart review performed, no outreach needed.      Questions for provider review:               Kelly Lemus CMA

## 2024-04-17 NOTE — TELEPHONE ENCOUNTER
"Patient has stitches on his left hand at the base of the pinky finger and bumped that area causing some bleeding. Patient reports not a lot of bleeding and it has stopped. Patient has cleaned, applied bacitracin and dressed it again like it was initially from the ED. The sutures are still in tact. The wound did not break open. Patient states that it was a difficult place to suture and the suture line looks a little \"angry and red\". Denies redness spreading away from incision, pus, foul smell, worsening pain.   Protocol recommends home care.   Patient to continue care advice from ED physician. Patient has appointment this Wednesday to see if sutures can be removed.   Patient agrees to call back with any worsening symptoms.   Елена Pérez RN   05/22/22 11:22 AM  [facility] Nurse Advisor  COVID 19 Nurse Triage Plan/Patient Instructions    Please be aware that novel coronavirus (COVID-19) may be circulating in the community. If you develop symptoms such as fever, cough, or SOB or if you have concerns about the presence of another infection including coronavirus (COVID-19), please contact your health care provider or visit [email or web address].     Disposition/Instructions    Home care recommended. Follow home care protocol based instructions.    Thank you for taking steps to prevent the spread of this virus.  o Limit your contact with others.  o Wear a simple mask to cover your cough.  o Wash your hands well and often.    Resources    [facility]: About COVID-19: [email or web address]    CDC: What to Do If You're Sick: www.cdc.gov/coronavirus/2019-ncov/about/steps-when-sick.html    CDC: Ending Home Isolation: www.cdc.gov/coronavirus/2019-ncov/hcp/disposition-in-home-patients.html     CDC: Caring for Someone: www.cdc.gov/coronavirus/2019-ncov/if-you-are-sick/care-for-someone.html     [facility]: Interim Guidance for Hospital Discharge to Home: " www.health.[facility].[place]/diseases/coronavirus/hcp/hospdischarge.pdf    [facility] clinical trials (COVID-19 research studies): [email or web address].[email or web address]/umn-clinical-trials     Below are the COVID-19 hotlines at the [facility] ([facility]). Interpreters are available.   o For health questions: Call 538-290-2415 or 1-545.712.2534 (7 a.m. to 7 p.m.)  o For questions about schools and childcare: Call 914-103-3658 or 1-720.543.7506 (7 a.m. to 7 p.m.)   Reason for Disposition    Wound care after sutures (or staples) removed, questions about    Care of sutured (or stapled) wound,  questions about    Additional Information    Negative: [1] Major abdominal surgical wound AND [2] visible internal organs    Negative: Sounds like a life-threatening emergency to the triager    Negative: Surgical incision symptoms and questions    Negative: New cut and caller wonders if it needs stitches    Negative: Skin glue (Dermabond) questions    Negative: Wound looks infected    Negative: [1] Bleeding from wound AND [2] won't stop after 10 minutes of direct pressure    Negative: [1] Wound gaping open after sutures (or staples) AND [2] < 48 hours since wound re-opened    Negative: Patient sounds very sick or weak to the triager    Negative: [1] Wound gaping open after sutures (or staples) AND [2] > 48 hours since wound re-opened AND [3] length of opening > 1/2 inch (12 mm)    Negative: [1] Face wound gaping open after sutures (or staples) AND [2] > 48 hours since wound re-opened AND [3] length of opening > 1/4 inch (6 mm)    Negative: Suture or staple removal is overdue    Negative: [1] Suture (or staple) came out early AND [2] > 48 hours since sutures placed AND [3] caller wants wound checked    Negative: [1] Numbness extends beyond the wound edges AND [2] lasts > 8 hours    Negative: Suture (or staple) came out early    Protocols used: SUTURE OR STAPLE [id number]       [FreeTextEntry1] : 75-year-old female with past medical history of hyperlipidemia borderline hypertension comes in for follow-up.  Since last visit patient did undergo routine blood work all within normal limits except for mildly low platelet count she follows with hematology for.  Overall she feels well denies chest pain shortness of breath PND orthopnea.

## 2024-04-18 ENCOUNTER — MYC MEDICAL ADVICE (OUTPATIENT)
Dept: CARDIOLOGY | Facility: CLINIC | Age: 79
End: 2024-04-18
Payer: COMMERCIAL

## 2024-04-18 NOTE — TELEPHONE ENCOUNTER
"Reply from Dr. Sim:  Miguelito Sim MD Anderson, Jennifer ROSA, RN  Phone Number: [phone]     No problem if he is on Eliquis and heart rate is not consistently over 110.    1515 left a detailed message for patient with update and sent a my chart message.    Yoko Mr. Tamez,    Dr. Sim reviewed your strip and notes. He said, \"it is not a problem if he is on Eliquis and heart rate is not consistently over 110\".    So, please continue to monitor your heart rate and let us know if things change to long runs of rapid rates.    Thank you  Team 2 R.N.s  [phone]      "

## 2024-04-19 ENCOUNTER — TRANSFERRED RECORDS (OUTPATIENT)
Dept: HEALTH INFORMATION MANAGEMENT | Facility: CLINIC | Age: 79
End: 2024-04-19
Payer: COMMERCIAL

## 2024-04-23 ENCOUNTER — MYC MEDICAL ADVICE (OUTPATIENT)
Dept: CARDIOLOGY | Facility: CLINIC | Age: 79
End: 2024-04-23
Payer: COMMERCIAL

## 2024-04-23 NOTE — TELEPHONE ENCOUNTER
My Chart message replied to Patient and reminded of YUNG OV 4-24-24.     My Chart for review -- possible A Fib this am.     YUNG OV today

## 2024-04-24 ENCOUNTER — OFFICE VISIT (OUTPATIENT)
Dept: CARDIOLOGY | Facility: CLINIC | Age: 79
End: 2024-04-24
Attending: INTERNAL MEDICINE
Payer: COMMERCIAL

## 2024-04-24 VITALS
HEIGHT: 71 IN | SYSTOLIC BLOOD PRESSURE: 94 MMHG | WEIGHT: 183.6 LBS | DIASTOLIC BLOOD PRESSURE: 52 MMHG | HEART RATE: 75 BPM | BODY MASS INDEX: 25.7 KG/M2 | OXYGEN SATURATION: 92 %

## 2024-04-24 DIAGNOSIS — I48.0 PAROXYSMAL A-FIB (H): ICD-10-CM

## 2024-04-24 DIAGNOSIS — I48.91 ATRIAL FIBRILLATION, UNSPECIFIED TYPE (H): ICD-10-CM

## 2024-04-24 DIAGNOSIS — Z79.01 CURRENT USE OF LONG TERM ANTICOAGULATION: ICD-10-CM

## 2024-04-24 DIAGNOSIS — R93.1 AGATSTON CAC SCORE, >400: ICD-10-CM

## 2024-04-24 DIAGNOSIS — I25.119 CORONARY ARTERY DISEASE INVOLVING NATIVE CORONARY ARTERY OF NATIVE HEART WITH ANGINA PECTORIS (H): ICD-10-CM

## 2024-04-24 DIAGNOSIS — R07.89 ATYPICAL CHEST PAIN: ICD-10-CM

## 2024-04-24 DIAGNOSIS — E78.00 HYPERCHOLESTEROLEMIA: ICD-10-CM

## 2024-04-24 PROCEDURE — 99214 OFFICE O/P EST MOD 30 MIN: CPT | Performed by: PHYSICIAN ASSISTANT

## 2024-04-24 RX ORDER — ROSUVASTATIN CALCIUM 10 MG/1
TABLET, COATED ORAL
COMMUNITY
End: 2024-07-29

## 2024-04-24 RX ORDER — METOPROLOL SUCCINATE 25 MG/1
25 TABLET, EXTENDED RELEASE ORAL 2 TIMES DAILY
Qty: 180 TABLET | Refills: 3 | Status: SHIPPED | OUTPATIENT
Start: 2024-04-24

## 2024-04-24 NOTE — LETTER
2024    Guillermo Kong MD, MD  [address] [facility] [address]  [facility] [address]    RE: Javier Tamez       Dear Colleague,     I had the pleasure of seeing Javier Tamez in the [facility].      CARDIOLOGY CLINIC PROGRESS NOTE    DOS: 2024      Javier Tamez  : 1945, 79 year old  MRN: [id number]      Primary cardiologist: Dr. Sim       History: Javier Tamez is a 79 year old man with history of  Coronary artery disease, hyperlipidemia, hypertension.      Mr Tamez has been followed in our clinic for years, mainly for cardiovascular prevention.        Stress ECHO () showed no ischemia.      Coronary CT calcium score () was 4316, placing him in the top 1% for risk.  He has been on a statin and aspirin.     NUC stress () showed no ischemia  Preserved LVEF.  Chest CT measured his ascending aorta at 3.6 x 3.5 cm (no change).     ECHO () showed LVEF 50-55% without wall motion abnormalities, normal RV function, no significant valvular pathology, mild aortic root enlargement and borderline ascending aorta dilatation.     Exercise stress ECHO () did not show any stress-induced ischemia.  LVEF 55%.     Stress ECHO (23) did not show any evidence of stress-induced ischemia, LVEF 55%, no significant valve pathology. Mild ascending aorta dilatation.     Stress echo (2023): exercised 9 and half minutes without symptoms, normal stress EKG with no evidence of stress-induced ischemia.     Interval History, reviewed:   Unfortunately his chest pain turned out to be metastatic lung cancer to the bones.     Humza's cancer therapy caused a bump in his LFTs for which we decreased his rosuvastatin from 20 mg to 10 mg daily. As expected, he had a little bump in LDL. As ALT normalized, we did try to increase Crestor to 20 mg again but ALT winifred again, so he is back on 10 mg.     Echo 24, noted to be in rapid afib. EF 50-55%.    Initiated metoprolol succinate  50 mg daily and Eliquis 5 mg twice daily     When he saw Dr. Sim 1/24/24, he was back in SR. ASA stopped sine he was now on Eliquis.     He presents today for follow up.   He saw he had repeat labs last week at [facility] and ALT was even lower.   He got a [employer]. He also got a smart watch.   He does have some episodes of PAF. Rates can get a bit fast, but never sustain.   Generally, he is feeling pretty good.   Some mild LH when he stands.   He is on Tagrisso daily (targeted therapy). Minimal side effects.  Has not been on chemo or radiation therapies.   He is golfing this afternoon.           ROS:  Skin:        Eyes:       ENT:       Respiratory:       Cardiovascular:       Gastroenterology:      Genitourinary:       Musculoskeletal:       Neurologic:       Psychiatric:       Heme/Lymph/Imm:       Endocrine:         PAST MEDICAL HISTORY:  Past Medical History:   Diagnosis Date    Aortic root dilatation (H24)     Aortic stenosis     Arthritis     Atypical chest pain 01/28/2015    Cerebral artery occlusion with cerebral infarction (H)     TIA  1992    Essential hypertension with goal blood pressure less than 140/90 11/26/2016    takes lisinopril for preventitive    Family history of heart disease     Hypercholesterolemia 01/03/2014     Diagnosis updated by automated process. Provider to review and confirm.    Hyperlipidemia LDL goal <70     IBS (irritable bowel syndrome)     Lung cancer (H) 09/2023    Other chronic pain     Paroxysmal A-fib (H) 01/2024       PAST SURGICAL HISTORY:  Past Surgical History:   Procedure Laterality Date    ARTHROPLASTY HIP ANTERIOR Right 10/24/2017    Procedure: ARTHROPLASTY HIP ANTERIOR;  RIGHT TOTAL HIP ARTHROPLASTY DIRECT ANTERIOR APPROACH;  Surgeon: Meño Avalos MD;  Location:  OR    ARTHROPLASTY HIP ANTERIOR Left 10/09/2018    Procedure: ARTHROPLASTY HIP ANTERIOR;  LEFT TOTAL HIP ARTHROPLASTY DIRECT ANTERIOR APPROACH (DEPUY)^;  Surgeon: Meño Avalos MD;   Location: [facility] OR    COLONOSCOPY  01/01/2011    Adenomatous polyp removed    COLONOSCOPY  03/23/2017    Two adenomatous polyps, repeat in 5 yrs    VASECTOMY      ZZC NONSPECIFIC PROCEDURE  09/01/1990    kidney stone       SOCIAL HISTORY:  Social History     Socioeconomic History    Marital status:     Number of children: 4   Occupational History    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 Yes     Comment: father     Caffeine Concern Yes     Comment: 2 cups coffee daily, occ soda     Sleep Concern No    Special Diet No    Exercise Yes     Comment: 3-4 times weekly     Seat Belt Yes   Social History Narrative    Rides bicycle or goes to Y regularly (3x/week).      Social Determinants of Health     Financial Resource Strain: Low Risk  (10/21/2021)    Overall Financial Resource Strain (CARDIA)     Difficulty of Paying Living Expenses: Not hard at all   Food Insecurity: No Food Insecurity (10/21/2021)    Hunger Vital Sign     Worried About Running Out of Food in the Last Year: Never true     Ran Out of Food in the Last Year: Never true   Transportation Needs: No Transportation Needs (10/21/2021)    PRAPARE - Transportation     Lack of Transportation (Medical): No     Lack of Transportation (Non-Medical): No   Physical Activity: Sufficiently Active (10/21/2021)    Exercise Vital Sign     Days of Exercise per Week: 5 days     Minutes of Exercise per Session: 60 min   Interpersonal Safety: Low Risk  (4/9/2024)    Interpersonal Safety     Do you feel physically and emotionally safe where you currently live?: Yes     Within the  past 12 months, have you been hit, slapped, kicked or otherwise physically hurt by someone?: No     Within the past 12 months, have you been humiliated or emotionally abused in other ways by your partner or ex-partner?: No   Housing Stability: Low Risk  (10/21/2021)    Housing Stability Vital Sign     Unable to Pay for Housing in the Last Year: No     Number of Places Lived in the Last Year: 1     Unstable Housing in the Last Year: No       FAMILY HISTORY:  Family History   Problem Relation Age of Onset    Cerebrovascular Disease Mother         Stroke age 69    Breast Cancer Mother          age 75    C.A.D. Father          of MI at age 63, 1ST MI age 39    Diabetes Father     Cerebrovascular Disease Brother         Born 1948    Atrial fibrillation Brother     Transient ischemic attack Brother        MEDS:   Current Outpatient Medications   Medication Sig Dispense Refill    apixaban ANTICOAGULANT (ELIQUIS) 5 MG tablet Take 5 mg by mouth 2 times daily      Ascorbic Acid (VITAMIN C PO) Take 1,000 mg by mouth every morning       Cyanocobalamin (VITAMIN B 12 PO) Take 1,000 mcg by mouth every morning      ezetimibe (ZETIA) 10 MG tablet Take 1 tablet (10 mg) by mouth daily 90 tablet 4    metoprolol succinate ER (TOPROL XL) 25 MG 24 hr tablet Take 1 tablet (25 mg) by mouth 2 times daily 180 tablet 3    nitroGLYcerin (NITROSTAT) 0.4 MG sublingual tablet For chest pain place 1 tablet under the tongue every 5 minutes for 3 doses. If symptoms persist 5 minutes after 1st dose call 911. 25 tablet 1    osimertinib (TAGRISSO) 80 MG tablet Take 80 mg by mouth every morning      rosuvastatin (CRESTOR) 10 MG tablet 05 tabs/10 mg QAM since 24      tamsulosin (FLOMAX) 0.4 MG capsule Take 1 capsule (0.4 mg) by mouth daily 90 capsule 3    VITAMIN D, CHOLECALCIFEROL, PO Take 1,000 Units by mouth every morning        No current facility-administered medications for this visit.       ALLERGIES: No Known Allergies    PHYSICAL  "EXAM:  Vitals: BP 94/52 (BP Location: Right arm, Patient Position: Sitting, Cuff Size: Adult Regular)   Pulse 75   Ht 1.803 m (5' 11\")   Wt 83.3 kg (183 lb 9.6 oz)   SpO2 92%   BMI 25.61 kg/m    Constitutional:  cooperative, alert and oriented, well developed, well nourished, in no acute distress        Skin:  warm and dry to the touch, no apparent skin lesions or masses noted        Head:  normocephalic, no masses or lesions        Eyes:  pupils equal and round;conjunctivae and lids unremarkable;sclera white        ENT:  no pallor or cyanosis        Neck:  JVP normal        Respiratory:  normal respiratory excursion;clear to auscultation        Cardiac: regular rhythm;normal S1 and S2;no S3 or S4;no murmurs, gallops or rubs detected                  GI:  abdomen soft;BS normoactive        Vascular: pulses full and equal                                      Extremities and Musculoskeletal:  no edema;no spinal abnormalities noted;normal muscle strength and tone        Neurological:  no gross motor deficits              LABS/DATA:  I reviewed the following:  Component      Latest Ref Rng 1/11/2023  8:26 AM 1/17/2024  10:17 AM 2/27/2024  9:19 AM 3/14/2024  2:14 PM   Cholesterol      <200 mg/dL 96  126  106  103    Triglycerides      <150 mg/dL 35  53  38  89    HDL Cholesterol      >=40 mg/dL 52  58  49  48    LDL Cholesterol Calculated      <=100 mg/dL 37  57  49  37    Non HDL Cholesterol      <130 mg/dL 44  68  57  55    Patient Fasting?  Yes  Yes  No    ALT      0 - 70 U/L           On Crestor 20 mg 70     On Crestor 10 mg 128 (H)     On Crestor 20 mg 112 (H)     On Crestor 10 mg            ASSESSMENT/PLAN:  Coronary Artery Disease, nonobstructive  - family hx of premature CAD  - based off of abnormal calcium score (2015) 4316   - stress ECHO show no ischemia LVEF 55% (1/2023)  - Stress echo 8/2023: negative for stress-induced ischemia  - Continue statin, is also on Eliquis         Hypertension  - on Toprol XL " 50 mg  - BP low range with some mild LH at times  - Change Toprol XL to 25 mg BID        Hyperlipidemia  - on Crestor 10 mg, Zetia  - LDL is controlled still and LFTs are improving      Paroxysmal afib RVR  - Asx  - Toprol XL 50 mg. BP low range with some mild LH at times  - Eliquis   - We discussed that he should let us know if he has RVR for extended periods of time, new sxs      Follow up:  Already has follow up 7/19/24 with Dr. Roney Taylor PA-C        Thank you for allowing me to participate in the care of your patient.    Sincerely,   Rachel Taylor PA-C   [facility]  cc:   Miguelito Sim MD  [address]  [place] [id number]

## 2024-04-24 NOTE — PATIENT INSTRUCTIONS
Due to the mild lightheadedness, split Toprol XL to 25 mg twice daily.     Episodes of afib are ok as long as your heart rate is not staying >110 for prolonged periods of time.   Please let us know if it, or if you have new symptoms when in afib.             Component      Latest Ref Rng 1/17/2024  10:17 AM 2/27/2024  9:19 AM 3/14/2024  2:14 PM   Cholesterol      <200 mg/dL 126  106  103    Triglycerides      <150 mg/dL 53  38  89    HDL Cholesterol      >=40 mg/dL 58  49  48    LDL Cholesterol Calculated      <=100 mg/dL 57  49  37    Non HDL Cholesterol      <130 mg/dL 68  57  55    Patient Fasting? Yes  Yes  No    ALT      0 - 70 U/L 70  128 (H)  112 (H)

## 2024-04-24 NOTE — PROGRESS NOTES
CARDIOLOGY CLINIC PROGRESS NOTE    DOS: 2024      Javier Tamez  : 1945, 79 year old  MRN: [id number]      Primary cardiologist: Dr. Sim       History: Javier Tamez is a 79 year old man with history of  Coronary artery disease, hyperlipidemia, hypertension.      Mr Tamez has been followed in our clinic for years, mainly for cardiovascular prevention.        Stress ECHO () showed no ischemia.      Coronary CT calcium score () was 4316, placing him in the top 1% for risk.  He has been on a statin and aspirin.     NUC stress () showed no ischemia  Preserved LVEF.  Chest CT measured his ascending aorta at 3.6 x 3.5 cm (no change).     ECHO () showed LVEF 50-55% without wall motion abnormalities, normal RV function, no significant valvular pathology, mild aortic root enlargement and borderline ascending aorta dilatation.     Exercise stress ECHO () did not show any stress-induced ischemia.  LVEF 55%.     Stress ECHO (23) did not show any evidence of stress-induced ischemia, LVEF 55%, no significant valve pathology. Mild ascending aorta dilatation.     Stress echo (2023): exercised 9 and half minutes without symptoms, normal stress EKG with no evidence of stress-induced ischemia.     Interval History, reviewed:   Unfortunately his chest pain turned out to be metastatic lung cancer to the bones.     Humza's cancer therapy caused a bump in his LFTs for which we decreased his rosuvastatin from 20 mg to 10 mg daily. As expected, he had a little bump in LDL. As ALT normalized, we did try to increase Crestor to 20 mg again but ALT winifred again, so he is back on 10 mg.     Echo 24, noted to be in rapid afib. EF 50-55%.    Initiated metoprolol succinate 50 mg daily and Eliquis 5 mg twice daily     When he saw Dr. Sim 24, he was back in SR. ASA stopped sine he was now on Eliquis.     He presents today for follow up.   He saw he had repeat labs last week at [facility] and ALT was even lower.   He got a [employer]. He also got a smart watch.   He does have some episodes of PAF. Rates can get a bit fast, but never sustain.   Generally, he is feeling pretty good.   Some mild LH when he stands.   He is on Tagrisso daily (targeted therapy). Minimal side effects.  Has not been on chemo or radiation therapies.   He is golfing this afternoon.           ROS:  Skin:        Eyes:       ENT:       Respiratory:       Cardiovascular:       Gastroenterology:      Genitourinary:       Musculoskeletal:       Neurologic:       Psychiatric:       Heme/Lymph/Imm:       Endocrine:         PAST MEDICAL HISTORY:  Past Medical History:   Diagnosis Date    Aortic root dilatation (H24)     Aortic stenosis     Arthritis     Atypical chest pain 01/28/2015    Cerebral artery occlusion with cerebral infarction (H)     TIA  1992    Essential hypertension with goal blood pressure less than 140/90 11/26/2016    takes lisinopril for preventitive    Family history of heart disease     Hypercholesterolemia 01/03/2014     Diagnosis updated by automated process. Provider to review and confirm.    Hyperlipidemia LDL goal <70     IBS (irritable bowel syndrome)     Lung cancer (H) 09/2023    Other chronic pain     Paroxysmal A-fib (H) 01/2024       PAST SURGICAL HISTORY:  Past Surgical History:   Procedure Laterality Date    ARTHROPLASTY HIP ANTERIOR Right 10/24/2017    Procedure: ARTHROPLASTY HIP ANTERIOR;  RIGHT TOTAL HIP ARTHROPLASTY DIRECT ANTERIOR APPROACH;  Surgeon: Meño Avalos MD;  Location:  OR    ARTHROPLASTY HIP ANTERIOR Left 10/09/2018    Procedure: ARTHROPLASTY HIP ANTERIOR;  LEFT TOTAL HIP ARTHROPLASTY DIRECT ANTERIOR APPROACH (DEPUY)^;  Surgeon: Meño Avalos MD;  Location:  OR    COLONOSCOPY  01/01/2011    Adenomatous polyp removed    COLONOSCOPY  03/23/2017    Two adenomatous polyps, repeat in 5 yrs    VASECTOMY      Z NONSPECIFIC PROCEDURE  09/01/1990    kidney stone        SOCIAL HISTORY:  Social History     Socioeconomic History    Marital status:     Number of children: 4   Occupational History    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 Yes     Comment: father     Caffeine Concern Yes     Comment: 2 cups coffee daily, occ soda     Sleep Concern No    Special Diet No    Exercise Yes     Comment: 3-4 times weekly     Seat Belt Yes   Social History Narrative    Rides bicycle or goes to Y regularly (3x/week).      Social Determinants of Health     Financial Resource Strain: Low Risk  (10/21/2021)    Overall Financial Resource Strain (CARDIA)     Difficulty of Paying Living Expenses: Not hard at all   Food Insecurity: No Food Insecurity (10/21/2021)    Hunger Vital Sign     Worried About Running Out of Food in the Last Year: Never true     Ran Out of Food in the Last Year: Never true   Transportation Needs: No Transportation Needs (10/21/2021)    PRAPARE - Transportation     Lack of Transportation (Medical): No     Lack of Transportation (Non-Medical): No   Physical Activity: Sufficiently Active (10/21/2021)    Exercise Vital Sign     Days of Exercise per Week: 5 days     Minutes of Exercise per Session: 60 min   Interpersonal Safety: Low Risk  (4/9/2024)    Interpersonal Safety     Do you feel physically and emotionally safe where you currently live?: Yes     Within the past 12 months, have you been hit, slapped, kicked or otherwise physically hurt by someone?: No     Within the past 12 months, have you been humiliated or emotionally abused in other ways by your partner or  "ex-partner?: No   Housing Stability: Low Risk  (10/21/2021)    Housing Stability Vital Sign     Unable to Pay for Housing in the Last Year: No     Number of Places Lived in the Last Year: 1     Unstable Housing in the Last Year: No       FAMILY HISTORY:  Family History   Problem Relation Age of Onset    Cerebrovascular Disease Mother         Stroke age 69    Breast Cancer Mother          age 75    C.A.D. Father          of MI at age 63, 1ST MI age 39    Diabetes Father     Cerebrovascular Disease Brother         Born 1948    Atrial fibrillation Brother     Transient ischemic attack Brother        MEDS:   Current Outpatient Medications   Medication Sig Dispense Refill    apixaban ANTICOAGULANT (ELIQUIS) 5 MG tablet Take 5 mg by mouth 2 times daily      Ascorbic Acid (VITAMIN C PO) Take 1,000 mg by mouth every morning       Cyanocobalamin (VITAMIN B 12 PO) Take 1,000 mcg by mouth every morning      ezetimibe (ZETIA) 10 MG tablet Take 1 tablet (10 mg) by mouth daily 90 tablet 4    metoprolol succinate ER (TOPROL XL) 25 MG 24 hr tablet Take 1 tablet (25 mg) by mouth 2 times daily 180 tablet 3    nitroGLYcerin (NITROSTAT) 0.4 MG sublingual tablet For chest pain place 1 tablet under the tongue every 5 minutes for 3 doses. If symptoms persist 5 minutes after 1st dose call 911. 25 tablet 1    osimertinib (TAGRISSO) 80 MG tablet Take 80 mg by mouth every morning      rosuvastatin (CRESTOR) 10 MG tablet 05 tabs/10 mg QAM since 24      tamsulosin (FLOMAX) 0.4 MG capsule Take 1 capsule (0.4 mg) by mouth daily 90 capsule 3    VITAMIN D, CHOLECALCIFEROL, PO Take 1,000 Units by mouth every morning        No current facility-administered medications for this visit.       ALLERGIES: No Known Allergies    PHYSICAL EXAM:  Vitals: BP 94/52 (BP Location: Right arm, Patient Position: Sitting, Cuff Size: Adult Regular)   Pulse 75   Ht 1.803 m (5' 11\")   Wt 83.3 kg (183 lb 9.6 oz)   SpO2 92%   BMI 25.61 kg/m  "   Constitutional:  cooperative, alert and oriented, well developed, well nourished, in no acute distress        Skin:  warm and dry to the touch, no apparent skin lesions or masses noted        Head:  normocephalic, no masses or lesions        Eyes:  pupils equal and round;conjunctivae and lids unremarkable;sclera white        ENT:  no pallor or cyanosis        Neck:  JVP normal        Respiratory:  normal respiratory excursion;clear to auscultation        Cardiac: regular rhythm;normal S1 and S2;no S3 or S4;no murmurs, gallops or rubs detected                  GI:  abdomen soft;BS normoactive        Vascular: pulses full and equal                                      Extremities and Musculoskeletal:  no edema;no spinal abnormalities noted;normal muscle strength and tone        Neurological:  no gross motor deficits              LABS/DATA:  I reviewed the following:  Component      Latest Ref Rng 1/11/2023  8:26 AM 1/17/2024  10:17 AM 2/27/2024  9:19 AM 3/14/2024  2:14 PM   Cholesterol      <200 mg/dL 96  126  106  103    Triglycerides      <150 mg/dL 35  53  38  89    HDL Cholesterol      >=40 mg/dL 52  58  49  48    LDL Cholesterol Calculated      <=100 mg/dL 37  57  49  37    Non HDL Cholesterol      <130 mg/dL 44  68  57  55    Patient Fasting?  Yes  Yes  No    ALT      0 - 70 U/L           On Crestor 20 mg 70     On Crestor 10 mg 128 (H)     On Crestor 20 mg 112 (H)     On Crestor 10 mg            ASSESSMENT/PLAN:  Coronary Artery Disease, nonobstructive  - family hx of premature CAD  - based off of abnormal calcium score (2015) 4316   - stress ECHO show no ischemia LVEF 55% (1/2023)  - Stress echo 8/2023: negative for stress-induced ischemia  - Continue statin, is also on Eliquis         Hypertension  - on Toprol XL 50 mg  - BP low range with some mild LH at times  - Change Toprol XL to 25 mg BID        Hyperlipidemia  - on Crestor 10 mg, Zetia  - LDL is controlled still and LFTs are improving      Paroxysmal  afib RVR  - Asx  - Toprol XL 50 mg. BP low range with some mild LH at times  - Eliquis   - We discussed that he should let us know if he has RVR for extended periods of time, new sxs            Follow up:  Already has follow up 7/19/24 with Dr. Roney Taylor, [facility]

## 2024-05-21 ENCOUNTER — OFFICE VISIT (OUTPATIENT)
Dept: UROLOGY | Facility: CLINIC | Age: 79
End: 2024-05-21
Payer: COMMERCIAL

## 2024-05-21 VITALS
HEART RATE: 86 BPM | HEIGHT: 71 IN | SYSTOLIC BLOOD PRESSURE: 101 MMHG | DIASTOLIC BLOOD PRESSURE: 63 MMHG | BODY MASS INDEX: 25.2 KG/M2 | WEIGHT: 180 LBS

## 2024-05-21 DIAGNOSIS — R39.198 VOIDING DIFFICULTY: ICD-10-CM

## 2024-05-21 LAB
ALBUMIN UR-MCNC: NEGATIVE MG/DL
APPEARANCE UR: CLEAR
BILIRUB UR QL STRIP: NEGATIVE
COLOR UR AUTO: YELLOW
GLUCOSE UR STRIP-MCNC: NEGATIVE MG/DL
HGB UR QL STRIP: ABNORMAL
KETONES UR STRIP-MCNC: NEGATIVE MG/DL
LEUKOCYTE ESTERASE UR QL STRIP: NEGATIVE
NITRATE UR QL: NEGATIVE
PH UR STRIP: 5.5 [PH] (ref 5–7)
RESIDUAL VOLUME (RV) (EXTERNAL): 125
SP GR UR STRIP: 1.02 (ref 1–1.03)
UROBILINOGEN UR STRIP-ACNC: 0.2 E.U./DL

## 2024-05-21 PROCEDURE — 99213 OFFICE O/P EST LOW 20 MIN: CPT | Mod: 25 | Performed by: NURSE PRACTITIONER

## 2024-05-21 PROCEDURE — 51798 US URINE CAPACITY MEASURE: CPT | Performed by: NURSE PRACTITIONER

## 2024-05-21 PROCEDURE — 81003 URINALYSIS AUTO W/O SCOPE: CPT | Mod: QW | Performed by: NURSE PRACTITIONER

## 2024-05-21 ASSESSMENT — PAIN SCALES - GENERAL: PAINLEVEL: NO PAIN (0)

## 2024-05-21 NOTE — LETTER
5/21/2024       RE: Javier Tamez  [address] [place] [address]     Dear Colleague,    Thank you for referring your patient, Javier Tamez, to the [facility] [place] at [facility]. Please see a copy of my visit note below.      Urology Outpatient Visit    Name: Javier Tamez    MRN: [id number]   YOB: 1945               Chief Complaint:   BPH and LUTS         Impression and Plan:   Impression / Plan:   Javier Tamez is a 79 year old male with BPH w LUTS      It was my pleasure to meet with Mr. Tamez in clinic today to discuss his lower urinary tract symptoms. Patient presentation is not consistent with prostate cancer, UTI, urinary obstruction, prostatitis, neurogenic bladder, diabetes, nor diuretic related. I explained that his lower urinary tract symptoms are likely secondary to benign prostatic hyperplasia (BPH). We reviewed the natural history of BPH, its prevalence, and management options. We discussed that, in general, treatment of BPH is based on the extent of bother caused by lower urinary tract symptoms. We then reviewed options for ongoing management including observation/watchful waiting, lifestyle modifications, medical management, office based minimally invasive procedures (Rezum) vs. other surgical options. Pros and cons of these options were discussed in detail. All patient questions, comments, concerns addressed.     -After shared decision making process, will continue daily tamsulosin. Pt educated that Flomax works by relaxing the muscles in the prostate, opening the prostatic urethra allowing for easier flow of urine. Discussed Tamsulosin is associated with orthostatic hypotension in ~1/10 patients, which can manifest as dizziness, especially upon standing. Discussed other potential side effects including rhinitis. Educated that Flomax typically takes ~1 week to reach maximum  therapeutic effect.     Follow-up PRN    Thank you for the opportunity to participate in the care of Javier Tamez.     JAS Jenkins, CNP  [facility] - Department of Urology  [phone]          History of Present Illness:   Javier Tamez is a 79 year old male with stage IV lung cancer, IBS, CAD, here for eval of LUTS. Sx of noct 4x/night, hesitancy, weak stream... recently started on Flomax 0.4 mg daily and has had good improvement in his Sx. He reports having some dizziness upon standing occasionally prior to starting Flomax, has not noticed this being any worse since initiating Flomax.     PSA 0.59 03/14/2024     125 mL PVR    History is obtained from patient & EMR    Pertinent imaging reviewed:  CT CHEST/ABDOMEN/PELVIS WITH CONTRAST 2/21/2024 10:16 AM           Past Medical History:     Past Medical History:   Diagnosis Date    Aortic root dilatation (H24)     Aortic stenosis     Arthritis     Atypical chest pain 01/28/2015    Cerebral artery occlusion with cerebral infarction (H)     TIA  1992    Essential hypertension with goal blood pressure less than 140/90 11/26/2016    takes lisinopril for preventitive    Family history of heart disease     Hypercholesterolemia 01/03/2014     Diagnosis updated by automated process. Provider to review and confirm.    Hyperlipidemia LDL goal <70     IBS (irritable bowel syndrome)     Lung cancer (H) 09/2023    Other chronic pain     Paroxysmal A-fib (H) 01/2024          Past Surgical History:     Past Surgical History:   Procedure Laterality Date    ARTHROPLASTY HIP ANTERIOR Right 10/24/2017    Procedure: ARTHROPLASTY HIP ANTERIOR;  RIGHT TOTAL HIP ARTHROPLASTY DIRECT ANTERIOR APPROACH;  Surgeon: Meño Avalos MD;  Location:  OR    ARTHROPLASTY HIP ANTERIOR Left 10/09/2018    Procedure: ARTHROPLASTY HIP ANTERIOR;  LEFT TOTAL HIP ARTHROPLASTY DIRECT ANTERIOR APPROACH (DEPUY)^;  Surgeon: Meño Avalos MD;  Location:  OR    COLONOSCOPY  01/01/2011     Adenomatous polyp removed    COLONOSCOPY  2017    Two adenomatous polyps, repeat in 5 yrs    VASECTOMY      [facility] NONSPECIFIC PROCEDURE  1990    kidney stone          Social History:     Social History     Tobacco Use    Smoking status: Never    Smokeless tobacco: Never   Substance Use Topics    Alcohol use: Not Currently          Family History:     Family History   Problem Relation Age of Onset    Cerebrovascular Disease Mother         Stroke age 69    Breast Cancer Mother          age 75    C.A.D. Father          of MI at age 63, 1ST MI age 39    Diabetes Father     Cerebrovascular Disease Brother         Born 1948    Atrial fibrillation Brother     Transient ischemic attack Brother           Allergies:   No Known Allergies       Medications:     Current Outpatient Medications   Medication Sig Dispense Refill    apixaban ANTICOAGULANT (ELIQUIS) 5 MG tablet Take 5 mg by mouth 2 times daily      Ascorbic Acid (VITAMIN C PO) Take 1,000 mg by mouth every morning       Cyanocobalamin (VITAMIN B 12 PO) Take 1,000 mcg by mouth every morning      ezetimibe (ZETIA) 10 MG tablet Take 1 tablet (10 mg) by mouth daily 90 tablet 4    metoprolol succinate ER (TOPROL XL) 25 MG 24 hr tablet Take 1 tablet (25 mg) by mouth 2 times daily 180 tablet 3    nitroGLYcerin (NITROSTAT) 0.4 MG sublingual tablet For chest pain place 1 tablet under the tongue every 5 minutes for 3 doses. If symptoms persist 5 minutes after 1st dose call 911. 25 tablet 1    osimertinib (TAGRISSO) 80 MG tablet Take 80 mg by mouth every morning      rosuvastatin (CRESTOR) 10 MG tablet 05 tabs/10 mg QAM since 24      tamsulosin (FLOMAX) 0.4 MG capsule Take 1 capsule (0.4 mg) by mouth daily 90 capsule 3    VITAMIN D, CHOLECALCIFEROL, PO Take 1,000 Units by mouth every morning        No current facility-administered medications for this visit.          Review of Systems:    ROS: See HPI for pertinent details.  Remainder of 10-point ROS  negative.         Physical Exam:   VS:  T: Data Unavailable    HR: 86    BP: 101/63    RR: Data Unavailable     GEN:  Alert.  NAD.   HEENT:  Sclerae anicteric.    CV:  No obvious jugular venous distension.  LUNGS: No respiratory distress, breathing comfortably wo accessory muscle use.  ABD:  ND.     SKIN:  Dry. No visible rashes.   NEURO:  CN grossly intact.          Laboratory Data:     Lab Results   Component Value Date    PSA 0.59 03/14/2024    PSA 0.32 09/21/2022    PSA 0.28 09/11/2020    PSA 0.37 11/30/2016    PSA 0.41 10/15/2015    PSA 0.45 01/03/2014    PSA 0.3 07/11/2011     Lab Results   Component Value Date    CR 0.8 02/21/2024    CR 0.80 01/17/2024    CR 0.8 09/27/2023    CR 0.78 01/11/2023    CR 0.79 01/11/2022    CR 0.84 01/27/2021    CR 0.73 10/11/2019    CR 0.71 10/10/2018    CR 0.76 10/09/2018    CR 0.80 09/18/2018    CR 0.77 01/12/2018    CR 0.73 10/24/2017    CR 0.97 09/29/2017    CR 0.81 11/30/2016    CR 0.80 03/31/2016     Lab Results   Component Value Date    GFRESTIMATED >60 02/21/2024    GFRESTIMATED >90 01/17/2024    GFRESTIMATED >60 09/27/2023    GFRESTIMATED >90 01/11/2023    GFRESTIMATED >90 01/11/2022    GFRESTIMATED 86 01/27/2021    GFRESTIMATED >90 10/11/2019    GFRESTIMATED >90 01/10/2019    GFRESTIMATED >90 10/10/2018    GFRESTIMATED >90 10/09/2018    GFRESTIMATED >90 09/18/2018    GFRESTIMATED >90 01/12/2018    GFRESTIMATED >90 10/24/2017    GFRESTIMATED 76 09/29/2017    GFRESTIMATED >90  Non  GFR Calc   11/30/2016

## 2024-05-21 NOTE — NURSING NOTE
Chief Complaint   Patient presents with    Voiding difficulty      Pt states nighttime urination is biggest concern, pt started flomax and this has improved. Was waking 3-4 times, now waking 2-3 times.    PVR: 125 mL    Ruba Buckley CMA

## 2024-05-21 NOTE — PROGRESS NOTES
Urology Outpatient Visit    Name: Javier Tamez    MRN: [id number]   YOB: 1945               Chief Complaint:   BPH and LUTS         Impression and Plan:   Impression / Plan:   Javier Tamez is a 79 year old male with BPH w LUTS      It was my pleasure to meet with Mr. Tamez in clinic today to discuss his lower urinary tract symptoms. Patient presentation is not consistent with prostate cancer, UTI, urinary obstruction, prostatitis, neurogenic bladder, diabetes, nor diuretic related. I explained that his lower urinary tract symptoms are likely secondary to benign prostatic hyperplasia (BPH). We reviewed the natural history of BPH, its prevalence, and management options. We discussed that, in general, treatment of BPH is based on the extent of bother caused by lower urinary tract symptoms. We then reviewed options for ongoing management including observation/watchful waiting, lifestyle modifications, medical management, office based minimally invasive procedures (Re[facility]um) vs. other surgical options. Pros and cons of these options were discussed in detail. All patient questions, comments, concerns addressed.     -After shared decision making process, will continue daily tamsulosin. Pt educated that Flomax works by relaxing the muscles in the prostate, opening the prostatic urethra allowing for easier flow of urine. Discussed Tamsulosin is associated with orthostatic hypotension in ~1/10 patients, which can manifest as dizziness, especially upon standing. Discussed other potential side effects including rhinitis. Educated that Flomax typically takes ~1 week to reach maximum therapeutic effect.     Follow-up PRN    Thank you for the opportunity to participate in the care of Javier Tamez.     Jacqueline Summers, APRN, CNP  [facility] - Department of Urology  [phone]          History of Present Illness:   Jvaier Tamez is a 79 year old male with stage IV lung cancer, IBS, CAD, here for  eval of LUTS. Sx of noct 4x/night, hesitancy, weak stream... recently started on Flomax 0.4 mg daily and has had good improvement in his Sx. He reports having some dizziness upon standing occasionally prior to starting Flomax, has not noticed this being any worse since initiating Flomax.     PSA 0.59 03/14/2024     125 mL PVR    History is obtained from patient & EMR    Pertinent imaging reviewed:  CT CHEST/ABDOMEN/PELVIS WITH CONTRAST 2/21/2024 10:16 AM           Past Medical History:     Past Medical History:   Diagnosis Date    Aortic root dilatation (H24)     Aortic stenosis     Arthritis     Atypical chest pain 01/28/2015    Cerebral artery occlusion with cerebral infarction (H)     TIA  1992    Essential hypertension with goal blood pressure less than 140/90 11/26/2016    takes lisinopril for preventitive    Family history of heart disease     Hypercholesterolemia 01/03/2014     Diagnosis updated by automated process. Provider to review and confirm.    Hyperlipidemia LDL goal <70     IBS (irritable bowel syndrome)     Lung cancer (H) 09/2023    Other chronic pain     Paroxysmal A-fib (H) 01/2024          Past Surgical History:     Past Surgical History:   Procedure Laterality Date    ARTHROPLASTY HIP ANTERIOR Right 10/24/2017    Procedure: ARTHROPLASTY HIP ANTERIOR;  RIGHT TOTAL HIP ARTHROPLASTY DIRECT ANTERIOR APPROACH;  Surgeon: Meño Avalos MD;  Location:  OR    ARTHROPLASTY HIP ANTERIOR Left 10/09/2018    Procedure: ARTHROPLASTY HIP ANTERIOR;  LEFT TOTAL HIP ARTHROPLASTY DIRECT ANTERIOR APPROACH (DEPUY)^;  Surgeon: Meño Avalos MD;  Location:  OR    COLONOSCOPY  01/01/2011    Adenomatous polyp removed    COLONOSCOPY  03/23/2017    Two adenomatous polyps, repeat in 5 yrs    VASECTOMY      ZZC NONSPECIFIC PROCEDURE  09/01/1990    kidney stone          Social History:     Social History     Tobacco Use    Smoking status: Never    Smokeless tobacco: Never   Substance Use Topics    Alcohol use:  Not Currently          Family History:     Family History   Problem Relation Age of Onset    Cerebrovascular Disease Mother         Stroke age 69    Breast Cancer Mother          age 75    C.A.D. Father          of MI at age 63, 1ST MI age 39    Diabetes Father     Cerebrovascular Disease Brother         Born 1948    Atrial fibrillation Brother     Transient ischemic attack Brother           Allergies:   No Known Allergies       Medications:     Current Outpatient Medications   Medication Sig Dispense Refill    apixaban ANTICOAGULANT (ELIQUIS) 5 MG tablet Take 5 mg by mouth 2 times daily      Ascorbic Acid (VITAMIN C PO) Take 1,000 mg by mouth every morning       Cyanocobalamin (VITAMIN B 12 PO) Take 1,000 mcg by mouth every morning      ezetimibe (ZETIA) 10 MG tablet Take 1 tablet (10 mg) by mouth daily 90 tablet 4    metoprolol succinate ER (TOPROL XL) 25 MG 24 hr tablet Take 1 tablet (25 mg) by mouth 2 times daily 180 tablet 3    nitroGLYcerin (NITROSTAT) 0.4 MG sublingual tablet For chest pain place 1 tablet under the tongue every 5 minutes for 3 doses. If symptoms persist 5 minutes after 1st dose call 911. 25 tablet 1    osimertinib (TAGRISSO) 80 MG tablet Take 80 mg by mouth every morning      rosuvastatin (CRESTOR) 10 MG tablet 05 tabs/10 mg QAM since 24      tamsulosin (FLOMAX) 0.4 MG capsule Take 1 capsule (0.4 mg) by mouth daily 90 capsule 3    VITAMIN D, CHOLECALCIFEROL, PO Take 1,000 Units by mouth every morning        No current facility-administered medications for this visit.          Review of Systems:    ROS: See HPI for pertinent details.  Remainder of 10-point ROS negative.         Physical Exam:   VS:  T: Data Unavailable    HR: 86    BP: 101/63    RR: Data Unavailable     GEN:  Alert.  NAD.   HEENT:  Sclerae anicteric.    CV:  No obvious jugular venous distension.  LUNGS: No respiratory distress, breathing comfortably wo accessory muscle use.  ABD:  ND.     SKIN:  Dry. No visible  nancy.   NEURO:  CN grossly intact.          Laboratory Data:     Lab Results   Component Value Date    PSA 0.59 03/14/2024    PSA 0.32 09/21/2022    PSA 0.28 09/11/2020    PSA 0.37 11/30/2016    PSA 0.41 10/15/2015    PSA 0.45 01/03/2014    PSA 0.3 07/11/2011     Lab Results   Component Value Date    CR 0.8 02/21/2024    CR 0.80 01/17/2024    CR 0.8 09/27/2023    CR 0.78 01/11/2023    CR 0.79 01/11/2022    CR 0.84 01/27/2021    CR 0.73 10/11/2019    CR 0.71 10/10/2018    CR 0.76 10/09/2018    CR 0.80 09/18/2018    CR 0.77 01/12/2018    CR 0.73 10/24/2017    CR 0.97 09/29/2017    CR 0.81 11/30/2016    CR 0.80 03/31/2016     Lab Results   Component Value Date    GFRESTIMATED >60 02/21/2024    GFRESTIMATED >90 01/17/2024    GFRESTIMATED >60 09/27/2023    GFRESTIMATED >90 01/11/2023    GFRESTIMATED >90 01/11/2022    GFRESTIMATED 86 01/27/2021    GFRESTIMATED >90 10/11/2019    GFRESTIMATED >90 01/10/2019    GFRESTIMATED >90 10/10/2018    GFRESTIMATED >90 10/09/2018    GFRESTIMATED >90 09/18/2018    GFRESTIMATED >90 01/12/2018    GFRESTIMATED >90 10/24/2017    GFRESTIMATED 76 09/29/2017    GFRESTIMATED >90  Non  GFR Calc   11/30/2016

## 2024-06-05 ENCOUNTER — HOSPITAL ENCOUNTER (OUTPATIENT)
Dept: CT IMAGING | Facility: CLINIC | Age: 79
Discharge: HOME | End: 2024-06-05
Attending: INTERNAL MEDICINE | Admitting: INTERNAL MEDICINE
Payer: COMMERCIAL

## 2024-06-05 DIAGNOSIS — C34.12 PRIMARY MALIGNANT NEOPLASM OF LEFT UPPER LOBE OF LUNG (H): ICD-10-CM

## 2024-06-05 LAB
CREAT BLD-MCNC: 0.8 MG/DL (ref 0.7–1.3)
EGFRCR SERPLBLD CKD-EPI 2021: >60 ML/MIN/1.73M2

## 2024-06-05 PROCEDURE — 250N000011 HC RX IP 250 OP 636: Performed by: INTERNAL MEDICINE

## 2024-06-05 PROCEDURE — 82565 ASSAY OF CREATININE: CPT

## 2024-06-05 PROCEDURE — 71260 CT THORAX DX C+: CPT

## 2024-06-05 RX ORDER — IOPAMIDOL 755 MG/ML
75 INJECTION, SOLUTION INTRAVASCULAR ONCE
Status: COMPLETED | OUTPATIENT
Start: 2024-06-05 | End: 2024-06-05

## 2024-06-05 RX ADMIN — IOPAMIDOL 75 ML: 755 INJECTION, SOLUTION INTRAVENOUS at 08:54

## 2024-06-07 ENCOUNTER — TRANSFERRED RECORDS (OUTPATIENT)
Dept: HEALTH INFORMATION MANAGEMENT | Facility: CLINIC | Age: 79
End: 2024-06-07
Payer: COMMERCIAL

## 2024-07-19 ENCOUNTER — TRANSFERRED RECORDS (OUTPATIENT)
Dept: HEALTH INFORMATION MANAGEMENT | Facility: CLINIC | Age: 79
End: 2024-07-19

## 2024-07-22 ENCOUNTER — TELEPHONE (OUTPATIENT)
Dept: CARDIOLOGY | Facility: CLINIC | Age: 79
End: 2024-07-22
Payer: COMMERCIAL

## 2024-07-22 NOTE — TELEPHONE ENCOUNTER
Patient called and wondering if he should have labs drawn prior to apt with Dr. Sim tomorrow. None ordered at previous apt.    Last seen by Rachel Taylor. MEHREEN on 4/24/24 and no med changes at that time, but per note - Humza's cancer therapy caused a bump in his LFTs for which we decreased his rosuvastatin from 20 mg to 10 mg daily. As expected, he had a little bump in LDL. As ALT normalized, we did try to increase Crestor to 20 mg again but ALT winifred again, so he is back on 10 mg.     Message routed to [facility] nursing team.

## 2024-07-22 NOTE — TELEPHONE ENCOUNTER
Health Call Center    Phone Message    May a detailed message be left on voicemail: yes     Reason for Call: Other: Humza called requesting to speak with his care team about whether or not he should have labs drawn prior to his visit with Dr. Sim tomorrow morning. Informed pt there are currently no lab orders. Please reach out to Humza to discuss. Thank you!     Action Taken: Other: Cardiology    Travel Screening: Not Applicable    Thank you!  [facility]       Date of Service:

## 2024-07-22 NOTE — TELEPHONE ENCOUNTER
Miguelito Sim MD Ashenmacher, Megan, RN  Caller: Unspecified (Today,  1:43 PM)  Not unless we have changed any of his meds.  He has had his cholesterol checked 3 times this year already.  Most recently in March.  Recommendation is to check at most once a year unless medicine dosage has changed    ----------------------------------------    Patient called and VM left informing patient of above recommendation from Dr. Sim. Patient informed that no labs are needed prior to apt tomorrow and he does need to fast. Instructed Dr. Sim will order labs at time of visit if needed. Patient instructed to call team 2 nurse line back with any questions/ concerns.

## 2024-07-23 ENCOUNTER — OFFICE VISIT (OUTPATIENT)
Dept: CARDIOLOGY | Facility: CLINIC | Age: 79
End: 2024-07-23
Attending: INTERNAL MEDICINE
Payer: COMMERCIAL

## 2024-07-23 VITALS
WEIGHT: 179.8 LBS | DIASTOLIC BLOOD PRESSURE: 56 MMHG | HEIGHT: 71 IN | HEART RATE: 83 BPM | OXYGEN SATURATION: 97 % | SYSTOLIC BLOOD PRESSURE: 98 MMHG | BODY MASS INDEX: 25.17 KG/M2

## 2024-07-23 DIAGNOSIS — I25.119 CORONARY ARTERY DISEASE INVOLVING NATIVE CORONARY ARTERY OF NATIVE HEART WITH ANGINA PECTORIS (H): ICD-10-CM

## 2024-07-23 DIAGNOSIS — Z79.01 CURRENT USE OF LONG TERM ANTICOAGULATION: ICD-10-CM

## 2024-07-23 DIAGNOSIS — R79.89 ELEVATED LIVER FUNCTION TESTS: Primary | ICD-10-CM

## 2024-07-23 DIAGNOSIS — R07.89 ATYPICAL CHEST PAIN: ICD-10-CM

## 2024-07-23 DIAGNOSIS — R93.1 AGATSTON CAC SCORE, >400: ICD-10-CM

## 2024-07-23 DIAGNOSIS — I48.0 PAROXYSMAL A-FIB (H): ICD-10-CM

## 2024-07-23 DIAGNOSIS — E78.00 HYPERCHOLESTEROLEMIA: ICD-10-CM

## 2024-07-23 PROCEDURE — 99214 OFFICE O/P EST MOD 30 MIN: CPT | Performed by: INTERNAL MEDICINE

## 2024-07-23 NOTE — PROGRESS NOTES
HPI and Plan:   Humza is a very nice 79-year-old gentleman with past medical history significant for hypercholesterolemia, a very strong family history of coronary disease with his father dying of a second myocardial infarction at age 62 but first myocardial infarction at age 39.  Grandfather  at age 35.  Brother at 68 also had a myocardial infarction and received a stent.     A calcium score ()  returned at 4316, putting him in the top 1% for risk.  He has borderline dilated ascending aorta by echocardiogram at 4.1.  2019 CT scan measured 3.7.    He had a negative stress nuclear scan in  and 2021.  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  He fortunately has had a very good response to Tagrisso which she has been on since 2023.  Humza's cancer therapy caused a bump in his LFTs for which we decreased his rosuvastatin from 20 mg to 10 mg daily.  His most recent ALT through [facility] from  was 85.  Tagrisso can affect QT prolongation and he had a QT interval in January of 421     In January of this year neck was noted to be in asymptomatic rapid atrial fibrillation for which she spontaneously converted.  We started him on Eliquis and he monitors with a Apperian mobile device.  I have given him some metoprolol to tartrate to use as pill in the pocket in addition to his metoprolol succinate.  Humza has had occasional self-limited breakthroughs of A-fib that are short-lived.  He has not used pill in the pocket.  He states when he has that he has a vague feeling in his chest that he is aware he is out of rhythm.     Neck exercises 3 times a week and golfs without any exertional symptoms.     Humza returns to clinic and state he thinks he is doing great.     ASSESSMENT AND PLAN: Humza has no symptoms to suggest ischemia or heart  failure and this is supported by his stress echo of last summer..     Humza is having no bleeding problems with his Eliquis therapy no symptoms to suggest a TIA or CVA.  I again reviewed how to use his metoprolol  tartrate.     Most recent fasting lipid profile is still outstanding with total cholesterol 103, HDL 48, LDL 37 and triglycerides of 89.  As stated most recent ALT through [facility] is quite acceptable from a cardiac standpoint     His weight has stabilized.    Blood pressure is a bit on the low side at 98/56 with a pulse of 83.  He is completely asymptomatic we will continue current medications     I have congratulated on his healthy lifestyle and encouraged him to continue to do so.       I informed him that I will be retiring May 2.  I plan is to have him follow-up with on in 4 months.  I will see him in 9 months.  Will check EKGs, liver function tests and fasting lipid profile.  He has seen Dr. Star Contreras in the past and I will have him follow-up with call as Manolo has an expertise in cardio oncology.  Thank you for allow me to participate in this patient's care.  Sincerely,                                Miguelito Sim MD [facility]       Today's clinic visit entailed:  Review of the result(s) of each unique test - lab work, stress echo  Ordering of each unique test  Prescription drug management  32 minutes spent by me on the date of the encounter doing chart review, history and exam, documentation and further activities per the note  Provider  Link to [facility] Help Grid     The level of medical decision making during this visit was of moderate complexity.      Orders Placed This Encounter   Procedures    Lipid Profile    Lipid Profile    Basic metabolic panel    Follow-Up with Cardiology    Follow-Up with Cardiology YUNG       No orders of the defined types were placed in this encounter.      There are no discontinued medications.      Encounter Diagnoses   Name Primary?    Agatston CAC score,  >400     Atypical chest pain     Hypercholesterolemia     Coronary artery disease involving native coronary artery of native heart with angina pectoris (H24)     Paroxysmal A-fib (H)     Current use of long term anticoagulation     Elevated liver function tests Yes       CURRENT MEDICATIONS:  Current Outpatient Medications   Medication Sig Dispense Refill    apixaban ANTICOAGULANT (ELIQUIS) 5 MG tablet Take 5 mg by mouth 2 times daily      Ascorbic Acid (VITAMIN C PO) Take 1,000 mg by mouth every morning       Cyanocobalamin (VITAMIN B 12 PO) Take 1,000 mcg by mouth every morning      ezetimibe (ZETIA) 10 MG tablet Take 1 tablet (10 mg) by mouth daily 90 tablet 4    metoprolol succinate ER (TOPROL XL) 25 MG 24 hr tablet Take 1 tablet (25 mg) by mouth 2 times daily 180 tablet 3    nitroGLYcerin (NITROSTAT) 0.4 MG sublingual tablet For chest pain place 1 tablet under the tongue every 5 minutes for 3 doses. If symptoms persist 5 minutes after 1st dose call 911. 25 tablet 1    osimertinib (TAGRISSO) 80 MG tablet Take 80 mg by mouth every morning      rosuvastatin (CRESTOR) 10 MG tablet 05 tabs/10 mg QAM since 2/28/24      tamsulosin (FLOMAX) 0.4 MG capsule Take 1 capsule (0.4 mg) by mouth daily 90 capsule 3    VITAMIN D, CHOLECALCIFEROL, PO Take 1,000 Units by mouth every morning          ALLERGIES   No Known Allergies    PAST MEDICAL HISTORY:  Past Medical History:   Diagnosis Date    Aortic root dilatation (H24)     Aortic stenosis     Arthritis     Atypical chest pain 01/28/2015    Cerebral artery occlusion with cerebral infarction (H)     TIA  1992    Essential hypertension with goal blood pressure less than 140/90 11/26/2016    takes lisinopril for preventitive    Family history of heart disease     Hypercholesterolemia 01/03/2014     Diagnosis updated by automated process. Provider to review and confirm.    Hyperlipidemia LDL goal <70     IBS (irritable bowel syndrome)     Lung cancer (H) 09/2023    Other chronic  pain     Paroxysmal A-fib (H) 2024       PAST SURGICAL HISTORY:  Past Surgical History:   Procedure Laterality Date    ARTHROPLASTY HIP ANTERIOR Right 10/24/2017    Procedure: ARTHROPLASTY HIP ANTERIOR;  RIGHT TOTAL HIP ARTHROPLASTY DIRECT ANTERIOR APPROACH;  Surgeon: Meño Avalos MD;  Location:  OR    ARTHROPLASTY HIP ANTERIOR Left 10/09/2018    Procedure: ARTHROPLASTY HIP ANTERIOR;  LEFT TOTAL HIP ARTHROPLASTY DIRECT ANTERIOR APPROACH (DEPUY)^;  Surgeon: Meño Avalos MD;  Location:  OR    COLONOSCOPY  2011    Adenomatous polyp removed    COLONOSCOPY  2017    Two adenomatous polyps, repeat in 5 yrs    VASECTOMY      ZZC NONSPECIFIC PROCEDURE  1990    kidney stone       FAMILY HISTORY:  Family History   Problem Relation Age of Onset    Cerebrovascular Disease Mother         Stroke age 69    Breast Cancer Mother          age 75    C.A.D. Father          of MI at age 63, 1ST MI age 39    Diabetes Father     Cerebrovascular Disease Brother         Born [birth year]    Atrial fibrillation Brother     Transient ischemic attack Brother        SOCIAL HISTORY:  Social History     Socioeconomic History    Marital status:      Spouse name: None    Number of children: 4    Years of education: None    Highest education level: None   Occupational History    Occupation: [employer]     Employer: SELF     Comment:    Tobacco Use    Smoking status: Never    Smokeless tobacco: Never   Vaping Use    Vaping status: Never Used   Substance and Sexual Activity    Alcohol use: Not Currently    Drug use: No    Sexual activity: Yes     Partners: Female     Birth control/protection: Surgical   Other Topics Concern    Parent/sibling w/ CABG, MI or angioplasty before 65F 55M? Yes     Comment: father     Caffeine Concern Yes     Comment: 2 cups coffee daily, occ soda     Sleep Concern No    Special Diet No    Exercise Yes     Comment: 3-4 times weekly     Seat Belt Yes  "  Social History Narrative    Rides bicycle or goes to Y regularly (3x/week).      Social Determinants of Health     Financial Resource Strain: Low Risk  (10/21/2021)    Overall Financial Resource Strain (CARDIA)     Difficulty of Paying Living Expenses: Not hard at all   Food Insecurity: No Food Insecurity (10/21/2021)    Hunger Vital Sign     Worried About Running Out of Food in the Last Year: Never true     Ran Out of Food in the Last Year: Never true   Transportation Needs: No Transportation Needs (10/21/2021)    PRAPARE - Transportation     Lack of Transportation (Medical): No     Lack of Transportation (Non-Medical): No   Physical Activity: Sufficiently Active (10/21/2021)    Exercise Vital Sign     Days of Exercise per Week: 5 days     Minutes of Exercise per Session: 60 min   Interpersonal Safety: Low Risk  (4/9/2024)    Interpersonal Safety     Do you feel physically and emotionally safe where you currently live?: Yes     Within the past 12 months, have you been hit, slapped, kicked or otherwise physically hurt by someone?: No     Within the past 12 months, have you been humiliated or emotionally abused in other ways by your partner or ex-partner?: No   Housing Stability: Low Risk  (10/21/2021)    Housing Stability Vital Sign     Unable to Pay for Housing in the Last Year: No     Number of Places Lived in the Last Year: 1     Unstable Housing in the Last Year: No       Review of Systems:  Skin:  not assessed       Eyes:  not assessed      ENT:  not assessed      Respiratory:  Negative       Cardiovascular:  Negative dizziness;Positive for \"sometimes does get dizzy and is on cancer medication\"  Gastroenterology: not assessed      Genitourinary:  not assessed      Musculoskeletal:  not assessed      Neurologic:  not assessed      Psychiatric:  not assessed      Heme/Lymph/Imm:  not assessed      Endocrine:  not assessed        Physical Exam:  Vitals: BP 98/56 (BP Location: Right arm, Patient Position: " "Sitting, Cuff Size: Adult Regular)   Pulse 83   Ht 1.803 m (5' 11\")   Wt 81.6 kg (179 lb 12.8 oz)   SpO2 97%   BMI 25.08 kg/m      Constitutional:  cooperative, alert and oriented, well developed, well nourished, in no acute distress        Skin:  warm and dry to the touch, no apparent skin lesions or masses noted          Head:  normocephalic, no masses or lesions        Eyes:  pupils equal and round, conjunctivae and lids unremarkable, sclera white, no xanthalasma, EOMS intact, no nystagmus        Lymph:      ENT:  no pallor or cyanosis        Neck:  no carotid bruit        Respiratory:  normal respiratory excursion;clear to auscultation         Cardiac: regular rhythm;normal S1 and S2;no S3 or S4;no murmurs, gallops or rubs detected                pulses full and equal                                        GI:           Extremities and Muscular Skeletal:  no edema;no spinal abnormalities noted;normal muscle strength and tone              Neurological:  no gross motor deficits        Psych:  affect appropriate, oriented to time, person and place        CC  Miguelito Sim MD  [address]  [place] [id number]                "

## 2024-07-23 NOTE — LETTER
2024    Guillermo Kong MD, MD  [address]  [facility] [address]    RE: Javier MARIO Tamez       Dear Colleague,     I had the pleasure of seeing Javier MARTIN Milas in the [facility].  HPI and Plan:   Humza is a very nice 79-year-old gentleman with past medical history significant for hypercholesterolemia, a very strong family history of coronary disease with his father dying of a second myocardial infarction at age 62 but first myocardial infarction at age 39.  Grandfather  at age 35.  Brother at 68 also had a myocardial infarction and received a stent.     A calcium score ()  returned at 4316, putting him in the top 1% for risk.  He has borderline dilated ascending aorta by echocardiogram at 4.1.  2019 CT scan measured 3.7.    He had a negative stress nuclear scan in  and 2021.  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  He fortunately has had a very good response to Tagrisso which she has been on since 2023.  Humza's cancer therapy caused a bump in his LFTs for which we decreased his rosuvastatin from 20 mg to 10 mg daily.  His most recent ALT through [facility] from  was 85.  Tagrisso can affect QT prolongation and he had a QT interval in January of 421     In January of this year neck was noted to be in asymptomatic rapid atrial fibrillation for which she spontaneously converted.  We started him on Eliquis and he monitors with a [employer] mobile device.  I have given him some metoprolol to tartrate to use as pill in the pocket in addition to his metoprolol succinate.  uHmza has had occasional self-limited breakthroughs of A-fib that are short-lived.  He has not used pill in the pocket.  He states when he has that he has a vague feeling in his chest that he is aware he is out of rhythm.      Neck exercises 3 times a week and golfs without any exertional symptoms.     Humza returns to clinic and state he thinks he is doing great.     ASSESSMENT AND PLAN: Humza has no symptoms to suggest ischemia or heart failure and this is supported by his stress echo of last summer..     Humza is having no bleeding problems with his Eliquis therapy no symptoms to suggest a TIA or CVA.  I again reviewed how to use his metoprolol  tartrate.     Most recent fasting lipid profile is still outstanding with total cholesterol 103, HDL 48, LDL 37 and triglycerides of 89.  As stated most recent ALT through [facility] is quite acceptable from a cardiac standpoint     His weight has stabilized.    Blood pressure is a bit on the low side at 98/56 with a pulse of 83.  He is completely asymptomatic we will continue current medications     I have congratulated on his healthy lifestyle and encouraged him to continue to do so.       I informed him that I will be retiring May 2.  I plan is to have him follow-up with on in 4 months.  I will see him in 9 months.  Will check EKGs, liver function tests and fasting lipid profile.  He has seen Dr. Star Contreras in the past and I will have him follow-up with call as Manolo has an expertise in cardio oncology.  Thank you for allow me to participate in this patient's care.  Sincerely,                                Miguelito Sim MD [facility]       Today's clinic visit entailed:  Review of the result(s) of each unique test - lab work, stress echo  Ordering of each unique test  Prescription drug management  32 minutes spent by me on the date of the encounter doing chart review, history and exam, documentation and further activities per the note  Provider  Link to [facility] Help Grid     The level of medical decision making during this visit was of moderate complexity.      Orders Placed This Encounter   Procedures    Lipid Profile    Lipid Profile    Basic metabolic panel    Follow-Up with  Cardiology    Follow-Up with Cardiology YUGN       No orders of the defined types were placed in this encounter.      There are no discontinued medications.      Encounter Diagnoses   Name Primary?    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)     Paroxysmal A-fib (H)     Current use of long term anticoagulation     Elevated liver function tests Yes       CURRENT MEDICATIONS:  Current Outpatient Medications   Medication Sig Dispense Refill    apixaban ANTICOAGULANT (ELIQUIS) 5 MG tablet Take 5 mg by mouth 2 times daily      Ascorbic Acid (VITAMIN C PO) Take 1,000 mg by mouth every morning       Cyanocobalamin (VITAMIN B 12 PO) Take 1,000 mcg by mouth every morning      ezetimibe (ZETIA) 10 MG tablet Take 1 tablet (10 mg) by mouth daily 90 tablet 4    metoprolol succinate ER (TOPROL XL) 25 MG 24 hr tablet Take 1 tablet (25 mg) by mouth 2 times daily 180 tablet 3    nitroGLYcerin (NITROSTAT) 0.4 MG sublingual tablet For chest pain place 1 tablet under the tongue every 5 minutes for 3 doses. If symptoms persist 5 minutes after 1st dose call 911. 25 tablet 1    osimertinib (TAGRISSO) 80 MG tablet Take 80 mg by mouth every morning      rosuvastatin (CRESTOR) 10 MG tablet 05 tabs/10 mg QAM since 2/28/24      tamsulosin (FLOMAX) 0.4 MG capsule Take 1 capsule (0.4 mg) by mouth daily 90 capsule 3    VITAMIN D, CHOLECALCIFEROL, PO Take 1,000 Units by mouth every morning          ALLERGIES   No Known Allergies    PAST MEDICAL HISTORY:  Past Medical History:   Diagnosis Date    Aortic root dilatation (H24)     Aortic stenosis     Arthritis     Atypical chest pain 01/28/2015    Cerebral artery occlusion with cerebral infarction (H)     TIA  1992    Essential hypertension with goal blood pressure less than 140/90 11/26/2016    takes lisinopril for preventitive    Family history of heart disease      Hypercholesterolemia 2014     Diagnosis updated by automated process. Provider to review and confirm.    Hyperlipidemia LDL goal <70     IBS (irritable bowel syndrome)     Lung cancer (H) 2023    Other chronic pain     Paroxysmal A-fib (H) 2024       PAST SURGICAL HISTORY:  Past Surgical History:   Procedure Laterality Date    ARTHROPLASTY HIP ANTERIOR Right 10/24/2017    Procedure: ARTHROPLASTY HIP ANTERIOR;  RIGHT TOTAL HIP ARTHROPLASTY DIRECT ANTERIOR APPROACH;  Surgeon: Meño Avalos MD;  Location: [facility] OR    ARTHROPLASTY HIP ANTERIOR Left 10/09/2018    Procedure: ARTHROPLASTY HIP ANTERIOR;  LEFT TOTAL HIP ARTHROPLASTY DIRECT ANTERIOR APPROACH (DEPUY)^;  Surgeon: Meño Avalos MD;  Location: [facility] OR    COLONOSCOPY  2011    Adenomatous polyp removed    COLONOSCOPY  2017    Two adenomatous polyps, repeat in 5 yrs    VASECTOMY      ZZC NONSPECIFIC PROCEDURE  1990    kidney stone       FAMILY HISTORY:  Family History   Problem Relation Age of Onset    Cerebrovascular Disease Mother         Stroke age 69    Breast Cancer Mother          age 75    C.A.D. 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 Yes     Comment: father     Caffeine Concern Yes     Comment: 2 cups coffee daily, occ soda     Sleep Concern No    Special Diet No    Exercise Yes     Comment: 3-4 times weekly     Seat Belt Yes   Social History Narrative    Rides bicycle or goes to Y regularly (3x/week).      Social Determinants of Health     Financial Resource Strain: Low Risk  (10/21/2021)    Overall Financial Resource Strain (CARDIA)     Difficulty of Paying Living Expenses: Not hard at all   Food Insecurity: No Food Insecurity (10/21/2021)    Hunger Vital Sign     Worried About Running Out of Food in the Last Year: Never true     Ran Out of Food in the Last Year: Never true   Transportation Needs: No Transportation Needs (10/21/2021)    PRAPARE - Transportation     Lack of Transportation (Medical): No     Lack of Transportation (Non-Medical): No   Physical Activity: Sufficiently Active (10/21/2021)    Exercise Vital Sign     Days of Exercise per Week: 5 days     Minutes of Exercise per Session: 60 min   Interpersonal Safety: Low Risk  (4/9/2024)    Interpersonal Safety     Do you feel physically and emotionally safe where you currently live?: Yes     Within the past 12 months, have you been hit, slapped, kicked or otherwise physically hurt by someone?: No     Within the past 12 months, have you been humiliated or emotionally abused in other ways by your partner or ex-partner?: No   Housing Stability: Low Risk  (10/21/2021)    Housing Stability Vital Sign     Unable to Pay for Housing in the Last Year: No     Number of Places Lived in the Last Year: 1     Unstable Housing in the Last Year: No       Review of Systems:  Skin:  not assessed       Eyes:  not assessed      ENT:  not assessed      Respiratory:  Negative       Cardiovascular:  Negative dizziness;Positive for \"sometimes does get dizzy and is on cancer medication\"  Gastroenterology: not assessed      Genitourinary:  not assessed      Musculoskeletal:  not assessed    " "  Neurologic:  not assessed      Psychiatric:  not assessed      Heme/Lymph/Imm:  not assessed      Endocrine:  not assessed        Physical Exam:  Vitals: BP 98/56 (BP Location: Right arm, Patient Position: Sitting, Cuff Size: Adult Regular)   Pulse 83   Ht 1.803 m (5' 11\")   Wt 81.6 kg (179 lb 12.8 oz)   SpO2 97%   BMI 25.08 kg/m      Constitutional:  cooperative, alert and oriented, well developed, well nourished, in no acute distress        Skin:  warm and dry to the touch, no apparent skin lesions or masses noted          Head:  normocephalic, no masses or lesions        Eyes:  pupils equal and round, conjunctivae and lids unremarkable, sclera white, no xanthalasma, EOMS intact, no nystagmus        Lymph:      ENT:  no pallor or cyanosis        Neck:  no carotid bruit        Respiratory:  normal respiratory excursion;clear to auscultation         Cardiac: regular rhythm;normal S1 and S2;no S3 or S4;no murmurs, gallops or rubs detected                pulses full and equal                                        GI:           Extremities and Muscular Skeletal:  no edema;no spinal abnormalities noted;normal muscle strength and tone              Neurological:  no gross motor deficits        Psych:  affect appropriate, oriented to time, person and place        CC  Miguelito Sim MD  [address]  [place] [address]      Thank you for allowing me to participate in the care of your patient.      Sincerely,     Miguelito Sim MD     [facility]  "

## 2024-07-29 DIAGNOSIS — E78.00 HYPERCHOLESTEROLEMIA: Primary | ICD-10-CM

## 2024-07-29 RX ORDER — ROSUVASTATIN CALCIUM 10 MG/1
10 TABLET, COATED ORAL DAILY
Qty: 90 TABLET | Refills: 3 | Status: SHIPPED | OUTPATIENT
Start: 2024-07-29 | End: 2024-08-21

## 2024-07-29 NOTE — TELEPHONE ENCOUNTER
[facility] Cardiology Refill Guideline reviewed.  Medication meets criteria for refill. Routing to nursing team to further review. Unclear what dosage patient is currently taking.

## 2024-08-07 ENCOUNTER — TELEPHONE (OUTPATIENT)
Dept: CARDIOLOGY | Facility: CLINIC | Age: 79
End: 2024-08-07
Payer: COMMERCIAL

## 2024-08-07 NOTE — TELEPHONE ENCOUNTER
Refills of rosuvastatin were sent 7/29/24 to requested pharmacy. Should have refills on file and available when needed.

## 2024-08-07 NOTE — TELEPHONE ENCOUNTER
[facility] Call Center    Phone Message    May a detailed message be left on voicemail: yes     Reason for Call: Medication Refill Request    Has the patient contacted the pharmacy for the refill? Yes   Name of medication being requested:   rosuvastatin (CRESTOR) 10 MG tablet [10255] (Order [id number])   Provider who prescribed the medication: [facility]  Pharmacy:    [facility] ([facility]) - [place], [place] - [address]      Date medication is needed: pt will be out in about a month.       Action Taken: Other: cardiology     Travel Screening: Not Applicable      Thank you!  Specialty Access Center        Date of Service:

## 2024-08-21 DIAGNOSIS — E78.00 HYPERCHOLESTEROLEMIA: ICD-10-CM

## 2024-08-21 RX ORDER — ROSUVASTATIN CALCIUM 10 MG/1
10 TABLET, COATED ORAL DAILY
Qty: 90 TABLET | Refills: 3 | Status: SHIPPED | OUTPATIENT
Start: 2024-08-21

## 2024-08-21 NOTE — TELEPHONE ENCOUNTER
Attempted to call and speak to Humza to let him know the refill of rosuvastatin was sent to his preferred pharmacy as requested. He did not answer. Left a message  .

## 2024-08-21 NOTE — TELEPHONE ENCOUNTER
[facility] Call Center    Phone Message    May a detailed message be left on voicemail: yes     Reason for Call: Other: Pt is calling to inform that [facility] has notified him that they do not have a refill orders  for rosuvastatin (CRESTOR) 10 MG tablet.  He is asking that an order be sent to [facility] or to call  the pharmacy at [phone].  Please inform pt when order is sent.     Action Taken: Other: cardio    Travel Screening: Not Applicable    Thank you!  Specialty Access Center       Date of Service:

## 2024-09-05 ENCOUNTER — OFFICE VISIT (OUTPATIENT)
Dept: INTERNAL MEDICINE | Facility: CLINIC | Age: 79
End: 2024-09-05
Payer: COMMERCIAL

## 2024-09-05 VITALS
TEMPERATURE: 98.1 F | HEART RATE: 88 BPM | WEIGHT: 179 LBS | HEIGHT: 70 IN | OXYGEN SATURATION: 95 % | RESPIRATION RATE: 18 BRPM | BODY MASS INDEX: 25.62 KG/M2 | SYSTOLIC BLOOD PRESSURE: 98 MMHG | DIASTOLIC BLOOD PRESSURE: 58 MMHG

## 2024-09-05 DIAGNOSIS — C79.51 METASTATIC ADENOCARCINOMA TO BONE (H): ICD-10-CM

## 2024-09-05 DIAGNOSIS — I25.119 CORONARY ARTERY DISEASE INVOLVING NATIVE CORONARY ARTERY OF NATIVE HEART WITH ANGINA PECTORIS (H): ICD-10-CM

## 2024-09-05 DIAGNOSIS — C34.90 PRIMARY ADENOCARCINOMA OF LUNG, UNSPECIFIED LATERALITY (H): ICD-10-CM

## 2024-09-05 DIAGNOSIS — I48.0 PAROXYSMAL A-FIB (H): ICD-10-CM

## 2024-09-05 DIAGNOSIS — C79.51 MALIGNANT NEOPLASM METASTATIC TO BONE (H): Primary | ICD-10-CM

## 2024-09-05 PROCEDURE — 99214 OFFICE O/P EST MOD 30 MIN: CPT | Performed by: INTERNAL MEDICINE

## 2024-09-05 NOTE — PROGRESS NOTES
"Face to face time with patient: 30 minutes (1515---1545)     Assessment & Plan   (C79.51) Malignant neoplasm metastatic to bone (H)  (primary encounter diagnosis)  (C79.51) Metastatic adenocarcinoma to bone (H)  (C34.90) Primary adenocarcinoma of lung, unspecified laterality (H)  Comment: Follow with oncology for treatment and staging as they advise.     (I25.119) Coronary artery disease involving native coronary artery of native heart with angina pectoris (H24)  (I48.0) Paroxysmal A-fib (H)  Comment: Follow with cardiology as they advise.         BMI  Estimated body mass index is 25.92 kg/m  as calculated from the following:    Height as of this encounter: 1.77 m (5' 9.69\").    Weight as of this encounter: 81.2 kg (179 lb).         Patient Instructions   It was good to see you today.     Continue to follow with Dr Kevin of [facility] for treatment and staging as he recommends.     Follow with cardiology as they advise.     See me in no more than a year, perhaps next summer for medication check or an Annual Wellness exam.         John Paul Ching is a 79 year old, presenting for the following health issues:  Follow Up (Afib, cancer update)      9/5/2024     2:15 PM   Additional Questions   Roomed by Kelly CAVAZOS CMA     History of Present Illness       Vascular Disease:  He presents for follow up of vascular disease.      He is not taking daily aspirin.    Reason for visit:  Annual checkup.    He eats 4 or more servings of fruits and vegetables daily.He consumes 3 sweetened beverage(s) daily.He exercises with enough effort to increase his heart rate 10 to 19 minutes per day.  He exercises with enough effort to increase his heart rate 4 days per week. He is missing 1 dose(s) of medications per week.  He is not taking prescribed medications regularly due to remembering to take.     The patient wished to review with me developments in his medical status in recent months.     He had reported persistent chest " "discomfort to Dr Sim of cardiology about a year ago.   A stress echo showed no obvious evidence of significant coronary occlusive disease.     He saw Demetria Singleton on 9/27/2023 for a sternal mass. Subsequent biopsy of the mass and staging/imaging studies showed metastatic adenocarcinoma.     He has been following with Dr Yovany Kevin of [facility], and initiated Tagrisso in around mid-November 2023. Serial staging CT scans of the chest/abdomen and pelvis have been followed roughly every three months, which have shown some level of response to Tagrissa.     Past medical, family and social histories as well as medications reviewed and updated as needed.    REVIEW OF SYSTEMS: The following systems have been completely reviewed and are negative except as noted above:   Constitutional,  respiratory, cardiovascular, musculoskeletal, and neurologic systems.          Objective    BP 98/58 (BP Location: Right arm, Patient Position: Sitting, Cuff Size: Adult Large)   Pulse 88   Temp 98.1  F (36.7  C) (Oral)   Resp 18   Ht 1.77 m (5' 9.69\")   Wt 81.2 kg (179 lb)   SpO2 95%   BMI 25.92 kg/m    Body mass index is 25.92 kg/m .    Physical Exam   GENERAL: alert and no distress  RESP: lungs clear to auscultation - no rales, rhonchi or wheezes  CV: regular rate and rhythm, normal S1 S2, no S3 or S4, no murmur, click or rub, no peripheral edema  MS: no gross musculoskeletal defects noted, no edema        Signed Electronically by: Guillermo Kong MD,     "

## 2024-09-09 NOTE — PATIENT INSTRUCTIONS
It was good to see you today.     Continue to follow with Dr Kevin of [facility] for treatment and staging as he recommends.     Follow with cardiology as they advise.     See me in no more than a year, perhaps next summer for medication check or an Annual Wellness exam.

## 2024-09-26 ENCOUNTER — TELEPHONE (OUTPATIENT)
Dept: INTERNAL MEDICINE | Facility: CLINIC | Age: 79
End: 2024-09-26
Payer: COMMERCIAL

## 2024-09-26 NOTE — TELEPHONE ENCOUNTER
Patient calls for refill of Tidalafil 10 mg taken as needed for erectile dysfunction.    Please refill and send to [facility].    Michelleevan is out of the office so routing to Team B to fill.

## 2024-10-07 DIAGNOSIS — N52.9 ERECTILE DYSFUNCTION, UNSPECIFIED ERECTILE DYSFUNCTION TYPE: ICD-10-CM

## 2024-10-07 DIAGNOSIS — N52.8 OTHER MALE ERECTILE DYSFUNCTION: ICD-10-CM

## 2024-10-07 NOTE — TELEPHONE ENCOUNTER
Medication Question or Refill        What medication are you calling about (include dose and sig)?: Tadalafil    Preferred Pharmacy:   [facility] - [place] [id number] [facility]  [address]  [address]  [place] [address]  Phone: [phone] Fax: [phone]      Controlled Substance Agreement on file:   CSA -- Patient Level:    CSA: None found at the patient level.       Who prescribed the medication?: Dilan Vargas at [facility]     Do you need a refill? Yes    When did you use the medication last? current    Patient offered an appointment? No    Do you have any questions or concerns?  No      Could we send this information to you in [employer] or would you prefer to receive a phone call?:   Patient would prefer a phone call   Okay to leave a detailed message?: Yes at Cell number on file:    Telephone Information:   Mobile [phone]

## 2024-10-09 ENCOUNTER — HOSPITAL ENCOUNTER (OUTPATIENT)
Dept: CT IMAGING | Facility: CLINIC | Age: 79
Discharge: HOME | End: 2024-10-09
Attending: INTERNAL MEDICINE | Admitting: INTERNAL MEDICINE
Payer: COMMERCIAL

## 2024-10-09 DIAGNOSIS — C34.90 NON-SMALL CELL LUNG CANCER (H): ICD-10-CM

## 2024-10-09 LAB
CREAT BLD-MCNC: 0.9 MG/DL (ref 0.7–1.3)
EGFRCR SERPLBLD CKD-EPI 2021: >60 ML/MIN/1.73M2

## 2024-10-09 PROCEDURE — 250N000011 HC RX IP 250 OP 636: Performed by: INTERNAL MEDICINE

## 2024-10-09 PROCEDURE — 250N000009 HC RX 250: Performed by: INTERNAL MEDICINE

## 2024-10-09 PROCEDURE — 82565 ASSAY OF CREATININE: CPT

## 2024-10-09 PROCEDURE — 74177 CT ABD & PELVIS W/CONTRAST: CPT

## 2024-10-09 RX ORDER — TADALAFIL 10 MG/1
10 TABLET ORAL DAILY PRN
Qty: 15 TABLET | Refills: 11 | Status: SHIPPED | OUTPATIENT
Start: 2024-10-09

## 2024-10-09 RX ORDER — IOPAMIDOL 755 MG/ML
500 INJECTION, SOLUTION INTRAVASCULAR ONCE
Status: COMPLETED | OUTPATIENT
Start: 2024-10-09 | End: 2024-10-09

## 2024-10-09 RX ADMIN — IOPAMIDOL 88 ML: 755 INJECTION, SOLUTION INTRAVENOUS at 10:15

## 2024-10-09 RX ADMIN — SODIUM CHLORIDE 62 ML: 9 INJECTION, SOLUTION INTRAVENOUS at 10:15

## 2024-10-09 NOTE — TELEPHONE ENCOUNTER
Medication not on current med list. Pended medication and routing to primary care provider.    Thank you,  Juan R Ferrell, Triage RN [facility]  10:14 AM 10/9/2024

## 2024-10-11 ENCOUNTER — TRANSFERRED RECORDS (OUTPATIENT)
Dept: HEALTH INFORMATION MANAGEMENT | Facility: CLINIC | Age: 79
End: 2024-10-11
Payer: COMMERCIAL

## 2024-10-28 ENCOUNTER — TELEPHONE (OUTPATIENT)
Dept: INTERNAL MEDICINE | Facility: CLINIC | Age: 79
End: 2024-10-28
Payer: COMMERCIAL

## 2024-10-28 DIAGNOSIS — N52.9 ERECTILE DYSFUNCTION, UNSPECIFIED ERECTILE DYSFUNCTION TYPE: Primary | ICD-10-CM

## 2024-10-28 RX ORDER — SILDENAFIL CITRATE 20 MG/1
TABLET ORAL
Qty: 20 TABLET | Refills: 11 | Status: SHIPPED | OUTPATIENT
Start: 2024-10-28

## 2024-10-28 NOTE — TELEPHONE ENCOUNTER
Advised patient of provider recommendation via telephone.     Summer RN 4:34 PM October 28, 2024   [facility]

## 2024-10-28 NOTE — TELEPHONE ENCOUNTER
Patient calls, is requesting Sildenafil instead of Tadalafil, as insurance is no longer covering Tadalafil. His pharmacy has stock of 20 mg Sildenafil.   Patient would like this to be done by Wednesday 10/30/24.  Patient uses [facility] in [place], this is pended. Will forward to PCP and team to review.

## 2024-11-07 ENCOUNTER — NURSE TRIAGE (OUTPATIENT)
Dept: INTERNAL MEDICINE | Facility: CLINIC | Age: 79
End: 2024-11-07
Payer: COMMERCIAL

## 2024-11-07 ENCOUNTER — TELEPHONE (OUTPATIENT)
Dept: INTERNAL MEDICINE | Facility: CLINIC | Age: 79
End: 2024-11-07
Payer: COMMERCIAL

## 2024-11-07 NOTE — TELEPHONE ENCOUNTER
Left a voicemail asking patient to call the clinic back. Symptoms may need further triage.    Per chart, it appears another nurse this morning recommended patient to take a Covid-19 test, did he do this? Has he contacted [facility] regarding this?

## 2024-11-07 NOTE — TELEPHONE ENCOUNTER
Order/Referral Request    Who is requesting: pt     Orders being requested: x-ray    Reason service is needed/diagnosis: Has lung cancer and wants referral for xray because he's had a cough for 3 days    When are orders needed by: asap    Has this been discussed with Provider: No    Does patient have a preference on a Group/Provider/Facility? [facility]    Does patient have an appointment scheduled?: No    Where to send orders: Place orders within Epic    Could we send this information to you in [employer] or would you prefer to receive a phone call?:   [facility]  Okay to leave a detailed message?: Yes at Cell number on file:    Telephone Information:   Mobile [phone]

## 2024-11-07 NOTE — TELEPHONE ENCOUNTER
Nurse Triage SBAR    Is this a 2nd Level Triage? NO    Situation: cough x 3 days    Background: hx of lung cancer followed by [facility].    Assessment:   He tested negative for covid  He called [facility] - they are checking with oncologist for next steps.     He notes dry cough since Monday, mild coughing spells, sleeps through the night. No fever. No sob or chest pain. Little runny nose.     Protocol Recommended Disposition:   Home Care    Recommendation: care advice given. Patient stated an understanding and agreed with plan.       Does the patient meet one of the following criteria for ADS visit consideration? 16+ years old, with an [facility] PCP     TIP  Providers, please consider if this condition is appropriate for management at one of our Acute and Diagnostic Services sites.     If patient is a good candidate, please use dotphrase <dot>triageresponse and select Refer to ADS to document.     Patient stated an understanding and agreed with plan.     CATA HOWARD RN on 11/7/2024 at 12:30 PM   [facility]         Reason for Disposition   Cough with no complications    Additional Information   Negative: Bluish (or gray) lips or face   Negative: SEVERE difficulty breathing (e.g., struggling for each breath, speaks in single words)   Negative: Rapid onset of cough and has hives   Negative: Coughing started suddenly after medicine, an allergic food or bee sting   Negative: Difficulty breathing after exposure to flames, smoke, or fumes   Negative: Sounds like a life-threatening emergency to the triager   Negative: Previous asthma attacks and this feels like asthma attack   Negative: Dry cough (non-productive; no sputum or minimal clear sputum) and within 14 days of COVID-19 Exposure   Negative: MODERATE difficulty breathing (e.g., speaks in phrases, SOB even at rest, pulse 100-120) and still present when not coughing   Negative: Chest pain present when not coughing   Negative: Passed out (i.e.,  fainted, collapsed and was not responding)   Negative: Patient sounds very sick or weak to the triager   Negative: MILD difficulty breathing (e.g., minimal/no SOB at rest, SOB with walking, pulse <100) and still present when not coughing   Negative: Coughed up > 1 tablespoon (15 ml) blood (Exception: Blood-tinged sputum.)   Negative: Fever > 103 F (39.4 C)   Negative: Fever > 101 F (38.3 C) and over 60 years of age   Negative: Fever > 100.0 F (37.8 C) and has diabetes mellitus or a weak immune system (e.g., HIV positive, cancer chemotherapy, organ transplant, splenectomy, chronic steroids)   Negative: Fever > 100.0 F (37.8 C) and bedridden (e.g., CVA, chronic illness, recovering from surgery)   Negative: Increasing ankle swelling   Negative: Wheezing is present   Negative: SEVERE coughing spells (e.g., whooping sound after coughing, vomiting after coughing)   Negative: Coughing up serafin-colored (reddish-brown) or blood-tinged sputum   Negative: Fever present > 3 days (72 hours)   Negative: Fever returns after gone for over 24 hours and symptoms worse or not improved   Negative: Using nasal washes and pain medicine > 24 hours and sinus pain persists   Negative: Known COPD or other severe lung disease (i.e., bronchiectasis, cystic fibrosis, lung surgery) and worsening symptoms (i.e., increased sputum purulence or amount, increased breathing difficulty)   Negative: Continuous (nonstop) coughing interferes with work or school and no improvement using cough treatment per Care Advice   Negative: Patient wants to be seen   Negative: Cough has been present for > 3 weeks   Negative: Allergy symptoms are also present (e.g., itchy eyes, clear nasal discharge, postnasal drip)   Negative: Nasal discharge present > 10 days   Negative: Exposure to TB (Tuberculosis)   Negative: Taking an ACE Inhibitor medicine (e.g., benazepril/LOTENSIN, captopril/CAPOTEN, enalapril/VASOTEC, lisinopril/ZESTRIL)    Protocols used: Cough-A-OH

## 2024-11-07 NOTE — TELEPHONE ENCOUNTER
Patient called to report cough x 3 days. Patient would like to have a chest x-ray done. Advised pt to test himself for Covid first. Patient agreed and he will call back with results.

## 2024-11-13 ENCOUNTER — LAB (OUTPATIENT)
Dept: LAB | Facility: CLINIC | Age: 79
End: 2024-11-13
Payer: COMMERCIAL

## 2024-11-13 DIAGNOSIS — I25.119 CORONARY ARTERY DISEASE INVOLVING NATIVE CORONARY ARTERY OF NATIVE HEART WITH ANGINA PECTORIS (H): ICD-10-CM

## 2024-11-13 LAB
ANION GAP SERPL CALCULATED.3IONS-SCNC: 12 MMOL/L (ref 7–15)
BUN SERPL-MCNC: 10.4 MG/DL (ref 8–23)
CALCIUM SERPL-MCNC: 9.1 MG/DL (ref 8.8–10.4)
CHLORIDE SERPL-SCNC: 104 MMOL/L (ref 98–107)
CHOLEST SERPL-MCNC: 111 MG/DL
CREAT SERPL-MCNC: 0.93 MG/DL (ref 0.67–1.17)
EGFRCR SERPLBLD CKD-EPI 2021: 84 ML/MIN/1.73M2
FASTING STATUS PATIENT QL REPORTED: YES
GLUCOSE SERPL-MCNC: 105 MG/DL (ref 70–99)
HCO3 SERPL-SCNC: 25 MMOL/L (ref 22–29)
HDLC SERPL-MCNC: 57 MG/DL
LDLC SERPL CALC-MCNC: 45 MG/DL
NONHDLC SERPL-MCNC: 54 MG/DL
POTASSIUM SERPL-SCNC: 5 MMOL/L (ref 3.4–5.3)
SODIUM SERPL-SCNC: 141 MMOL/L (ref 135–145)
TRIGL SERPL-MCNC: 43 MG/DL

## 2024-11-13 PROCEDURE — 36415 COLL VENOUS BLD VENIPUNCTURE: CPT | Performed by: INTERNAL MEDICINE

## 2024-11-13 PROCEDURE — 80048 BASIC METABOLIC PNL TOTAL CA: CPT | Performed by: INTERNAL MEDICINE

## 2024-11-13 PROCEDURE — 80061 LIPID PANEL: CPT | Performed by: INTERNAL MEDICINE

## 2024-11-14 ENCOUNTER — OFFICE VISIT (OUTPATIENT)
Dept: CARDIOLOGY | Facility: CLINIC | Age: 79
End: 2024-11-14
Attending: INTERNAL MEDICINE
Payer: COMMERCIAL

## 2024-11-14 VITALS
OXYGEN SATURATION: 96 % | BODY MASS INDEX: 25.94 KG/M2 | HEIGHT: 71 IN | HEART RATE: 73 BPM | DIASTOLIC BLOOD PRESSURE: 60 MMHG | WEIGHT: 185.3 LBS | SYSTOLIC BLOOD PRESSURE: 100 MMHG

## 2024-11-14 DIAGNOSIS — Z79.01 CURRENT USE OF LONG TERM ANTICOAGULATION: ICD-10-CM

## 2024-11-14 DIAGNOSIS — R07.89 ATYPICAL CHEST PAIN: ICD-10-CM

## 2024-11-14 DIAGNOSIS — R79.89 ELEVATED LIVER FUNCTION TESTS: ICD-10-CM

## 2024-11-14 DIAGNOSIS — E78.00 HYPERCHOLESTEROLEMIA: ICD-10-CM

## 2024-11-14 DIAGNOSIS — R93.1 AGATSTON CAC SCORE, >400: ICD-10-CM

## 2024-11-14 DIAGNOSIS — I48.0 PAROXYSMAL A-FIB (H): ICD-10-CM

## 2024-11-14 DIAGNOSIS — I25.119 CORONARY ARTERY DISEASE INVOLVING NATIVE CORONARY ARTERY OF NATIVE HEART WITH ANGINA PECTORIS (H): ICD-10-CM

## 2024-11-14 NOTE — LETTER
2024    Guillermo Kong MD, MD  [address] [facility] [address]  [facility] [address]    RE: Javier Tamez       Dear Colleague,     I had the pleasure of seeing Javier Tamez in the [facility].      CARDIOLOGY CLINIC PROGRESS NOTE    DOS: 2024      Javier Tamez  : 1945, 79 year old  MRN: [id number]      Primary cardiologist: Dr. Sim       History: Javier Tamez is a 79 year old man with history of  Coronary artery disease, hyperlipidemia, hypertension.      Mr Tamez has been followed in our clinic for years, mainly for cardiovascular prevention.        Stress ECHO () showed no ischemia.      Coronary CT calcium score () was 4316, placing him in the top 1% for risk.  He has been on a statin and aspirin.     NUC stress () showed no ischemia  Preserved LVEF.  Chest CT measured his ascending aorta at 3.6 x 3.5 cm (no change).     ECHO () showed LVEF 50-55% without wall motion abnormalities, normal RV function, no significant valvular pathology, mild aortic root enlargement and borderline ascending aorta dilatation.     Exercise stress ECHO () did not show any stress-induced ischemia.  LVEF 55%.     Stress ECHO (23) did not show any evidence of stress-induced ischemia, LVEF 55%, no significant valve pathology. Mild ascending aorta dilatation.     Stress echo (2023): exercised 9 and half minutes without symptoms, normal stress EKG with no evidence of stress-induced ischemia.     Unfortunately his chest pain turned out to be metastatic lung cancer to the bones.     Humza's cancer therapy caused a bump in his LFTs for which we decreased his rosuvastatin from 20 mg to 10 mg daily. As expected, he had a little bump in LDL. As ALT normalized, we did try to increase Crestor to 20 mg again but ALT winifred again, so he is back on 10 mg.     Echo 24, noted to be in rapid afib. EF 50-55%.    Initiated metoprolol succinate 50 mg daily and Eliquis 5 mg  twice daily     When he saw Dr. Sim 1/24/24, he was back in SR. ASA stopped sine he was now on Eliquis.     He presents today for follow up.   Generally, he is feeling pretty good.   He still uses his [employer] mobile.   Still with occasional breakthrough afib up to 110. He never has sustained RVR.   No significant LH.   His oncology team is still following his LFTs.   He is on Tagrisso daily (targeted therapy). Minimal side effects.  Has not been on chemo or radiation therapies. Tagrisso can affect QT prolongation. EKG today shows normal QTc 419 ms.  He tells me had a recent scan and his tumors were decreased in size and no new tumors. He has scans Q4 months.   He plans to golf tomorrow - he says it will be a perfect mid 50 degree day and julianne.       ROS:  Skin:        Eyes:       ENT:       Respiratory:  Negative    Cardiovascular:  Negative    Gastroenterology:      Genitourinary:       Musculoskeletal:       Neurologic:       Psychiatric:       Heme/Lymph/Imm:       Endocrine:         PAST MEDICAL HISTORY:  Past Medical History:   Diagnosis Date     Aortic root dilatation (H)      Aortic stenosis      Arthritis      Atypical chest pain 01/28/2015     Cerebral artery occlusion with cerebral infarction (H)     TIA  1992     Essential hypertension with goal blood pressure less than 140/90 11/26/2016    takes lisinopril for preventitive     Family history of heart disease      Hypercholesterolemia 01/03/2014     Diagnosis updated by automated process. Provider to review and confirm.     Hyperlipidemia LDL goal <70      IBS (irritable bowel syndrome)      Lung cancer (H) 09/2023     Malignant neoplasm metastatic to bone (H) 04/09/2024     Other chronic pain      Paroxysmal A-fib (H) 01/2024       PAST SURGICAL HISTORY:  Past Surgical History:   Procedure Laterality Date     ARTHROPLASTY HIP ANTERIOR Right 10/24/2017    Procedure: ARTHROPLASTY HIP ANTERIOR;  RIGHT TOTAL HIP ARTHROPLASTY DIRECT ANTERIOR APPROACH;   Surgeon: Meño Avalos MD;  Location: [facility]     ARTHROPLASTY HIP ANTERIOR Left 10/09/2018    Procedure: ARTHROPLASTY HIP ANTERIOR;  LEFT TOTAL HIP ARTHROPLASTY DIRECT ANTERIOR APPROACH (DEPUY)^;  Surgeon: Meño Avalos MD;  Location:  OR     COLONOSCOPY  01/01/2011    Adenomatous polyp removed     COLONOSCOPY  03/23/2017    Two adenomatous polyps, repeat in 5 yrs     VASECTOMY       ZZC NONSPECIFIC PROCEDURE  09/01/1990    kidney stone       SOCIAL HISTORY:  Social History     Socioeconomic History     Marital status:      Number of children: 4   Occupational History     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 Yes     Comment: father      Caffeine Concern Yes     Comment: 2 cups coffee daily, occ soda      Sleep Concern No     Special Diet No     Exercise Yes     Comment: 3-4 times weekly      Seat Belt Yes   Social History Narrative    Rides bicycle or [place] to [facility] regularly (3x/week).      Social Determinants of Health     Financial Resource Strain: Low Risk  (10/21/2021)    Overall Financial Resource Strain (CARDIA)      Difficulty of Paying Living Expenses: Not hard at all   Food Insecurity: No Food Insecurity (10/21/2021)    Hunger Vital Sign      Worried About Running Out of Food in the Last Year: Never true      Ran Out of Food in the Last Year: Never true   Transportation Needs: No Transportation Needs (10/21/2021)    PRAPARE - Transportation      Lack of Transportation (Medical): No      Lack of Transportation (Non-Medical): No   Physical Activity: Sufficiently Active (10/21/2021)    Exercise  Vital Sign      Days of Exercise per Week: 5 days      Minutes of Exercise per Session: 60 min   Interpersonal Safety: Low Risk  (2024)    Interpersonal Safety      Do you feel physically and emotionally safe where you currently live?: Yes      Within the past 12 months, have you been hit, slapped, kicked or otherwise physically hurt by someone?: No      Within the past 12 months, have you been humiliated or emotionally abused in other ways by your partner or ex-partner?: No   Housing Stability: Low Risk  (10/21/2021)    Housing Stability Vital Sign      Unable to Pay for Housing in the Last Year: No      Number of Places Lived in the Last Year: 1      Unstable Housing in the Last Year: No       FAMILY HISTORY:  Family History   Problem Relation Age of Onset     Cerebrovascular Disease Mother         Stroke age 69     Breast Cancer Mother          age 75     C.A.D. Father          of MI at age 63, 1ST MI age 39     Diabetes Father      Cerebrovascular Disease Brother         Born 1948     Atrial fibrillation Brother      Transient ischemic attack Brother        MEDS:   Current Outpatient Medications   Medication Sig Dispense Refill     apixaban ANTICOAGULANT (ELIQUIS) 5 MG tablet Take 5 mg by mouth 2 times daily       Ascorbic Acid (VITAMIN C PO) Take 1,000 mg by mouth every morning        Cyanocobalamin (VITAMIN B 12 PO) Take 1,000 mcg by mouth every morning       ezetimibe (ZETIA) 10 MG tablet Take 1 tablet (10 mg) by mouth daily 90 tablet 4     metoprolol succinate ER (TOPROL XL) 25 MG 24 hr tablet Take 1 tablet (25 mg) by mouth 2 times daily 180 tablet 3     nitroGLYcerin (NITROSTAT) 0.4 MG sublingual tablet For chest pain place 1 tablet under the tongue every 5 minutes for 3 doses. If symptoms persist 5 minutes after 1st dose call 911. 25 tablet 1     osimertinib (TAGRISSO) 80 MG tablet Take 80 mg by mouth every morning       rosuvastatin (CRESTOR) 10 MG tablet Take 1 tablet (10 mg) by mouth daily.  "90 tablet 3     sildenafil (REVATIO) 20 MG tablet Take 40 to 100 mg (two to five tablets) once daily as needed for erectile dysfunction 20 tablet 11     tamsulosin (FLOMAX) 0.4 MG capsule Take 1 capsule (0.4 mg) by mouth daily 90 capsule 3     VITAMIN D, CHOLECALCIFEROL, PO Take 1,000 Units by mouth every morning        No current facility-administered medications for this visit.       ALLERGIES: No Known Allergies    PHYSICAL EXAM:  Vitals: /60 (BP Location: Right arm, Patient Position: Sitting, Cuff Size: Adult Regular)   Pulse 73   Ht 1.803 m (5' 11\")   Wt 84.1 kg (185 lb 4.8 oz)   SpO2 96%   BMI 25.84 kg/m    Constitutional:  cooperative, alert and oriented, well developed, well nourished, in no acute distress        Skin:  warm and dry to the touch, no apparent skin lesions or masses noted        Head:  normocephalic, no masses or lesions        Eyes:  pupils equal and round;conjunctivae and lids unremarkable;sclera white        ENT:  no pallor or cyanosis        Neck:  JVP normal        Respiratory:  clear to auscultation        Cardiac: regular rhythm;normal S1 and S2;no S3 or S4;no murmurs, gallops or rubs detected                  GI:  abdomen soft;BS normoactive        Vascular: pulses full and equal                                      Extremities and Musculoskeletal:  no edema;no spinal abnormalities noted;normal muscle strength and tone        Neurological:  no gross motor deficits;affect appropriate              LABS/DATA:  I reviewed the following:  Component      Latest Ref Rng 11/13/2024  7:49 AM   Sodium      135 - 145 mmol/L 141    Potassium      3.4 - 5.3 mmol/L 5.0    Chloride      98 - 107 mmol/L 104    Carbon Dioxide (CO2)      22 - 29 mmol/L 25    Anion Gap      7 - 15 mmol/L 12    Urea Nitrogen      8.0 - 23.0 mg/dL 10.4    Creatinine      0.67 - 1.17 mg/dL 0.93    GFR Estimate      >60 mL/min/1.73m2 84    Calcium      8.8 - 10.4 mg/dL 9.1    Glucose      70 - 99 mg/dL 105 (H)  "   Cholesterol      <200 mg/dL 111    Triglycerides      <150 mg/dL 43    HDL Cholesterol      >=40 mg/dL 57    LDL Cholesterol Calculated      <100 mg/dL 45    Non HDL Cholesterol      <130 mg/dL 54    Patient Fasting? Yes        EKG 11/14/2024:  SR, VR 70, QTc 419 ms          ASSESSMENT/PLAN:  Coronary Artery Disease, nonobstructive  - family hx of premature CAD  - based off of abnormal calcium score (2015) 4316   - stress ECHO show no ischemia LVEF 55% (1/2023)  - Stress echo 8/2023: negative for stress-induced ischemia  - Continue statin, is also on Eliquis         Hypertension  - on Toprol XL 25 mg BID  - BP controlled        Hyperlipidemia  - on Crestor 10 mg, Zetia  - LDL is controlled        Paroxysmal afib RVR  - Asx  - Toprol XL 25 mg BID for rate control  - Eliquis   - We discussed that he should let us know if he has RVR for extended periods of time, new sxs  - EKG today shows SR      Lung cancer with bone mets  - On targeted therapy Tagrisso, which can prolong QTc  - EKG today shows SR with normal QTc 419 ms            Follow up:  April with Dr. Sim and [facility]          Rachel Taylor PA-C          Thank you for allowing me to participate in the care of your patient.      Sincerely,     Rachel Taylor PA-C     [facility]  cc:   Miguelito Sim MD  [address]  [place] [address]

## 2024-11-14 NOTE — PROGRESS NOTES
CARDIOLOGY CLINIC PROGRESS NOTE    DOS: 2024      Javier Tamez  : 1945, 79 year old  MRN: [id number]      Primary cardiologist: Dr. Sim       History: Javier Tamez is a 79 year old man with history of  Coronary artery disease, hyperlipidemia, hypertension.      Mr Tamez has been followed in our clinic for years, mainly for cardiovascular prevention.        Stress ECHO () showed no ischemia.      Coronary CT calcium score () was 4316, placing him in the top 1% for risk.  He has been on a statin and aspirin.     NUC stress () showed no ischemia  Preserved LVEF.  Chest CT measured his ascending aorta at 3.6 x 3.5 cm (no change).     ECHO () showed LVEF 50-55% without wall motion abnormalities, normal RV function, no significant valvular pathology, mild aortic root enlargement and borderline ascending aorta dilatation.     Exercise stress ECHO () did not show any stress-induced ischemia.  LVEF 55%.     Stress ECHO (23) did not show any evidence of stress-induced ischemia, LVEF 55%, no significant valve pathology. Mild ascending aorta dilatation.     Stress echo (2023): exercised 9 and half minutes without symptoms, normal stress EKG with no evidence of stress-induced ischemia.     Unfortunately his chest pain turned out to be metastatic lung cancer to the bones.     Humza's cancer therapy caused a bump in his LFTs for which we decreased his rosuvastatin from 20 mg to 10 mg daily. As expected, he had a little bump in LDL. As ALT normalized, we did try to increase Crestor to 20 mg again but ALT winifred again, so he is back on 10 mg.     Echo 24, noted to be in rapid afib. EF 50-55%.    Initiated metoprolol succinate 50 mg daily and Eliquis 5 mg twice daily     When he saw Dr. Sim 24, he was back in SR. ASA stopped sine he was now on Eliquis.     He presents today for follow up.   Generally, he is feeling pretty good.   He still uses his [employer] mobile.    Still with occasional breakthrough afib up to 110. He never has sustained RVR.   No significant LH.   His oncology team is still following his LFTs.   He is on Tagrisso daily (targeted therapy). Minimal side effects.  Has not been on chemo or radiation therapies. Tagrisso can affect QT prolongation. EKG today shows normal QTc 419 ms.  He tells me had a recent scan and his tumors were decreased in size and no new tumors. He has scans Q4 months.   He plans to golf tomorrow - he says it will be a perfect mid 50 degree day and julianne.       ROS:  Skin:        Eyes:       ENT:       Respiratory:  Negative    Cardiovascular:  Negative    Gastroenterology:      Genitourinary:       Musculoskeletal:       Neurologic:       Psychiatric:       Heme/Lymph/Imm:       Endocrine:         PAST MEDICAL HISTORY:  Past Medical History:   Diagnosis Date    Aortic root dilatation (H)     Aortic stenosis     Arthritis     Atypical chest pain 01/28/2015    Cerebral artery occlusion with cerebral infarction (H)     TIA  1992    Essential hypertension with goal blood pressure less than 140/90 11/26/2016    takes lisinopril for preventitive    Family history of heart disease     Hypercholesterolemia 01/03/2014     Diagnosis updated by automated process. Provider to review and confirm.    Hyperlipidemia LDL goal <70     IBS (irritable bowel syndrome)     Lung cancer (H) 09/2023    Malignant neoplasm metastatic to bone (H) 04/09/2024    Other chronic pain     Paroxysmal A-fib (H) 01/2024       PAST SURGICAL HISTORY:  Past Surgical History:   Procedure Laterality Date    ARTHROPLASTY HIP ANTERIOR Right 10/24/2017    Procedure: ARTHROPLASTY HIP ANTERIOR;  RIGHT TOTAL HIP ARTHROPLASTY DIRECT ANTERIOR APPROACH;  Surgeon: Meño Avalos MD;  Location: [facility] OR    ARTHROPLASTY HIP ANTERIOR Left 10/09/2018    Procedure: ARTHROPLASTY HIP ANTERIOR;  LEFT TOTAL HIP ARTHROPLASTY DIRECT ANTERIOR APPROACH (DEPUY)^;  Surgeon: Meño Avalos MD;   Location: [facility] OR    COLONOSCOPY  01/01/2011    Adenomatous polyp removed    COLONOSCOPY  03/23/2017    Two adenomatous polyps, repeat in 5 yrs    VASECTOMY      ZZC NONSPECIFIC PROCEDURE  09/01/1990    kidney stone       SOCIAL HISTORY:  Social History     Socioeconomic History    Marital status:     Number of children: 4   Occupational History    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 Yes     Comment: father     Caffeine Concern Yes     Comment: 2 cups coffee daily, occ soda     Sleep Concern No    Special Diet No    Exercise Yes     Comment: 3-4 times weekly     Seat Belt Yes   Social History Narrative    Rides bicycle or goes to [employer] regularly (3x/week).      Social Determinants of Health     Financial Resource Strain: Low Risk  (10/21/2021)    Overall Financial Resource Strain (CARDIA)     Difficulty of Paying Living Expenses: Not hard at all   Food Insecurity: No Food Insecurity (10/21/2021)    Hunger Vital Sign     Worried About Running Out of Food in the Last Year: Never true     Ran Out of Food in the Last Year: Never true   Transportation Needs: No Transportation Needs (10/21/2021)    PRAPARE - Transportation     Lack of Transportation (Medical): No     Lack of Transportation (Non-Medical): No   Physical Activity: Sufficiently Active (10/21/2021)    Exercise Vital Sign     Days of Exercise per Week: 5 days     Minutes of Exercise per Session: 60 min   Interpersonal Safety: Low Risk  (4/9/2024)    Interpersonal Safety     Do you feel physically and emotionally safe where you currently live?: Yes     Within the  past 12 months, have you been hit, slapped, kicked or otherwise physically hurt by someone?: No     Within the past 12 months, have you been humiliated or emotionally abused in other ways by your partner or ex-partner?: No   Housing Stability: Low Risk  (10/21/2021)    Housing Stability Vital Sign     Unable to Pay for Housing in the Last Year: No     Number of Places Lived in the Last Year: 1     Unstable Housing in the Last Year: No       FAMILY HISTORY:  Family History   Problem Relation Age of Onset    Cerebrovascular Disease Mother         Stroke age 69    Breast Cancer Mother          age 75    C.A.D. Father          of MI at age 63, 1ST MI age 39    Diabetes Father     Cerebrovascular Disease Brother         Born 1947    Atrial fibrillation Brother     Transient ischemic attack Brother        MEDS:   Current Outpatient Medications   Medication Sig Dispense Refill    apixaban ANTICOAGULANT (ELIQUIS) 5 MG tablet Take 5 mg by mouth 2 times daily      Ascorbic Acid (VITAMIN C PO) Take 1,000 mg by mouth every morning       Cyanocobalamin (VITAMIN B 12 PO) Take 1,000 mcg by mouth every morning      ezetimibe (ZETIA) 10 MG tablet Take 1 tablet (10 mg) by mouth daily 90 tablet 4    metoprolol succinate ER (TOPROL XL) 25 MG 24 hr tablet Take 1 tablet (25 mg) by mouth 2 times daily 180 tablet 3    nitroGLYcerin (NITROSTAT) 0.4 MG sublingual tablet For chest pain place 1 tablet under the tongue every 5 minutes for 3 doses. If symptoms persist 5 minutes after 1st dose call 911. 25 tablet 1    osimertinib (TAGRISSO) 80 MG tablet Take 80 mg by mouth every morning      rosuvastatin (CRESTOR) 10 MG tablet Take 1 tablet (10 mg) by mouth daily. 90 tablet 3    sildenafil (REVATIO) 20 MG tablet Take 40 to 100 mg (two to five tablets) once daily as needed for erectile dysfunction 20 tablet 11    tamsulosin (FLOMAX) 0.4 MG capsule Take 1 capsule (0.4 mg) by mouth daily 90 capsule 3    VITAMIN D, CHOLECALCIFEROL, PO Take  "1,000 Units by mouth every morning        No current facility-administered medications for this visit.       ALLERGIES: No Known Allergies    PHYSICAL EXAM:  Vitals: /60 (BP Location: Right arm, Patient Position: Sitting, Cuff Size: Adult Regular)   Pulse 73   Ht 1.803 m (5' 11\")   Wt 84.1 kg (185 lb 4.8 oz)   SpO2 96%   BMI 25.84 kg/m    Constitutional:  cooperative, alert and oriented, well developed, well nourished, in no acute distress        Skin:  warm and dry to the touch, no apparent skin lesions or masses noted        Head:  normocephalic, no masses or lesions        Eyes:  pupils equal and round;conjunctivae and lids unremarkable;sclera white        ENT:  no pallor or cyanosis        Neck:  JVP normal        Respiratory:  clear to auscultation        Cardiac: regular rhythm;normal S1 and S2;no S3 or S4;no murmurs, gallops or rubs detected                  GI:  abdomen soft;BS normoactive        Vascular: pulses full and equal                                      Extremities and Musculoskeletal:  no edema;no spinal abnormalities noted;normal muscle strength and tone        Neurological:  no gross motor deficits;affect appropriate              LABS/DATA:  I reviewed the following:  Component      Latest Ref Rng 11/13/2024  7:49 AM   Sodium      135 - 145 mmol/L 141    Potassium      3.4 - 5.3 mmol/L 5.0    Chloride      98 - 107 mmol/L 104    Carbon Dioxide (CO2)      22 - 29 mmol/L 25    Anion Gap      7 - 15 mmol/L 12    Urea Nitrogen      8.0 - 23.0 mg/dL 10.4    Creatinine      0.67 - 1.17 mg/dL 0.93    GFR Estimate      >60 mL/min/1.73m2 84    Calcium      8.8 - 10.4 mg/dL 9.1    Glucose      70 - 99 mg/dL 105 (H)    Cholesterol      <200 mg/dL 111    Triglycerides      <150 mg/dL 43    HDL Cholesterol      >=40 mg/dL 57    LDL Cholesterol Calculated      <100 mg/dL 45    Non HDL Cholesterol      <130 mg/dL 54    Patient Fasting? Yes        EKG 11/14/2024:  SR, VR 70, QTc 419 " ms          ASSESSMENT/PLAN:  Coronary Artery Disease, nonobstructive  - family hx of premature CAD  - based off of abnormal calcium score (2015) 4316   - stress ECHO show no ischemia LVEF 55% (1/2023)  - Stress echo 8/2023: negative for stress-induced ischemia  - Continue statin, is also on Eliquis         Hypertension  - on Toprol XL 25 mg BID  - BP controlled        Hyperlipidemia  - on Crestor 10 mg, Zetia  - LDL is controlled        Paroxysmal afib RVR  - Asx  - Toprol XL 25 mg BID for rate control  - Eliquis   - We discussed that he should let us know if he has RVR for extended periods of time, new sxs  - EKG today shows SR      Lung cancer with bone mets  - On targeted therapy Tagrisso, which can prolong QTc  - EKG today shows SR with normal QTc 419 ms            Follow up:  April with Dr. Sim and [facility]          Rachel Taylor PA-C

## 2024-11-30 NOTE — TELEPHONE ENCOUNTER
Patient sent a my chart scan from his [email or web address] mobile unit: afib @ 143 BPM        Called patient to discuss. He states he is using his Fitbit as a reminder to check his rhythm as he can't really tell if he has palpitations. He was not really watching closely at all but recently starting seeing short bursts of rates >120 BPM. So he worked at getting a kardia strip recorded and then getting sent on my chart. Today's message is his first success but he did have previous episodes.   Patient's baseline HR is 75-80 BPM. He will try to keep a diary of his Fitbit alerts if his rate is >100 for the next week. He sees YUNG Farseer on 4/24/204.  These episodes have not lasted long enough to try the pill in a pocket plan.  Verified that patient is consistent with the toprol Xl 50mg daily and his eliquis.    Per Dr. Sim's dictation 1/24/2024:  His exam and EKG demonstrates he is back in normal sinus rhythm today.  He relates he was completely asymptomatic with his A-fib despite 150 bpm.  We had a long discussion about A-fib, risk of cerebrovascular accident which for him is higher than his [id number] score because of his underlying lung cancer.  Ultimately decided we will continue with the metoprolol succinate 50 mg daily which she is tolerating quite well.  I have given her metoprolol tartrate 25 mg to use as a pill in the pocket if need be.  I recommended that he get a [employer] mobile device or smart watch to monitor going forward especially given the fact that he appears to be completely asymptomatic when in A-fib.  And will continue with Eliquis therapy.     Will message Dr. Morin to review           Spontaneous, unlabored and symmetrical

## 2024-12-04 ENCOUNTER — HOSPITAL ENCOUNTER (OUTPATIENT)
Dept: MRI IMAGING | Facility: CLINIC | Age: 79
Discharge: HOME | End: 2024-12-04
Attending: INTERNAL MEDICINE
Payer: COMMERCIAL

## 2024-12-04 DIAGNOSIS — C34.12 PRIMARY NON-SMALL CELL CARCINOMA OF UPPER LOBE OF LEFT LUNG (H): ICD-10-CM

## 2024-12-04 PROCEDURE — 255N000002 HC RX 255 OP 636: Performed by: INTERNAL MEDICINE

## 2024-12-04 PROCEDURE — 70553 MRI BRAIN STEM W/O & W/DYE: CPT

## 2024-12-04 PROCEDURE — A9585 GADOBUTROL INJECTION: HCPCS | Performed by: INTERNAL MEDICINE

## 2024-12-04 RX ORDER — GADOBUTROL 604.72 MG/ML
8.5 INJECTION INTRAVENOUS ONCE
Status: COMPLETED | OUTPATIENT
Start: 2024-12-04 | End: 2024-12-04

## 2024-12-04 RX ADMIN — GADOBUTROL 8.5 ML: 604.72 INJECTION INTRAVENOUS at 11:02

## 2024-12-09 ENCOUNTER — TRANSFERRED RECORDS (OUTPATIENT)
Dept: HEALTH INFORMATION MANAGEMENT | Facility: CLINIC | Age: 79
End: 2024-12-09
Payer: COMMERCIAL

## 2024-12-10 ENCOUNTER — TELEPHONE (OUTPATIENT)
Dept: INTERNAL MEDICINE | Facility: CLINIC | Age: 79
End: 2024-12-10
Payer: COMMERCIAL

## 2024-12-10 NOTE — TELEPHONE ENCOUNTER
Patient Quality Outreach    Patient is due for the following:   Physical Annual Wellness Visit    Action(s) Taken:   Patient has upcoming appointment, these items will be addressed at that time. 9/09/2025 appointment with Dr. Kong.    Type of outreach:    Chart review performed, no outreach needed.    Questions for provider review:    None           Kelly Lemus, [facility]

## 2025-02-12 ENCOUNTER — TELEPHONE (OUTPATIENT)
Dept: CARDIOLOGY | Facility: CLINIC | Age: 80
End: 2025-02-12
Payer: COMMERCIAL

## 2025-02-12 DIAGNOSIS — I48.0 PAROXYSMAL A-FIB (H): Primary | ICD-10-CM

## 2025-02-12 NOTE — TELEPHONE ENCOUNTER
[facility] Call Center    Phone Message    May a detailed message be left on voicemail: yes     Reason for Call: Medication Refill Request    Has the patient contacted the pharmacy for the refill? Yes   Name of medication being requested: Eliquis  Provider who prescribed the medication: Miguelito Sim  Pharmacy: [facility]  Date medication is needed: 02/12/2025       Action Taken: Other: Cardio    Travel Screening: Not Applicable     Date of Service:

## 2025-02-19 ENCOUNTER — HOSPITAL ENCOUNTER (OUTPATIENT)
Dept: CT IMAGING | Facility: CLINIC | Age: 80
Discharge: HOME | End: 2025-02-19
Attending: INTERNAL MEDICINE
Payer: COMMERCIAL

## 2025-02-19 DIAGNOSIS — C34.12 PRIMARY NON-SMALL CELL CARCINOMA OF UPPER LOBE OF LEFT LUNG (H): ICD-10-CM

## 2025-02-19 LAB
CREAT BLD-MCNC: 0.9 MG/DL (ref 0.7–1.3)
EGFRCR SERPLBLD CKD-EPI 2021: >60 ML/MIN/1.73M2

## 2025-02-19 PROCEDURE — 250N000009 HC RX 250: Performed by: INTERNAL MEDICINE

## 2025-02-19 PROCEDURE — 74177 CT ABD & PELVIS W/CONTRAST: CPT

## 2025-02-19 PROCEDURE — 250N000011 HC RX IP 250 OP 636: Performed by: INTERNAL MEDICINE

## 2025-02-19 PROCEDURE — 82565 ASSAY OF CREATININE: CPT

## 2025-02-19 RX ORDER — IOPAMIDOL 755 MG/ML
500 INJECTION, SOLUTION INTRAVASCULAR ONCE
Status: COMPLETED | OUTPATIENT
Start: 2025-02-19 | End: 2025-02-19

## 2025-02-19 RX ADMIN — SODIUM CHLORIDE 63 ML: 9 INJECTION, SOLUTION INTRAVENOUS at 09:38

## 2025-02-19 RX ADMIN — IOPAMIDOL 91 ML: 755 INJECTION, SOLUTION INTRAVENOUS at 09:38

## 2025-02-21 ENCOUNTER — TRANSFERRED RECORDS (OUTPATIENT)
Dept: HEALTH INFORMATION MANAGEMENT | Facility: CLINIC | Age: 80
End: 2025-02-21
Payer: COMMERCIAL

## 2025-03-22 ENCOUNTER — HEALTH MAINTENANCE LETTER (OUTPATIENT)
Age: 80
End: 2025-03-22

## 2025-04-14 ENCOUNTER — LAB (OUTPATIENT)
Dept: LAB | Facility: CLINIC | Age: 80
End: 2025-04-14
Payer: COMMERCIAL

## 2025-04-14 DIAGNOSIS — I48.91 ATRIAL FIBRILLATION, UNSPECIFIED TYPE (H): ICD-10-CM

## 2025-04-14 DIAGNOSIS — I25.119 CORONARY ARTERY DISEASE INVOLVING NATIVE CORONARY ARTERY OF NATIVE HEART WITH ANGINA PECTORIS: ICD-10-CM

## 2025-04-14 LAB
CHOLEST SERPL-MCNC: 120 MG/DL
FASTING STATUS PATIENT QL REPORTED: YES
HDLC SERPL-MCNC: 61 MG/DL
LDLC SERPL CALC-MCNC: 49 MG/DL
NONHDLC SERPL-MCNC: 59 MG/DL
TRIGL SERPL-MCNC: 50 MG/DL

## 2025-04-14 PROCEDURE — 36415 COLL VENOUS BLD VENIPUNCTURE: CPT | Performed by: INTERNAL MEDICINE

## 2025-04-14 PROCEDURE — 80061 LIPID PANEL: CPT | Performed by: INTERNAL MEDICINE

## 2025-04-14 PROCEDURE — 80048 BASIC METABOLIC PNL TOTAL CA: CPT | Performed by: INTERNAL MEDICINE

## 2025-04-15 ENCOUNTER — OFFICE VISIT (OUTPATIENT)
Dept: CARDIOLOGY | Facility: CLINIC | Age: 80
End: 2025-04-15
Attending: INTERNAL MEDICINE
Payer: COMMERCIAL

## 2025-04-15 VITALS
HEART RATE: 81 BPM | SYSTOLIC BLOOD PRESSURE: 90 MMHG | DIASTOLIC BLOOD PRESSURE: 56 MMHG | OXYGEN SATURATION: 95 % | WEIGHT: 183.1 LBS | BODY MASS INDEX: 25.63 KG/M2 | HEIGHT: 71 IN

## 2025-04-15 DIAGNOSIS — R79.89 ELEVATED LIVER FUNCTION TESTS: ICD-10-CM

## 2025-04-15 DIAGNOSIS — Z79.01 CURRENT USE OF LONG TERM ANTICOAGULATION: ICD-10-CM

## 2025-04-15 DIAGNOSIS — R93.1 AGATSTON CAC SCORE, >400: ICD-10-CM

## 2025-04-15 DIAGNOSIS — I95.1 ORTHOSTATIC HYPOTENSION: ICD-10-CM

## 2025-04-15 DIAGNOSIS — I48.91 ATRIAL FIBRILLATION, UNSPECIFIED TYPE (H): ICD-10-CM

## 2025-04-15 DIAGNOSIS — R07.89 ATYPICAL CHEST PAIN: ICD-10-CM

## 2025-04-15 DIAGNOSIS — E78.00 HYPERCHOLESTEROLEMIA: ICD-10-CM

## 2025-04-15 DIAGNOSIS — I25.10 CORONARY ARTERY DISEASE INVOLVING NATIVE CORONARY ARTERY OF NATIVE HEART WITHOUT ANGINA PECTORIS: Primary | ICD-10-CM

## 2025-04-15 DIAGNOSIS — I48.0 PAROXYSMAL A-FIB (H): ICD-10-CM

## 2025-04-15 LAB
ANION GAP SERPL CALCULATED.3IONS-SCNC: 13 MMOL/L (ref 7–15)
BUN SERPL-MCNC: 12.8 MG/DL (ref 8–23)
CALCIUM SERPL-MCNC: 9.5 MG/DL (ref 8.8–10.4)
CHLORIDE SERPL-SCNC: 102 MMOL/L (ref 98–107)
CREAT SERPL-MCNC: 1.04 MG/DL (ref 0.67–1.17)
EGFRCR SERPLBLD CKD-EPI 2021: 73 ML/MIN/1.73M2
FASTING STATUS PATIENT QL REPORTED: YES
GLUCOSE SERPL-MCNC: 111 MG/DL (ref 70–99)
HCO3 SERPL-SCNC: 23 MMOL/L (ref 22–29)
POTASSIUM SERPL-SCNC: 5.4 MMOL/L (ref 3.4–5.3)
SODIUM SERPL-SCNC: 138 MMOL/L (ref 135–145)

## 2025-04-15 RX ORDER — METOPROLOL SUCCINATE 25 MG/1
25 TABLET, EXTENDED RELEASE ORAL DAILY
Qty: 90 TABLET | Refills: 3 | Status: SHIPPED | OUTPATIENT
Start: 2025-04-15

## 2025-04-15 NOTE — PROGRESS NOTES
HPI and Plan:   Humza is a very nice 80-year-old gentleman with past medical history significant for hypercholesterolemia, a very strong family history of coronary disease with his father dying of a second myocardial infarction at age 62 but first myocardial infarction at age 39.  Grandfather  at age 35.  Brother at 68 also had a myocardial infarction and received a stent.     A calcium score ()  returned at 4316, putting him in the top 1% for risk.  He had a negative stress nuclear scan in 2018 and 2021.  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  He fortunately has had a very good response to Tagrisso which he has been on since 2023.  Humza's cancer therapy caused a bump in his LFTs for which we decreased his rosuvastatin from 20 mg to 10 mg daily.  His most recent ALT through [place] [facility] from December was 90.  Tagrisso can affect QT prolongation and he had a QT interval today of 436.  It can also cause a cardiomyopathy.     In 2023 Humza was noted to be in asymptomatic rapid atrial fibrillation for which he spontaneously converted.  We started him on Eliquis and he monitors with a Torrential mobile device.  I have given him some metoprolol  tartrate to use as pill in the pocket in addition to his metoprolol succinate 25 twice daily.  Humza has had occasional self-limited breakthroughs of A-fib that are short-lived.  He keeps a record and has about 1 or 2 episodes a month none of which lasted 48 hours.  He has not used pill in the pocket.  He states when he has that he has a vague feeling in his chest that he is aware he is out of rhythm.  He continues to have his back pain.  He has no exertional chest arm neck jaw or shoulder discomfort.  No dyspnea on exertion orthopnea or PND.    He does note his blood  pressure has been running lower and he does have occasional orthostasis.  He states about once a week.  He does not think it correlates with A-fib     Humza exercises 3 times a week and golfs without any exertional symptoms.    ASSESSMENT AND PLAN: Humza has no symptoms to suggest ischemia or heart failure and this is supported by his stress echo of 2023.  His back pain is quite atypical and I do not think an anginal equivalent..     Humza is having no bleeding problems with his Eliquis therapy no symptoms to suggest a TIA or CVA.  I again reviewed how to use his metoprolol  tartrate.     Blood pressure is on the low side at 90/56.  I will check his BMP to see if there is any evidence of dehydration or intravascular volume depletion.  I have told to make sure he stays well-hydrated.  I will decrease his metoprolol succinate to 25 mg once a day from twice a day.  It does not appear to be correlated with his A-fib but I have asked him to watch for this.  His Tagrisso can cause a cardiomyopathy so I will check an echocardiogram.  I will have him follow-up with Rachel in 1 month.  We talked about the fact that he may have an A-fib rebound and backing off on the beta-blockers but we will see how this affects blood pressure and orthostasis.    Fasting lipid profile is outstanding with total cholesterol of 120, HDL 61, LDL 49 and triglycerides of 50.  ALT from December from oncology is 90    As stated above he has no QT prolongation today.    His weight is stable, if anything up 4 pounds at 183    He asked about a CT scan which shows quite a bit of calcified lymph nodes.  He asks if we need to do anything about the calcium given the fact that he has high calcium score as well.  We discussed the fact that the calcium is more of a marker.  It is not the atherosclerosis.  With his very low cholesterol and healthy lifestyle he probably is affecting atherosclerotic regression.     I have congratulated on his healthy lifestyle and  encouraged him to continue to do so.       We talked about the fact that I am retiring May 2.  I will follow-up on his BMP.  I told him to call over the next 2 weeks if he has significant symptom changes with the change in beta-blockers.  Otherwise, given his cancer diagnosis I will have him follow-up with Dr. Maxime Guerra in 6 months.  As stated I will have him see Anah in 4 weeks to follow-up on metoprolol adjustment.  As stated above to see if he has more A-fib and whether it makes any significant difference with his orthostasis and whether he is better on more or less beta-blocker.  She will also follow-up on his echocardiogram.      Thank you for allow me to participate in this patient's care.  Sincerely,                                Miguelito Sim MD [facility]    The longitudinal plan of care for the diagnosis(es)/condition(s) as documented were addressed during this visit. Due to the added complexity in care, I will continue to support Humza in the subsequent management and with ongoing continuity of care.         Today's clinic visit entailed:  Review of external notes as documented elsewhere in note  Review of the result(s) of each unique test - EKG, lab work  The following tests were independently interpreted by me as noted in my documentation: EKG  Ordering of each unique test  Prescription drug management  45 minutes spent by me on the date of the encounter doing chart review, history and exam, documentation and further activities per the note  Provider  Link to [facility] Help Grid     The level of medical decision making during this visit was of high complexity.      Orders Placed This Encounter   Procedures    Basic metabolic panel    Follow-Up with Cardiology    Follow-Up with Cardiology YUNG    EKG 12-lead complete w/read - Clinics    EKG 12-lead complete w/read - Clinics    Echocardiogram Complete       Orders Placed This Encounter   Medications    metoprolol succinate ER (TOPROL XL) 25 MG 24 hr tablet      Sig: Take 1 tablet (25 mg) by mouth daily.     Dispense:  90 tablet     Refill:  3       Medications Discontinued During This Encounter   Medication Reason    metoprolol succinate ER (TOPROL XL) 25 MG 24 hr tablet Reorder (No AVS)         Encounter Diagnoses   Name Primary?    Agatston CAC score, >400     Atypical chest pain     Hypercholesterolemia     Paroxysmal A-fib (H)     Current use of long term anticoagulation     Elevated liver function tests     Atrial fibrillation, unspecified type (H)     Coronary artery disease involving native coronary artery of native heart without angina pectoris Yes    Orthostatic hypotension        CURRENT MEDICATIONS:  Current Outpatient Medications   Medication Sig Dispense Refill    apixaban ANTICOAGULANT (ELIQUIS) 5 MG tablet Take 1 tablet (5 mg) by mouth 2 times daily. 180 tablet 3    Ascorbic Acid (VITAMIN C PO) Take 1,000 mg by mouth every morning       Cyanocobalamin (VITAMIN B 12 PO) Take 1,000 mcg by mouth every morning      ezetimibe (ZETIA) 10 MG tablet Take 1 tablet (10 mg) by mouth daily. 90 tablet 2    metoprolol succinate ER (TOPROL XL) 25 MG 24 hr tablet Take 1 tablet (25 mg) by mouth daily. 90 tablet 3    nitroGLYcerin (NITROSTAT) 0.4 MG sublingual tablet For chest pain place 1 tablet under the tongue every 5 minutes for 3 doses. If symptoms persist 5 minutes after 1st dose call 911. 25 tablet 1    osimertinib (TAGRISSO) 80 MG tablet Take 80 mg by mouth every morning      rosuvastatin (CRESTOR) 10 MG tablet Take 1 tablet (10 mg) by mouth daily. 90 tablet 2    sildenafil (REVATIO) 20 MG tablet Take 40 to 100 mg (two to five tablets) once daily as needed for erectile dysfunction 20 tablet 11    tamsulosin (FLOMAX) 0.4 MG capsule TAKE 1 CAPSULE BY MOUTH DAILY 60 capsule 5    VITAMIN D, CHOLECALCIFEROL, PO Take 1,000 Units by mouth every morning          ALLERGIES   No Known Allergies    PAST MEDICAL HISTORY:  Past Medical History:   Diagnosis Date    Aortic root  dilatation     Aortic stenosis     Arthritis     Atypical chest pain 2015    Cerebral artery occlusion with cerebral infarction (H)     TIA      Essential hypertension with goal blood pressure less than 140/90 2016    takes lisinopril for preventitive    Family history of heart disease     Hypercholesterolemia 2014     Diagnosis updated by automated process. Provider to review and confirm.    Hyperlipidemia LDL goal <70     IBS (irritable bowel syndrome)     Lung cancer (H) 2023    Malignant neoplasm metastatic to bone (H) 2024    Other chronic pain     Paroxysmal A-fib (H) 2024       PAST SURGICAL HISTORY:  Past Surgical History:   Procedure Laterality Date    ARTHROPLASTY HIP ANTERIOR Right 10/24/2017    Procedure: ARTHROPLASTY HIP ANTERIOR;  RIGHT TOTAL HIP ARTHROPLASTY DIRECT ANTERIOR APPROACH;  Surgeon: Meño Avalos MD;  Location:  OR    ARTHROPLASTY HIP ANTERIOR Left 10/09/2018    Procedure: ARTHROPLASTY HIP ANTERIOR;  LEFT TOTAL HIP ARTHROPLASTY DIRECT ANTERIOR APPROACH (DEPUY)^;  Surgeon: Meño Avalos MD;  Location:  OR    COLONOSCOPY  2011    Adenomatous polyp removed    COLONOSCOPY  2017    Two adenomatous polyps, repeat in 5 yrs    VASECTOMY      ZZC NONSPECIFIC PROCEDURE  1990    kidney stone       FAMILY HISTORY:  Family History   Problem Relation Age of Onset    Cerebrovascular Disease Mother         Stroke age 69    Breast Cancer Mother          age 75    C.A.D. 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 Yes     Comment: father     Caffeine Concern Yes     Comment: 2 cups coffee daily, occ soda     Sleep Concern No    Special Diet No    Exercise Yes     Comment: 3-4 times weekly     Seat Belt Yes   Social History Narrative    Rides bicycle or goes to [facility] regularly (3x/week).      Social Drivers of Health     Financial Resource Strain: Low Risk  (10/21/2021)    Overall Financial Resource Strain (CARDIA)     Difficulty of Paying Living Expenses: Not hard at all   Food Insecurity: No Food Insecurity (10/21/2021)    Hunger Vital Sign     Worried About Running Out of Food in the Last Year: Never true     Ran Out of Food in the Last Year: Never true   Transportation Needs: No Transportation Needs (10/21/2021)    PRAPARE - Transportation     Lack of Transportation (Medical): No     Lack of Transportation (Non-Medical): No   Physical Activity: Sufficiently Active (10/21/2021)    Exercise Vital Sign     Days of Exercise per Week: 5 days     Minutes of Exercise per Session: 60 min   Interpersonal Safety: Low Risk  (4/9/2024)    Interpersonal Safety     Do you feel physically and emotionally safe where you currently live?: Yes     Within the past 12 months, have you been hit, slapped, kicked or otherwise physically hurt by someone?: No     Within the past 12 months, have you been humiliated or emotionally abused in other ways by your partner or ex-partner?: No   Housing Stability: Low Risk  (10/21/2021)    Housing Stability Vital Sign     Unable to Pay for Housing in the Last Year: No     Number of Places Lived in the Last Year: 1     Unstable Housing in the Last Year: No       Review of Systems:  Skin:          Eyes:         ENT:   "       Respiratory:  Negative       Cardiovascular:  Negative      Gastroenterology:        Genitourinary:         Musculoskeletal:         Neurologic:         Psychiatric:         Heme/Lymph/Imm:         Endocrine:           Physical Exam:  Vitals: BP 90/56 (BP Location: Right arm, Patient Position: Sitting, Cuff Size: Adult Regular)   Pulse 81   Ht 1.803 m (5' 11\")   Wt 83.1 kg (183 lb 1.6 oz)   SpO2 95%   BMI 25.54 kg/m      Constitutional:  cooperative, alert and oriented, well developed, well nourished, in no acute distress        Skin:  warm and dry to the touch, no apparent skin lesions or masses noted          Head:  normocephalic, no masses or lesions        Eyes:  pupils equal and round, conjunctivae and lids unremarkable, sclera white, no xanthalasma, EOMS intact, no nystagmus        Lymph:      ENT:  no pallor or cyanosis        Neck:  no carotid bruit        Respiratory:  normal respiratory excursion, clear to auscultation         Cardiac: regular rhythm, no murmurs, gallops or rubs detected                pulses full and equal                                        GI:           Extremities and Muscular Skeletal:  no edema, no spinal abnormalities noted, normal muscle strength and tone              Neurological:  no gross motor deficits        Psych:  affect appropriate, oriented to time, person and place        CC  Miguelito Sim MD  [address]  [place] [id number]                "

## 2025-04-15 NOTE — LETTER
4/15/2025    Guillermo Kong MD, MD  [address]  [facility] [address]    RE: Javier Tamez       Dear Colleague,     I had the pleasure of seeing Javier MARTIN Joi in the [facility].  HPI and Plan:   Humza is a very nice 80-year-old gentleman with past medical history significant for hypercholesterolemia, a very strong family history of coronary disease with his father dying of a second myocardial infarction at age 62 but first myocardial infarction at age 39.  Grandfather  at age 35.  Brother at 68 also had a myocardial infarction and received a stent.     A calcium score ()  returned at 4316, putting him in the top 1% for risk.  He had a negative stress nuclear scan in  and 2021.  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  He fortunately has had a very good response to Tagrisso which he has been on since 2023.  Humza's cancer therapy caused a bump in his LFTs for which we decreased his rosuvastatin from 20 mg to 10 mg daily.  His most recent ALT through [facility] from December was 90.  Tagrisso can affect QT prolongation and he had a QT interval today of 436.  It can also cause a cardiomyopathy.     In 2023 Humza was noted to be in asymptomatic rapid atrial fibrillation for which he spontaneously converted.  We started him on Eliquis and he monitors with a [employer] mobile device.  I have given him some metoprolol  tartrate to use as pill in the pocket in addition to his metoprolol succinate 25 twice daily.  Humza has had occasional self-limited breakthroughs of A-fib that are short-lived.  He keeps a record and has about 1 or 2 episodes a month none of which lasted 48 hours.  He has not used pill in the pocket.  He states when he has that he has a vague feeling in  his chest that he is aware he is out of rhythm.  He continues to have his back pain.  He has no exertional chest arm neck jaw or shoulder discomfort.  No dyspnea on exertion orthopnea or PND.    He does note his blood pressure has been running lower and he does have occasional orthostasis.  He states about once a week.  He does not think it correlates with A-fib     Humza exercises 3 times a week and golfs without any exertional symptoms.    ASSESSMENT AND PLAN: Humza has no symptoms to suggest ischemia or heart failure and this is supported by his stress echo of 2023.  His back pain is quite atypical and I do not think an anginal equivalent..     Humza is having no bleeding problems with his Eliquis therapy no symptoms to suggest a TIA or CVA.  I again reviewed how to use his metoprolol  tartrate.     Blood pressure is on the low side at 90/56.  I will check his BMP to see if there is any evidence of dehydration or intravascular volume depletion.  I have told to make sure he stays well-hydrated.  I will decrease his metoprolol succinate to 25 mg once a day from twice a day.  It does not appear to be correlated with his A-fib but I have asked him to watch for this.  His Tagrisso can cause a cardiomyopathy so I will check an echocardiogram.  I will have him follow-up with Rachel in 1 month.  We talked about the fact that he may have an A-fib rebound and backing off on the beta-blockers but we will see how this affects blood pressure and orthostasis.    Fasting lipid profile is outstanding with total cholesterol of 120, HDL 61, LDL 49 and triglycerides of 50.  ALT from December from oncology is 90    As stated above he has no QT prolongation today.    His weight is stable, if anything up 4 pounds at 183    He asked about a CT scan which shows quite a bit of calcified lymph nodes.  He asks if we need to do anything about the calcium given the fact that he has high calcium score as well.  We discussed the fact that the  calcium is more of a marker.  It is not the atherosclerosis.  With his very low cholesterol and healthy lifestyle he probably is affecting atherosclerotic regression.     I have congratulated on his healthy lifestyle and encouraged him to continue to do so.       We talked about the fact that I am retiring May 2.  I will follow-up on his BMP.  I told him to call over the next 2 weeks if he has significant symptom changes with the change in beta-blockers.  Otherwise, given his cancer diagnosis I will have him follow-up with Dr. Maxime Guerra in 6 months.  As stated I will have him see Anamesfin in 4 weeks to follow-up on metoprolol adjustment.  As stated above to see if he has more A-fib and whether it makes any significant difference with his orthostasis and whether he is better on more or less beta-blocker.  She will also follow-up on his echocardiogram.      Thank you for allow me to participate in this patient's care.  Sincerely,                                Miguelito Sim MD [facility]    The longitudinal plan of care for the diagnosis(es)/condition(s) as documented were addressed during this visit. Due to the added complexity in care, I will continue to support Humza in the subsequent management and with ongoing continuity of care.         Today's clinic visit entailed:  Review of external notes as documented elsewhere in note  Review of the result(s) of each unique test - EKG, lab work  The following tests were independently interpreted by me as noted in my documentation: EKG  Ordering of each unique test  Prescription drug management  45 minutes spent by me on the date of the encounter doing chart review, history and exam, documentation and further activities per the note  Provider  Link to [facility] Help Grid     The level of medical decision making during this visit was of high complexity.      Orders Placed This Encounter   Procedures     Basic metabolic panel     Follow-Up with Cardiology     Follow-Up with Cardiology  YUNG     EKG 12-lead complete w/read - Clinics     EKG 12-lead complete w/read - Clinics     Echocardiogram Complete       Orders Placed This Encounter   Medications     metoprolol succinate ER (TOPROL XL) 25 MG 24 hr tablet     Sig: Take 1 tablet (25 mg) by mouth daily.     Dispense:  90 tablet     Refill:  3       Medications Discontinued During This Encounter   Medication Reason     metoprolol succinate ER (TOPROL XL) 25 MG 24 hr tablet Reorder (No AVS)         Encounter Diagnoses   Name Primary?     Agatston CAC score, >400      Atypical chest pain      Hypercholesterolemia      Paroxysmal A-fib (H)      Current use of long term anticoagulation      Elevated liver function tests      Atrial fibrillation, unspecified type (H)      Coronary artery disease involving native coronary artery of native heart without angina pectoris Yes     Orthostatic hypotension        CURRENT MEDICATIONS:  Current Outpatient Medications   Medication Sig Dispense Refill     apixaban ANTICOAGULANT (ELIQUIS) 5 MG tablet Take 1 tablet (5 mg) by mouth 2 times daily. 180 tablet 3     Ascorbic Acid (VITAMIN C PO) Take 1,000 mg by mouth every morning        Cyanocobalamin (VITAMIN B 12 PO) Take 1,000 mcg by mouth every morning       ezetimibe (ZETIA) 10 MG tablet Take 1 tablet (10 mg) by mouth daily. 90 tablet 2     metoprolol succinate ER (TOPROL XL) 25 MG 24 hr tablet Take 1 tablet (25 mg) by mouth daily. 90 tablet 3     nitroGLYcerin (NITROSTAT) 0.4 MG sublingual tablet For chest pain place 1 tablet under the tongue every 5 minutes for 3 doses. If symptoms persist 5 minutes after 1st dose call 911. 25 tablet 1     osimertinib (TAGRISSO) 80 MG tablet Take 80 mg by mouth every morning       rosuvastatin (CRESTOR) 10 MG tablet Take 1 tablet (10 mg) by mouth daily. 90 tablet 2     sildenafil (REVATIO) 20 MG tablet Take 40 to 100 mg (two to five tablets) once daily as needed for erectile dysfunction 20 tablet 11     tamsulosin (FLOMAX) 0.4 MG  capsule TAKE 1 CAPSULE BY MOUTH DAILY 60 capsule 5     VITAMIN D, CHOLECALCIFEROL, PO Take 1,000 Units by mouth every morning          ALLERGIES   No Known Allergies    PAST MEDICAL HISTORY:  Past Medical History:   Diagnosis Date     Aortic root dilatation      Aortic stenosis      Arthritis      Atypical chest pain 2015     Cerebral artery occlusion with cerebral infarction (H)     TIA       Essential hypertension with goal blood pressure less than 140/90 2016    takes lisinopril for preventitive     Family history of heart disease      Hypercholesterolemia 2014     Diagnosis updated by automated process. Provider to review and confirm.     Hyperlipidemia LDL goal <70      IBS (irritable bowel syndrome)      Lung cancer (H) 2023     Malignant neoplasm metastatic to bone (H) 2024     Other chronic pain      Paroxysmal A-fib (H) 2024       PAST SURGICAL HISTORY:  Past Surgical History:   Procedure Laterality Date     ARTHROPLASTY HIP ANTERIOR Right 10/24/2017    Procedure: ARTHROPLASTY HIP ANTERIOR;  RIGHT TOTAL HIP ARTHROPLASTY DIRECT ANTERIOR APPROACH;  Surgeon: Meño Avalos MD;  Location:  OR     ARTHROPLASTY HIP ANTERIOR Left 10/09/2018    Procedure: ARTHROPLASTY HIP ANTERIOR;  LEFT TOTAL HIP ARTHROPLASTY DIRECT ANTERIOR APPROACH (DEPUY)^;  Surgeon: Meño Avalos MD;  Location:  OR     COLONOSCOPY  2011    Adenomatous polyp removed     COLONOSCOPY  2017    Two adenomatous polyps, repeat in 5 yrs     VASECTOMY       ZZC NONSPECIFIC PROCEDURE  1990    kidney stone       FAMILY HISTORY:  Family History   Problem Relation Age of Onset     Cerebrovascular Disease Mother         Stroke age 69     Breast Cancer Mother          age 75     C.A.D. Father          of MI at age 63, 1ST MI age 39     Diabetes Father      Cerebrovascular Disease Brother         Born 1948     Atrial fibrillation Brother      Transient ischemic attack Brother        SOCIAL  HISTORY:  Social History     Socioeconomic History     Marital status:      Number of children: 4   Occupational History     Occupation: [employer]     Employer: SELF     Comment:    Tobacco Use     Smoking status: Never     Smokeless tobacco: Never   Vaping Use     Vaping status: Never Used   Substance and Sexual Activity     Alcohol use: Not Currently     Drug use: No     Sexual activity: Yes     Partners: Female     Birth control/protection: Surgical   Other Topics Concern     Parent/sibling w/ CABG, MI or angioplasty before 65F 55M? Yes     Comment: father      Caffeine Concern Yes     Comment: 2 cups coffee daily, occ soda      Sleep Concern No     Special Diet No     Exercise Yes     Comment: 3-4 times weekly      Seat Belt Yes   Social History Narrative    Rides bicycle or goes to Y regularly (3x/week).      Social Drivers of Health     Financial Resource Strain: Low Risk  (10/21/2021)    Overall Financial Resource Strain (CARDIA)      Difficulty of Paying Living Expenses: Not hard at all   Food Insecurity: No Food Insecurity (10/21/2021)    Hunger Vital Sign      Worried About Running Out of Food in the Last Year: Never true      Ran Out of Food in the Last Year: Never true   Transportation Needs: No Transportation Needs (10/21/2021)    PRAPARE - Transportation      Lack of Transportation (Medical): No      Lack of Transportation (Non-Medical): No   Physical Activity: Sufficiently Active (10/21/2021)    Exercise Vital Sign      Days of Exercise per Week: 5 days      Minutes of Exercise per Session: 60 min   Interpersonal Safety: Low Risk  (4/9/2024)    Interpersonal Safety      Do you feel physically and emotionally safe where you currently live?: Yes      Within the past 12 months, have you been hit, slapped, kicked or otherwise physically hurt by someone?: No      Within the past 12 months, have you been humiliated or emotionally abused in other ways by your partner or  "ex-partner?: No   Housing Stability: Low Risk  (10/21/2021)    Housing Stability Vital Sign      Unable to Pay for Housing in the Last Year: No      Number of Places Lived in the Last Year: 1      Unstable Housing in the Last Year: No       Review of Systems:  Skin:          Eyes:         ENT:         Respiratory:  Negative       Cardiovascular:  Negative      Gastroenterology:        Genitourinary:         Musculoskeletal:         Neurologic:         Psychiatric:         Heme/Lymph/Imm:         Endocrine:           Physical Exam:  Vitals: BP 90/56 (BP Location: Right arm, Patient Position: Sitting, Cuff Size: Adult Regular)   Pulse 81   Ht 1.803 m (5' 11\")   Wt 83.1 kg (183 lb 1.6 oz)   SpO2 95%   BMI 25.54 kg/m      Constitutional:  cooperative, alert and oriented, well developed, well nourished, in no acute distress        Skin:  warm and dry to the touch, no apparent skin lesions or masses noted          Head:  normocephalic, no masses or lesions        Eyes:  pupils equal and round, conjunctivae and lids unremarkable, sclera white, no xanthalasma, EOMS intact, no nystagmus        Lymph:      ENT:  no pallor or cyanosis        Neck:  no carotid bruit        Respiratory:  normal respiratory excursion, clear to auscultation         Cardiac: regular rhythm, no murmurs, gallops or rubs detected                pulses full and equal                                        GI:           Extremities and Muscular Skeletal:  no edema, no spinal abnormalities noted, normal muscle strength and tone              Neurological:  no gross motor deficits        Psych:  affect appropriate, oriented to time, person and place        CC  Miguelito Sim MD  [address]  [place] [address]                  Thank you for allowing me to participate in the care of your patient.      Sincerely,     Miguelito Sim MD     [facility]  cc:   Miguelito Sim, " MD  [address]  [place] [id number]

## 2025-04-17 ENCOUNTER — TRANSFERRED RECORDS (OUTPATIENT)
Dept: HEALTH INFORMATION MANAGEMENT | Facility: CLINIC | Age: 80
End: 2025-04-17
Payer: COMMERCIAL

## 2025-05-13 ENCOUNTER — TELEPHONE (OUTPATIENT)
Dept: CARDIOLOGY | Facility: CLINIC | Age: 80
End: 2025-05-13
Payer: COMMERCIAL

## 2025-05-13 ENCOUNTER — NURSE TRIAGE (OUTPATIENT)
Dept: CARDIOLOGY | Facility: CLINIC | Age: 80
End: 2025-05-13
Payer: COMMERCIAL

## 2025-05-13 NOTE — TELEPHONE ENCOUNTER
"1. REASON FOR CALL or QUESTION: \"What is your reason for calling today?\" or \"How can I best help you?\" or \"What question do you have that I can help answer?\"    Received call transferred to Triage for patient regarding taking extra dose of his medications yesterday.    Patient states he took his scheduled rosuvastatin, zetia, metoprolol, and Eliquis yesterday morning and then accidentally took the same 4 pills last night. Patient normally takes his Eliquis BID. Patient has not yet taken anything this morning.  Patient states he is *asymptomatic* this morning. He says he is not in A-Fib.  While on this call, patient checked his blood pressure which was 124/60 HR 73.    Patient was educated on symptoms to continue monitoring for including dark urine, muscle cramping, nausea, vomiting, dizziness, lightheadedness, fatigue, headaches, constipation. Reminded patient to be mindful of what he is taking for medications and when he is supposed to be taking them. Patient verbalized understanding. Patient will continue his medication schedule as normal today.  "

## 2025-05-13 NOTE — TELEPHONE ENCOUNTER
[facility] Call Center    Phone Message    May a detailed message be left on voicemail: yes     Reason for Call: Medication Question or concern regarding medication   Prescription Clarification  Name of Medication: rosuvastatin and metoprolol, eliquis and ezetimbe  Prescribing Provider: Roney   Pharmacy:    [facility]) - [place], [place] - [address] [id number] - [place], [place] - [address] [address] [facility]   [facility] - [address]     What on the order needs clarification? Patient states he took these medications in the morning and then again in the evening by accident. He wants to know how to remedy this, if he should continue as normal or if there is something he should be doing. He states he feels fine.       Action Taken: Other: cardio    Travel Screening: Not Applicable     Date of Service:

## 2025-05-13 NOTE — TELEPHONE ENCOUNTER
1st attempt- Left voicemail for the patient to call back and schedule the following:    Appointment type:  Return Cardiology  Provider:  Dr Guerra  Return date:  October 2025  Additional appointment(s) needed:  EGK with office visit  Additional Notes:  -  Specialty phone number: [phone]

## 2025-06-03 ENCOUNTER — TELEPHONE (OUTPATIENT)
Dept: INTERNAL MEDICINE | Facility: CLINIC | Age: 80
End: 2025-06-03
Payer: COMMERCIAL

## 2025-06-03 NOTE — TELEPHONE ENCOUNTER
Patient Quality Outreach    Patient is due for the following:   Physical Annual Wellness Visit    Action(s) Taken:   Patient has upcoming appointment, these items will be addressed at that time. 6/11/2025 appointment with Dr. Kong.    Type of outreach:    Chart review performed, no outreach needed.    Questions for provider review:    None         Kelly Lemus [facility]  Chart routed to None.

## 2025-06-10 ENCOUNTER — HOSPITAL ENCOUNTER (OUTPATIENT)
Dept: CARDIOLOGY | Facility: CLINIC | Age: 80
Discharge: HOME | End: 2025-06-10
Attending: INTERNAL MEDICINE
Payer: COMMERCIAL

## 2025-06-10 ENCOUNTER — RESULTS FOLLOW-UP (OUTPATIENT)
Dept: CARDIOLOGY | Facility: CLINIC | Age: 80
End: 2025-06-10

## 2025-06-10 DIAGNOSIS — I48.91 ATRIAL FIBRILLATION, UNSPECIFIED TYPE (H): ICD-10-CM

## 2025-06-10 LAB — LVEF ECHO: NORMAL

## 2025-06-10 PROCEDURE — 93306 TTE W/DOPPLER COMPLETE: CPT | Mod: 26 | Performed by: INTERNAL MEDICINE

## 2025-06-10 PROCEDURE — 93306 TTE W/DOPPLER COMPLETE: CPT

## 2025-06-12 ENCOUNTER — OFFICE VISIT (OUTPATIENT)
Dept: CARDIOLOGY | Facility: CLINIC | Age: 80
End: 2025-06-12
Attending: INTERNAL MEDICINE
Payer: COMMERCIAL

## 2025-06-12 VITALS
OXYGEN SATURATION: 96 % | BODY MASS INDEX: 25.32 KG/M2 | HEART RATE: 75 BPM | DIASTOLIC BLOOD PRESSURE: 57 MMHG | WEIGHT: 180.9 LBS | HEIGHT: 71 IN | SYSTOLIC BLOOD PRESSURE: 99 MMHG

## 2025-06-12 DIAGNOSIS — I48.91 ATRIAL FIBRILLATION, UNSPECIFIED TYPE (H): ICD-10-CM

## 2025-06-12 DIAGNOSIS — I48.0 PAROXYSMAL A-FIB (H): ICD-10-CM

## 2025-06-12 DIAGNOSIS — Z79.01 CURRENT USE OF LONG TERM ANTICOAGULATION: ICD-10-CM

## 2025-06-12 DIAGNOSIS — I25.10 CORONARY ARTERY DISEASE INVOLVING NATIVE CORONARY ARTERY OF NATIVE HEART WITHOUT ANGINA PECTORIS: ICD-10-CM

## 2025-06-12 DIAGNOSIS — R07.89 ATYPICAL CHEST PAIN: ICD-10-CM

## 2025-06-12 DIAGNOSIS — E78.00 HYPERCHOLESTEROLEMIA: ICD-10-CM

## 2025-06-12 DIAGNOSIS — R93.1 AGATSTON CAC SCORE, >400: ICD-10-CM

## 2025-06-12 DIAGNOSIS — I95.1 ORTHOSTATIC HYPOTENSION: ICD-10-CM

## 2025-06-12 DIAGNOSIS — R79.89 ELEVATED LIVER FUNCTION TESTS: ICD-10-CM

## 2025-06-12 DIAGNOSIS — E87.5 HYPERKALEMIA: Primary | ICD-10-CM

## 2025-06-12 RX ORDER — METOPROLOL SUCCINATE 25 MG/1
25 TABLET, EXTENDED RELEASE ORAL EVERY EVENING
Qty: 90 TABLET | Refills: 3 | Status: SHIPPED | OUTPATIENT
Start: 2025-06-12 | End: 2025-06-12

## 2025-06-12 RX ORDER — METOPROLOL SUCCINATE 25 MG/1
25 TABLET, EXTENDED RELEASE ORAL EVERY EVENING
Qty: 90 TABLET | Refills: 3 | Status: SHIPPED | OUTPATIENT
Start: 2025-06-12

## 2025-06-12 RX ORDER — METOPROLOL TARTRATE 25 MG/1
TABLET, FILM COATED ORAL
Qty: 60 TABLET | Refills: 1 | Status: SHIPPED | OUTPATIENT
Start: 2025-06-12

## 2025-06-12 NOTE — PATIENT INSTRUCTIONS
Take your Toprol XL 25 mg in the evening.     Each morning, continue to check your Kardia.  When you have your afib episodes, you can take the metoprolol tartrate tablet. This may help you come out of the afib episode.     If you have dizziness, check your rhythm on the Kardia.     On your last lab, the potassium level was elevated. Please get this rechecked tomorrow.

## 2025-06-12 NOTE — LETTER
2025    Guillermo Kong MD, MD  [address]  [facility] [address]    RE: Javier Tamez       Dear Colleague,     I had the pleasure of seeing Javier Tamez in the [facility].      CARDIOLOGY CLINIC PROGRESS NOTE    DOS: 2025      Javier Tamez  : 1945, 80 year old  MRN: [id number]      Primary cardiologist: Was Dr. Sim       History: Javier Tamez is a 80 year old man with history of nonobstructive coronary artery disease, hyperlipidemia, hypertension, mildly dilated ascending aorta and aortic root, lung cancer with bone mets, paroxysmal afib.      Stress ECHO () showed no ischemia.      Coronary CT calcium score () was 4316, placing him in the top 1% for risk.  He has been on a statin and aspirin.     NUC stress () showed no ischemia  Preserved LVEF.  Chest CT measured his ascending aorta at 3.6 x 3.5 cm (no change).     ECHO () showed LVEF 50-55% without wall motion abnormalities, normal RV function, no significant valvular pathology, mild aortic root enlargement and borderline ascending aorta dilatation.     Exercise stress ECHO () did not show any stress-induced ischemia.  LVEF 55%.     Stress ECHO (23) did not show any evidence of stress-induced ischemia, LVEF 55%, no significant valve pathology. Mild ascending aorta dilatation.     Stress echo (2023): exercised 9 and half minutes without symptoms, normal stress EKG with no evidence of stress-induced ischemia.     Unfortunately his chest pain turned out to be metastatic lung cancer to the bones.     Humza's cancer therapy caused a bump in his LFTs for which we decreased his rosuvastatin from 20 mg to 10 mg daily. As expected, he had a little bump in LDL. As ALT normalized, we did try to increase Crestor to 20 mg again but ALT winifred again, so he is back on 10 mg.     Echo 24, noted to be in rapid afib. EF 50-55%.    Initiated metoprolol succinate 50 mg daily and  Eliquis 5 mg twice daily     When he saw Dr. Sim 24, he was back in SR. ASA stopped sine he was now on Eliquis.     4/15/25 Dr. Sim. Due to orthostasis, Toprol XL decreased to 25 mg once daily from BID.   Echo ordered.    He presents today for follow up.   Less orthostasis on Toprol XL 25 mg.   He checks his Kardia mobil every morning.  About 10 times a month, he is in afib. Only rarely is it 2 days in a row.  He has not taken the metoprolol tartrate PRN. He thinks it is .   He does not feel the afib.    Generally, he is feeling pretty good.   He is still on Tagrisso.  We are following echo and EKGs.  His oncology team is still following his LFTs.     ROS:  Skin:  not assessed     Eyes:  not assessed    ENT:  not assessed    Respiratory:  Negative    Cardiovascular:    lightheadedness, dizziness, Positive for  Gastroenterology: not assessed    Genitourinary:  not assessed    Musculoskeletal:  not assessed    Neurologic:  not assessed    Psychiatric:  not assessed    Heme/Lymph/Imm:  not assessed    Endocrine:  not assessed      PAST MEDICAL HISTORY:  Past Medical History:   Diagnosis Date     Aortic root dilatation      Aortic stenosis      Arthritis      Atypical chest pain 2015     Cerebral artery occlusion with cerebral infarction (H)     TIA       Essential hypertension with goal blood pressure less than 140/90 2016    takes lisinopril for preventitive     Family history of heart disease      Hypercholesterolemia 2014     Diagnosis updated by automated process. Provider to review and confirm.     Hyperlipidemia LDL goal <70      IBS (irritable bowel syndrome)      Lung cancer (H) 2023     Malignant neoplasm metastatic to bone (H) 2024     Other chronic pain      Paroxysmal A-fib (H) 2024     Thyroid disease 3/2019       PAST SURGICAL HISTORY:  Past Surgical History:   Procedure Laterality Date     ARTHROPLASTY HIP ANTERIOR Right 10/24/2017    Procedure:  ARTHROPLASTY HIP ANTERIOR;  RIGHT TOTAL HIP ARTHROPLASTY DIRECT ANTERIOR APPROACH;  Surgeon: Meño Avalos MD;  Location: [facility] OR     ARTHROPLASTY HIP ANTERIOR Left 10/09/2018    Procedure: ARTHROPLASTY HIP ANTERIOR;  LEFT TOTAL HIP ARTHROPLASTY DIRECT ANTERIOR APPROACH (DEPUY)^;  Surgeon: Meño Avalos MD;  Location: [facility] OR     BIOPSY  Sept. 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 Yes     Comment: father      Caffeine Concern Yes     Comment: 2 cups coffee daily, occ soda      Sleep Concern No     Special Diet No     Exercise Yes     Comment: 3-4 times weekly      Seat Belt Yes   Social History Narrative    Rides bicycle or goes to Y regularly (3x/week).      Social Determinants of Health     Financial Resource Strain: Low Risk  (10/21/2021)    Overall Financial Resource Strain (CARDIA)      Difficulty of Paying Living Expenses: Not hard at all   Food Insecurity: No Food Insecurity (10/21/2021)    Hunger Vital Sign      Worried About Running Out of Food in the Last Year: Never true      Ran Out of Food in the Last Year: Never true   Transportation Needs: No Transportation Needs  (10/21/2021)    PRAPARE - Transportation      Lack of Transportation (Medical): No      Lack of Transportation (Non-Medical): No   Physical Activity: Sufficiently Active (10/21/2021)    Exercise Vital Sign      Days of Exercise per Week: 5 days      Minutes of Exercise per Session: 60 min   Interpersonal Safety: Low Risk  (2024)    Interpersonal Safety      Do you feel physically and emotionally safe where you currently live?: Yes      Within the past 12 months, have you been hit, slapped, kicked or otherwise physically hurt by someone?: No      Within the past 12 months, have you been humiliated or emotionally abused in other ways by your partner or ex-partner?: No   Housing Stability: Low Risk  (10/21/2021)    Housing Stability Vital Sign      Unable to Pay for Housing in the Last Year: No      Number of Places Lived in the Last Year: 1      Unstable Housing in the Last Year: No       FAMILY HISTORY:  Family History   Problem Relation Age of Onset     Cerebrovascular Disease Mother         Stroke age 69     Breast Cancer Mother          age 75     C.A.D. Father          of MI at age 63, 1ST MI age 39     Diabetes Father      Coronary Artery Disease Father      Cerebrovascular Disease Brother         Born 1948     Atrial fibrillation Brother      Transient ischemic attack Brother      Coronary Artery Disease Brother      Coronary Artery Disease Brother      Cerebrovascular Disease Brother        MEDS:   Current Outpatient Medications   Medication Sig Dispense Refill     apixaban ANTICOAGULANT (ELIQUIS) 5 MG tablet Take 1 tablet (5 mg) by mouth 2 times daily. 180 tablet 3     Ascorbic Acid (VITAMIN C PO) Take 1,000 mg by mouth every morning        Cyanocobalamin (VITAMIN B 12 PO) Take 1,000 mcg by mouth every morning       ezetimibe (ZETIA) 10 MG tablet Take 1 tablet (10 mg) by mouth daily. 90 tablet 2     metoprolol succinate ER (TOPROL XL) 25 MG 24 hr tablet Take 1 tablet (25 mg) by mouth every  "evening. 90 tablet 3     metoprolol tartrate (LOPRESSOR) 25 MG tablet Take 1 tablet when you go into afib 60 tablet 1     nitroGLYcerin (NITROSTAT) 0.4 MG sublingual tablet For chest pain place 1 tablet under the tongue every 5 minutes for 3 doses. If symptoms persist 5 minutes after 1st dose call 911. 25 tablet 1     osimertinib (TAGRISSO) 80 MG tablet Take 80 mg by mouth every morning       rosuvastatin (CRESTOR) 10 MG tablet Take 1 tablet (10 mg) by mouth daily. 90 tablet 2     sildenafil (REVATIO) 20 MG tablet Take 40 to 100 mg (two to five tablets) once daily as needed for erectile dysfunction 20 tablet 11     tamsulosin (FLOMAX) 0.4 MG capsule TAKE 1 CAPSULE BY MOUTH DAILY 60 capsule 5     VITAMIN D, CHOLECALCIFEROL, PO Take 1,000 Units by mouth every morning        No current facility-administered medications for this visit.       ALLERGIES: No Known Allergies    PHYSICAL EXAM:  Vitals: BP 99/57 (BP Location: Right arm, Patient Position: Sitting, Cuff Size: Adult Regular)   Pulse 75   Ht 1.803 m (5' 11\")   Wt 82.1 kg (180 lb 14.4 oz)   SpO2 96%   BMI 25.23 kg/m    Constitutional:  cooperative, alert and oriented, well developed, well nourished, in no acute distress        Skin:  warm and dry to the touch, no apparent skin lesions or masses noted        Head:  normocephalic, no masses or lesions        Eyes:  pupils equal and round, conjunctivae and lids unremarkable, sclera white        ENT:  no pallor or cyanosis        Neck:  JVP normal        Respiratory:  clear to auscultation        Cardiac: regular rhythm, no murmurs, gallops or rubs detected, normal S1 and S2                  GI:  abdomen soft, BS normoactive        Vascular: pulses full and equal                                      Extremities and Musculoskeletal:  no edema, no spinal abnormalities noted, normal muscle strength and tone        Neurological:  no gross motor deficits, affect appropriate              LABS/DATA:  I reviewed the " following:  Echo 6/10/25:  Interpretation Summary  Left ventricular systolic function is normal.The visual ejection fraction is  55-60%.  The right ventricular systolic function is mildly reduced.  Aortic valve sclerosis.  Aortic root dilatation is present - 4.1 cm.  IVC diameter <2.1 cm collapsing >50% with sniff suggests a normal RA pressure  of 3 mmHg.        Component      Latest Ref Rng 4/14/2025  7:41 AM   Sodium      135 - 145 mmol/L 138    Potassium      3.4 - 5.3 mmol/L 5.4 (H)    Chloride      98 - 107 mmol/L 102    Carbon Dioxide (CO2)      22 - 29 mmol/L 23    Anion Gap      7 - 15 mmol/L 13    Urea Nitrogen      8.0 - 23.0 mg/dL 12.8    Creatinine      0.67 - 1.17 mg/dL 1.04    GFR Estimate      >60 mL/min/1.73m2 73    Calcium      8.8 - 10.4 mg/dL 9.5    Glucose      70 - 99 mg/dL 111 (H)    Patient Fasting? Yes    Patient Fasting? Yes    Cholesterol      <200 mg/dL 120    Triglycerides      <150 mg/dL 50    HDL Cholesterol      >=40 mg/dL 61    LDL Cholesterol Calculated      <100 mg/dL 49    Non HDL Cholesterol      <130 mg/dL 59               ASSESSMENT/PLAN:  Coronary Artery Disease, nonobstructive  - family hx of premature CAD  - CAD based off of abnormal calcium score (2015) 4316   - stress ECHO show no ischemia LVEF 55% (1/2023)  - Stress echo 8/2023: negative for stress-induced ischemia  - Continue statin, is also on Eliquis         Hypertension  - on Toprol XL 25 mg BID  - BP controlled        Hyperlipidemia  - on Crestor 10 mg, Zetia  - LDL is controlled , FLP 4/2025: LDL 49       Paroxysmal afib RVR  - Asx  - Toprol XL 25 mg for rate control, metoprolol tartrate 25 mg PRN  - Eliquis   - We discussed that he should let us know if he has RVR for extended periods of time, new sxs      Lung cancer with bone mets  - On targeted therapy Tagrisso, which can prolong QTc or cause CMY  - EKG 4/15/25 shows SR with normal QTc 436 ms  - Echo 6/10/25: EF 55-60%      Mild hyperkalemia  - Recheck BMP  tomorrow        Follow up:  [facility] tomorrow   Oct 2025, establish with new cardiologist Dr. Mari Taylor PA-C          Thank you for allowing me to participate in the care of your patient.      Sincerely,     Rachel Taylor PA-C     [facility]  cc:   Miguelito Sim MD  No address on file

## 2025-06-12 NOTE — PROGRESS NOTES
CARDIOLOGY CLINIC PROGRESS NOTE    DOS: 2025      Javier Tamez  : 1945, 80 year old  MRN: [id number]      Primary cardiologist: Was Dr. Sim       History: Javier Tamez is a 80 year old man with history of nonobstructive coronary artery disease, hyperlipidemia, hypertension, mildly dilated ascending aorta and aortic root, lung cancer with bone mets, paroxysmal afib.      Stress ECHO () showed no ischemia.      Coronary CT calcium score () was 4316, placing him in the top 1% for risk.  He has been on a statin and aspirin.     NUC stress () showed no ischemia  Preserved LVEF.  Chest CT measured his ascending aorta at 3.6 x 3.5 cm (no change).     ECHO () showed LVEF 50-55% without wall motion abnormalities, normal RV function, no significant valvular pathology, mild aortic root enlargement and borderline ascending aorta dilatation.     Exercise stress ECHO () did not show any stress-induced ischemia.  LVEF 55%.     Stress ECHO (23) did not show any evidence of stress-induced ischemia, LVEF 55%, no significant valve pathology. Mild ascending aorta dilatation.     Stress echo (2023): exercised 9 and half minutes without symptoms, normal stress EKG with no evidence of stress-induced ischemia.     Unfortunately his chest pain turned out to be metastatic lung cancer to the bones.     Humza's cancer therapy caused a bump in his LFTs for which we decreased his rosuvastatin from 20 mg to 10 mg daily. As expected, he had a little bump in LDL. As ALT normalized, we did try to increase Crestor to 20 mg again but ALT winifred again, so he is back on 10 mg.     Echo 24, noted to be in rapid afib. EF 50-55%.    Initiated metoprolol succinate 50 mg daily and Eliquis 5 mg twice daily     When he saw Dr. Sim 24, he was back in SR. ASA stopped sine he was now on Eliquis.     4/15/25 Dr. Sim. Due to orthostasis, Toprol XL decreased to 25 mg once daily from BID.    Echo ordered.    He presents today for follow up.   Less orthostasis on Toprol XL 25 mg.   He checks his Kardia mobil every morning.  About 10 times a month, he is in afib. Only rarely is it 2 days in a row.  He has not taken the metoprolol tartrate PRN. He thinks it is .   He does not feel the afib.    Generally, he is feeling pretty good.   He is still on Tagrisso.  We are following echo and EKGs.  His oncology team is still following his LFTs.     ROS:  Skin:  not assessed     Eyes:  not assessed    ENT:  not assessed    Respiratory:  Negative    Cardiovascular:    lightheadedness, dizziness, Positive for  Gastroenterology: not assessed    Genitourinary:  not assessed    Musculoskeletal:  not assessed    Neurologic:  not assessed    Psychiatric:  not assessed    Heme/Lymph/Imm:  not assessed    Endocrine:  not assessed      PAST MEDICAL HISTORY:  Past Medical History:   Diagnosis Date    Aortic root dilatation     Aortic stenosis     Arthritis     Atypical chest pain 2015    Cerebral artery occlusion with cerebral infarction (H)     TIA      Essential hypertension with goal blood pressure less than 140/90 2016    takes lisinopril for preventitive    Family history of heart disease     Hypercholesterolemia 2014     Diagnosis updated by automated process. Provider to review and confirm.    Hyperlipidemia LDL goal <70     IBS (irritable bowel syndrome)     Lung cancer (H) 2023    Malignant neoplasm metastatic to bone (H) 2024    Other chronic pain     Paroxysmal A-fib (H) 2024    Thyroid disease 3/2019       PAST SURGICAL HISTORY:  Past Surgical History:   Procedure Laterality Date    ARTHROPLASTY HIP ANTERIOR Right 10/24/2017    Procedure: ARTHROPLASTY HIP ANTERIOR;  RIGHT TOTAL HIP ARTHROPLASTY DIRECT ANTERIOR APPROACH;  Surgeon: Meño Avalos MD;  Location:  OR    ARTHROPLASTY HIP ANTERIOR Left 10/09/2018    Procedure: ARTHROPLASTY HIP ANTERIOR;  LEFT TOTAL HIP  ARTHROPLASTY DIRECT ANTERIOR APPROACH (DEPUY)^;  Surgeon: Meño Avalos MD;  Location: [facility] OR    BIOPSY  Sept. 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 Yes     Comment: father     Caffeine Concern Yes     Comment: 2 cups coffee daily, occ soda     Sleep Concern No    Special Diet No    Exercise Yes     Comment: 3-4 times weekly     Seat Belt Yes   Social History Narrative    Rides bicycle or goes to Y regularly (3x/week).      Social Determinants of Health     Financial Resource Strain: Low Risk  (10/21/2021)    Overall Financial Resource Strain (CARDIA)     Difficulty of Paying Living Expenses: Not hard at all   Food Insecurity: No Food Insecurity (10/21/2021)    Hunger Vital Sign     Worried About Running Out of Food in the Last Year: Never true     Ran Out of Food in the Last Year: Never true   Transportation Needs: No Transportation Needs (10/21/2021)    PRAPARE - Transportation     Lack of Transportation (Medical): No     Lack of Transportation (Non-Medical): No   Physical Activity: Sufficiently Active (10/21/2021)    Exercise Vital Sign     Days of Exercise per Week: 5 days     Minutes of Exercise  per Session: 60 min   Interpersonal Safety: Low Risk  (2024)    Interpersonal Safety     Do you feel physically and emotionally safe where you currently live?: Yes     Within the past 12 months, have you been hit, slapped, kicked or otherwise physically hurt by someone?: No     Within the past 12 months, have you been humiliated or emotionally abused in other ways by your partner or ex-partner?: No   Housing Stability: Low Risk  (10/21/2021)    Housing Stability Vital Sign     Unable to Pay for Housing in the Last Year: No     Number of Places Lived in the Last Year: 1     Unstable Housing in the Last Year: No       FAMILY HISTORY:  Family History   Problem Relation Age of Onset    Cerebrovascular Disease Mother         Stroke age 69    Breast Cancer Mother          age 75    C.A.D. Father          of MI at age 63, 1ST MI age 39    Diabetes Father     Coronary Artery Disease Father     Cerebrovascular Disease Brother         Born 1947    Atrial fibrillation Brother     Transient ischemic attack Brother     Coronary Artery Disease Brother     Coronary Artery Disease Brother     Cerebrovascular Disease Brother        MEDS:   Current Outpatient Medications   Medication Sig Dispense Refill    apixaban ANTICOAGULANT (ELIQUIS) 5 MG tablet Take 1 tablet (5 mg) by mouth 2 times daily. 180 tablet 3    Ascorbic Acid (VITAMIN C PO) Take 1,000 mg by mouth every morning       Cyanocobalamin (VITAMIN B 12 PO) Take 1,000 mcg by mouth every morning      ezetimibe (ZETIA) 10 MG tablet Take 1 tablet (10 mg) by mouth daily. 90 tablet 2    metoprolol succinate ER (TOPROL XL) 25 MG 24 hr tablet Take 1 tablet (25 mg) by mouth every evening. 90 tablet 3    metoprolol tartrate (LOPRESSOR) 25 MG tablet Take 1 tablet when you go into afib 60 tablet 1    nitroGLYcerin (NITROSTAT) 0.4 MG sublingual tablet For chest pain place 1 tablet under the tongue every 5 minutes for 3 doses. If symptoms persist 5 minutes after 1st dose call  "911. 25 tablet 1    osimertinib (TAGRISSO) 80 MG tablet Take 80 mg by mouth every morning      rosuvastatin (CRESTOR) 10 MG tablet Take 1 tablet (10 mg) by mouth daily. 90 tablet 2    sildenafil (REVATIO) 20 MG tablet Take 40 to 100 mg (two to five tablets) once daily as needed for erectile dysfunction 20 tablet 11    tamsulosin (FLOMAX) 0.4 MG capsule TAKE 1 CAPSULE BY MOUTH DAILY 60 capsule 5    VITAMIN D, CHOLECALCIFEROL, PO Take 1,000 Units by mouth every morning        No current facility-administered medications for this visit.       ALLERGIES: No Known Allergies    PHYSICAL EXAM:  Vitals: BP 99/57 (BP Location: Right arm, Patient Position: Sitting, Cuff Size: Adult Regular)   Pulse 75   Ht 1.803 m (5' 11\")   Wt 82.1 kg (180 lb 14.4 oz)   SpO2 96%   BMI 25.23 kg/m    Constitutional:  cooperative, alert and oriented, well developed, well nourished, in no acute distress        Skin:  warm and dry to the touch, no apparent skin lesions or masses noted        Head:  normocephalic, no masses or lesions        Eyes:  pupils equal and round, conjunctivae and lids unremarkable, sclera white        ENT:  no pallor or cyanosis        Neck:  JVP normal        Respiratory:  clear to auscultation        Cardiac: regular rhythm, no murmurs, gallops or rubs detected, normal S1 and S2                  GI:  abdomen soft, BS normoactive        Vascular: pulses full and equal                                      Extremities and Musculoskeletal:  no edema, no spinal abnormalities noted, normal muscle strength and tone        Neurological:  no gross motor deficits, affect appropriate              LABS/DATA:  I reviewed the following:  Echo 6/10/25:  Interpretation Summary  Left ventricular systolic function is normal.The visual ejection fraction is  55-60%.  The right ventricular systolic function is mildly reduced.  Aortic valve sclerosis.  Aortic root dilatation is present - 4.1 cm.  IVC diameter <2.1 cm collapsing >50% " with sniff suggests a normal RA pressure  of 3 mmHg.        Component      Latest Ref Rng 4/14/2025  7:41 AM   Sodium      135 - 145 mmol/L 138    Potassium      3.4 - 5.3 mmol/L 5.4 (H)    Chloride      98 - 107 mmol/L 102    Carbon Dioxide (CO2)      22 - 29 mmol/L 23    Anion Gap      7 - 15 mmol/L 13    Urea Nitrogen      8.0 - 23.0 mg/dL 12.8    Creatinine      0.67 - 1.17 mg/dL 1.04    GFR Estimate      >60 mL/min/1.73m2 73    Calcium      8.8 - 10.4 mg/dL 9.5    Glucose      70 - 99 mg/dL 111 (H)    Patient Fasting? Yes    Patient Fasting? Yes    Cholesterol      <200 mg/dL 120    Triglycerides      <150 mg/dL 50    HDL Cholesterol      >=40 mg/dL 61    LDL Cholesterol Calculated      <100 mg/dL 49    Non HDL Cholesterol      <130 mg/dL 59               ASSESSMENT/PLAN:  Coronary Artery Disease, nonobstructive  - family hx of premature CAD  - CAD based off of abnormal calcium score (2015) 4316   - stress ECHO show no ischemia LVEF 55% (1/2023)  - Stress echo 8/2023: negative for stress-induced ischemia  - Continue statin, is also on Eliquis         Hypertension  - on Toprol XL 25 mg BID  - BP controlled        Hyperlipidemia  - on Crestor 10 mg, Zetia  - LDL is controlled , FLP 4/2025: LDL 49       Paroxysmal afib RVR  - Asx  - Toprol XL 25 mg for rate control, metoprolol tartrate 25 mg PRN  - Eliquis   - We discussed that he should let us know if he has RVR for extended periods of time, new sxs      Lung cancer with bone mets  - On targeted therapy Tagrisso, which can prolong QTc or cause CMY  - EKG 4/15/25 shows SR with normal QTc 436 ms  - Echo 6/10/25: EF 55-60%      Mild hyperkalemia  - Recheck BMP tomorrow        Follow up:  BMP tomorrow   Oct 2025, establish with new cardiologist Dr. Mari Taylor, PASUD[facility]

## 2025-06-23 ENCOUNTER — TRANSFERRED RECORDS (OUTPATIENT)
Dept: HEALTH INFORMATION MANAGEMENT | Facility: CLINIC | Age: 80
End: 2025-06-23
Payer: COMMERCIAL

## 2025-06-26 ENCOUNTER — RESULTS FOLLOW-UP (OUTPATIENT)
Dept: CARDIOLOGY | Facility: CLINIC | Age: 80
End: 2025-06-26

## 2025-07-09 ENCOUNTER — TELEPHONE (OUTPATIENT)
Dept: CARDIOLOGY | Facility: CLINIC | Age: 80
End: 2025-07-09
Payer: COMMERCIAL

## 2025-07-09 ENCOUNTER — NURSE TRIAGE (OUTPATIENT)
Dept: CARDIOLOGY | Facility: CLINIC | Age: 80
End: 2025-07-09
Payer: COMMERCIAL

## 2025-07-09 NOTE — TELEPHONE ENCOUNTER
[facility] Call Center    Phone Message    May a detailed message be left on voicemail: yes     Reason for Call: Other: Pt calling back looking for an update after his triage call this morning. Pt is anxiously awaiting a call back as he was supposed to have left already. Please review and call pt back to advise as soon as possible. Thank you!     Action Taken: Message routed to:  Other: [facility] [place]  [[id number]]    Travel Screening: Not Applicable     Date of Service:

## 2025-07-09 NOTE — CONFIDENTIAL NOTE
"Received call transferred to Triage for patient regarding A-Fib.    Patient states he went into A-Fib yesterday and is in A-Fib again this morning per his [employer] Mobile. Patient states his heart rate this morning was 133. He was unable to obtain a reading while we were on the phone. Yesterday his heart rate was 117. Patient took metoprolol tartrate 25mg yesterday AM and took another one around 7am this morning. Patient is also on Eliquis 5mg BID.  *Patient is currently asymptomatic and states he feels good.*  Patient wants to go golfing this morning and wants to make sure he is ok to do that. He states they use a golf cart and does not walk the 18 holes.     Advised this will be sent to the team with Rachel Taylor to further follow-up. He verbalized understanding.   *Patient would like a call back by 8:45am before he goes golfing.*    1. DESCRIPTION: \"Please describe your heart rate or heartbeat that you are having\" (e.g., fast/slow, regular/irregular, skipped or extra beats, \"palpitations\") A-Fib.  2. ONSET: \"When did it start?\" (e.g., minutes, hours, days) Yesterday and again this AM.  3. DURATION: \"How long does it last\" (e.g., seconds, minutes, hours)  4. PATTERN \"Does it come and go, or has it been constant since it started?\" \"Does it get worse with exertion?\" \"Are you feeling it now?\" Currently asymptomatic.  5. TAP: \"Using your hand, can you tap out what you are feeling on a chair or table in front of you, so that I can hear?\" Note: Not all patients can do this.  6. HEART RATE: \"Can you tell me your heart rate?\" \"How many beats in 15 seconds?\" Note: Not all patients can do this. 133 this AM. Unable to obtain a reading while we are on the phone.  7. RECURRENT SYMPTOM: \"Have you ever had this before?\" If Yes, ask: \"When was the last time?\" and \"What happened that time?\" Yes.  8. CAUSE: \"What do you think is causing the palpitations?\" No palpitations.  9. CARDIAC HISTORY: \"Do you have any history of heart " "disease?\" (e.g., heart attack, angina, bypass surgery, angioplasty, arrhythmia) A-Fib.  10. OTHER SYMPTOMS: \"Do you have any other symptoms?\" (e.g., dizziness, chest pain, sweating, difficulty breathing) Asymptomatic.  11. PREGNANCY: \"Is there any chance you are pregnant?\" \"When was your last menstrual period?\"  "

## 2025-07-09 NOTE — TELEPHONE ENCOUNTER
Called patient regarding Afib.     Pt states he checks his "[employer] Mobile every morning and today it said Afib  bpm.   Pt took Metoprolol tartrate 25 mg (PRN dose when in afib) around 7:30am.   HR then improved to 113 bpm most recently.   Pt is completely asymptomatic.     Pt checked his BP on the phone   /64   HR 87    Pt take Metoprolol succinate 25 mg every evening.   Pt's question was if he can go golfing this morning.   Advised pt that it is ultimately up to him. Pt is not having symptoms. We would like him to have controlled Afib (under 100 bpm). Pt on AC and has not missed any doses.   Known afib. See last OV notes.     Pt states he will decide on what he will end up doing and give us a call if he has any concerns or further questions. Encouraged pt to be mindful of his HRs and his sxs.     Susie CHING  [facility]

## 2025-07-10 ENCOUNTER — TRANSFERRED RECORDS (OUTPATIENT)
Dept: HEALTH INFORMATION MANAGEMENT | Facility: CLINIC | Age: 80
End: 2025-07-10
Payer: COMMERCIAL

## 2025-07-21 ENCOUNTER — HOSPITAL ENCOUNTER (OUTPATIENT)
Dept: MRI IMAGING | Facility: CLINIC | Age: 80
Discharge: HOME | End: 2025-07-21
Attending: RADIOLOGY | Admitting: RADIOLOGY
Payer: COMMERCIAL

## 2025-07-21 DIAGNOSIS — C79.51 MALIGNANT NEOPLASM METASTATIC TO BONE (H): ICD-10-CM

## 2025-07-21 PROCEDURE — A9585 GADOBUTROL INJECTION: HCPCS | Performed by: RADIOLOGY

## 2025-07-21 PROCEDURE — 255N000002 HC RX 255 OP 636: Performed by: RADIOLOGY

## 2025-07-21 PROCEDURE — 72157 MRI CHEST SPINE W/O & W/DYE: CPT

## 2025-07-21 RX ORDER — GADOBUTROL 604.72 MG/ML
8 INJECTION INTRAVENOUS ONCE
Status: COMPLETED | OUTPATIENT
Start: 2025-07-21 | End: 2025-07-21

## 2025-07-21 RX ADMIN — GADOBUTROL 8 ML: 604.72 INJECTION INTRAVENOUS at 11:20

## 2025-08-06 ENCOUNTER — TRANSFERRED RECORDS (OUTPATIENT)
Dept: HEALTH INFORMATION MANAGEMENT | Facility: CLINIC | Age: 80
End: 2025-08-06
Payer: COMMERCIAL

## 2025-08-21 ENCOUNTER — TRANSFERRED RECORDS (OUTPATIENT)
Dept: HEALTH INFORMATION MANAGEMENT | Facility: CLINIC | Age: 80
End: 2025-08-21
Payer: COMMERCIAL

## 2025-08-25 ENCOUNTER — MYC REFILL (OUTPATIENT)
Dept: CARDIOLOGY | Facility: CLINIC | Age: 80
End: 2025-08-25
Payer: COMMERCIAL

## 2025-08-25 DIAGNOSIS — I25.10 CORONARY ARTERY DISEASE INVOLVING NATIVE CORONARY ARTERY OF NATIVE HEART WITHOUT ANGINA PECTORIS: ICD-10-CM

## 2025-08-25 RX ORDER — NITROGLYCERIN 0.4 MG/1
TABLET SUBLINGUAL
Qty: 25 TABLET | Refills: 1 | Status: SHIPPED | OUTPATIENT
Start: 2025-08-25

## (undated) DEVICE — SU STRATAFIXÂ  PDO 0 36MM MH TAPERPOINT 9" SXPD2B410

## (undated) DEVICE — CATH TRAY FOLEY COUDE SURESTEP 16FR W/URNE MTR STLK A304716A

## (undated) DEVICE — SOL NACL 0.9% INJ 250ML BAG 2B1322Q

## (undated) DEVICE — MANIFOLD NEPTUNE 4 PORT 700-20

## (undated) DEVICE — GLOVE PROTEXIS POWDER FREE 8.5 ORTHOPEDIC 2D73ET85

## (undated) DEVICE — DRAPE IOBAN INCISE 23X17" 6650EZ

## (undated) DEVICE — PACK TOTAL HIP W/U DRAPE SOP15HUFSC

## (undated) DEVICE — SUCTION IRR SYSTEM W/O TIP INTERPULSE HANDPIECE 0210-100-000

## (undated) DEVICE — WIPES FOLEY CARE SURESTEP PROVON DFC100

## (undated) DEVICE — SU ETHIBOND 2 V-37 4X30" MX69G

## (undated) DEVICE — BONE CLEANING TIP INTERPULSE  0210-010-000

## (undated) DEVICE — NDL 22GA 1.5"

## (undated) DEVICE — SYR 50ML LL W/O NDL 309653

## (undated) DEVICE — SU VICRYL 2-0 CP-1 27" UND J266H

## (undated) DEVICE — DRAPE STERI U 1015

## (undated) DEVICE — DRAPE SHEET REV FOLD 3/4 9349

## (undated) DEVICE — LINEN TOWEL PACK X5 5464

## (undated) DEVICE — HOOD FLYTE 0408-800-000

## (undated) DEVICE — DRAPE CONVERTORS U-DRAPE 60X72" 8476

## (undated) DEVICE — ESU PENCIL W/SMOKE EVAC NEPTUNE STRYKER 0703-046-000

## (undated) DEVICE — GLOVE PROTEXIS W/NEU-THERA 8.5  2D73TE85

## (undated) DEVICE — DRAPE IOBAN ISOLATION VERTICAL 320X21CM 6617

## (undated) DEVICE — GLOVE PROTEXIS BLUE W/NEU-THERA 7.0  2D73EB70

## (undated) DEVICE — SU MONOCRYL 3-0 PS-2 27" Y427H

## (undated) DEVICE — ESU BIPOLAR SEALER AQUAMANTYS 6MM 23-112-1

## (undated) DEVICE — BLADE SAW SAGITTAL STRK 19.5X95X1.27MM 2108-109-000S15

## (undated) DEVICE — ESU GROUND PAD UNIVERSAL W/O CORD

## (undated) DEVICE — Device

## (undated) DEVICE — HOOD FLYTE W/PEELAWAY 408-800-100

## (undated) DEVICE — SOL NACL 0.9% IRRIG 1000ML BOTTLE 07138-09

## (undated) DEVICE — SOL WATER IRRIG 1000ML BOTTLE 2F7114

## (undated) DEVICE — BNDG COBAN 4"X5YDS STERILE

## (undated) DEVICE — KIT PATIENT CARE HANA TABLE PROFX SUPINE 6855

## (undated) DEVICE — SU WND CLOSURE VLOC 180 ABS 2-0 12" GS-21 VLOCL0315

## (undated) DEVICE — DRSG TELFA ISLAND 4X8" 7541

## (undated) DEVICE — PREP CHLORAPREP 26ML TINTED ORANGE  260815

## (undated) DEVICE — BLADE KNIFE SURG 10 371110

## (undated) DEVICE — BLADE CLIPPER SGL USE 9680

## (undated) DEVICE — GLOVE PROTEXIS W/NEU-THERA 6.5  2D73TE65

## (undated) DEVICE — SU DERMABOND PRINEO 22CM CLR222US

## (undated) DEVICE — CATH TRAY FOLEY SURESTEP 16FR DRAIN BAG STATOCK A899916

## (undated) DEVICE — NDL 21GA 1.5"

## (undated) DEVICE — SOL BENZOIN 0.5OZ

## (undated) DEVICE — SUCTION TIP YANKAUER STR K87

## (undated) DEVICE — SU VICRYL 0 CTX CR 8X18" J764D

## (undated) DEVICE — SU WND CLOSURE VLOC 180 ABS 0 24" GS-25 VLOCL0436

## (undated) DEVICE — DRAPE C-ARM 60X42" 1013

## (undated) RX ORDER — CEFAZOLIN SODIUM 2 G/100ML
INJECTION, SOLUTION INTRAVENOUS
Status: DISPENSED
Start: 2018-10-09

## (undated) RX ORDER — HYDROMORPHONE HYDROCHLORIDE 1 MG/ML
INJECTION, SOLUTION INTRAMUSCULAR; INTRAVENOUS; SUBCUTANEOUS
Status: DISPENSED
Start: 2017-10-24

## (undated) RX ORDER — ONDANSETRON 2 MG/ML
INJECTION INTRAMUSCULAR; INTRAVENOUS
Status: DISPENSED
Start: 2018-10-09

## (undated) RX ORDER — LIDOCAINE HYDROCHLORIDE 20 MG/ML
INJECTION, SOLUTION EPIDURAL; INFILTRATION; INTRACAUDAL; PERINEURAL
Status: DISPENSED
Start: 2017-10-24

## (undated) RX ORDER — ACETAMINOPHEN 500 MG
TABLET ORAL
Status: DISPENSED
Start: 2018-10-09

## (undated) RX ORDER — ONDANSETRON 2 MG/ML
INJECTION INTRAMUSCULAR; INTRAVENOUS
Status: DISPENSED
Start: 2017-10-24

## (undated) RX ORDER — KETOROLAC TROMETHAMINE 30 MG/ML
INJECTION, SOLUTION INTRAMUSCULAR; INTRAVENOUS
Status: DISPENSED
Start: 2017-10-24

## (undated) RX ORDER — FENTANYL CITRATE 50 UG/ML
INJECTION, SOLUTION INTRAMUSCULAR; INTRAVENOUS
Status: DISPENSED
Start: 2018-10-09

## (undated) RX ORDER — ACETAMINOPHEN 500 MG
TABLET ORAL
Status: DISPENSED
Start: 2017-10-24

## (undated) RX ORDER — GLYCOPYRROLATE 0.2 MG/ML
INJECTION, SOLUTION INTRAMUSCULAR; INTRAVENOUS
Status: DISPENSED
Start: 2018-10-09

## (undated) RX ORDER — EPINEPHRINE 1 MG/ML
INJECTION, SOLUTION INTRAMUSCULAR; SUBCUTANEOUS
Status: DISPENSED
Start: 2017-10-24

## (undated) RX ORDER — LIDOCAINE HYDROCHLORIDE AND EPINEPHRINE 10; 10 MG/ML; UG/ML
INJECTION, SOLUTION INFILTRATION; PERINEURAL
Status: DISPENSED
Start: 2022-05-11

## (undated) RX ORDER — BUPIVACAINE HYDROCHLORIDE 5 MG/ML
INJECTION, SOLUTION EPIDURAL; INTRACAUDAL
Status: DISPENSED
Start: 2017-10-24

## (undated) RX ORDER — PROPOFOL 10 MG/ML
INJECTION, EMULSION INTRAVENOUS
Status: DISPENSED
Start: 2017-10-24

## (undated) RX ORDER — CEFAZOLIN SODIUM 1 G/3ML
INJECTION, POWDER, FOR SOLUTION INTRAMUSCULAR; INTRAVENOUS
Status: DISPENSED
Start: 2017-10-24

## (undated) RX ORDER — DEXAMETHASONE SODIUM PHOSPHATE 4 MG/ML
INJECTION, SOLUTION INTRA-ARTICULAR; INTRALESIONAL; INTRAMUSCULAR; INTRAVENOUS; SOFT TISSUE
Status: DISPENSED
Start: 2018-10-09

## (undated) RX ORDER — LIDOCAINE HYDROCHLORIDE 20 MG/ML
INJECTION, SOLUTION EPIDURAL; INFILTRATION; INTRACAUDAL; PERINEURAL
Status: DISPENSED
Start: 2018-10-09

## (undated) RX ORDER — CEFAZOLIN SODIUM 2 G/100ML
INJECTION, SOLUTION INTRAVENOUS
Status: DISPENSED
Start: 2017-10-24

## (undated) RX ORDER — VANCOMYCIN HYDROCHLORIDE 1 G/20ML
INJECTION, POWDER, LYOPHILIZED, FOR SOLUTION INTRAVENOUS
Status: DISPENSED
Start: 2018-10-09

## (undated) RX ORDER — BUPIVACAINE HYDROCHLORIDE AND EPINEPHRINE 5; 5 MG/ML; UG/ML
INJECTION, SOLUTION EPIDURAL; INTRACAUDAL; PERINEURAL
Status: DISPENSED
Start: 2018-10-09

## (undated) RX ORDER — HYDROMORPHONE HYDROCHLORIDE 1 MG/ML
INJECTION, SOLUTION INTRAMUSCULAR; INTRAVENOUS; SUBCUTANEOUS
Status: DISPENSED
Start: 2018-10-09

## (undated) RX ORDER — FENTANYL CITRATE 50 UG/ML
INJECTION, SOLUTION INTRAMUSCULAR; INTRAVENOUS
Status: DISPENSED
Start: 2017-10-24

## (undated) RX ORDER — NEOSTIGMINE METHYLSULFATE 1 MG/ML
VIAL (ML) INJECTION
Status: DISPENSED
Start: 2018-10-09

## (undated) RX ORDER — CEFAZOLIN SODIUM 1 G/3ML
INJECTION, POWDER, FOR SOLUTION INTRAMUSCULAR; INTRAVENOUS
Status: DISPENSED
Start: 2018-10-09

## (undated) RX ORDER — PREGABALIN 150 MG/1
CAPSULE ORAL
Status: DISPENSED
Start: 2018-10-09

## (undated) RX ORDER — CELECOXIB 200 MG/1
CAPSULE ORAL
Status: DISPENSED
Start: 2018-10-09

## (undated) RX ORDER — PROPOFOL 10 MG/ML
INJECTION, EMULSION INTRAVENOUS
Status: DISPENSED
Start: 2018-10-09

## (undated) RX ORDER — KETOROLAC TROMETHAMINE 30 MG/ML
INJECTION, SOLUTION INTRAMUSCULAR; INTRAVENOUS
Status: DISPENSED
Start: 2018-10-09

## (undated) RX ORDER — FENTANYL CITRATE 50 UG/ML
INJECTION, SOLUTION INTRAMUSCULAR; INTRAVENOUS
Status: DISPENSED
Start: 2023-10-23

## (undated) RX ORDER — DEXAMETHASONE SODIUM PHOSPHATE 4 MG/ML
INJECTION, SOLUTION INTRA-ARTICULAR; INTRALESIONAL; INTRAMUSCULAR; INTRAVENOUS; SOFT TISSUE
Status: DISPENSED
Start: 2017-10-24

## (undated) RX ORDER — GLYCOPYRROLATE 0.2 MG/ML
INJECTION, SOLUTION INTRAMUSCULAR; INTRAVENOUS
Status: DISPENSED
Start: 2017-10-24